# Patient Record
Sex: MALE | Race: OTHER | NOT HISPANIC OR LATINO | URBAN - METROPOLITAN AREA
[De-identification: names, ages, dates, MRNs, and addresses within clinical notes are randomized per-mention and may not be internally consistent; named-entity substitution may affect disease eponyms.]

---

## 2024-04-27 ENCOUNTER — INPATIENT (INPATIENT)
Facility: HOSPITAL | Age: 49
LOS: 10 days | Discharge: HOME | DRG: 434 | End: 2024-05-08
Attending: INTERNAL MEDICINE | Admitting: INTERNAL MEDICINE
Payer: MEDICAID

## 2024-04-27 VITALS
HEART RATE: 110 BPM | WEIGHT: 238.1 LBS | SYSTOLIC BLOOD PRESSURE: 130 MMHG | DIASTOLIC BLOOD PRESSURE: 75 MMHG | TEMPERATURE: 99 F | OXYGEN SATURATION: 93 % | RESPIRATION RATE: 17 BRPM | HEIGHT: 73 IN

## 2024-04-27 DIAGNOSIS — K70.10 ALCOHOLIC HEPATITIS WITHOUT ASCITES: ICD-10-CM

## 2024-04-27 DIAGNOSIS — K74.60 UNSPECIFIED CIRRHOSIS OF LIVER: ICD-10-CM

## 2024-04-27 DIAGNOSIS — R18.8 OTHER ASCITES: ICD-10-CM

## 2024-04-27 DIAGNOSIS — D64.9 ANEMIA, UNSPECIFIED: ICD-10-CM

## 2024-04-27 DIAGNOSIS — D69.6 THROMBOCYTOPENIA, UNSPECIFIED: ICD-10-CM

## 2024-04-27 DIAGNOSIS — Z29.9 ENCOUNTER FOR PROPHYLACTIC MEASURES, UNSPECIFIED: ICD-10-CM

## 2024-04-27 LAB
ALBUMIN SERPL ELPH-MCNC: 2.3 G/DL — LOW (ref 3.5–5)
ALP SERPL-CCNC: 123 U/L — HIGH (ref 40–120)
ALT FLD-CCNC: 37 U/L DA — SIGNIFICANT CHANGE UP (ref 10–60)
ANION GAP SERPL CALC-SCNC: 8 MMOL/L — SIGNIFICANT CHANGE UP (ref 5–17)
APAP SERPL-MCNC: <2 UG/ML — LOW (ref 10–30)
APPEARANCE UR: CLEAR — SIGNIFICANT CHANGE UP
APTT BLD: 37.5 SEC — HIGH (ref 24.5–35.6)
AST SERPL-CCNC: 81 U/L — HIGH (ref 10–40)
BACTERIA # UR AUTO: ABNORMAL /HPF
BILIRUB DIRECT SERPL-MCNC: 2.7 MG/DL — HIGH (ref 0–0.3)
BILIRUB SERPL-MCNC: 6.5 MG/DL — HIGH (ref 0.2–1.2)
BILIRUB UR-MCNC: ABNORMAL
BLD GP AB SCN SERPL QL: SIGNIFICANT CHANGE UP
BUN SERPL-MCNC: 10 MG/DL — SIGNIFICANT CHANGE UP (ref 7–18)
CALCIUM SERPL-MCNC: 8 MG/DL — LOW (ref 8.4–10.5)
CHLORIDE SERPL-SCNC: 106 MMOL/L — SIGNIFICANT CHANGE UP (ref 96–108)
CO2 SERPL-SCNC: 27 MMOL/L — SIGNIFICANT CHANGE UP (ref 22–31)
COLOR SPEC: SIGNIFICANT CHANGE UP
CREAT SERPL-MCNC: 0.78 MG/DL — SIGNIFICANT CHANGE UP (ref 0.5–1.3)
DIFF PNL FLD: NEGATIVE — SIGNIFICANT CHANGE UP
EGFR: 111 ML/MIN/1.73M2 — SIGNIFICANT CHANGE UP
EPI CELLS # UR: PRESENT
ETHANOL SERPL-MCNC: 8 MG/DL — SIGNIFICANT CHANGE UP (ref 0–10)
GLUCOSE SERPL-MCNC: 110 MG/DL — HIGH (ref 70–99)
GLUCOSE UR QL: NEGATIVE MG/DL — SIGNIFICANT CHANGE UP
HCT VFR BLD CALC: 25.6 % — LOW (ref 39–50)
HGB BLD-MCNC: 8.1 G/DL — LOW (ref 13–17)
HIV 1 & 2 AB SERPL IA.RAPID: SIGNIFICANT CHANGE UP
INR BLD: 1.97 RATIO — HIGH (ref 0.85–1.18)
KETONES UR-MCNC: 15 MG/DL
LEUKOCYTE ESTERASE UR-ACNC: ABNORMAL
LIDOCAIN IGE QN: 83 U/L — HIGH (ref 13–75)
MCHC RBC-ENTMCNC: 31.6 GM/DL — LOW (ref 32–36)
MCHC RBC-ENTMCNC: 32.4 PG — SIGNIFICANT CHANGE UP (ref 27–34)
MCV RBC AUTO: 102.4 FL — HIGH (ref 80–100)
NITRITE UR-MCNC: POSITIVE
NRBC # BLD: 0 /100 WBCS — SIGNIFICANT CHANGE UP (ref 0–0)
PH UR: 6.5 — SIGNIFICANT CHANGE UP (ref 5–8)
PLATELET # BLD AUTO: 93 K/UL — LOW (ref 150–400)
POTASSIUM SERPL-MCNC: 3 MMOL/L — LOW (ref 3.5–5.3)
POTASSIUM SERPL-SCNC: 3 MMOL/L — LOW (ref 3.5–5.3)
PROT SERPL-MCNC: 7 G/DL — SIGNIFICANT CHANGE UP (ref 6–8.3)
PROT UR-MCNC: ABNORMAL MG/DL
PROTHROM AB SERPL-ACNC: 22 SEC — HIGH (ref 9.5–13)
RBC # BLD: 2.5 M/UL — LOW (ref 4.2–5.8)
RBC # FLD: 16.1 % — HIGH (ref 10.3–14.5)
RBC CASTS # UR COMP ASSIST: 2 /HPF — SIGNIFICANT CHANGE UP (ref 0–4)
SODIUM SERPL-SCNC: 141 MMOL/L — SIGNIFICANT CHANGE UP (ref 135–145)
SP GR SPEC: 1.1 — HIGH (ref 1–1.03)
UROBILINOGEN FLD QL: >=8 MG/DL (ref 0.2–1)
WBC # BLD: 6.46 K/UL — SIGNIFICANT CHANGE UP (ref 3.8–10.5)
WBC # FLD AUTO: 6.46 K/UL — SIGNIFICANT CHANGE UP (ref 3.8–10.5)
WBC UR QL: 5 /HPF — SIGNIFICANT CHANGE UP (ref 0–5)

## 2024-04-27 PROCEDURE — 74177 CT ABD & PELVIS W/CONTRAST: CPT | Mod: 26,MC

## 2024-04-27 PROCEDURE — 71045 X-RAY EXAM CHEST 1 VIEW: CPT | Mod: 26

## 2024-04-27 PROCEDURE — 99285 EMERGENCY DEPT VISIT HI MDM: CPT

## 2024-04-27 RX ORDER — THIAMINE MONONITRATE (VIT B1) 100 MG
100 TABLET ORAL DAILY
Refills: 0 | Status: DISCONTINUED | OUTPATIENT
Start: 2024-04-27 | End: 2024-05-08

## 2024-04-27 RX ORDER — ACETAMINOPHEN 500 MG
650 TABLET ORAL EVERY 6 HOURS
Refills: 0 | Status: DISCONTINUED | OUTPATIENT
Start: 2024-04-27 | End: 2024-05-08

## 2024-04-27 RX ORDER — FUROSEMIDE 40 MG
20 TABLET ORAL DAILY
Refills: 0 | Status: DISCONTINUED | OUTPATIENT
Start: 2024-04-27 | End: 2024-05-03

## 2024-04-27 RX ORDER — LANOLIN ALCOHOL/MO/W.PET/CERES
3 CREAM (GRAM) TOPICAL AT BEDTIME
Refills: 0 | Status: DISCONTINUED | OUTPATIENT
Start: 2024-04-27 | End: 2024-04-28

## 2024-04-27 RX ORDER — POTASSIUM CHLORIDE 20 MEQ
40 PACKET (EA) ORAL ONCE
Refills: 0 | Status: COMPLETED | OUTPATIENT
Start: 2024-04-27 | End: 2024-04-27

## 2024-04-27 RX ORDER — POTASSIUM CHLORIDE 20 MEQ
10 PACKET (EA) ORAL
Refills: 0 | Status: COMPLETED | OUTPATIENT
Start: 2024-04-27 | End: 2024-04-27

## 2024-04-27 RX ORDER — FOLIC ACID 0.8 MG
1 TABLET ORAL DAILY
Refills: 0 | Status: DISCONTINUED | OUTPATIENT
Start: 2024-04-27 | End: 2024-05-08

## 2024-04-27 RX ORDER — SPIRONOLACTONE 25 MG/1
25 TABLET, FILM COATED ORAL DAILY
Refills: 0 | Status: DISCONTINUED | OUTPATIENT
Start: 2024-04-27 | End: 2024-05-03

## 2024-04-27 RX ORDER — PANTOPRAZOLE SODIUM 20 MG/1
80 TABLET, DELAYED RELEASE ORAL ONCE
Refills: 0 | Status: COMPLETED | OUTPATIENT
Start: 2024-04-27 | End: 2024-04-27

## 2024-04-27 RX ORDER — ONDANSETRON 8 MG/1
4 TABLET, FILM COATED ORAL EVERY 8 HOURS
Refills: 0 | Status: DISCONTINUED | OUTPATIENT
Start: 2024-04-27 | End: 2024-05-08

## 2024-04-27 RX ORDER — PANTOPRAZOLE SODIUM 20 MG/1
40 TABLET, DELAYED RELEASE ORAL
Refills: 0 | Status: DISCONTINUED | OUTPATIENT
Start: 2024-04-28 | End: 2024-05-08

## 2024-04-27 RX ORDER — SODIUM CHLORIDE 9 MG/ML
1000 INJECTION, SOLUTION INTRAVENOUS ONCE
Refills: 0 | Status: COMPLETED | OUTPATIENT
Start: 2024-04-27 | End: 2024-04-27

## 2024-04-27 RX ADMIN — Medication 100 MILLIEQUIVALENT(S): at 15:22

## 2024-04-27 RX ADMIN — Medication 3 MILLIGRAM(S): at 21:21

## 2024-04-27 RX ADMIN — Medication 650 MILLIGRAM(S): at 21:55

## 2024-04-27 RX ADMIN — SODIUM CHLORIDE 1000 MILLILITER(S): 9 INJECTION, SOLUTION INTRAVENOUS at 13:32

## 2024-04-27 RX ADMIN — Medication 100 MILLIEQUIVALENT(S): at 13:32

## 2024-04-27 RX ADMIN — Medication 650 MILLIGRAM(S): at 21:25

## 2024-04-27 RX ADMIN — PANTOPRAZOLE SODIUM 80 MILLIGRAM(S): 20 TABLET, DELAYED RELEASE ORAL at 12:16

## 2024-04-27 RX ADMIN — SODIUM CHLORIDE 1000 MILLILITER(S): 9 INJECTION, SOLUTION INTRAVENOUS at 12:16

## 2024-04-27 RX ADMIN — Medication 40 MILLIEQUIVALENT(S): at 13:32

## 2024-04-27 RX ADMIN — Medication 100 MILLIEQUIVALENT(S): at 16:49

## 2024-04-27 NOTE — H&P ADULT - PROBLEM SELECTOR PLAN 1
- p/w worsening new-onset ascites w/ abd pain + jaundice   - CT abd:  cirrhosis with portal hypertension, splenomegaly and ascites.  - likely in the setting of chronic ETOH use  - AST - 81, ALT - 37, ALP - 123. PT/INR - 22/1.97, Plt - 93, Alb - 2.3, SCr - 2.38  - Tbili - 6.8, Direct bili - 2.7  - MELD Na: 21 , Maddrey: 47.9.  - Start prednisone.   - Check hepatitis panel.   - F/U A1c, lipid profile.  - plan for dx and tx paracentesis - consult IR in the AM   - f/u septic w/u (ascitic analysis, BCx, UCx, and CXR)  - No tenderness on exam but will start ceftriaxone for SBP prophylaxis.   - Hepatology consulted: Dr. Vuong   - Consult GI for endoscopy on Monday. - p/w worsening new-onset ascites w/ abd pain + jaundice   - CT abd:  cirrhosis with portal hypertension, splenomegaly and ascites.  - likely in the setting of chronic ETOH use  - AST - 81, ALT - 37, ALP - 123. PT/INR - 22/1.97, Plt - 93, Alb - 2.3, SCr - 2.38  - Tbili - 6.8, Direct bili - 2.7  - MELD Na: 21 , Maddrey: 47.9.  - Start prednisone.   - Check hepatitis panel.   - F/U A1c, lipid profile.  - plan for dx and tx paracentesis - consult IR in the AM   - F/U UA, Bcx.   - No tenderness on exam but will start ceftriaxone for SBP prophylaxis.   - Hepatology consulted: Dr. Vuong   - Consult GI for endoscopy on Monday. - p/w worsening new-onset ascites w/ abd pain + jaundice   - CT abd:  cirrhosis with portal hypertension, splenomegaly and ascites.  - likely in the setting of chronic ETOH use  - AST - 81, ALT - 37, ALP - 123. PT/INR - 22/1.97, Plt - 93, Alb - 2.3, SCr - 2.38  - Tbili - 6.8, Direct bili - 2.7  - MELD Na: 21 , Maddrey: 47.9.  - Start prednisone.   - Check hepatitis panel.   - F/U A1c, lipid profile.  - plan for dx and tx paracentesis - consult IR in the AM   - F/U UA, Bcx.   - No tenderness on exam. Will hold off SBP prophylaxis until reccs from Hepatology.   - Hepatology consulted: Dr. Vuong   - Consult GI for endoscopy on Monday. - p/w worsening new-onset ascites w/ abd pain + jaundice   - CT abd:  cirrhosis with portal hypertension, splenomegaly and ascites.  - likely in the setting of chronic ETOH use  - AST - 81, ALT - 37, ALP - 123. PT/INR - 22/1.97, Plt - 93, Alb - 2.3, SCr - 2.38  - Tbili - 6.8, Direct bili - 2.7  - MELD Na: 21 , Maddrey: 47.9.  - Check hepatitis panel, and Hepatitis E, and Hepatitis B serologies.   - F/U A1c, lipid profile.  - plan for dx and tx paracentesis - consult IR in the AM   - F/U UA, Bcx.   - No tenderness on exam. Will hold off SBP prophylaxis until reccs from Hepatology.   - Hepatology consulted: Dr. Vuong   - Consult GI for endoscopy on Monday.

## 2024-04-27 NOTE — ED PROVIDER NOTE - OBJECTIVE STATEMENT
48-year-old male with no significant past medical history although does not see a doctor presents to the ED complaining of abdominal bloating and discomfort for a week and a half now.  Stool characteristics different from normal states that it is soft but it seems like it close around a hard stool.  admits heavy drinking but since have not for the past week and a half.  No Tylenol usage, no history of any hepatitis disorder.  No bleeding disorder.  No vomiting, no  fever, no rashes.

## 2024-04-27 NOTE — H&P ADULT - PROBLEM SELECTOR PLAN 3
- P/w abdominal distention and discomfort.  - CT abdomen pelvis patient has cirrhosis with portal hypertension, splenomegaly and ascites.   - Start lasix and spironolactone. - P/w Hb of 8.1 (unknown baseline)  - No signs of active bleeding.   - F/U ferritin, TIBC, total iron, reticulocyte count, haptoglobin, LDH.   - Maintain active T&S.   - Start Pantoprazole. - P/w abdominal distention and discomfort.  - CT abdomen pelvis patient has cirrhosis with portal hypertension, splenomegaly and ascites.   - Start lasix and spironolactone.  - F/U UA, Bcx.   - No tenderness on exam but will start ceftriaxone for SBP prophylaxis.  - plan for dx and tx paracentesis - consult IR in the AM

## 2024-04-27 NOTE — H&P ADULT - HISTORY OF PRESENT ILLNESS
Patient is a 47 y/o M with PMH of alcohol use disorder (has not seen a doctor for 10 years), who presented with 2 weeks history of worsening generalized abdominal pain, discomfort, and bloating. Patient reports that before this he did not feel his abdomen was getting bigger and he had no complains. Patient reports he started noticing bloating 2 weeks ago and it has worsened since then. Patient also reported that for the past few days he has also noticed that he is getting yellowing of his eyes and skin. Patient denies associated nausea/vomiting however reports decreased appetite for the past 2 weeks. Patient also complains of weakness, fatigue, malaise, Patient also denies any hematemesis, melena, hemtochezia, or BRBPR. Patient also denies moments of confusion. He also denies any SOB, chest pain, or LE swelling. He also denies easy bruising, or any bleeding. Patient reports he has been drinking alcohol since more 4-5 drinks of Vodka daily but his last drink was 2 weeks ago when his symptoms started. Patient also reports smoking a pack a day for 20-25 years. Patient denies using any illicit drugs. Patient also denies any history of transfusions, IV drug use, hepatitis B or C virus, or family history of liver disease. Patient denies any recent fevers, chills, headache, dizziness, lightheadedness, chest pain, palpitations, shortness of breath, cough, diarrhea, constipation, melena, hematochezia, dysuria, urinary frequency, or urgency. Patient denies any other complains at this time.

## 2024-04-27 NOTE — H&P ADULT - PROBLEM SELECTOR PLAN 5
- P/w Plt of 93 (unknown baseline)  - Likely 2/2 to cirrhosis. - Hold DVT prophylaxis in setting of anemia and thrombocytopenia.   - SCDs.

## 2024-04-27 NOTE — H&P ADULT - PROBLEM SELECTOR PLAN 4
- P/w Hb of 8.1 (unknown baseline)  - No signs of active bleeding.   - F/U ferritin, TIBC, total iron, reticulocyte count, haptoglobin, LDH.   - Maintain active T&S.   - Start Pantoprazole. - P/w Plt of 93 (unknown baseline)  - Likely 2/2 to cirrhosis.

## 2024-04-27 NOTE — ED PROVIDER NOTE - CLINICAL SUMMARY MEDICAL DECISION MAKING FREE TEXT BOX
48-year-old male with no significant past medical history although does not see a doctor presents to the ED complaining of abdominal bloating and discomfort for a week and a half now.  Stool characteristics different from normal states that it is soft but it seems like it close around a hard stool.  admits heavy drinking but since have not for the past week and a half.  No Tylenol usage, no history of any hepatitis disorder.  No bleeding disorder.  No vomiting, no  fever, no rashes.    likely cirrhosis due to alcohol use- labs, ct, meds, admit

## 2024-04-27 NOTE — H&P ADULT - NSHPPHYSICALEXAM_GEN_ALL_CORE
PHYSICAL EXAM:  GENERAL: NAD, speaks in full sentences, no signs of respiratory distress  HEAD:  Atraumatic, Normocephalic  EYES: EOMI, PERRLA, scleral icterus +ve  NECK: Supple, +spider angiomatas.   CHEST/LUNG: Clear to auscultation bilaterally; No wheeze; No crackles; No accessory muscles used  HEART: Regular rate and rhythm; No murmurs;   ABDOMEN: tense, Nontender, distended; Bowel sounds present; No guarding, +ve fluid thrill.   EXTREMITIES:  2+ Peripheral Pulses, No edema  PSYCH: AAOx3  NEUROLOGY: non-focal, no tremors.   SKIN: +spider angiomatas.

## 2024-04-27 NOTE — H&P ADULT - ASSESSMENT
Patient is a 49 y/o M with PMH of alcohol use disorder (has not seen a doctor for 10 years), who presented with 2 weeks history of worsening generalized abdominal pain, discomfort, and bloating. On CT abdomen pelvis patient has cirrhosis with portal hypertension, splenomegaly and ascites. Patient is being admitted for further work up and management of newly diagnosed cirrhosis w/ ascites.  Patient is a 49 y/o M with PMH of alcohol use disorder (has not seen a doctor for 10 years), who presented with 2 weeks history of worsening generalized abdominal pain, discomfort, and bloating. On CT abdomen pelvis patient has cirrhosis with portal hypertension, splenomegaly and ascites. Patient is being admitted for further work up and management of newly diagnosed cirrhosis w/ ascites.     - +sceral icterus, dye erythema, spider telectacias, tense abd, fluid shift       Patient is a 47 y/o M with PMH of alcohol use disorder (has not seen a doctor for 10 years), who presented with 2 weeks history of worsening generalized abdominal pain, discomfort, and bloating. On CT abdomen pelvis patient has cirrhosis with portal hypertension, splenomegaly and ascites. Patient is being admitted for further work up and management of newly diagnosed cirrhosis w/ ascites.

## 2024-04-27 NOTE — ED ADULT NURSE NOTE - NSFALLUNIVINTERV_ED_ALL_ED
Bed/Stretcher in lowest position, wheels locked, appropriate side rails in place/Call bell, personal items and telephone in reach/Instruct patient to call for assistance before getting out of bed/chair/stretcher/Non-slip footwear applied when patient is off stretcher/[place] to call system/Physically safe environment - no spills, clutter or unnecessary equipment/Purposeful proactive rounding/Room/bathroom lighting operational, light cord in reach

## 2024-04-27 NOTE — ED PROVIDER NOTE - CONSTITUTIONAL, MLM
normal... awake, alert, oriented to person, place, time/situation and in no apparent distress. jaundice

## 2024-04-27 NOTE — ED PROVIDER NOTE - PROGRESS NOTE DETAILS
sorensen: Workup consistent with cirrhosis.  Patient hemodynamically stable no respiratory distress.  Will admit as patient has no follow-up

## 2024-04-27 NOTE — H&P ADULT - PROBLEM SELECTOR PLAN 2
- P/w abdominal distention and discomfort.  - CT abdomen pelvis patient has cirrhosis with portal hypertension, splenomegaly and ascites.   - Start lasix and spironolactone.  - F/U UA, Bcx.   - No tenderness on exam but will start ceftriaxone for SBP prophylaxis.  - plan for dx and tx paracentesis - consult IR in the AM - Meets criteria of alcoholic hepatitis with transaminitis, jaundice, history of heavy alcohol use, elevated PT/INR,  -  Maddrey: 47.9.  - Start prednisone.   - Consulted hepatology - Dr. Vuong. - Meets criteria of alcoholic hepatitis with transaminitis, jaundice, history of heavy alcohol use, elevated PT/INR,  -  Maddrey: 47.9.  - Consulted hepatology - Dr. Vuong.  - F/U Bcx, Ua, CXR.   - Will hold off on starting steroids until 24-28 hours of negative sepsis work up.

## 2024-04-27 NOTE — ED ADULT NURSE NOTE - OBJECTIVE STATEMENT
49 y/o male ambulatory Pt  A &o x3, Pt referred to ED from Urgent Care for, abdominal pain x 1week with worsening abdominal distention.  Pt sclera is also jaundice. Pt chest pain, SOB, nausea, vomiting, dizziness, fever, cough, chills.

## 2024-04-27 NOTE — PATIENT PROFILE ADULT - FALL HARM RISK - RISK INTERVENTIONS

## 2024-04-28 LAB
A1C WITH ESTIMATED AVERAGE GLUCOSE RESULT: 4.1 % — SIGNIFICANT CHANGE UP (ref 4–5.6)
ALBUMIN SERPL ELPH-MCNC: 2 G/DL — LOW (ref 3.5–5)
ALP SERPL-CCNC: 131 U/L — HIGH (ref 40–120)
ALT FLD-CCNC: 32 U/L DA — SIGNIFICANT CHANGE UP (ref 10–60)
ANION GAP SERPL CALC-SCNC: 5 MMOL/L — SIGNIFICANT CHANGE UP (ref 5–17)
AST SERPL-CCNC: 67 U/L — HIGH (ref 10–40)
BILIRUB DIRECT SERPL-MCNC: 2.2 MG/DL — HIGH (ref 0–0.3)
BILIRUB INDIRECT FLD-MCNC: 1.5 MG/DL — HIGH (ref 0.2–1)
BILIRUB SERPL-MCNC: 3.7 MG/DL — HIGH (ref 0.2–1.2)
BUN SERPL-MCNC: 9 MG/DL — SIGNIFICANT CHANGE UP (ref 7–18)
CALCIUM SERPL-MCNC: 8 MG/DL — LOW (ref 8.4–10.5)
CHLORIDE SERPL-SCNC: 108 MMOL/L — SIGNIFICANT CHANGE UP (ref 96–108)
CHOLEST SERPL-MCNC: 74 MG/DL — SIGNIFICANT CHANGE UP
CO2 SERPL-SCNC: 28 MMOL/L — SIGNIFICANT CHANGE UP (ref 22–31)
CREAT SERPL-MCNC: 0.66 MG/DL — SIGNIFICANT CHANGE UP (ref 0.5–1.3)
EGFR: 116 ML/MIN/1.73M2 — SIGNIFICANT CHANGE UP
ESTIMATED AVERAGE GLUCOSE: 71 MG/DL — SIGNIFICANT CHANGE UP (ref 68–114)
FERRITIN SERPL-MCNC: 202 NG/ML — SIGNIFICANT CHANGE UP (ref 30–400)
GLUCOSE SERPL-MCNC: 101 MG/DL — HIGH (ref 70–99)
HAPTOGLOB SERPL-MCNC: 40 MG/DL — SIGNIFICANT CHANGE UP (ref 34–200)
HAV IGM SER-ACNC: SIGNIFICANT CHANGE UP
HBV CORE AB SER-ACNC: SIGNIFICANT CHANGE UP
HBV CORE IGM SER-ACNC: SIGNIFICANT CHANGE UP
HBV CORE IGM SER-ACNC: SIGNIFICANT CHANGE UP
HBV SURFACE AB SER-ACNC: SIGNIFICANT CHANGE UP
HBV SURFACE AG SER-ACNC: SIGNIFICANT CHANGE UP
HBV SURFACE AG SER-ACNC: SIGNIFICANT CHANGE UP
HCT VFR BLD CALC: 22.3 % — LOW (ref 39–50)
HCT VFR BLD CALC: 22.8 % — LOW (ref 39–50)
HCV AB S/CO SERPL IA: 0.64 S/CO — SIGNIFICANT CHANGE UP (ref 0–0.99)
HCV AB S/CO SERPL IA: 0.68 S/CO — SIGNIFICANT CHANGE UP (ref 0–0.99)
HCV AB SERPL-IMP: SIGNIFICANT CHANGE UP
HCV AB SERPL-IMP: SIGNIFICANT CHANGE UP
HDLC SERPL-MCNC: 31 MG/DL — LOW
HGB BLD-MCNC: 7 G/DL — CRITICAL LOW (ref 13–17)
HGB BLD-MCNC: 7.3 G/DL — LOW (ref 13–17)
IRON SATN MFR SERPL: 13 UG/DL — LOW (ref 65–170)
IRON SATN MFR SERPL: 6 % — LOW (ref 20–55)
LDH SERPL L TO P-CCNC: 163 U/L — SIGNIFICANT CHANGE UP (ref 120–225)
LIPID PNL WITH DIRECT LDL SERPL: 33 MG/DL — SIGNIFICANT CHANGE UP
MAGNESIUM SERPL-MCNC: 1.7 MG/DL — SIGNIFICANT CHANGE UP (ref 1.6–2.6)
MCHC RBC-ENTMCNC: 31.4 GM/DL — LOW (ref 32–36)
MCHC RBC-ENTMCNC: 32 GM/DL — SIGNIFICANT CHANGE UP (ref 32–36)
MCHC RBC-ENTMCNC: 32.3 PG — SIGNIFICANT CHANGE UP (ref 27–34)
MCHC RBC-ENTMCNC: 32.6 PG — SIGNIFICANT CHANGE UP (ref 27–34)
MCV RBC AUTO: 101.8 FL — HIGH (ref 80–100)
MCV RBC AUTO: 102.8 FL — HIGH (ref 80–100)
NON HDL CHOLESTEROL: 43 MG/DL — SIGNIFICANT CHANGE UP
NRBC # BLD: 0 /100 WBCS — SIGNIFICANT CHANGE UP (ref 0–0)
NRBC # BLD: 0 /100 WBCS — SIGNIFICANT CHANGE UP (ref 0–0)
PHOSPHATE SERPL-MCNC: 2.4 MG/DL — LOW (ref 2.5–4.5)
PLATELET # BLD AUTO: 74 K/UL — LOW (ref 150–400)
PLATELET # BLD AUTO: 74 K/UL — LOW (ref 150–400)
POTASSIUM SERPL-MCNC: 3.6 MMOL/L — SIGNIFICANT CHANGE UP (ref 3.5–5.3)
POTASSIUM SERPL-SCNC: 3.6 MMOL/L — SIGNIFICANT CHANGE UP (ref 3.5–5.3)
PROT SERPL-MCNC: 6.3 G/DL — SIGNIFICANT CHANGE UP (ref 6–8.3)
RBC # BLD: 2.17 M/UL — LOW (ref 4.2–5.8)
RBC # BLD: 2.17 M/UL — LOW (ref 4.2–5.8)
RBC # BLD: 2.24 M/UL — LOW (ref 4.2–5.8)
RBC # FLD: 16.2 % — HIGH (ref 10.3–14.5)
RBC # FLD: 16.3 % — HIGH (ref 10.3–14.5)
RETICS #: 82.5 K/UL — SIGNIFICANT CHANGE UP (ref 25–125)
RETICS/RBC NFR: 3.8 % — HIGH (ref 0.5–2.5)
SODIUM SERPL-SCNC: 141 MMOL/L — SIGNIFICANT CHANGE UP (ref 135–145)
TIBC SERPL-MCNC: 221 UG/DL — LOW (ref 250–450)
TRIGL SERPL-MCNC: 52 MG/DL — SIGNIFICANT CHANGE UP
UIBC SERPL-MCNC: 208 UG/DL — SIGNIFICANT CHANGE UP (ref 110–370)
WBC # BLD: 5.23 K/UL — SIGNIFICANT CHANGE UP (ref 3.8–10.5)
WBC # BLD: 5.78 K/UL — SIGNIFICANT CHANGE UP (ref 3.8–10.5)
WBC # FLD AUTO: 5.23 K/UL — SIGNIFICANT CHANGE UP (ref 3.8–10.5)
WBC # FLD AUTO: 5.78 K/UL — SIGNIFICANT CHANGE UP (ref 3.8–10.5)

## 2024-04-28 RX ORDER — LANOLIN ALCOHOL/MO/W.PET/CERES
5 CREAM (GRAM) TOPICAL AT BEDTIME
Refills: 0 | Status: COMPLETED | OUTPATIENT
Start: 2024-04-28 | End: 2024-05-05

## 2024-04-28 RX ADMIN — SPIRONOLACTONE 25 MILLIGRAM(S): 25 TABLET, FILM COATED ORAL at 05:24

## 2024-04-28 RX ADMIN — Medication 1 MILLIGRAM(S): at 11:58

## 2024-04-28 RX ADMIN — Medication 20 MILLIGRAM(S): at 05:24

## 2024-04-28 RX ADMIN — PANTOPRAZOLE SODIUM 40 MILLIGRAM(S): 20 TABLET, DELAYED RELEASE ORAL at 05:24

## 2024-04-28 RX ADMIN — Medication 100 MILLIGRAM(S): at 11:58

## 2024-04-28 RX ADMIN — Medication 5 MILLIGRAM(S): at 23:37

## 2024-04-28 NOTE — PROGRESS NOTE ADULT - PROBLEM SELECTOR PLAN 2
- Meets criteria of alcoholic hepatitis with transaminitis, jaundice, history of heavy alcohol use, elevated PT/INR,  -hepatology eval

## 2024-04-28 NOTE — PROGRESS NOTE ADULT - ASSESSMENT
Patient is a 49 y/o M with PMH of alcohol use disorder (has not seen a doctor for 10 years), who presented with 2 weeks history of worsening generalized abdominal pain, discomfort, and bloating. On CT abdomen pelvis patient has cirrhosis with portal hypertension, splenomegaly and ascites. Patient is being admitted for further work up and management of newly diagnosed cirrhosis w/ ascites.

## 2024-04-28 NOTE — CHART NOTE - NSCHARTNOTEFT_GEN_A_CORE
++++++++++++++++++INCOMPLETE+++++++++++++++ EVENT:  UA positive.      Vital Signs Last 24 Hrs  T(C): 36.8 (28 Apr 2024 09:01), Max: 37.4 (28 Apr 2024 01:00)  T(F): 98.2 (28 Apr 2024 09:01), Max: 99.3 (28 Apr 2024 01:00)  HR: 97 (28 Apr 2024 09:01) (89 - 110)  BP: 114/67 (28 Apr 2024 09:01) (108/50 - 130/75)  BP(mean): 69 (28 Apr 2024 01:00) (69 - 69)  RR: 18 (28 Apr 2024 09:01) (16 - 19)  SpO2: 95% (28 Apr 2024 09:01) (93% - 97%)    Parameters below as of 28 Apr 2024 09:01  Patient On (Oxygen Delivery Method): nasal cannula  O2 Flow (L/min): 2      LABS:                        7.0    5.78  )-----------( 74       ( 28 Apr 2024 05:45 )             22.3     04-28    141  |  108  |  9   ----------------------------<  101<H>  3.6   |  28  |  0.66    Ca    8.0<L>      28 Apr 2024 05:45  Phos  2.4     04-28  Mg     1.7     04-28    TPro  6.3  /  Alb  2.0<L>  /  TBili  3.7<H>  /  DBili  2.2<H>  /  AST  67<H>  /  ALT  32  /  AlkPhos  131<H>  04-28      EKG:   IMAGING:    ASSESSMENT:  47 y/o, M, with PMH of Alcohol use disorder (has not seen a doctor for 10 years), who presented with 2 weeks history of worsening generalized abdominal pain, discomfort, and bloating.  Admitted newly diagnosed cirrhosis w/ ascites.    - UA positive    - According to system no Ucx received in lab.    - NP ordered Ucx that was cancelled by lab.  NP called lab to inquire why test was cancelled and  stated the system cancelled test.    - Holding abx in asymptomatic pt in s/o decompensated cirrhosis.    - Continue to monitor and start abx.     - F/u Ucx. EVENT:  UA positive.      Vital Signs Last 24 Hrs  T(C): 36.8 (28 Apr 2024 09:01), Max: 37.4 (28 Apr 2024 01:00)  T(F): 98.2 (28 Apr 2024 09:01), Max: 99.3 (28 Apr 2024 01:00)  HR: 97 (28 Apr 2024 09:01) (89 - 110)  BP: 114/67 (28 Apr 2024 09:01) (108/50 - 130/75)  BP(mean): 69 (28 Apr 2024 01:00) (69 - 69)  RR: 18 (28 Apr 2024 09:01) (16 - 19)  SpO2: 95% (28 Apr 2024 09:01) (93% - 97%)    Parameters below as of 28 Apr 2024 09:01  Patient On (Oxygen Delivery Method): nasal cannula  O2 Flow (L/min): 2      LABS:                        7.0    5.78  )-----------( 74       ( 28 Apr 2024 05:45 )             22.3     04-28    141  |  108  |  9   ----------------------------<  101<H>  3.6   |  28  |  0.66    Ca    8.0<L>      28 Apr 2024 05:45  Phos  2.4     04-28  Mg     1.7     04-28    TPro  6.3  /  Alb  2.0<L>  /  TBili  3.7<H>  /  DBili  2.2<H>  /  AST  67<H>  /  ALT  32  /  AlkPhos  131<H>  04-28      EKG:   IMAGING:    ASSESSMENT:  47 y/o, M, with PMH of Alcohol use disorder (has not seen a doctor for 10 years), who presented with 2 weeks history of worsening generalized abdominal pain, discomfort, and bloating.  Admitted newly diagnosed cirrhosis w/ ascites.    - UA positive.    - According to system no Ucx received in lab.    - NP ordered Ucx that was cancelled by lab.  NP called lab to inquire why test was cancelled and  stated the system cancelled test.    - Holding abx in asymptomatic pt in s/o decompensated cirrhosis.    - Continue to monitor and start abx.     - F/u Ucx.    Of note, NP discussed bedside Para w/ ICU NP.  F/u ICU NP or IR on Mon for Para. EVENT:  UA positive.      Vital Signs Last 24 Hrs  T(C): 36.8 (28 Apr 2024 09:01), Max: 37.4 (28 Apr 2024 01:00)  T(F): 98.2 (28 Apr 2024 09:01), Max: 99.3 (28 Apr 2024 01:00)  HR: 97 (28 Apr 2024 09:01) (89 - 110)  BP: 114/67 (28 Apr 2024 09:01) (108/50 - 130/75)  BP(mean): 69 (28 Apr 2024 01:00) (69 - 69)  RR: 18 (28 Apr 2024 09:01) (16 - 19)  SpO2: 95% (28 Apr 2024 09:01) (93% - 97%)    Parameters below as of 28 Apr 2024 09:01  Patient On (Oxygen Delivery Method): nasal cannula  O2 Flow (L/min): 2      LABS:                        7.0    5.78  )-----------( 74       ( 28 Apr 2024 05:45 )             22.3     04-28    141  |  108  |  9   ----------------------------<  101<H>  3.6   |  28  |  0.66    Ca    8.0<L>      28 Apr 2024 05:45  Phos  2.4     04-28  Mg     1.7     04-28    TPro  6.3  /  Alb  2.0<L>  /  TBili  3.7<H>  /  DBili  2.2<H>  /  AST  67<H>  /  ALT  32  /  AlkPhos  131<H>  04-28      EKG:   IMAGING:    ASSESSMENT:  49 y/o, M, with PMH of Alcohol use disorder (has not seen a doctor for 10 years), who presented with 2 weeks history of worsening generalized abdominal pain, discomfort, and bloating.  Admitted for newly diagnosed cirrhosis w/ ascites.    - UA positive.    - According to system no Ucx received in lab.    - NP ordered Ucx that was cancelled by lab.  NP called lab to inquire why test was cancelled and  stated the system cancelled test.    - Holding abx in asymptomatic pt in s/o decompensated cirrhosis.    - Continue to monitor and start abx.     - F/u Ucx.    Of note, NP discussed bedside Para w/ ICU NP.  F/u ICU NP or IR on Mon for Para.

## 2024-04-28 NOTE — PROGRESS NOTE ADULT - SUBJECTIVE AND OBJECTIVE BOX
SUBJECTIVE / OVERNIGHT EVENTS:pt seen and examined  04-28-24    MEDICATIONS  (STANDING):  folic acid 1 milliGRAM(s) Oral daily  furosemide    Tablet 20 milliGRAM(s) Oral daily  pantoprazole    Tablet 40 milliGRAM(s) Oral before breakfast  spironolactone 25 milliGRAM(s) Oral daily  thiamine 100 milliGRAM(s) Oral daily    MEDICATIONS  (PRN):  acetaminophen     Tablet .. 650 milliGRAM(s) Oral every 6 hours PRN Temp greater or equal to 38C (100.4F), Mild Pain (1 - 3)  LORazepam   Injectable 1 milliGRAM(s) IV Push every 2 hours PRN CIWA-Ar score increase by 2 points and a total score of 7 or less  LORazepam   Injectable 1 milliGRAM(s) IV Push every 1 hour PRN CIWA-Ar score 8 or greater  melatonin 5 milliGRAM(s) Oral at bedtime PRN Insomnia  ondansetron Injectable 4 milliGRAM(s) IV Push every 8 hours PRN Nausea and/or Vomiting    T(C): 37.1 (04-28-24 @ 21:00), Max: 37.4 (04-28-24 @ 01:00)  HR: 87 (04-28-24 @ 21:00) (87 - 97)  BP: 126/71 (04-28-24 @ 21:00) (108/50 - 126/71)  RR: 18 (04-28-24 @ 21:00) (17 - 18)  SpO2: 97% (04-28-24 @ 21:00) (93% - 97%)    CAPILLARY BLOOD GLUCOSE        I&O's Summary      Constitutional: No fever, fatigue  Skin: No rash.  Eyes: No recent vision problems or eye pain.  ENT: No congestion, ear pain, or sore throat.  Cardiovascular: No chest pain or palpation.  Respiratory: No cough, shortness of breath, congestion, or wheezing.  Gastrointestinal: No abdominal pain, nausea, vomiting, or diarrhea.  Genitourinary: No dysuria.  Musculoskeletal: No joint swelling.  Neurologic: No headache.    PHYSICAL EXAM:  GENERAL: NAD  EYES: EOMI, PERRLA  NECK: Supple, No JVD  CHEST/LUNG: dec breath sounds at bases  HEART:  S1 , S2 +  ABDOMEN: soft , bs+, distension+, no tenderness  EXTREMITIES:  no edema,   NEUROLOGY:alert awake      LABS:                        7.3    5.23  )-----------( 74       ( 28 Apr 2024 13:50 )             22.8     04-28    141  |  108  |  9   ----------------------------<  101<H>  3.6   |  28  |  0.66    Ca    8.0<L>      28 Apr 2024 05:45  Phos  2.4     04-28  Mg     1.7     04-28    TPro  6.3  /  Alb  2.0<L>  /  TBili  3.7<H>  /  DBili  2.2<H>  /  AST  67<H>  /  ALT  32  /  AlkPhos  131<H>  04-28    PT/INR - ( 27 Apr 2024 12:10 )   PT: 22.0 sec;   INR: 1.97 ratio         PTT - ( 27 Apr 2024 12:10 )  PTT:37.5 sec      Urinalysis Basic - ( 28 Apr 2024 05:45 )    Color: x / Appearance: x / SG: x / pH: x  Gluc: 101 mg/dL / Ketone: x  / Bili: x / Urobili: x   Blood: x / Protein: x / Nitrite: x   Leuk Esterase: x / RBC: x / WBC x   Sq Epi: x / Non Sq Epi: x / Bacteria: x        RADIOLOGY & ADDITIONAL TESTS:    Imaging Personally Reviewed:    Consultant(s) Notes Reviewed:      Care Discussed with Consultants/Other Providers:

## 2024-04-28 NOTE — PROGRESS NOTE ADULT - PROBLEM SELECTOR PLAN 1
- p/w worsening new-onset ascites w/ abd pain + jaundice   - CT abd:  cirrhosis with portal hypertension, splenomegaly and ascites.  - likely in the setting of chronic ETOH use  - AST - 81, ALT - 37, ALP - 123. PT/INR - 22/1.97, Plt - 93, Alb - 2.3, SCr - 2.38  - Tbili - 6.8, Direct bili - 2.7  - MELD Na: 21 , Maddrey: 47.9.  - Check hepatitis panel, and Hepatitis E, and Hepatitis B serologies.   - F/U A1c, lipid profile.  - plan for dx and tx paracentesis - consult IR in the AM   - F/U UA, Bcx.   - No tenderness on exam. Will hold off SBP prophylaxis until reccs from Hepatology.   - Hepatology consulted: Dr. Vuong   - Consult GI for endoscopy on Monday.

## 2024-04-28 NOTE — PROGRESS NOTE ADULT - PROBLEM SELECTOR PLAN 3
- P/w abdominal distention and discomfort.  - CT abdomen pelvis patient has cirrhosis with portal hypertension, splenomegaly and ascites.   - Start lasix and spironolactone.  - F/U UA, Bcx.   - No tenderness on exam but will start ceftriaxone for SBP prophylaxis.  - plan for dx and tx paracentesis - consult IR in the AM

## 2024-04-28 NOTE — PROGRESS NOTE ADULT - PROBLEM SELECTOR PLAN 4
- P/w Hb of 8.1 (unknown baseline)  - No signs of active bleeding.   - F/U ferritin, TIBC, total iron, reticulocyte count, haptoglobin, LDH.   - Maintain active T&S.    Pantoprazole.

## 2024-04-29 DIAGNOSIS — Z75.8 OTHER PROBLEMS RELATED TO MEDICAL FACILITIES AND OTHER HEALTH CARE: ICD-10-CM

## 2024-04-29 LAB
ALBUMIN SERPL ELPH-MCNC: 2 G/DL — LOW (ref 3.5–5)
ALP SERPL-CCNC: 135 U/L — HIGH (ref 40–120)
ALT FLD-CCNC: 30 U/L DA — SIGNIFICANT CHANGE UP (ref 10–60)
ANION GAP SERPL CALC-SCNC: 6 MMOL/L — SIGNIFICANT CHANGE UP (ref 5–17)
APTT BLD: 39.5 SEC — HIGH (ref 24.5–35.6)
AST SERPL-CCNC: 66 U/L — HIGH (ref 10–40)
BASOPHILS # BLD AUTO: 0.04 K/UL — SIGNIFICANT CHANGE UP (ref 0–0.2)
BASOPHILS NFR BLD AUTO: 0.7 % — SIGNIFICANT CHANGE UP (ref 0–2)
BILIRUB DIRECT SERPL-MCNC: 1.7 MG/DL — HIGH (ref 0–0.3)
BILIRUB INDIRECT FLD-MCNC: 1.6 MG/DL — HIGH (ref 0.2–1)
BILIRUB SERPL-MCNC: 3.3 MG/DL — HIGH (ref 0.2–1.2)
BUN SERPL-MCNC: 8 MG/DL — SIGNIFICANT CHANGE UP (ref 7–18)
CALCIUM SERPL-MCNC: 7.6 MG/DL — LOW (ref 8.4–10.5)
CHLORIDE SERPL-SCNC: 107 MMOL/L — SIGNIFICANT CHANGE UP (ref 96–108)
CO2 SERPL-SCNC: 28 MMOL/L — SIGNIFICANT CHANGE UP (ref 22–31)
CREAT SERPL-MCNC: 0.6 MG/DL — SIGNIFICANT CHANGE UP (ref 0.5–1.3)
EGFR: 120 ML/MIN/1.73M2 — SIGNIFICANT CHANGE UP
EOSINOPHIL # BLD AUTO: 0.16 K/UL — SIGNIFICANT CHANGE UP (ref 0–0.5)
EOSINOPHIL NFR BLD AUTO: 2.9 % — SIGNIFICANT CHANGE UP (ref 0–6)
GLUCOSE SERPL-MCNC: 89 MG/DL — SIGNIFICANT CHANGE UP (ref 70–99)
HBV E AB SER-ACNC: SIGNIFICANT CHANGE UP
HBV E AG SER-ACNC: SIGNIFICANT CHANGE UP
HCT VFR BLD CALC: 22.6 % — LOW (ref 39–50)
HGB BLD-MCNC: 7.2 G/DL — LOW (ref 13–17)
IMM GRANULOCYTES NFR BLD AUTO: 0.2 % — SIGNIFICANT CHANGE UP (ref 0–0.9)
INR BLD: 2.05 RATIO — HIGH (ref 0.85–1.18)
LYMPHOCYTES # BLD AUTO: 2.18 K/UL — SIGNIFICANT CHANGE UP (ref 1–3.3)
LYMPHOCYTES # BLD AUTO: 39.4 % — SIGNIFICANT CHANGE UP (ref 13–44)
MAGNESIUM SERPL-MCNC: 1.7 MG/DL — SIGNIFICANT CHANGE UP (ref 1.6–2.6)
MCHC RBC-ENTMCNC: 31.9 GM/DL — LOW (ref 32–36)
MCHC RBC-ENTMCNC: 32.6 PG — SIGNIFICANT CHANGE UP (ref 27–34)
MCV RBC AUTO: 102.3 FL — HIGH (ref 80–100)
MELD SCORE WITH DIALYSIS: 32 POINTS — SIGNIFICANT CHANGE UP
MELD SCORE WITHOUT DIALYSIS: 19 POINTS — SIGNIFICANT CHANGE UP
MONOCYTES # BLD AUTO: 0.74 K/UL — SIGNIFICANT CHANGE UP (ref 0–0.9)
MONOCYTES NFR BLD AUTO: 13.4 % — SIGNIFICANT CHANGE UP (ref 2–14)
NEUTROPHILS # BLD AUTO: 2.41 K/UL — SIGNIFICANT CHANGE UP (ref 1.8–7.4)
NEUTROPHILS NFR BLD AUTO: 43.4 % — SIGNIFICANT CHANGE UP (ref 43–77)
NRBC # BLD: 0 /100 WBCS — SIGNIFICANT CHANGE UP (ref 0–0)
PHOSPHATE SERPL-MCNC: 2.9 MG/DL — SIGNIFICANT CHANGE UP (ref 2.5–4.5)
PLATELET # BLD AUTO: 77 K/UL — LOW (ref 150–400)
POTASSIUM SERPL-MCNC: 3.4 MMOL/L — LOW (ref 3.5–5.3)
POTASSIUM SERPL-SCNC: 3.4 MMOL/L — LOW (ref 3.5–5.3)
PROT SERPL-MCNC: 6.3 G/DL — SIGNIFICANT CHANGE UP (ref 6–8.3)
PROTHROM AB SERPL-ACNC: 22.9 SEC — HIGH (ref 9.5–13)
RBC # BLD: 2.21 M/UL — LOW (ref 4.2–5.8)
RBC # FLD: 16.7 % — HIGH (ref 10.3–14.5)
SODIUM SERPL-SCNC: 141 MMOL/L — SIGNIFICANT CHANGE UP (ref 135–145)
WBC # BLD: 5.54 K/UL — SIGNIFICANT CHANGE UP (ref 3.8–10.5)
WBC # FLD AUTO: 5.54 K/UL — SIGNIFICANT CHANGE UP (ref 3.8–10.5)

## 2024-04-29 PROCEDURE — 99223 1ST HOSP IP/OBS HIGH 75: CPT

## 2024-04-29 RX ORDER — PHYTONADIONE (VIT K1) 5 MG
5 TABLET ORAL ONCE
Refills: 0 | Status: COMPLETED | OUTPATIENT
Start: 2024-04-29 | End: 2024-04-29

## 2024-04-29 RX ADMIN — Medication 101 MILLIGRAM(S): at 13:38

## 2024-04-29 RX ADMIN — Medication 20 MILLIGRAM(S): at 05:32

## 2024-04-29 RX ADMIN — Medication 650 MILLIGRAM(S): at 21:23

## 2024-04-29 RX ADMIN — Medication 5 MILLIGRAM(S): at 22:04

## 2024-04-29 RX ADMIN — SPIRONOLACTONE 25 MILLIGRAM(S): 25 TABLET, FILM COATED ORAL at 05:32

## 2024-04-29 RX ADMIN — Medication 100 MILLIGRAM(S): at 11:27

## 2024-04-29 RX ADMIN — Medication 1 MILLIGRAM(S): at 11:28

## 2024-04-29 RX ADMIN — Medication 650 MILLIGRAM(S): at 21:53

## 2024-04-29 RX ADMIN — PANTOPRAZOLE SODIUM 40 MILLIGRAM(S): 20 TABLET, DELAYED RELEASE ORAL at 05:32

## 2024-04-29 NOTE — PROGRESS NOTE ADULT - PROBLEM SELECTOR PLAN 2
- Meets criteria of alcoholic hepatitis with transaminitis, jaundice, history of heavy alcohol use, elevated PT/INR,  -  Maddrey: 47.9.  - Consulted hepatology - Dr. Vuong.  - F/U Bcx, Ua, CXR.   - Will hold off on starting steroids until 24-28 hours of negative sepsis work up. - P/w abdominal distention and discomfort.  - CT abdomen pelvis patient has cirrhosis with portal hypertension, splenomegaly and ascites.   - Start lasix and spironolactone.  - F/U UA, Bcx.   - No tenderness on exam but will start ceftriaxone for SBP prophylaxis.  - plan for dx and tx paracentesis in AM

## 2024-04-29 NOTE — PROGRESS NOTE ADULT - PROBLEM SELECTOR PLAN 4
- P/w Hb of 8.1 (unknown baseline)  - No signs of active bleeding.   - F/U ferritin, TIBC, total iron, reticulocyte count, haptoglobin, LDH.   - Maintain active T&S.   - Start Pantoprazole. - HBG downtrending   - concern for black stool  - F/U ferritin, TIBC, total iron, reticulocyte count, haptoglobin, LDH.   - Maintain active T&S.   - Start Pantoprazole   - GI consulted

## 2024-04-29 NOTE — PROGRESS NOTE ADULT - PROBLEM SELECTOR PLAN 1
- p/w worsening new-onset ascites w/ abd pain + jaundice   - CT abd:  cirrhosis with portal hypertension, splenomegaly and ascites.  - likely in the setting of chronic ETOH use  - AST - 81, ALT - 37, ALP - 123. PT/INR - 22/1.97, Plt - 93, Alb - 2.3, SCr - 2.38  - Tbili - 6.8, Direct bili - 2.7  - MELD Na: 21 , Maddrey: 47.9.  - Check hepatitis panel, and Hepatitis E, and Hepatitis B serologies.   - F/U A1c, lipid profile.  - plan for dx and tx paracentesis in AM   - F/U UA, Bcx.   - No tenderness on exam. Will hold off SBP prophylaxis until reccs from Hepatology.   - Hepatology consulted: Dr. Vuong   - GI consulted for endoscopy plan for 5/1

## 2024-04-29 NOTE — CONSULT NOTE ADULT - SUBJECTIVE AND OBJECTIVE BOX
Chief Complaint:  Patient is a 48y old  Male who presents with a chief complaint of New cirrhosis (28 Apr 2024 10:49)      HPI:WHITEABENA is a 48y Male    PMHX/PSHX:    Allergies:  No Known Allergies      Home Medications: reviewed  Hospital Medications:  acetaminophen     Tablet .. 650 milliGRAM(s) Oral every 6 hours PRN  folic acid 1 milliGRAM(s) Oral daily  furosemide    Tablet 20 milliGRAM(s) Oral daily  LORazepam   Injectable 1 milliGRAM(s) IV Push every 2 hours PRN  LORazepam   Injectable 1 milliGRAM(s) IV Push every 1 hour PRN  melatonin 5 milliGRAM(s) Oral at bedtime PRN  ondansetron Injectable 4 milliGRAM(s) IV Push every 8 hours PRN  pantoprazole    Tablet 40 milliGRAM(s) Oral before breakfast  spironolactone 25 milliGRAM(s) Oral daily  thiamine 100 milliGRAM(s) Oral daily      Social History:   Tob: Denies  EtOH: Denies  Illicit Drugs: Denies    Family history:    Denies family history of colon cancer/polyps, stomach cancer/polyps, pancreatic cancer/masses, liver cancer/disease, ovarian cancer and endometrial cancer.    ROS:   General:  No  fevers, chills, night sweats, fatigue  Eyes:  Good vision, no reported pain  ENT:  No sore throat, pain, runny nose  CV:  No pain, palpitations  Pulm:  No dyspnea, cough  GI:  See HPI, otherwise negative  :  No  incontinence, nocturia  Muscle:  No pain, weakness  Neuro:  No memory problems  Psych:  No insomnia, mood problems, depression  Endocrine:  No polyuria, polydipsia, cold/heat intolerance  Heme:  No petechiae, ecchymosis, easy bruisability  Skin:  No rash    PHYSICAL EXAM:   Vital Signs:  Vital Signs Last 24 Hrs  T(C): 36.6 (29 Apr 2024 04:59), Max: 37.2 (29 Apr 2024 01:03)  T(F): 97.9 (29 Apr 2024 04:59), Max: 98.9 (29 Apr 2024 01:03)  HR: 85 (29 Apr 2024 04:59) (85 - 91)  BP: 121/70 (29 Apr 2024 04:59) (113/64 - 127/62)  BP(mean): 87 (29 Apr 2024 04:59) (82 - 87)  RR: 18 (29 Apr 2024 04:59) (18 - 18)  SpO2: 95% (29 Apr 2024 04:59) (95% - 97%)    Parameters below as of 29 Apr 2024 04:59  Patient On (Oxygen Delivery Method): room air  O2 Flow (L/min): 2    Daily     Daily     GENERAL: no acute distress  NEURO: alert, no asterixis  HEENT: anicteric sclera, no conjunctival pallor appreciated  CHEST: no respiratory distress, no accessory muscle use  CARDIAC: regular rate, rhythm  ABDOMEN: soft, non-tender, non-distended, no rebound or guarding  EXTREMITIES: warm, well perfused, no edema  SKIN: no lesions noted    LABS: reviewed                        7.2    5.54  )-----------( 77       ( 29 Apr 2024 07:03 )             22.6     04-29    141  |  107  |  8   ----------------------------<  89  3.4<L>   |  28  |  0.60    Ca    7.6<L>      29 Apr 2024 07:03  Phos  2.9     04-29  Mg     1.7     04-29    TPro  6.3  /  Alb  2.0<L>  /  TBili  3.3<H>  /  DBili  1.7<H>  /  AST  66<H>  /  ALT  30  /  AlkPhos  135<H>  04-29    LIVER FUNCTIONS - ( 29 Apr 2024 07:03 )  Alb: 2.0 g/dL / Pro: 6.3 g/dL / ALK PHOS: 135 U/L / ALT: 30 U/L DA / AST: 66 U/L / GGT: x             Culture - Blood (collected 27 Apr 2024 17:30)  Source: .Blood Blood-Peripheral  Preliminary Report (28 Apr 2024 21:01):    No growth at 24 hours    Urinalysis with Rflx Culture (collected 27 Apr 2024 17:30)    Culture - Blood (collected 27 Apr 2024 17:20)  Source: .Blood Blood-Peripheral  Preliminary Report (28 Apr 2024 21:01):    No growth at 24 hours        Diagnostic Studies: see sunrise for full report         Chief Complaint:  Patient is a 48y old  Male who presents with a chief complaint of New cirrhosis (2024 10:49)      HPI:  WHITE, ABENA is a 48y Male w/ Hx of AUD ( 4-5 drinks/day, Vodka, last drink 2 weeks prior to admission), smoking (20-25 years) presented to [facility] ER w/ 2 weeks progressively worsening abdominal distention,  appetite, malaise, generalized weakness, 2 days Hx of jaundice, and was admitted w/ newly diagnosed cirrhosis w/ CSPH, decompensated w/ ascites.       PMHX/PSHX:    Allergies:  No Known Allergies      Home Medications: reviewed  Hospital Medications:  acetaminophen     Tablet .. 650 milliGRAM(s) Oral every 6 hours PRN  folic acid 1 milliGRAM(s) Oral daily  furosemide    Tablet 20 milliGRAM(s) Oral daily  LORazepam   Injectable 1 milliGRAM(s) IV Push every 2 hours PRN  LORazepam   Injectable 1 milliGRAM(s) IV Push every 1 hour PRN  melatonin 5 milliGRAM(s) Oral at bedtime PRN  ondansetron Injectable 4 milliGRAM(s) IV Push every 8 hours PRN  pantoprazole    Tablet 40 milliGRAM(s) Oral before breakfast  spironolactone 25 milliGRAM(s) Oral daily  thiamine 100 milliGRAM(s) Oral daily      Social History:   Tob: Denies  EtOH: Denies  Illicit Drugs: Denies    Family history:    Denies family history of colon cancer/polyps, stomach cancer/polyps, pancreatic cancer/masses, liver cancer/disease, ovarian cancer and endometrial cancer.    ROS:   General:  No  fevers, chills, night sweats, fatigue  Eyes:  Good vision, no reported pain  ENT:  No sore throat, pain, runny nose  CV:  No pain, palpitations  Pulm:  No dyspnea, cough  GI:  See HPI, otherwise negative  :  No  incontinence, nocturia  Muscle:  No pain, weakness  Neuro:  No memory problems  Psych:  No insomnia, mood problems, depression  Endocrine:  No polyuria, polydipsia, cold/heat intolerance  Heme:  No petechiae, ecchymosis, easy bruisability  Skin:  No rash    PHYSICAL EXAM:   Vital Signs:  Vital Signs Last 24 Hrs  T(C): 36.6 (2024 04:59), Max: 37.2 (2024 01:03)  T(F): 97.9 (2024 04:59), Max: 98.9 (2024 01:03)  HR: 85 (2024 04:59) (85 - 91)  BP: 121/70 (2024 04:59) (113/64 - 127/62)  BP(mean): 87 (2024 04:59) (82 - 87)  RR: 18 (2024 04:59) (18 - 18)  SpO2: 95% (2024 04:59) (95% - 97%)    Parameters below as of 2024 04:59  Patient On (Oxygen Delivery Method): room air  O2 Flow (L/min): 2    Daily     Daily     GENERAL: no acute distress  NEURO: alert, no asterixis  HEENT: anicteric sclera, no conjunctival pallor appreciated  CHEST: no respiratory distress, no accessory muscle use  CARDIAC: regular rate, rhythm  ABDOMEN: soft, non-tender, non-distended, no rebound or guarding  EXTREMITIES: warm, well perfused, no edema  SKIN: no lesions noted    LABS: reviewed                        7.2    5.54  )-----------( 77       ( 2024 07:03 )             22.6     04-29    141  |  107  |  8   ----------------------------<  89  3.4<L>   |  28  |  0.60    Ca    7.6<L>      2024 07:03  Phos  2.9     04-29  Mg     1.7     04-29    TPro  6.3  /  Alb  2.0<L>  /  TBili  3.3<H>  /  DBili  1.7<H>  /  AST  66<H>  /  ALT  30  /  AlkPhos  135<H>  04-29    LIVER FUNCTIONS - ( 2024 07:03 )  Alb: 2.0 g/dL / Pro: 6.3 g/dL / ALK PHOS: 135 U/L / ALT: 30 U/L DA / AST: 66 U/L / GGT: x             Culture - Blood (collected 2024 17:30)  Source: .Blood Blood-Peripheral  Preliminary Report (2024 21:01):    No growth at 24 hours    Urinalysis with Rflx Culture (collected 2024 17:30)    Culture - Blood (collected 2024 17:20)  Source: .Blood Blood-Peripheral  Preliminary Report (2024 21:01):    No growth at 24 hours        Diagnostic Studies: see sunrise for full report         Chief Complaint:  Patient is a 48y old  Male who presents with a chief complaint of New cirrhosis (2024 10:49)      HPI:  WHITE, ABENA is a 48y Male w/ Hx of AUD ( 4-5 drinks/day, Vodka, last drink 2 weeks prior to admission), smoking (20-25 years) presented to [facility] ER w/ 2 weeks progressively worsening abdominal distention,  appetite, malaise, generalized weakness, 2 days Hx of jaundice, and was admitted w/ newly diagnosed cirrhosis w/ CSPH, decompensated w/ ascites.       PMHX/PSHX:    Allergies:  No Known Allergies      Home Medications: reviewed  Hospital Medications:  acetaminophen     Tablet .. 650 milliGRAM(s) Oral every 6 hours PRN  folic acid 1 milliGRAM(s) Oral daily  furosemide    Tablet 20 milliGRAM(s) Oral daily  LORazepam   Injectable 1 milliGRAM(s) IV Push every 2 hours PRN  LORazepam   Injectable 1 milliGRAM(s) IV Push every 1 hour PRN  melatonin 5 milliGRAM(s) Oral at bedtime PRN  ondansetron Injectable 4 milliGRAM(s) IV Push every 8 hours PRN  pantoprazole    Tablet 40 milliGRAM(s) Oral before breakfast  spironolactone 25 milliGRAM(s) Oral daily  thiamine 100 milliGRAM(s) Oral daily      Social History:   Tob: Yes  EtOH: As above  Illicit Drugs: Denies    Family history:    Mother has hepatitis C.    ROS:   General:  No  fevers, chills, night sweats, fatigue  Eyes:  Good vision, no reported pain  ENT:  No sore throat, pain, runny nose  CV:  No pain, palpitations  Pulm:  SOB since the time of worsened abdominal distention  GI:  Abdominal distention, no N/V, no diarrhea, rather constipation, denies hematochezia or melena  :  No  dysuria  Muscle:  No pain, weakness  Neuro:  No memory problems  Skin:  No rash    PHYSICAL EXAM:   Vital Signs:  Vital Signs Last 24 Hrs  T(C): 36.6 (2024 04:59), Max: 37.2 (2024 01:03)  T(F): 97.9 (2024 04:59), Max: 98.9 (2024 01:03)  HR: 85 (2024 04:59) (85 - 91)  BP: 121/70 (2024 04:59) (113/64 - 127/62)  BP(mean): 87 (2024 04:59) (82 - 87)  RR: 18 (2024 04:59) (18 - 18)  SpO2: 95% (2024 04:59) (95% - 97%)    Parameters below as of 2024 04:59  Patient On (Oxygen Delivery Method): room air  O2 Flow (L/min): 2    Daily     Daily     GENERAL: no acute distress  NEURO: AAOX3 no asterixis  HEENT: icteric sclera  CHEST: no respiratory distress, no accessory muscle use, on 2l NC O2  CARDIAC: regular rate, rhythm  ABDOMEN: distended, tense ascites, no rebound or guarding, BS+  EXTREMITIES: warm, well perfused, no edema  SKIN: tattoos    LABS: reviewed                        7.2    5.54  )-----------( 77       ( 2024 07:03 )             22.6     04-29    141  |  107  |  8   ----------------------------<  89  3.4<L>   |  28  |  0.60    Ca    7.6<L>      2024 07:03  Phos  2.9     04-29  Mg     1.7     29    TPro  6.3  /  Alb  2.0<L>  /  TBili  3.3<H>  /  DBili  1.7<H>  /  AST  66<H>  /  ALT  30  /  AlkPhos  135<H>  0429    LIVER FUNCTIONS - ( 2024 07:03 )  Alb: 2.0 g/dL / Pro: 6.3 g/dL / ALK PHOS: 135 U/L / ALT: 30 U/L DA / AST: 66 U/L / GGT: x             Culture - Blood (collected 2024 17:30)  Source: .Blood Blood-Peripheral  Preliminary Report (2024 21:01):    No growth at 24 hours    Urinalysis with Rflx Culture (collected 2024 17:30)    Culture - Blood (collected 2024 17:20)  Source: .Blood Blood-Peripheral  Preliminary Report (2024 21:01):    No growth at 24 hours        Diagnostic Studies: see sunrise for full report

## 2024-04-29 NOTE — PROGRESS NOTE ADULT - PROBLEM SELECTOR PLAN 4
- HBG downtrending   - concern for black stool  - F/U ferritin, TIBC, total iron, reticulocyte count, haptoglobin, LDH.   - Maintain active T&S.   - Start Pantoprazole   - GI consulted

## 2024-04-29 NOTE — PROGRESS NOTE ADULT - PROBLEM SELECTOR PLAN 2
- P/w abdominal distention and discomfort.  - CT abdomen pelvis patient has cirrhosis with portal hypertension, splenomegaly and ascites.   - Start lasix and spironolactone.  - F/U UA, Bcx.   - No tenderness on exam but will start ceftriaxone for SBP prophylaxis.  - plan for dx and tx paracentesis in AM

## 2024-04-29 NOTE — PROGRESS NOTE ADULT - PROBLEM SELECTOR PLAN 1
- p/w worsening new-onset ascites w/ abd pain + jaundice   - CT abd:  cirrhosis with portal hypertension, splenomegaly and ascites.  - likely in the setting of chronic ETOH use  - AST - 81, ALT - 37, ALP - 123. PT/INR - 22/1.97, Plt - 93, Alb - 2.3, SCr - 2.38  - Tbili - 6.8, Direct bili - 2.7  - MELD Na: 21 , Maddrey: 47.9.  - Check hepatitis panel, and Hepatitis E, and Hepatitis B serologies.   - F/U A1c, lipid profile.  - plan for dx and tx paracentesis - consult IR in the AM   - F/U UA, Bcx.   - No tenderness on exam. Will hold off SBP prophylaxis until reccs from Hepatology.   - Hepatology consulted: Dr. Vuong   - Consult GI for endoscopy on Monday. - p/w worsening new-onset ascites w/ abd pain + jaundice   - CT abd:  cirrhosis with portal hypertension, splenomegaly and ascites.  - likely in the setting of chronic ETOH use  - AST - 81, ALT - 37, ALP - 123. PT/INR - 22/1.97, Plt - 93, Alb - 2.3, SCr - 2.38  - Tbili - 6.8, Direct bili - 2.7  - MELD Na: 21 , Maddrey: 47.9.  - Check hepatitis panel, and Hepatitis E, and Hepatitis B serologies.   - F/U A1c, lipid profile.  - plan for dx and tx paracentesis in AM   - F/U UA, Bcx.   - No tenderness on exam. Will hold off SBP prophylaxis until reccs from Hepatology.   - Hepatology consulted: Dr. Vuong   - GI consulted for endoscopy plan for 5/1

## 2024-04-29 NOTE — CONSULT NOTE ADULT - ASSESSMENT
Patient is a 48M with a PMhx of alcohol use disorder (has not seen a doctor in 10 yrs), new dx of cirrhosis with portal HTN and lower esophageal varices on imaging, who presented to the ED with abdominal pain and bloating. GI was consulted for anemia and dark stools.      #Macrocytic anemia  #Cirrhosis, decompensated by ascites  #Thrombocytopenia  #Elevated INR  #Hyperbilirubinemia  #Elevated LFTs  #Ascites  Patient with a hx of alcohol use disorder but no prior withdrawals or seizures, presented with abd pain x 2 weeks and general malaise, found to have cirrhosis with portal HTN and esophageal varices on imaging. Labs on admission notable for Hgb 8.1, .4, plt 93, INR 1.97, K 3.0, TBili 6.5, DBili 2.7, alk phos 123, ALT 81, normal ALT 37. UA grossly positive, lipase 83. Yesterday Hgb 7.0 -> 7.3, not transfused. This morning, Hgb 7.2, plt 77. Macrocytosis suggestive of chronicity, though would consider anemia workup with vit B12 and folate as well. Patient has never had an EGD before however given lower esophageal varices noted on imaging, patient may benefit from endoscopic evaluation. Though no overt signs of bleeding, he's had melena 1 month prior to admission that self-resolved. Last BM was today, brown hard-formed. Symptoms consistent with upper GI bleed, differentials include varices vs esophagitis vs gastritis vs duodenitis vs peptic/duodenal ulcer vs angioectasias vs portal gastropathy vs malignancy vs other. Given overall HD stability and BUN:Cr <30, suspect slow occult bleed. Patient is receiving IV phytonadione for INR 2.05 this morning, possible paracentesis tomorrow. Hepatology consulted, appreciate recs.     	- Tentative EGD 5/1 Wednesday  	- Diet as tolerated now  	- Tuesday: NPO after midnight  	- Wednesday AM labs: CBC, CMP, PT/INR  	- Hepatology following, appreciate recs  	- Planned for paracentesis ? 4/30  	- Trend LFTs daily  	- IV PPI BID  	- Monitor for s/sx of GI bleed  	- Maintain active T&S, 2 large bore peripheral IVs, transfuse for goal Hgb >7 or if symptomatic  	- Trend H/H  	- Avoid NSAIDs and hold anticoagulation if safe per primary team    This note and its recommendations herein are preliminary until such time as cosigned by an attending.    GI will continue to follow.  Thank you for this consult!

## 2024-04-29 NOTE — CONSULT NOTE ADULT - NS ATTEND AMEND GEN_ALL_CORE FT
48M with a PMhx of alcohol use disorder (has not seen a doctor in 10 yrs), new dx of cirrhosis with portal HTN and lower esophageal varices on imaging, who presented to the ED with abdominal pain and bloating. GI was consulted for anemia and dark stools.    Plan for dx/therapeutic para tomorrow and tentative EGD for variceal assessment Wed.   Appreciate hepatology input.

## 2024-04-29 NOTE — PROGRESS NOTE ADULT - ASSESSMENT
Patient is a 47 y/o M with PMH of alcohol use disorder (has not seen a doctor for 10 years), who presented with 2 weeks history of worsening generalized abdominal pain, discomfort, and bloating. On CT abdomen pelvis patient has cirrhosis with portal hypertension, splenomegaly and ascites. Patient is being admitted for further work up and management of newly diagnosed cirrhosis w/ ascites.           47 y/o M with PMH of alcohol use disorder (has not seen a doctor for 10 years), who presented with 2 weeks history of worsening generalized abdominal pain, discomfort, and bloating. On CT abdomen pelvis patient has cirrhosis with portal hypertension, splenomegaly and large volume ascites. Patient is being admitted for newly diagnosed cirrhosis w/ ascites. Hepatology and GI consulted for work up. Plan for IR therapeutic and diagnostic paracentesis, will give Vit K to optimize for IR procedure in AM

## 2024-04-29 NOTE — PROGRESS NOTE ADULT - PROBLEM SELECTOR PLAN 3
- P/w abdominal distention and discomfort.  - CT abdomen pelvis patient has cirrhosis with portal hypertension, splenomegaly and ascites.   - Start lasix and spironolactone.  - F/U UA, Bcx.   - No tenderness on exam but will start ceftriaxone for SBP prophylaxis.  - plan for dx and tx paracentesis - consult IR in the AM - Meets criteria of alcoholic hepatitis with transaminitis, jaundice, history of heavy alcohol use, elevated PT/INR,  -  Maddrey: 47.9.  - Consulted hepatology - Dr. Vuong.  - F/U Bcx, Ua,

## 2024-04-29 NOTE — PROGRESS NOTE ADULT - SUBJECTIVE AND OBJECTIVE BOX
-*-*- INCOMPLETE  NP Note discussed with  primary attending    Patient is a 48y old  Male who presents with a chief complaint of New cirrhosis (29 Apr 2024 14:31)      INTERVAL HPI/OVERNIGHT EVENTS: intermittent sob     MEDICATIONS  (STANDING):  folic acid 1 milliGRAM(s) Oral daily  furosemide    Tablet 20 milliGRAM(s) Oral daily  pantoprazole    Tablet 40 milliGRAM(s) Oral before breakfast  spironolactone 25 milliGRAM(s) Oral daily  thiamine 100 milliGRAM(s) Oral daily    MEDICATIONS  (PRN):  acetaminophen     Tablet .. 650 milliGRAM(s) Oral every 6 hours PRN Temp greater or equal to 38C (100.4F), Mild Pain (1 - 3)  LORazepam   Injectable 1 milliGRAM(s) IV Push every 2 hours PRN CIWA-Ar score increase by 2 points and a total score of 7 or less  LORazepam   Injectable 1 milliGRAM(s) IV Push every 1 hour PRN CIWA-Ar score 8 or greater  melatonin 5 milliGRAM(s) Oral at bedtime PRN Insomnia  ondansetron Injectable 4 milliGRAM(s) IV Push every 8 hours PRN Nausea and/or Vomiting      __________________________________________________  REVIEW OF SYSTEMS:    CONSTITUTIONAL: No fever,   EYES: no acute visual disturbances  NECK: No pain or stiffness  RESPIRATORY: No cough; +shortness of breath  CARDIOVASCULAR: No chest pain, no palpitations  GASTROINTESTINAL: No pain. No nausea or vomiting; No diarrhea   NEUROLOGICAL: No headache or numbness, no tremors  MUSCULOSKELETAL: No joint pain, no muscle pain  GENITOURINARY: no dysuria, no frequency, no hesitancy  PSYCHIATRY: no depression , no anxiety  ALL OTHER  ROS negative        Vital Signs Last 24 Hrs  T(C): 37 (29 Apr 2024 14:15), Max: 37.2 (29 Apr 2024 01:03)  T(F): 98.6 (29 Apr 2024 14:15), Max: 98.9 (29 Apr 2024 01:03)  HR: 95 (29 Apr 2024 14:15) (85 - 99)  BP: 127/65 (29 Apr 2024 14:15) (110/69 - 127/65)  BP(mean): 87 (29 Apr 2024 04:59) (82 - 87)  RR: 16 (29 Apr 2024 14:15) (16 - 18)  SpO2: 96% (29 Apr 2024 14:15) (94% - 97%)    Parameters below as of 29 Apr 2024 14:15  Patient On (Oxygen Delivery Method): nasal cannula  O2 Flow (L/min): 2      ________________________________________________  PHYSICAL EXAM:  GENERAL: NAD  HEENT: Normocephalic;  conjunctivae and sclerae clear;   NECK : supple  CHEST/LUNG: Clear to ausculitation bilaterally with good air entry   HEART: S1 S2  regular; no murmurs, gallops or rubs  ABDOMEN: Soft, Nontender, large ascites Bowel sounds present  EXTREMITIES: no cyanosis; + 1 pitting edema edema; no calf tenderness  SKIN: warm and dry; no rash  NERVOUS SYSTEM:  Awake and alert; Oriented  to place, person and time     _________________________________________________  LABS:                        7.2    5.54  )-----------( 77       ( 29 Apr 2024 07:03 )             22.6     04-29    141  |  107  |  8   ----------------------------<  89  3.4<L>   |  28  |  0.60    Ca    7.6<L>      29 Apr 2024 07:03  Phos  2.9     04-29  Mg     1.7     04-29    TPro  6.3  /  Alb  2.0<L>  /  TBili  3.3<H>  /  DBili  1.7<H>  /  AST  66<H>  /  ALT  30  /  AlkPhos  135<H>  04-29    PT/INR - ( 29 Apr 2024 07:03 )   PT: 22.9 sec;   INR: 2.05 ratio         PTT - ( 29 Apr 2024 07:03 )  PTT:39.5 sec  Urinalysis Basic - ( 29 Apr 2024 07:03 )    Color: x / Appearance: x / SG: x / pH: x  Gluc: 89 mg/dL / Ketone: x  / Bili: x / Urobili: x   Blood: x / Protein: x / Nitrite: x   Leuk Esterase: x / RBC: x / WBC x   Sq Epi: x / Non Sq Epi: x / Bacteria: x      CAPILLARY BLOOD GLUCOSE      RADIOLOGY & ADDITIONAL TESTS: < from: Xray Chest 1 View- PORTABLE-Urgent (Xray Chest 1 View- PORTABLE-Urgent .) (04.27.24 @ 23:26) >  IMPRESSION:  2.4 cm opacity adjacent to RIGHT tracheobronchial angle either indicating   azygous vein or lymph node .  Lungs clear.  Continued surveillance recommended.    --- End of Report ---            DIAZ SANCHEZ MD; Attending Radiologist  This document has been electronically signed. Apr 28 2024  1:35PM    < end of copied text >    < from: CT Abdomen and Pelvis w/ IV Cont (04.27.24 @ 13:32) >    IMPRESSION:  Cirrhosis with portal hypertension, splenomegaly and ascites.        --- End of Report ---            MAHIN WILLIAMSON MD; Attending Radiologist  This document has been electronically signed. Apr 27 2024  2:16PM    < end of copied text >    Imaging Personally Reviewed:  YES    Consultant(s) Notes Reviewed:   YES/    Care Discussed with Consultants : GI, Hepatology     Plan of care was discussed with patient and /or primary care giver; all questions and concerns were addressed and care was aligned with patient's wishes.

## 2024-04-29 NOTE — PROGRESS NOTE ADULT - ASSESSMENT
49 y/o M with PMH of alcohol use disorder (has not seen a doctor for 10 years), who presented with 2 weeks history of worsening generalized abdominal pain, discomfort, and bloating. On CT abdomen pelvis patient has cirrhosis with portal hypertension, splenomegaly and large volume ascites. Patient is being admitted for newly diagnosed cirrhosis w/ ascites. Hepatology and GI consulted for work up. Plan for IR therapeutic and diagnostic paracentesis, will give Vit K to optimize for IR procedure in AM

## 2024-04-29 NOTE — PROGRESS NOTE ADULT - PROBLEM SELECTOR PLAN 5
- P/w Plt of 93 (unknown baseline)  - Likely 2/2 to cirrhosis. - Likely 2/2 to cirrhosis  - hold A/C   - will give 1 dose Vit K   - trend INR

## 2024-04-29 NOTE — PROGRESS NOTE ADULT - SUBJECTIVE AND OBJECTIVE BOX
Patient is a 48y old  Male who presents with a chief complaint of New cirrhosis (29 Apr 2024       INTERVAL HPI/OVERNIGHT EVENTS: intermittent sob     MEDICATIONS  (STANDING):  folic acid 1 milliGRAM(s) Oral daily  furosemide    Tablet 20 milliGRAM(s) Oral daily  pantoprazole    Tablet 40 milliGRAM(s) Oral before breakfast  spironolactone 25 milliGRAM(s) Oral daily  thiamine 100 milliGRAM(s) Oral daily    MEDICATIONS  (PRN):  acetaminophen     Tablet .. 650 milliGRAM(s) Oral every 6 hours PRN Temp greater or equal to 38C (100.4F), Mild Pain (1 - 3)  LORazepam   Injectable 1 milliGRAM(s) IV Push every 2 hours PRN CIWA-Ar score increase by 2 points and a total score of 7 or less  LORazepam   Injectable 1 milliGRAM(s) IV Push every 1 hour PRN CIWA-Ar score 8 or greater  melatonin 5 milliGRAM(s) Oral at bedtime PRN Insomnia  ondansetron Injectable 4 milliGRAM(s) IV Push every 8 hours PRN Nausea and/or Vomiting      __________________________________________________  REVIEW OF SYSTEMS:    CONSTITUTIONAL: No fever,   EYES: no acute visual disturbances  NECK: No pain or stiffness  RESPIRATORY: No cough; +shortness of breath  CARDIOVASCULAR: No chest pain, no palpitations  GASTROINTESTINAL: No pain. No nausea or vomiting; No diarrhea   NEUROLOGICAL: No headache or numbness, no tremors  MUSCULOSKELETAL: No joint pain, no muscle pain  GENITOURINARY: no dysuria, no frequency, no hesitancy  PSYCHIATRY: no depression , no anxiety  ALL OTHER  ROS negative        Vital Signs Last 24 Hrs  T(C): 37 (29 Apr 2024 14:15), Max: 37.2 (29 Apr 2024 01:03)  T(F): 98.6 (29 Apr 2024 14:15), Max: 98.9 (29 Apr 2024 01:03)  HR: 95 (29 Apr 2024 14:15) (85 - 99)  BP: 127/65 (29 Apr 2024 14:15) (110/69 - 127/65)  BP(mean): 87 (29 Apr 2024 04:59) (82 - 87)  RR: 16 (29 Apr 2024 14:15) (16 - 18)  SpO2: 96% (29 Apr 2024 14:15) (94% - 97%)    Parameters below as of 29 Apr 2024 14:15  Patient On (Oxygen Delivery Method): nasal cannula  O2 Flow (L/min): 2      ________________________________________________  PHYSICAL EXAM:  GENERAL: NAD  HEENT: Normocephalic;  conjunctivae and sclerae clear;   NECK : supple  CHEST/LUNG: dec breath sounds at bases  HEART: S1 S2  regular; no murmurs, gallops or rubs  ABDOMEN: Soft, Nontender, large ascites Bowel sounds present  EXTREMITIES: no cyanosis; + 1 pitting edema edema; no calf tenderness  SKIN: warm and dry; no rash  NERVOUS SYSTEM:  Awake and alert;_________________________________________________  LABS:                        7.2    5.54  )-----------( 77       ( 29 Apr 2024 07:03 )             22.6     04-29    141  |  107  |  8   ----------------------------<  89  3.4<L>   |  28  |  0.60    Ca    7.6<L>      29 Apr 2024 07:03  Phos  2.9     04-29  Mg     1.7     04-29    TPro  6.3  /  Alb  2.0<L>  /  TBili  3.3<H>  /  DBili  1.7<H>  /  AST  66<H>  /  ALT  30  /  AlkPhos  135<H>  04-29    PT/INR - ( 29 Apr 2024 07:03 )   PT: 22.9 sec;   INR: 2.05 ratio         PTT - ( 29 Apr 2024 07:03 )  PTT:39.5 sec  Urinalysis Basic - ( 29 Apr 2024 07:03 )    Color: x / Appearance: x / SG: x / pH: x  Gluc: 89 mg/dL / Ketone: x  / Bili: x / Urobili: x   Blood: x / Protein: x / Nitrite: x   Leuk Esterase: x / RBC: x / WBC x   Sq Epi: x / Non Sq Epi: x / Bacteria: x      CAPILLARY BLOOD GLUCOSE      RADIOLOGY & ADDITIONAL TESTS: < from: Xray Chest 1 View- PORTABLE-Urgent (Xray Chest 1 View- PORTABLE-Urgent .) (04.27.24 @ 23:26) >  IMPRESSION:  2.4 cm opacity adjacent to RIGHT tracheobronchial angle either indicating   azygous vein or lymph node .  Lungs clear.  Continued surveillance recommended.    --- End of Report ---            DIAZ SANCHEZ MD; Attending Radiologist  This document has been electronically signed. Apr 28 2024  1:35PM    < end of copied text >    < from: CT Abdomen and Pelvis w/ IV Cont (04.27.24 @ 13:32) >    IMPRESSION:  Cirrhosis with portal hypertension, splenomegaly and ascites.        --- End of Report ---            MAHIN WILLIAMSON MD; Attending Radiologist  This document has been electronically signed. Apr 27 2024  2:16PM    < end of copied text >    Imaging Personally Reviewed:  YES    Consultant(s) Notes Reviewed:   YES/    Care Discussed with Consultants : GI, Hepatology     Plan of care was discussed with patient and /or primary care giver; all questions and concerns were addressed and care was aligned with patient's wishes.

## 2024-04-29 NOTE — CONSULT NOTE ADULT - ASSESSMENT
48y Male w/ Hx of AUD ( 4-5 drinks/day, Vodka, last drink 2 weeks prior to admission), smoking (20-25 years) presented to [facility] ER w/ 2 weeks progressively worsening abdominal distention,  appetite, malaise, generalized weakness, 2 days Hx of jaundice, and was admitted w/ newly diagnosed cirrhosis w/ CSPH, decompensated w/ ascites.       # Cirrhosis likely due to EtOH, w/ CSPH, decompensated w/ ascites  # Superimposed alcoholic hepatitis  MELD 3.0 21  MDF 53.4  - CXR no infiltrate, F/u BCx, UCx and ascites analysis before considering initiating prednisolone  # Ascites  - Please, obtain diagnostic (cell count, Gram stain, culture, albumin, protein, cytology, LDH, glucose) and therapeutic paracentesis w/ 25% albumin for 6-8g/l ascites removed  - Monitor renal function (Cr normal so far)  - Avoid NSAIDs  - Low salt diet  - No PVT per CTa/p w/ iv  - Consider TTE  # Anemia  # Coagulpathy  # Thrombocytopenia  - Monitor H/H, keep Hb > 7  - Keep PLT > 50, fibrinogen > 120 if bleeding  - No reported overt GIB so far  - Will need EV screening, timing per GI  - Please, send anemia w/u  # No PSE  # HCC status - no focal hepatic lesion on CT a/p, can send AFP  # Etiology of cirrhosis: likely EtOH, but please, send CLD BW (A1AT, ceruloplasmin, ferritin, iron/TIBC, ROSY, SMA, LKM, AMA, Ig panel). Hep B neg, not immune, not exposed, and Hep C ab neg. Hep A IgM neg.   Also F/u Hep E IgM/PCR, and obtain EBV, CMV, HSV PCRs  # Transplant candidacy:     INCOMPLETE NOTE, FULL NOTE TO FOLLOW        47yo  Male w/ Hx of AUD ( 4-5 drinks/day, Vodka, last drink 2 weeks prior to admission), smoking (20-25 years) and family Hx of hepatitis C (mother), presented to [facility] ER w/ 2 weeks progressively worsening abdominal distention, decreased appetite, malaise, generalized weakness, 2 days Hx of jaundice, and was admitted w/ newly diagnosed cirrhosis w/ CSPH, decompensated w/ ascites.       # Cirrhosis likely due to EtOH, w/ CSPH, decompensated w/ ascites  # Superimposed alcoholic hepatitis  MELD 3.0 21  MDF 53.4  - CXR no infiltrate, F/u BCx, UCx and ascites analysis before considering initiating prednisolone  # Ascites  - Please, obtain diagnostic (cell count, Gram stain, culture, albumin, protein, cytology, LDH, glucose) and therapeutic paracentesis w/ 25% albumin for 6-8g/l ascites removed  - Monitor renal function (Cr normal so far)  - Avoid NSAIDs  - Low salt diet  - No PVT per CTa/p w/ iv  - Consider TTE  # Anemia  # Coagulopathy  # Thrombocytopenia  - Monitor H/H, keep Hb > 7  - Keep PLT > 50, fibrinogen > 120 if bleeding  - No reported overt GIB so far  - Will need EV screening, timing per GI, consult appreciated  - Please, send anemia w/u  # No PSE  # HCC status - no focal hepatic lesion on CT a/p, can send AFP  # Etiology of cirrhosis: likely EtOH, but please, send CLD BW (A1AT, ceruloplasmin, ferritin, iron/TIBC, ROSY, SMA, LKM, AMA, Ig panel). Hep B neg, not immune, not exposed, and Hep C ab neg. Hep A IgM neg.   Also F/u Hep E IgM/PCR, and obtain EBV, CMV, HSV PCRs  # Transplant candidacy: Will need to explore psychosocial status. Bilirubin is improving, possibly will need to be referred outpatient, unless worsening.  # AUD  - Folic, thiamine  - CIWA per primary team  - Aspiration, seizure, fall precautions  - Will need rehab     Thank you for consult  Will follow  Discussed w/ primary team

## 2024-04-29 NOTE — PROGRESS NOTE ADULT - PROBLEM SELECTOR PLAN 3
- Meets criteria of alcoholic hepatitis with transaminitis, jaundice, history of heavy alcohol use, elevated PT/INR,  -  Maddrey: 47.9.  - Consulted hepatology - Dr. Vuong.  - F/U Bcx, Ua,

## 2024-04-29 NOTE — CONSULT NOTE ADULT - SUBJECTIVE AND OBJECTIVE BOX
INITIAL GI CONSULTATION    Patient is a 48y old  Male who presents with a chief complaint of New cirrhosis (29 Apr 2024 12:31)    GI HPI:  Patient is a 48M with a PMhx of alcohol use disorder (has not seen a doctor in 10 yrs), new dx of cirrhosis with portal HTN and lower esophageal varices on imaging, who presented to the ED with abdominal pain and bloating. GI was consulted for anemia and dark stools.      Patient reports generalized abdominal pain, weakness, fatigue, bloating, yellow eyes, malaise for the past 2 weeks. Drinks vodka and mixed drinks daily, denies hx of withdrawals or seizures, last drink was 1 beer on Wednesday prior to admission.   Patient reports black diarrhea 2-3 times in one day about a month prior to admission, spontaneously resolved.   Last BM was today, brown, hard and formed. He takes PRN aleve but not in excess. +shortness of breath which he attributes to abd distention, +bloated. He denies coffee-ground emesis, dysphagia, odynophagia, globus sensation, cp, decreased appetite, unintentional weight loss. No prior EGD/colonoscopy. Smokes 1ppd x 20-25 yrs, no drug use. No family hx of liver disease or colorectal cancers. No autoimmune illnesses. No recent travel. He has not required blood transfusions in the past.     In the ED, labs notable for Hgb 8.1, .4, plt 93, INR 1.97, K 3.0, TBili 6.5, DBili 2.7, alk phos 123, AST 81H, ALT 37, lipase 83, UA+, acetaminophen <2, BAL 8, BCx neg. CTAP w/ IV - cirrhosis, no focal hepatic mass lesion, spleen enlarged, mod to large volume ascites, mesenteric edema, portal venous collaterals including lower esophageal varices, no portal venous thrombosis, anasarca of abdominal wall. CXR -  2.4cm opacity adjacent to R tracheobronchial angle either indicating azygous vein or LN, lungs clear.   Admitted to medicine for further management. Hepatology consulted for new cirrhosis.   4/28: retic% 3.8H, abs retic 82.5, TBili 3.7, DBili 2.2, Ind Bili 1.5, alk phos 131, AST 67, ALT 32, haptoglobin 40, , acute hep panel neg, Hgb 7.0 -> 7.3, .8, plt 74, iron 13L, UIBC 208, TIBC 221L, %sat 6L, ferritin 202.   4/29: Hgb 7.2, .3, plt 77, INR 2.05, K 3.4, TBili 3.3, DBili 1.7, alk phos 135, AST 66, ALT 30.           PMH/PSH:  PAST MEDICAL & SURGICAL HISTORY:        FH:  FAMILY HISTORY:        MEDS:  MEDICATIONS  (STANDING):  folic acid 1 milliGRAM(s) Oral daily  furosemide    Tablet 20 milliGRAM(s) Oral daily  pantoprazole    Tablet 40 milliGRAM(s) Oral before breakfast  spironolactone 25 milliGRAM(s) Oral daily  thiamine 100 milliGRAM(s) Oral daily    MEDICATIONS  (PRN):  acetaminophen     Tablet .. 650 milliGRAM(s) Oral every 6 hours PRN Temp greater or equal to 38C (100.4F), Mild Pain (1 - 3)  LORazepam   Injectable 1 milliGRAM(s) IV Push every 2 hours PRN CIWA-Ar score increase by 2 points and a total score of 7 or less  LORazepam   Injectable 1 milliGRAM(s) IV Push every 1 hour PRN CIWA-Ar score 8 or greater  melatonin 5 milliGRAM(s) Oral at bedtime PRN Insomnia  ondansetron Injectable 4 milliGRAM(s) IV Push every 8 hours PRN Nausea and/or Vomiting    Allergies    No Known Allergies    Intolerances          ROS: A detailed set of ROS were asked and negative except those outlined in GI HPI.  ______________________________________________________________________  PHYSICAL EXAM:  T(C): 37 (04-29-24 @ 14:15), Max: 37.2 (04-29-24 @ 01:03)  HR: 95 (04-29-24 @ 14:15)  BP: 127/65 (04-29-24 @ 14:15)  RR: 16 (04-29-24 @ 14:15)  SpO2: 96% (04-29-24 @ 14:15)  Wt(kg): --      GEN: NAD  HEENT: EOMI, conjunctivae anicteric, neck supple, moist mucous membranes  PULM: LSCTAB, no wheezing, rales, or rhonchi  CV: RRR, no m/r/b  GI: taut, pain to right abdomen, distended; +BS in all four quadrants  MSK: BETH, no edema  NEURO: A&O x 3, no gross deficits  ______________________________________________________________________  LABS:                        7.2    5.54  )-----------( 77       ( 29 Apr 2024 07:03 )             22.6     04-29    141  |  107  |  8   ----------------------------<  89  3.4<L>   |  28  |  0.60    Ca    7.6<L>      29 Apr 2024 07:03  Phos  2.9     04-29  Mg     1.7     04-29    TPro  6.3  /  Alb  2.0<L>  /  TBili  3.3<H>  /  DBili  1.7<H>  /  AST  66<H>  /  ALT  30  /  AlkPhos  135<H>  04-29    LIVER FUNCTIONS - ( 29 Apr 2024 07:03 )  Alb: 2.0 g/dL / Pro: 6.3 g/dL / ALK PHOS: 135 U/L / ALT: 30 U/L DA / AST: 66 U/L / GGT: x           PT/INR - ( 29 Apr 2024 07:03 )   PT: 22.9 sec;   INR: 2.05 ratio         PTT - ( 29 Apr 2024 07:03 )  PTT:39.5 sec  ____________________________________________    IMAGING:    < from: CT Abdomen and Pelvis w/ IV Cont (04.27.24 @ 13:32) >    IMPRESSION:  Cirrhosis with portal hypertension, splenomegaly and ascites.    < end of copied text >

## 2024-04-30 LAB
ALBUMIN SERPL ELPH-MCNC: 1.9 G/DL — LOW (ref 3.5–5)
ALP SERPL-CCNC: 134 U/L — HIGH (ref 40–120)
ALT FLD-CCNC: 29 U/L DA — SIGNIFICANT CHANGE UP (ref 10–60)
ANION GAP SERPL CALC-SCNC: 6 MMOL/L — SIGNIFICANT CHANGE UP (ref 5–17)
APTT BLD: 39.2 SEC — HIGH (ref 24.5–35.6)
AST SERPL-CCNC: 62 U/L — HIGH (ref 10–40)
BILIRUB DIRECT SERPL-MCNC: 1.8 MG/DL — HIGH (ref 0–0.3)
BILIRUB SERPL-MCNC: 3.4 MG/DL — HIGH (ref 0.2–1.2)
BUN SERPL-MCNC: 8 MG/DL — SIGNIFICANT CHANGE UP (ref 7–18)
CALCIUM SERPL-MCNC: 7.6 MG/DL — LOW (ref 8.4–10.5)
CHLORIDE SERPL-SCNC: 107 MMOL/L — SIGNIFICANT CHANGE UP (ref 96–108)
CO2 SERPL-SCNC: 29 MMOL/L — SIGNIFICANT CHANGE UP (ref 22–31)
CREAT SERPL-MCNC: 0.55 MG/DL — SIGNIFICANT CHANGE UP (ref 0.5–1.3)
EGFR: 123 ML/MIN/1.73M2 — SIGNIFICANT CHANGE UP
GLUCOSE SERPL-MCNC: 87 MG/DL — SIGNIFICANT CHANGE UP (ref 70–99)
HCT VFR BLD CALC: 22.5 % — LOW (ref 39–50)
HEV AB FLD QL: NEGATIVE — SIGNIFICANT CHANGE UP
HEV IGM SER QL: NEGATIVE — SIGNIFICANT CHANGE UP
HGB BLD-MCNC: 7.1 G/DL — LOW (ref 13–17)
INR BLD: 1.91 RATIO — HIGH (ref 0.85–1.18)
MCHC RBC-ENTMCNC: 31.6 GM/DL — LOW (ref 32–36)
MCHC RBC-ENTMCNC: 32.6 PG — SIGNIFICANT CHANGE UP (ref 27–34)
MCV RBC AUTO: 103.2 FL — HIGH (ref 80–100)
NRBC # BLD: 0 /100 WBCS — SIGNIFICANT CHANGE UP (ref 0–0)
PLATELET # BLD AUTO: 73 K/UL — LOW (ref 150–400)
POTASSIUM SERPL-MCNC: 3 MMOL/L — LOW (ref 3.5–5.3)
POTASSIUM SERPL-SCNC: 3 MMOL/L — LOW (ref 3.5–5.3)
PROT SERPL-MCNC: 6.3 G/DL — SIGNIFICANT CHANGE UP (ref 6–8.3)
PROTHROM AB SERPL-ACNC: 21.4 SEC — HIGH (ref 9.5–13)
RBC # BLD: 2.18 M/UL — LOW (ref 4.2–5.8)
RBC # FLD: 17.1 % — HIGH (ref 10.3–14.5)
SODIUM SERPL-SCNC: 142 MMOL/L — SIGNIFICANT CHANGE UP (ref 135–145)
WBC # BLD: 5.02 K/UL — SIGNIFICANT CHANGE UP (ref 3.8–10.5)
WBC # FLD AUTO: 5.02 K/UL — SIGNIFICANT CHANGE UP (ref 3.8–10.5)

## 2024-04-30 PROCEDURE — 99233 SBSQ HOSP IP/OBS HIGH 50: CPT

## 2024-04-30 RX ORDER — PHYTONADIONE (VIT K1) 5 MG
5 TABLET ORAL ONCE
Refills: 0 | Status: COMPLETED | OUTPATIENT
Start: 2024-04-30 | End: 2024-04-30

## 2024-04-30 RX ORDER — POTASSIUM CHLORIDE 20 MEQ
10 PACKET (EA) ORAL
Refills: 0 | Status: COMPLETED | OUTPATIENT
Start: 2024-04-30 | End: 2024-04-30

## 2024-04-30 RX ADMIN — Medication 100 MILLIEQUIVALENT(S): at 10:11

## 2024-04-30 RX ADMIN — SPIRONOLACTONE 25 MILLIGRAM(S): 25 TABLET, FILM COATED ORAL at 05:46

## 2024-04-30 RX ADMIN — Medication 1 MILLIGRAM(S): at 11:23

## 2024-04-30 RX ADMIN — Medication 650 MILLIGRAM(S): at 16:31

## 2024-04-30 RX ADMIN — Medication 100 MILLIGRAM(S): at 11:22

## 2024-04-30 RX ADMIN — Medication 5 MILLIGRAM(S): at 22:28

## 2024-04-30 RX ADMIN — Medication 100 MILLIEQUIVALENT(S): at 09:01

## 2024-04-30 RX ADMIN — Medication 100 MILLIEQUIVALENT(S): at 11:21

## 2024-04-30 RX ADMIN — Medication 20 MILLIGRAM(S): at 05:46

## 2024-04-30 RX ADMIN — PANTOPRAZOLE SODIUM 40 MILLIGRAM(S): 20 TABLET, DELAYED RELEASE ORAL at 05:46

## 2024-04-30 NOTE — PROGRESS NOTE ADULT - SUBJECTIVE AND OBJECTIVE BOX
Patient is a 48y old  Male who presents with a chief complaint of New cirrhosis (29 Apr 2024 14:31)      OVERNIGHT EVENTS: none overnight, pt is NAD, sitting up in bed, pending IR paracentesis     REVIEW OF SYSTEMS:  CONSTITUTIONAL: No fever, chills  RESPIRATORY: No cough, SOB  CARDIOVASCULAR: No chest pain, palpitations  GASTROINTESTINAL: intermittent abdominal pain. No nausea, vomiting, or diarrhea  GENITOURINARY: No dysuria  NEUROLOGICAL: No HA  SKIN: No itching, burning, rashes, or lesions   LYMPH NODES: No enlarged glands    T(C): 36.7 (04-30-24 @ 12:14), Max: 36.8 (04-29-24 @ 20:12)  HR: 103 (04-30-24 @ 12:14) (84 - 103)  BP: 128/64 (04-30-24 @ 12:14) (112/68 - 128/64)  RR: 18 (04-30-24 @ 12:14) (17 - 18)  SpO2: 94% (04-30-24 @ 12:14) (94% - 94%)  Wt(kg): --Vital Signs Last 24 Hrs  T(C): 36.7 (30 Apr 2024 12:14), Max: 36.8 (29 Apr 2024 20:12)  T(F): 98 (30 Apr 2024 12:14), Max: 98.3 (29 Apr 2024 20:12)  HR: 103 (30 Apr 2024 12:14) (84 - 103)  BP: 128/64 (30 Apr 2024 12:14) (112/68 - 128/64)  BP(mean): --  RR: 18 (30 Apr 2024 12:14) (17 - 18)  SpO2: 94% (30 Apr 2024 12:14) (94% - 94%)    Parameters below as of 30 Apr 2024 12:14  Patient On (Oxygen Delivery Method): nasal cannula  O2 Flow (L/min): 2        PHYSICAL EXAM:  GENERAL: NAD  CHEST/LUNG: Clear to auscultation bilaterally; No rales, rhonchi, wheezing, or rubs  HEART: S1, S2, Regular rate and rhythm  ABDOMEN:  distended, Soft, Nontender; Bowel sounds present  NEURO: Alert & Oriented X3    Consultant(s) Notes Reviewed:  [x ] YES  [ ] NO  Care Discussed with Consultants/Other Providers [ x] YES  [ ] NO  LABS:                        7.1    5.02  )-----------( 73       ( 30 Apr 2024 05:55 )             22.5     04-30    142  |  107  |  8   ----------------------------<  87  3.0<L>   |  29  |  0.55    Ca    7.6<L>      30 Apr 2024 05:55  Phos  2.9     04-29  Mg     1.7     04-29    TPro  6.3  /  Alb  1.9<L>  /  TBili  3.4<H>  /  DBili  1.8<H>  /  AST  62<H>  /  ALT  29  /  AlkPhos  134<H>  04-30    PT/INR - ( 30 Apr 2024 05:55 )   PT: 21.4 sec;   INR: 1.91 ratio         PTT - ( 30 Apr 2024 05:55 )  PTT:39.2 sec  CAPILLARY BLOOD GLUCOSE          Urinalysis Basic - ( 30 Apr 2024 05:55 )    Color: x / Appearance: x / SG: x / pH: x  Gluc: 87 mg/dL / Ketone: x  / Bili: x / Urobili: x   Blood: x / Protein: x / Nitrite: x   Leuk Esterase: x / RBC: x / WBC x   Sq Epi: x / Non Sq Epi: x / Bacteria: x        RADIOLOGY & ADDITIONAL TESTS:  Imaging Personally Reviewed:  [ ] YES  [ ] NO

## 2024-04-30 NOTE — PROGRESS NOTE ADULT - SUBJECTIVE AND OBJECTIVE BOX
Patient is a 48y old  Male who presents with a chief complaint of New cirrhosis      OVERNIGHT EVENTS: none overnight, pt is NAD, sitting up in bed, pending IR paracentesis     REVIEW OF SYSTEMS:  CONSTITUTIONAL: No fever, chills  RESPIRATORY: No cough, SOB  CARDIOVASCULAR: No chest pain, palpitations  GASTROINTESTINAL: intermittent abdominal pain. No nausea, vomiting, or diarrhea  GENITOURINARY: No dysuria  NEUROLOGICAL: No HA  SKIN: No itching, burning, rashes, or lesions   LYMPH NODES: No enlarged glands    T(C): 36.7 (04-30-24 @ 12:14), Max: 36.8 (04-29-24 @ 20:12)  HR: 103 (04-30-24 @ 12:14) (84 - 103)  BP: 128/64 (04-30-24 @ 12:14) (112/68 - 128/64)  RR: 18 (04-30-24 @ 12:14) (17 - 18)  SpO2: 94% (04-30-24 @ 12:14) (94% - 94%)  Wt(kg): --Vital Signs Last 24 Hrs  T(C): 36.7 (30 Apr 2024 12:14), Max: 36.8 (29 Apr 2024 20:12)  T(F): 98 (30 Apr 2024 12:14), Max: 98.3 (29 Apr 2024 20:12)  HR: 103 (30 Apr 2024 12:14) (84 - 103)  BP: 128/64 (30 Apr 2024 12:14) (112/68 - 128/64)  BP(mean): --  RR: 18 (30 Apr 2024 12:14) (17 - 18)  SpO2: 94% (30 Apr 2024 12:14) (94% - 94%)    Parameters below as of 30 Apr 2024 12:14  Patient On (Oxygen Delivery Method): nasal cannula  O2 Flow (L/min): 2        PHYSICAL EXAM:  GENERAL: NAD  CHEST/LUNG: dec breath sounds at bases  HEART: S1, S2, +  ABDOMEN:  distended, Soft, Nontender; Bowel sounds present  NEURO: Alert & Oriented X3  no flap tremor  Consultant(s) Notes Reviewed:  [x ] YES  [ ] NO  Care Discussed with Consultants/Other Providers [ x] YES  [ ] NO  LABS:                        7.1    5.02  )-----------( 73       ( 30 Apr 2024 05:55 )             22.5     04-30    142  |  107  |  8   ----------------------------<  87  3.0<L>   |  29  |  0.55    Ca    7.6<L>      30 Apr 2024 05:55  Phos  2.9     04-29  Mg     1.7     04-29    TPro  6.3  /  Alb  1.9<L>  /  TBili  3.4<H>  /  DBili  1.8<H>  /  AST  62<H>  /  ALT  29  /  AlkPhos  134<H>  04-30    PT/INR - ( 30 Apr 2024 05:55 )   PT: 21.4 sec;   INR: 1.91 ratio         PTT - ( 30 Apr 2024 05:55 )  PTT:39.2 sec  CAPILLARY BLOOD GLUCOSE          Urinalysis Basic - ( 30 Apr 2024 05:55 )    Color: x / Appearance: x / SG: x / pH: x  Gluc: 87 mg/dL / Ketone: x  / Bili: x / Urobili: x   Blood: x / Protein: x / Nitrite: x   Leuk Esterase: x / RBC: x / WBC x   Sq Epi: x / Non Sq Epi: x / Bacteria: x        RADIOLOGY & ADDITIONAL TESTS:  Imaging Personally Reviewed:  [ ] YES  [ ] NO

## 2024-04-30 NOTE — PROGRESS NOTE ADULT - ASSESSMENT
Patient is a 48M with a PMhx of alcohol use disorder (has not seen a doctor in 10 yrs), new dx of cirrhosis with portal HTN and lower esophageal varices on imaging, who presented to the ED with abdominal pain and bloating. GI was consulted for anemia and dark stools.      #Macrocytic anemia  #Cirrhosis, decompensated by ascites  #Thrombocytopenia  #Elevated INR  #Hyperbilirubinemia  #Elevated LFTs  #Ascites  Patient with a hx of alcohol use disorder but no prior withdrawals or seizures, presented with abd pain x 2 weeks and general malaise, found to have cirrhosis with portal HTN and esophageal varices on imaging. Labs on admission notable for Hgb 8.1, .4, plt 93, INR 1.97, K 3.0, TBili 6.5, DBili 2.7, alk phos 123, ALT 81, normal ALT 37. UA grossly positive, lipase 83. Yesterday Hgb 7.0 -> 7.3, not transfused. This morning, Hgb 7.2, plt 77. Macrocytosis suggestive of chronicity, though would consider anemia workup with vit B12 and folate as well. Patient has never had an EGD before however given lower esophageal varices noted on imaging, patient may benefit from endoscopic evaluation. Though no overt signs of bleeding, he's had melena 1 month prior to admission that self-resolved. Last BM was today, brown hard-formed.   4/30: Paracentesis today rescheduled to tomorrow. Planned for EGD tomorrow as well. Hgb 7.1, stable, no overt bleeding.     	- Tentative EGD 5/1 Wednesday  	- NPO after midnight  	- AM labs: CBC, CMP, PT/INR   	- Medical optimization, goal Hgb >7 and INR <1.5  	- Replete lytes aggressively  	- Planned for paracentesis 5/1  	- Trend LFTs daily  	- IV PPI BID  	- Maintain active T&S, 2 large bore peripheral IVs, transfuse for goal Hgb >7 or if symptomatic  	- Trend H/H  	- Avoid NSAIDs and hold anticoagulation if safe per primary team    This note and its recommendations herein are preliminary until such time as cosigned by an attending.

## 2024-04-30 NOTE — PROGRESS NOTE ADULT - ASSESSMENT
49 y/o M with PMH of alcohol use disorder (has not seen a doctor for 10 years), who presented with 2 weeks history of worsening generalized abdominal pain, discomfort, and bloating. On CT abdomen pelvis patient has cirrhosis with portal hypertension, splenomegaly and large volume ascites. Patient is being admitted for newly diagnosed cirrhosis w/ ascites. Hepatology and GI consulted for work up. Plan for IR therapeutic and diagnostic paracentesis, will give Vit K to optimize for IR procedure in AM       4/30: paracentesis rescheduled by IR tomorrow; EGD scheduled for 5/1 in the morning, keep pt NPO

## 2024-04-30 NOTE — PROGRESS NOTE ADULT - ATTENDING COMMENTS
49yo  Male w/ Hx of AUD (4-5 drinks/day, Vodka, last drink 2 weeks prior to admission), smoking, and family Hx of hepatitis C (mother), presented to [facility] ER w/ 2 weeks progressively worsening abdominal distention, decreased appetite, malaise, generalized weakness, and with new onset (2 days) of jaundice, and found to have newly diagnosed cirrhosis w/ CSPH, decompensated w/ ascites, and likely severe alcoholic hepatitis (w/ MDF > 32).   - Infectious workup negative so far (2 sets BCx neg, CXR w/o infiltrate, no pyuria), but still has pending diagnostic and therapeutic paracentesis. Please, assure 25% albumin 6-8 g/ l ascites removed.   - IF all infectious workup is negative, including ascites analysis, (and no overt GIB, normal Cr), consider prednisolone for alcoholic hepatitis component.   - Etiology of liver disease is likely EtOH, but need to follow CLD workup as above.   - Pending EGD for anemia, EV screening. No overt bleeding. (Pre-procedure optimization as above / per GI.)  - No PSE.  - No HCC.  Thank you for consult  Will follow  D/w primary team

## 2024-04-30 NOTE — PROGRESS NOTE ADULT - ASSESSMENT
47yo  Male w/ Hx of AUD (4-5 drinks/day, Vodka, last drink 2 weeks prior to admission), smoking (20-25 years) not on home O2, and family Hx of hepatitis C (mother), presented to [facility] w/ 2 weeks progressively worsening abdominal distention, decreased appetite, malaise, generalized weakness, with 2 days Hx of jaundice prior to arrival, and was admitted w/ newly diagnosed cirrhosis w/ CSPH, decompensated w/ ascites.       # Cirrhosis likely due to EtOH, w/ CSPH, decompensated w/ ascites  # Superimposed alcoholic hepatitis  # Ascites  MELD 3.0 21  MDF 53.4    CXR no infiltrate  BCx NGTD x 48h  CT a/p w/ IV contrast: Cirrhosis with portal HTN with collaterals including lower esophageal varices, moderate to large volume ascites with mesenteric edema and anasarca.     Planned for EDG and IR-quided paracentesis tomorrow 5/1  - F/u infectious w/u ascites analysis before considering initiating prednisolone  - Obtain diagnostic (cell count, Gram stain, culture, albumin, protein, cytology, LDH, glucose) and therapeutic paracentesis w/ 25% albumin for 6-8g/l ascites removed  - Monitor renal function (Cr normal so far)  - Avoid NSAIDs  - Low salt diet  - No PVT per CTa/p w/ iv  - Consider TTE    # Anemia  # Coagulopathy  # Thrombocytopenia  - Monitor H/H, keep Hb > 7  - Keep PLT > 50, fibrinogen > 120 if bleeding  - No reported overt GIB so far  - Will need EV screening, timing per GI, consult appreciated  - Please, send anemia w/u    # No PSE  # HCC status - no focal hepatic lesion on CT a/p, can send AFP  # Etiology of cirrhosis: likely EtOH, but please, send CLD BW (A1AT, ceruloplasmin, ferritin, iron/TIBC, ROSY, SMA, LKM, AMA, Ig panel). Hep B neg, not immune, not exposed, and Hep C ab neg. Hep A IgM neg.   Also F/u Hep E IgM/PCR, and obtain EBV, CMV, HSV PCRs  # Transplant candidacy: Will need to explore psychosocial status. Bilirubin is improving, possibly will need to be referred outpatient, unless worsening.  # AUD  - Folic, thiamine  - CIWA per primary team  - Aspiration, seizure, fall precautions  - Will need rehab     Thank you for consult  Will follow  Discussed w/ primary team   47yo  Male w/ Hx of AUD (4-5 drinks/day, Vodka, last drink 2 weeks prior to admission), smoking (20-25 years) not on home O2, and family Hx of hepatitis C (mother), presented to [facility] w/ 2 weeks progressively worsening abdominal distention, decreased appetite, malaise, generalized weakness, with 2 days Hx of jaundice prior to arrival, and was admitted w/ newly diagnosed cirrhosis w/ CSPH, decompensated w/ ascites.     CXR no infiltrate  BCx NGTD x 48h  CT a/p w/ IV contrast: Cirrhosis with portal HTN with collaterals including lower esophageal varices, moderate to large volume ascites with mesenteric edema and anasarca.     # Cirrhosis likely due to EtOH, w/ CSPH, decompensated w/ ascites  # Superimposed alcoholic hepatitis  # Ascites  MELD 3.0 21  MDF 53.4  Planned for EGD and IR-quided paracentesis tomorrow 5/1  - F/u infectious w/u ascites analysis before considering initiating prednisolone  - Obtain diagnostic (cell count, Gram stain, culture, albumin, protein, cytology, LDH, glucose) and therapeutic paracentesis w/ 25% albumin for 6-8g/l ascites removed  - Monitor renal function (Cr normal so far)  - Avoid NSAIDs  - Low salt diet  - No PVT per CTa/p w/ iv  - Consider TTE    # Anemia  # Coagulopathy  # Thrombocytopenia  - Monitor H/H, keep Hb > 7  - Keep PLT > 50, fibrinogen > 120 if bleeding  - No reported overt GIB so far  - Agree to give Vitamin K x1 prophylactically prior to planned paracentesis for suspected poor nutrition and cholestatic dz superimposed on suspected alcoholic hepatis w/ cirrhosis  - Planned for EGD tomorrow, GI consult appreciated  --- Iron 13, TIBC 221, Iron % 6, ferritin 202, haptoglobin 40, , reticulocyte % 3.8   - f/u folate, Vitamin b12    # No PSE  # HCC status - no focal hepatic lesion on CT a/p, can send AFP  # Etiology of cirrhosis: likely EtOH, but please, send CLD BW (A1AT, ceruloplasmin, ROSY, SMA, LKM, AMA, Ig panel).   --- Hep B neg, not immune, not exposed, and Hep C ab neg. Hep A IgM neg. Hep E IgG/IgM neg.  - F/u Hep PCR (testing), and obtain EBV, CMV, HSV PCRs    # Transplant candidacy: Will need to explore psychosocial status. Bilirubin is improving, possibly will need to be referred outpatient, unless worsening.  # AUD  - Folic, thiamine  - CIWA per primary team  - Aspiration, seizure, fall precautions  - Will need rehab     Thank you for consult  Will follow  Discussed w/ primary team

## 2024-04-30 NOTE — PROGRESS NOTE ADULT - SUBJECTIVE AND OBJECTIVE BOX
GI Progress Note    Patient is a 48y old  Male who presents with a chief complaint of New cirrhosis (30 Apr 2024 17:05)    GI was consulted for anemia and dark stools.    24-HOUR INTERVAL EVENTS: Patient resting in bed. Paracentesis re-scheduled to tomorrow. Tentative EGD tomorrow as well. Labs reviewed, no overt signs of bleeding. He denies cp, sob, n/v. Has good appetite.     MEDICATIONS  (STANDING):  folic acid 1 milliGRAM(s) Oral daily  furosemide    Tablet 20 milliGRAM(s) Oral daily  pantoprazole    Tablet 40 milliGRAM(s) Oral before breakfast  spironolactone 25 milliGRAM(s) Oral daily  thiamine 100 milliGRAM(s) Oral daily    MEDICATIONS  (PRN):  acetaminophen     Tablet .. 650 milliGRAM(s) Oral every 6 hours PRN Temp greater or equal to 38C (100.4F), Mild Pain (1 - 3)  LORazepam   Injectable 1 milliGRAM(s) IV Push every 2 hours PRN CIWA-Ar score increase by 2 points and a total score of 7 or less  LORazepam   Injectable 1 milliGRAM(s) IV Push every 1 hour PRN CIWA-Ar score 8 or greater  melatonin 5 milliGRAM(s) Oral at bedtime PRN Insomnia  ondansetron Injectable 4 milliGRAM(s) IV Push every 8 hours PRN Nausea and/or Vomiting    __________________________________________________  REVIEW OF SYSTEMS:  A detailed set of ROS were asked and negative except those outlined in GI HPI above/below.   ________________________________________________  PHYSICAL EXAM    Vital Signs Last 24 Hrs  T(C): 36.7 (30 Apr 2024 12:14), Max: 36.8 (29 Apr 2024 20:12)  T(F): 98 (30 Apr 2024 12:14), Max: 98.3 (29 Apr 2024 20:12)  HR: 103 (30 Apr 2024 12:14) (84 - 103)  BP: 128/64 (30 Apr 2024 12:14) (112/68 - 128/64)  BP(mean): --  RR: 18 (30 Apr 2024 12:14) (17 - 18)  SpO2: 94% (30 Apr 2024 12:14) (94% - 94%)    Parameters below as of 30 Apr 2024 12:14  Patient On (Oxygen Delivery Method): nasal cannula  O2 Flow (L/min): 2    GEN: NAD  HEENT: EOMI, conjunctivae anicteric, neck supple, moist mucous membranes  PULM: LSCTAB, no wheezing, rales, or rhonchi  CV: RRR, no m/r/b  GI: taut, pain to right abdomen, distended; +BS in all four quadrants  MSK: BETH, no edema  NEURO: A&O x 3, no gross deficits    _________________________________________________  LABS:                        7.1    5.02  )-----------( 73       ( 30 Apr 2024 05:55 )             22.5     04-30    142  |  107  |  8   ----------------------------<  87  3.0<L>   |  29  |  0.55    Ca    7.6<L>      30 Apr 2024 05:55  Phos  2.9     04-29  Mg     1.7     04-29    TPro  6.3  /  Alb  1.9<L>  /  TBili  3.4<H>  /  DBili  1.8<H>  /  AST  62<H>  /  ALT  29  /  AlkPhos  134<H>  04-30    PT/INR - ( 30 Apr 2024 05:55 )   PT: 21.4 sec;   INR: 1.91 ratio         PTT - ( 30 Apr 2024 05:55 )  PTT:39.2 sec  Urinalysis Basic - ( 30 Apr 2024 05:55 )    Color: x / Appearance: x / SG: x / pH: x  Gluc: 87 mg/dL / Ketone: x  / Bili: x / Urobili: x   Blood: x / Protein: x / Nitrite: x   Leuk Esterase: x / RBC: x / WBC x   Sq Epi: x / Non Sq Epi: x / Bacteria: x      RADIOLOGY & ADDITIONAL TESTS:    < from: Xray Chest 1 View- PORTABLE-Urgent (Xray Chest 1 View- PORTABLE-Urgent .) (04.27.24 @ 23:26) >  IMPRESSION:  2.4 cm opacity adjacent to RIGHT tracheobronchial angle either indicating   azygous vein or lymph node .  Lungs clear.  Continued surveillance recommended.    < end of copied text >

## 2024-04-30 NOTE — PROGRESS NOTE ADULT - SUBJECTIVE AND OBJECTIVE BOX
Chief Complaint:  Patient is a 48y old  Male who presents with a chief complaint of New cirrhosis (30 Apr 2024 15:58)      Reason for consult:  Cirrhosis      Interval Events:     Hospital Medications:  acetaminophen     Tablet .. 650 milliGRAM(s) Oral every 6 hours PRN  folic acid 1 milliGRAM(s) Oral daily  furosemide    Tablet 20 milliGRAM(s) Oral daily  LORazepam   Injectable 1 milliGRAM(s) IV Push every 2 hours PRN  LORazepam   Injectable 1 milliGRAM(s) IV Push every 1 hour PRN  melatonin 5 milliGRAM(s) Oral at bedtime PRN  ondansetron Injectable 4 milliGRAM(s) IV Push every 8 hours PRN  pantoprazole    Tablet 40 milliGRAM(s) Oral before breakfast  spironolactone 25 milliGRAM(s) Oral daily  thiamine 100 milliGRAM(s) Oral daily      ROS:   General:  No  fevers, chills, night sweats, fatigue  Eyes:  Good vision, no reported pain  ENT:  No sore throat, pain, runny nose  CV:  No pain, palpitations  Pulm:  No dyspnea, cough  GI:  See HPI, otherwise negative  :  No  incontinence, nocturia  Muscle:  No pain, weakness  Neuro:  No memory problems  Psych:  No insomnia, mood problems, depression  Endocrine:  No polyuria, polydipsia, cold/heat intolerance  Heme:  No petechiae, ecchymosis, easy bruisability  Skin:  No rash    PHYSICAL EXAM:   Vital Signs:  Vital Signs Last 24 Hrs  T(C): 36.7 (30 Apr 2024 12:14), Max: 36.8 (29 Apr 2024 20:12)  T(F): 98 (30 Apr 2024 12:14), Max: 98.3 (29 Apr 2024 20:12)  HR: 103 (30 Apr 2024 12:14) (84 - 103)  BP: 128/64 (30 Apr 2024 12:14) (112/68 - 128/64)  BP(mean): --  RR: 18 (30 Apr 2024 12:14) (17 - 18)  SpO2: 94% (30 Apr 2024 12:14) (94% - 94%)    Parameters below as of 30 Apr 2024 12:14  Patient On (Oxygen Delivery Method): nasal cannula  O2 Flow (L/min): 2    Daily     Daily     GENERAL: no acute distress  NEURO: alert, no asterixis  HEENT: anicteric sclera, no conjunctival pallor appreciated  CHEST: no respiratory distress, no accessory muscle use  CARDIAC: regular rate, rhythm  ABDOMEN: soft, non-tender, non-distended, no rebound or guarding  EXTREMITIES: warm, well perfused, no edema  SKIN: no lesions noted    LABS: reviewed                        7.1    5.02  )-----------( 73       ( 30 Apr 2024 05:55 )             22.5     04-30    142  |  107  |  8   ----------------------------<  87  3.0<L>   |  29  |  0.55    Ca    7.6<L>      30 Apr 2024 05:55  Phos  2.9     04-29  Mg     1.7     04-29    TPro  6.3  /  Alb  1.9<L>  /  TBili  3.4<H>  /  DBili  1.8<H>  /  AST  62<H>  /  ALT  29  /  AlkPhos  134<H>  04-30    LIVER FUNCTIONS - ( 30 Apr 2024 05:55 )  Alb: 1.9 g/dL / Pro: 6.3 g/dL / ALK PHOS: 134 U/L / ALT: 29 U/L DA / AST: 62 U/L / GGT: x             Interval Diagnostic Studies: see sunrise for full report  RADIOLOGIC IMAGING:  __________________________________________________________________  < from: CT Abdomen and Pelvis w/ IV Cont (04.27.24 @ 13:32) >  FINDINGS:  LOWER CHEST: Bibasilar dependent lung atelectasis, right more than left.    LIVER: Cirrhosis. No focal hepatic mass lesion as imaged.  BILE DUCTS: Normal caliber.  GALLBLADDER: Within normal limits.  SPLEEN: Enlarged measuring approximately 16 cm.  PANCREAS: Within normal limits.  ADRENALS: Within normal limits.  KIDNEYS/URETERS: No hydronephrosis. Multiple nonobstructing calculi lower pole of the left kidney measuring up to 5 mm.    BLADDER: Minimally distended.  REPRODUCTIVE ORGANS: Prostate within normal limits.    BOWEL: No bowel obstruction. Appendix is normal.  PERITONEUM: Moderate to large volume ascites. Mesenteric edema.  VESSELS: Portal venous collaterals including lower esophageal varices. No evidence of portal venous thrombosis.  RETROPERITONEUM/LYMPH NODES: No lymphadenopathy.  ABDOMINAL WALL: Anasarca  BONES: Within normal limits.    IMPRESSION:  Cirrhosis with portal hypertension, splenomegaly and ascites.         Chief Complaint:  Patient is a 48y old  Male who presents with a chief complaint of New cirrhosis (30 Apr 2024 15:58)      Reason for consult:  New Cirrhosis with ascites likely in the setting of chronic ETOH use.       Interval Events:   Patient seen at the bedside and denies any new complaints. Patient is awaiting EGD and IR paracentesis. Endorses abdominal tenderness upon palpation, but denies any change in abdominal distension. Reports that he is awaiting the paracentesis as he feel his stomach distension is limiting his appetite.       Hospital Medications:  acetaminophen     Tablet .. 650 milliGRAM(s) Oral every 6 hours PRN  folic acid 1 milliGRAM(s) Oral daily  furosemide    Tablet 20 milliGRAM(s) Oral daily  LORazepam   Injectable 1 milliGRAM(s) IV Push every 2 hours PRN  LORazepam   Injectable 1 milliGRAM(s) IV Push every 1 hour PRN  melatonin 5 milliGRAM(s) Oral at bedtime PRN  ondansetron Injectable 4 milliGRAM(s) IV Push every 8 hours PRN  pantoprazole    Tablet 40 milliGRAM(s) Oral before breakfast  spironolactone 25 milliGRAM(s) Oral daily  thiamine 100 milliGRAM(s) Oral daily      ROS:   General:  No  fevers, chills, night sweats, fatigue  Eyes:  Good vision, no reported pain  ENT:  No sore throat, pain, runny nose  CV:  No pain, palpitations  Pulm:  No dyspnea, cough  GI:  See HPI, otherwise negative  :  No  incontinence, nocturia  Muscle:  No pain, weakness  Neuro:  No memory problems  Psych:  No insomnia, mood problems, depression  Endocrine:  No polyuria, polydipsia, cold/heat intolerance  Heme:  No petechiae, ecchymosis, easy bruisability  Skin:  No rash    PHYSICAL EXAM:   Vital Signs:  Vital Signs Last 24 Hrs  T(C): 36.7 (30 Apr 2024 12:14), Max: 36.8 (29 Apr 2024 20:12)  T(F): 98 (30 Apr 2024 12:14), Max: 98.3 (29 Apr 2024 20:12)  HR: 103 (30 Apr 2024 12:14) (84 - 103)  BP: 128/64 (30 Apr 2024 12:14) (112/68 - 128/64)  BP(mean): --  RR: 18 (30 Apr 2024 12:14) (17 - 18)  SpO2: 94% (30 Apr 2024 12:14) (94% - 94%)    Parameters below as of 30 Apr 2024 12:14  Patient On (Oxygen Delivery Method): nasal cannula  O2 Flow (L/min): 2    Daily     Daily     GENERAL: no acute distress  NEURO: alert, no asterixis  HEENT: anicteric sclera, no conjunctival pallor appreciated  CHEST: no respiratory distress, no accessory muscle use  CARDIAC: regular rate, rhythm  ABDOMEN: soft, non-tender, non-distended, no rebound or guarding  EXTREMITIES: warm, well perfused, no edema  SKIN: no lesions noted    LABS: reviewed                        7.1    5.02  )-----------( 73       ( 30 Apr 2024 05:55 )             22.5     04-30    142  |  107  |  8   ----------------------------<  87  3.0<L>   |  29  |  0.55    Ca    7.6<L>      30 Apr 2024 05:55  Phos  2.9     04-29  Mg     1.7     04-29    TPro  6.3  /  Alb  1.9<L>  /  TBili  3.4<H>  /  DBili  1.8<H>  /  AST  62<H>  /  ALT  29  /  AlkPhos  134<H>  04-30    LIVER FUNCTIONS - ( 30 Apr 2024 05:55 )  Alb: 1.9 g/dL / Pro: 6.3 g/dL / ALK PHOS: 134 U/L / ALT: 29 U/L DA / AST: 62 U/L / GGT: x             Interval Diagnostic Studies: see sunrise for full report  RADIOLOGIC IMAGING:  __________________________________________________________________  < from: CT Abdomen and Pelvis w/ IV Cont (04.27.24 @ 13:32) >  FINDINGS:  LOWER CHEST: Bibasilar dependent lung atelectasis, right more than left.    LIVER: Cirrhosis. No focal hepatic mass lesion as imaged.  BILE DUCTS: Normal caliber.  GALLBLADDER: Within normal limits.  SPLEEN: Enlarged measuring approximately 16 cm.  PANCREAS: Within normal limits.  ADRENALS: Within normal limits.  KIDNEYS/URETERS: No hydronephrosis. Multiple nonobstructing calculi lower pole of the left kidney measuring up to 5 mm.    BLADDER: Minimally distended.  REPRODUCTIVE ORGANS: Prostate within normal limits.    BOWEL: No bowel obstruction. Appendix is normal.  PERITONEUM: Moderate to large volume ascites. Mesenteric edema.  VESSELS: Portal venous collaterals including lower esophageal varices. No evidence of portal venous thrombosis.  RETROPERITONEUM/LYMPH NODES: No lymphadenopathy.  ABDOMINAL WALL: Anasarca  BONES: Within normal limits.    IMPRESSION:  Cirrhosis with portal hypertension, splenomegaly and ascites.         Chief Complaint:  Patient is a 48y old  Male who presents with a chief complaint of New cirrhosis (30 Apr 2024 15:58)      Reason for consult:  New Cirrhosis with ascites likely in the setting of chronic ETOH use.       Interval Events:   Patient seen at the bedside and denies any new complaints. Patient is awaiting EGD and IR paracentesis. Endorses abdominal tenderness upon palpation, but denies any change in abdominal distension. Reports that he is awaiting the paracentesis as he feels his stomach distension is limiting his appetite and ability to take deep breaths.      Hospital Medications:  acetaminophen     Tablet .. 650 milliGRAM(s) Oral every 6 hours PRN  folic acid 1 milliGRAM(s) Oral daily  furosemide    Tablet 20 milliGRAM(s) Oral daily  LORazepam   Injectable 1 milliGRAM(s) IV Push every 2 hours PRN  LORazepam   Injectable 1 milliGRAM(s) IV Push every 1 hour PRN  melatonin 5 milliGRAM(s) Oral at bedtime PRN  ondansetron Injectable 4 milliGRAM(s) IV Push every 8 hours PRN  pantoprazole    Tablet 40 milliGRAM(s) Oral before breakfast  spironolactone 25 milliGRAM(s) Oral daily  thiamine 100 milliGRAM(s) Oral daily      ROS:   General:  No  fevers, chills, night sweats, fatigue  Eyes:  Good vision, no reported pain  ENT:  No sore throat, pain, runny nose  CV:  No pain, palpitations  Pulm:  No dyspnea, cough  GI:  See HPI, otherwise negative  :  No  incontinence, nocturia  Muscle:  No pain, weakness  Neuro:  No memory problems  Psych:  No insomnia, mood problems, depression  Endocrine:  No polyuria, polydipsia, cold/heat intolerance  Heme:  No petechiae, ecchymosis, easy bruisability  Skin:  No rash    PHYSICAL EXAM:   Vital Signs:  Vital Signs Last 24 Hrs  T(C): 36.7 (30 Apr 2024 12:14), Max: 36.8 (29 Apr 2024 20:12)  T(F): 98 (30 Apr 2024 12:14), Max: 98.3 (29 Apr 2024 20:12)  HR: 103 (30 Apr 2024 12:14) (84 - 103)  BP: 128/64 (30 Apr 2024 12:14) (112/68 - 128/64)  BP(mean): --  RR: 18 (30 Apr 2024 12:14) (17 - 18)  SpO2: 94% (30 Apr 2024 12:14) (94% - 94%)    Parameters below as of 30 Apr 2024 12:14  Patient On (Oxygen Delivery Method): nasal cannula  O2 Flow (L/min): 2    Daily     Daily     GENERAL: no acute distress  NEURO: alert, no asterixis  HEENT: anicteric sclera, no conjunctival pallor appreciated  CHEST: no respiratory distress, no accessory muscle use  CARDIAC: regular rate, rhythm  ABDOMEN: (+) tense abdominal distension, tender  EXTREMITIES: warm, well perfused, no edema  SKIN: no lesions noted    LABS: reviewed                        7.1    5.02  )-----------( 73       ( 30 Apr 2024 05:55 )             22.5     04-30    142  |  107  |  8   ----------------------------<  87  3.0<L>   |  29  |  0.55    Ca    7.6<L>      30 Apr 2024 05:55  Phos  2.9     04-29  Mg     1.7     04-29    TPro  6.3  /  Alb  1.9<L>  /  TBili  3.4<H>  /  DBili  1.8<H>  /  AST  62<H>  /  ALT  29  /  AlkPhos  134<H>  04-30    LIVER FUNCTIONS - ( 30 Apr 2024 05:55 )  Alb: 1.9 g/dL / Pro: 6.3 g/dL / ALK PHOS: 134 U/L / ALT: 29 U/L DA / AST: 62 U/L / GGT: x             Interval Diagnostic Studies: see sunrise for full report  RADIOLOGIC IMAGING:  __________________________________________________________________  < from: CT Abdomen and Pelvis w/ IV Cont (04.27.24 @ 13:32) >  FINDINGS:  LOWER CHEST: Bibasilar dependent lung atelectasis, right more than left.    LIVER: Cirrhosis. No focal hepatic mass lesion as imaged.  BILE DUCTS: Normal caliber.  GALLBLADDER: Within normal limits.  SPLEEN: Enlarged measuring approximately 16 cm.  PANCREAS: Within normal limits.  ADRENALS: Within normal limits.  KIDNEYS/URETERS: No hydronephrosis. Multiple nonobstructing calculi lower pole of the left kidney measuring up to 5 mm.    BLADDER: Minimally distended.  REPRODUCTIVE ORGANS: Prostate within normal limits.    BOWEL: No bowel obstruction. Appendix is normal.  PERITONEUM: Moderate to large volume ascites. Mesenteric edema.  VESSELS: Portal venous collaterals including lower esophageal varices. No evidence of portal venous thrombosis.  RETROPERITONEUM/LYMPH NODES: No lymphadenopathy.  ABDOMINAL WALL: Anasarca  BONES: Within normal limits.    IMPRESSION:  Cirrhosis with portal hypertension, splenomegaly and ascites.

## 2024-04-30 NOTE — PROGRESS NOTE ADULT - ASSESSMENT
49 y/o M with PMH of alcohol use disorder (has not seen a doctor for 10 years), who presented with 2 weeks history of worsening generalized abdominal pain, discomfort, and bloating. On CT abdomen pelvis patient has cirrhosis with portal hypertension, splenomegaly and large volume ascites. Patient is being admitted for newly diagnosed cirrhosis w/ ascites. Hepatology and GI consulted for work up. Plan for IR therapeutic and diagnostic paracentesis, will give Vit K to optimize for IR procedure in AM       4/30: paracentesis pending by IR today; EGD scheduled for 5/1        47 y/o M with PMH of alcohol use disorder (has not seen a doctor for 10 years), who presented with 2 weeks history of worsening generalized abdominal pain, discomfort, and bloating. On CT abdomen pelvis patient has cirrhosis with portal hypertension, splenomegaly and large volume ascites. Patient is being admitted for newly diagnosed cirrhosis w/ ascites. Hepatology and GI consulted for work up. Plan for IR therapeutic and diagnostic paracentesis, will give Vit K to optimize for IR procedure in AM       4/30: paracentesis rescheduled by IR tomorrow; EGD scheduled for 5/1 in the morning, keep pt NPO

## 2024-05-01 LAB
A1AT SERPL-MCNC: 153 MG/DL — SIGNIFICANT CHANGE UP (ref 90–200)
ALBUMIN FLD-MCNC: 0.4 G/DL — SIGNIFICANT CHANGE UP
ALBUMIN SERPL ELPH-MCNC: 1.9 G/DL — LOW (ref 3.5–5)
ALP SERPL-CCNC: 128 U/L — HIGH (ref 40–120)
ALT FLD-CCNC: 29 U/L DA — SIGNIFICANT CHANGE UP (ref 10–60)
ANION GAP SERPL CALC-SCNC: 4 MMOL/L — LOW (ref 5–17)
APTT BLD: 39 SEC — HIGH (ref 24.5–35.6)
AST SERPL-CCNC: 61 U/L — HIGH (ref 10–40)
B PERT IGG+IGM PNL SER: CLEAR — SIGNIFICANT CHANGE UP
BILIRUB SERPL-MCNC: 3.5 MG/DL — HIGH (ref 0.2–1.2)
BUN SERPL-MCNC: 9 MG/DL — SIGNIFICANT CHANGE UP (ref 7–18)
CALCIUM SERPL-MCNC: 7.6 MG/DL — LOW (ref 8.4–10.5)
CERULOPLASMIN SERPL-MCNC: 15 MG/DL — SIGNIFICANT CHANGE UP (ref 15–30)
CHLORIDE SERPL-SCNC: 108 MMOL/L — SIGNIFICANT CHANGE UP (ref 96–108)
CO2 SERPL-SCNC: 29 MMOL/L — SIGNIFICANT CHANGE UP (ref 22–31)
COLOR FLD: YELLOW — SIGNIFICANT CHANGE UP
COMMENT - FLUIDS: SIGNIFICANT CHANGE UP
CREAT SERPL-MCNC: 0.54 MG/DL — SIGNIFICANT CHANGE UP (ref 0.5–1.3)
EBV EA AB SER IA-ACNC: 11.2 U/ML — HIGH
EBV EA AB TITR SER IF: POSITIVE
EBV EA IGG SER-ACNC: POSITIVE
EBV NA IGG SER IA-ACNC: 431 U/ML — HIGH
EBV PATRN SPEC IB-IMP: SIGNIFICANT CHANGE UP
EBV VCA IGG AVIDITY SER QL IA: POSITIVE
EBV VCA IGM SER IA-ACNC: 25 U/ML — SIGNIFICANT CHANGE UP
EBV VCA IGM SER IA-ACNC: 568 U/ML — HIGH
EBV VCA IGM TITR FLD: NEGATIVE — SIGNIFICANT CHANGE UP
EGFR: 124 ML/MIN/1.73M2 — SIGNIFICANT CHANGE UP
FLUID INTAKE SUBSTANCE CLASS: SIGNIFICANT CHANGE UP
FOLATE SERPL-MCNC: 4.8 NG/ML — SIGNIFICANT CHANGE UP
GLUCOSE FLD-MCNC: 97 MG/DL — SIGNIFICANT CHANGE UP
GLUCOSE SERPL-MCNC: 83 MG/DL — SIGNIFICANT CHANGE UP (ref 70–99)
GRAM STN FLD: SIGNIFICANT CHANGE UP
HCT VFR BLD CALC: 22.9 % — LOW (ref 39–50)
HGB BLD-MCNC: 7.2 G/DL — LOW (ref 13–17)
IGA FLD-MCNC: 1658 MG/DL — HIGH (ref 84–499)
IGG FLD-MCNC: 1661 MG/DL — HIGH (ref 610–1660)
IGM SERPL-MCNC: 188 MG/DL — SIGNIFICANT CHANGE UP (ref 35–242)
INR BLD: 1.89 RATIO — HIGH (ref 0.85–1.18)
KAPPA LC SER QL IFE: 11.1 MG/DL — HIGH (ref 0.33–1.94)
KAPPA/LAMBDA FREE LIGHT CHAIN RATIO, SERUM: 0.95 RATIO — SIGNIFICANT CHANGE UP (ref 0.26–1.65)
LAMBDA LC SER QL IFE: 11.67 MG/DL — HIGH (ref 0.57–2.63)
LDH SERPL L TO P-CCNC: 46 U/L — SIGNIFICANT CHANGE UP
LYMPHOCYTES # FLD: 25 % — SIGNIFICANT CHANGE UP
MANUAL SMEAR VERIFICATION: SIGNIFICANT CHANGE UP
MCHC RBC-ENTMCNC: 31.4 GM/DL — LOW (ref 32–36)
MCHC RBC-ENTMCNC: 31.9 PG — SIGNIFICANT CHANGE UP (ref 27–34)
MCV RBC AUTO: 101.3 FL — HIGH (ref 80–100)
MESOTHL CELL # FLD: 9 % — SIGNIFICANT CHANGE UP
MONOS+MACROS # FLD: 66 % — SIGNIFICANT CHANGE UP
NEUTROPHILS-BODY FLUID: 0 % — SIGNIFICANT CHANGE UP
NRBC # BLD: 0 /100 WBCS — SIGNIFICANT CHANGE UP (ref 0–0)
PLAT MORPH BLD: NORMAL — SIGNIFICANT CHANGE UP
PLATELET # BLD AUTO: 94 K/UL — LOW (ref 150–400)
PLATELET COUNT - ESTIMATE: ABNORMAL
POTASSIUM SERPL-MCNC: 3.4 MMOL/L — LOW (ref 3.5–5.3)
POTASSIUM SERPL-SCNC: 3.4 MMOL/L — LOW (ref 3.5–5.3)
PROT FLD-MCNC: <1 G/DL — SIGNIFICANT CHANGE UP
PROT SERPL-MCNC: 6.5 G/DL — SIGNIFICANT CHANGE UP (ref 6–8.3)
PROTHROM AB SERPL-ACNC: 21.1 SEC — HIGH (ref 9.5–13)
RBC # BLD: 2.26 M/UL — LOW (ref 4.2–5.8)
RBC # FLD: 17.1 % — HIGH (ref 10.3–14.5)
RBC BLD AUTO: NORMAL — SIGNIFICANT CHANGE UP
RCV VOL RI: 0 /UL — SIGNIFICANT CHANGE UP (ref 0–5)
SODIUM SERPL-SCNC: 141 MMOL/L — SIGNIFICANT CHANGE UP (ref 135–145)
SPECIMEN SOURCE: SIGNIFICANT CHANGE UP
TOTAL NUCLEATED CELL COUNT, BODY FLUID: 215 /UL — SIGNIFICANT CHANGE UP
TUBE TYPE: SIGNIFICANT CHANGE UP
VIT B12 SERPL-MCNC: 1213 PG/ML — SIGNIFICANT CHANGE UP (ref 232–1245)
WBC # BLD: 5.73 K/UL — SIGNIFICANT CHANGE UP (ref 3.8–10.5)
WBC # FLD AUTO: 5.73 K/UL — SIGNIFICANT CHANGE UP (ref 3.8–10.5)

## 2024-05-01 PROCEDURE — 88305 TISSUE EXAM BY PATHOLOGIST: CPT | Mod: 26

## 2024-05-01 PROCEDURE — 99233 SBSQ HOSP IP/OBS HIGH 50: CPT

## 2024-05-01 PROCEDURE — 76942 ECHO GUIDE FOR BIOPSY: CPT | Mod: 26,59

## 2024-05-01 PROCEDURE — 49083 ABD PARACENTESIS W/IMAGING: CPT

## 2024-05-01 PROCEDURE — 88112 CYTOPATH CELL ENHANCE TECH: CPT | Mod: 26

## 2024-05-01 RX ORDER — POTASSIUM CHLORIDE 20 MEQ
20 PACKET (EA) ORAL ONCE
Refills: 0 | Status: COMPLETED | OUTPATIENT
Start: 2024-05-01 | End: 2024-05-01

## 2024-05-01 RX ORDER — ALBUMIN HUMAN 25 %
100 VIAL (ML) INTRAVENOUS EVERY 6 HOURS
Refills: 0 | Status: COMPLETED | OUTPATIENT
Start: 2024-05-01 | End: 2024-05-01

## 2024-05-01 RX ADMIN — Medication 5 MILLIGRAM(S): at 23:27

## 2024-05-01 RX ADMIN — Medication 20 MILLIGRAM(S): at 05:30

## 2024-05-01 RX ADMIN — Medication 100 MILLIGRAM(S): at 15:39

## 2024-05-01 RX ADMIN — Medication 50 MILLILITER(S): at 15:39

## 2024-05-01 RX ADMIN — SPIRONOLACTONE 25 MILLIGRAM(S): 25 TABLET, FILM COATED ORAL at 05:30

## 2024-05-01 RX ADMIN — Medication 50 MILLILITER(S): at 23:15

## 2024-05-01 RX ADMIN — Medication 650 MILLIGRAM(S): at 23:27

## 2024-05-01 RX ADMIN — PANTOPRAZOLE SODIUM 40 MILLIGRAM(S): 20 TABLET, DELAYED RELEASE ORAL at 05:30

## 2024-05-01 RX ADMIN — Medication 1 MILLIGRAM(S): at 15:39

## 2024-05-01 RX ADMIN — Medication 20 MILLIEQUIVALENT(S): at 18:35

## 2024-05-01 NOTE — PROGRESS NOTE ADULT - SUBJECTIVE AND OBJECTIVE BOX
Chief Complaint:  Patient is a 48y old  Male who presents with a chief complaint of New cirrhosis (2024 17:37)      Reason for consult:    Interval Events:     Hospital Medications:  acetaminophen     Tablet .. 650 milliGRAM(s) Oral every 6 hours PRN  albumin human 25% IVPB 100 milliLiter(s) IV Intermittent every 6 hours  folic acid 1 milliGRAM(s) Oral daily  furosemide    Tablet 20 milliGRAM(s) Oral daily  LORazepam   Injectable 1 milliGRAM(s) IV Push every 2 hours PRN  LORazepam   Injectable 1 milliGRAM(s) IV Push every 1 hour PRN  melatonin 5 milliGRAM(s) Oral at bedtime PRN  ondansetron Injectable 4 milliGRAM(s) IV Push every 8 hours PRN  pantoprazole    Tablet 40 milliGRAM(s) Oral before breakfast  spironolactone 25 milliGRAM(s) Oral daily  thiamine 100 milliGRAM(s) Oral daily      ROS:   General:  No  fevers, chills, night sweats, fatigue  Eyes:  Good vision, no reported pain  ENT:  No sore throat, pain, runny nose  CV:  No pain, palpitations  Pulm:  No dyspnea, cough  GI:  See HPI, otherwise negative  :  No  incontinence, nocturia  Muscle:  No pain, weakness  Neuro:  No memory problems  Psych:  No insomnia, mood problems, depression  Endocrine:  No polyuria, polydipsia, cold/heat intolerance  Heme:  No petechiae, ecchymosis, easy bruisability  Skin:  No rash    PHYSICAL EXAM:   Vital Signs:  Vital Signs Last 24 Hrs  T(C): 36.7 (01 May 2024 05:29), Max: 36.8 (2024 20:00)  T(F): 98 (01 May 2024 05:29), Max: 98.3 (2024 20:00)  HR: 85 (01 May 2024 05:29) (85 - 95)  BP: 103/68 (01 May 2024 05:29) (103/68 - 122/61)  BP(mean): --  RR: 18 (01 May 2024 05:29) (18 - 18)  SpO2: 93% (01 May 2024 05:29) (93% - 94%)    Parameters below as of 01 May 2024 05:29  Patient On (Oxygen Delivery Method): nasal cannula  O2 Flow (L/min): 2    Daily Height in cm: 185.4 (01 May 2024 05:00)    Daily Weight in k.3 (01 May 2024 05:29)    GENERAL: no acute distress  NEURO: alert, no asterixis  HEENT: anicteric sclera, no conjunctival pallor appreciated  CHEST: no respiratory distress, no accessory muscle use  CARDIAC: regular rate, rhythm  ABDOMEN: soft, non-tender, non-distended, no rebound or guarding  EXTREMITIES: warm, well perfused, no edema  SKIN: no lesions noted    LABS: reviewed                        7.2    5.73  )-----------( 94       ( 01 May 2024 05:45 )             22.9     05-    141  |  108  |  9   ----------------------------<  83  3.4<L>   |  29  |  0.54    Ca    7.6<L>      01 May 2024 05:45    TPro  6.5  /  Alb  1.9<L>  /  TBili  3.5<H>  /  DBili  x   /  AST  61<H>  /  ALT  29  /  AlkPhos  128<H>  05    LIVER FUNCTIONS - ( 01 May 2024 05:45 )  Alb: 1.9 g/dL / Pro: 6.5 g/dL / ALK PHOS: 128 U/L / ALT: 29 U/L DA / AST: 61 U/L / GGT: x             Interval Diagnostic Studies: see sunrise for full report  RADIOLOGIC IMAGING:     Chief Complaint:  Patient is a 48y old  Male who presents with a chief complaint of New cirrhosis (2024 17:37)      Reason for consult:  ALD    Interval Events:   Patient seen at the bedside, no overnight events reported. Patient is s/p large volume paracentesis today / draining 5L by IR. Patient reports feeling slightly relieved feeling as though his breathing and abdominal tenderness has improved, reporting that he feels as though it is easier to move.      Hospital Medications:  acetaminophen     Tablet .. 650 milliGRAM(s) Oral every 6 hours PRN  albumin human 25% IVPB 100 milliLiter(s) IV Intermittent every 6 hours  folic acid 1 milliGRAM(s) Oral daily  furosemide    Tablet 20 milliGRAM(s) Oral daily  LORazepam   Injectable 1 milliGRAM(s) IV Push every 2 hours PRN  LORazepam   Injectable 1 milliGRAM(s) IV Push every 1 hour PRN  melatonin 5 milliGRAM(s) Oral at bedtime PRN  ondansetron Injectable 4 milliGRAM(s) IV Push every 8 hours PRN  pantoprazole    Tablet 40 milliGRAM(s) Oral before breakfast  spironolactone 25 milliGRAM(s) Oral daily  thiamine 100 milliGRAM(s) Oral daily      ROS:   General:  No  fevers, chills, or fatigue  Eyes:  Good vision, no reported pain  ENT:  No sore throat, pain, runny nose  CV:  No pain, palpitations  Pulm:  No dyspnea, cough  GI:  See HPI, otherwise negative  :  No  incontinence, nocturia  Muscle:  No pain, weakness  Neuro:  No memory problems, no HA  Psych:  No insomnia, mood problems, depression  Endocrine:  No polyuria, polydipsia, cold/heat intolerance  Heme:  No petechiae, ecchymosis, easy bruisability  Skin:  No rash    PHYSICAL EXAM:   Vital Signs:  Vital Signs Last 24 Hrs  T(C): 36.7 (01 May 2024 05:29), Max: 36.8 (2024 20:00)  T(F): 98 (01 May 2024 05:29), Max: 98.3 (2024 20:00)  HR: 85 (01 May 2024 05:29) (85 - 95)  BP: 103/68 (01 May 2024 05:29) (103/68 - 122/61)  BP(mean): --  RR: 18 (01 May 2024 05:29) (18 - 18)  SpO2: 93% (01 May 2024 05:29) (93% - 94%)    Parameters below as of 01 May 2024 05:29  Patient On (Oxygen Delivery Method): nasal cannula  O2 Flow (L/min): 2    Daily Height in cm: 185.4 (01 May 2024 05:00)    Daily Weight in k.3 (01 May 2024 05:29)    GENERAL: no acute distress  NEURO: alert, no asterixis  HEENT: anicteric sclera, no conjunctival pallor appreciated  CHEST: no respiratory distress, no accessory muscle use  CARDIAC: regular rate, rhythm  ABDOMEN: (+) abdominal distension, softer now s/p paracentesis drained 5L, non-tender; no rebound or guarding  EXTREMITIES: warm, well perfused, no edema  SKIN: no lesions noted    LABS: reviewed                        7.2    5.73  )-----------( 94       ( 01 May 2024 05:45 )             22.9     05-    141  |  108  |  9   ----------------------------<  83  3.4<L>   |  29  |  0.54    Ca    7.6<L>      01 May 2024 05:45    TPro  6.5  /  Alb  1.9<L>  /  TBili  3.5<H>  /  DBili  x   /  AST  61<H>  /  ALT  29  /  AlkPhos  128<H>  05-    LIVER FUNCTIONS - ( 01 May 2024 05:45 )  Alb: 1.9 g/dL / Pro: 6.5 g/dL / ALK PHOS: 128 U/L / ALT: 29 U/L DA / AST: 61 U/L / GGT: x             Interval Diagnostic Studies: see sunrise for full report  RADIOLOGIC IMAGING:  __________________________________________________________________  < from: CT Abdomen and Pelvis w/ IV Cont (24 @ 13:32) >  FINDINGS:  LOWER CHEST: Bibasilar dependent lung atelectasis, right more than left.    LIVER: Cirrhosis. No focal hepatic mass lesion as imaged.  BILE DUCTS: Normal caliber.  GALLBLADDER: Within normal limits.  SPLEEN: Enlarged measuring approximately 16 cm.  PANCREAS: Within normal limits.  ADRENALS: Within normal limits.  KIDNEYS/URETERS: No hydronephrosis. Multiple nonobstructing calculi lower pole of the left kidney measuring up to 5 mm.    BLADDER: Minimally distended.  REPRODUCTIVE ORGANS: Prostate within normal limits.    BOWEL: No bowel obstruction. Appendix is normal.  PERITONEUM: Moderate to large volume ascites. Mesenteric edema.  VESSELS: Portal venous collaterals including lower esophageal varices. No evidence of portal venous thrombosis.  RETROPERITONEUM/LYMPH NODES: No lymphadenopathy.  ABDOMINAL WALL: Anasarca  BONES: Within normal limits.    IMPRESSION:  Cirrhosis with portal hypertension, splenomegaly and ascites.     Chief Complaint:  Patient is a 48y old  Male who presents with a chief complaint of New cirrhosis (2024 17:37)      Reason for consult:  ALD    Interval Events:   Patient seen at the bedside, no overnight events reported. Patient is s/p large volume paracentesis today / draining 5L by IR. Patient reports feeling slightly relieved feeling as though his breathing and abdominal tenderness has improved, reporting that he feels as though it is easier to move.      Hospital Medications:  acetaminophen     Tablet .. 650 milliGRAM(s) Oral every 6 hours PRN  albumin human 25% IVPB 100 milliLiter(s) IV Intermittent every 6 hours  folic acid 1 milliGRAM(s) Oral daily  furosemide    Tablet 20 milliGRAM(s) Oral daily  LORazepam   Injectable 1 milliGRAM(s) IV Push every 2 hours PRN  LORazepam   Injectable 1 milliGRAM(s) IV Push every 1 hour PRN  melatonin 5 milliGRAM(s) Oral at bedtime PRN  ondansetron Injectable 4 milliGRAM(s) IV Push every 8 hours PRN  pantoprazole    Tablet 40 milliGRAM(s) Oral before breakfast  spironolactone 25 milliGRAM(s) Oral daily  thiamine 100 milliGRAM(s) Oral daily      ROS:   General:  No  fevers, chills, or fatigue  Eyes:  Good vision, no reported pain  ENT:  No sore throat, pain, runny nose  CV:  No pain, palpitations  Pulm:  No dyspnea, cough  GI:  See HPI, otherwise negative  :  No  dysuria  Muscle:  No pain, weakness  Neuro:  No memory problems, no HA  Psych:  No insomnia, mood problems, depression  Endocrine:  No polyuria, polydipsia, cold/heat intolerance  Heme:  No petechiae, ecchymosis, easy bruisability  Skin:  No rash    PHYSICAL EXAM:   Vital Signs:  Vital Signs Last 24 Hrs  T(C): 36.7 (01 May 2024 05:29), Max: 36.8 (2024 20:00)  T(F): 98 (01 May 2024 05:29), Max: 98.3 (2024 20:00)  HR: 85 (01 May 2024 05:29) (85 - 95)  BP: 103/68 (01 May 2024 05:29) (103/68 - 122/61)  BP(mean): --  RR: 18 (01 May 2024 05:29) (18 - 18)  SpO2: 93% (01 May 2024 05:29) (93% - 94%)    Parameters below as of 01 May 2024 05:29  Patient On (Oxygen Delivery Method): nasal cannula  O2 Flow (L/min): 2    Daily Height in cm: 185.4 (01 May 2024 05:00)    Daily Weight in k.3 (01 May 2024 05:29)    GENERAL: no acute distress  NEURO: alert, no asterixis  HEENT: anicteric sclera, no conjunctival pallor appreciated  CHEST: no respiratory distress, no accessory muscle use  CARDIAC: regular rate, rhythm  ABDOMEN: (+) abdominal distension, softer now s/p paracentesis drained 5L, non-tender; no rebound or guarding  EXTREMITIES: warm, well perfused, no edema  SKIN: no lesions noted    LABS: reviewed                        7.2    5.73  )-----------( 94       ( 01 May 2024 05:45 )             22.9     05-    141  |  108  |  9   ----------------------------<  83  3.4<L>   |  29  |  0.54    Ca    7.6<L>      01 May 2024 05:45    TPro  6.5  /  Alb  1.9<L>  /  TBili  3.5<H>  /  DBili  x   /  AST  61<H>  /  ALT  29  /  AlkPhos  128<H>  05-    LIVER FUNCTIONS - ( 01 May 2024 05:45 )  Alb: 1.9 g/dL / Pro: 6.5 g/dL / ALK PHOS: 128 U/L / ALT: 29 U/L DA / AST: 61 U/L / GGT: x             Interval Diagnostic Studies: see sunrise for full report  RADIOLOGIC IMAGING:  __________________________________________________________________  < from: CT Abdomen and Pelvis w/ IV Cont (24 @ 13:32) >  FINDINGS:  LOWER CHEST: Bibasilar dependent lung atelectasis, right more than left.    LIVER: Cirrhosis. No focal hepatic mass lesion as imaged.  BILE DUCTS: Normal caliber.  GALLBLADDER: Within normal limits.  SPLEEN: Enlarged measuring approximately 16 cm.  PANCREAS: Within normal limits.  ADRENALS: Within normal limits.  KIDNEYS/URETERS: No hydronephrosis. Multiple nonobstructing calculi lower pole of the left kidney measuring up to 5 mm.    BLADDER: Minimally distended.  REPRODUCTIVE ORGANS: Prostate within normal limits.    BOWEL: No bowel obstruction. Appendix is normal.  PERITONEUM: Moderate to large volume ascites. Mesenteric edema.  VESSELS: Portal venous collaterals including lower esophageal varices. No evidence of portal venous thrombosis.  RETROPERITONEUM/LYMPH NODES: No lymphadenopathy.  ABDOMINAL WALL: Anasarca  BONES: Within normal limits.    IMPRESSION:  Cirrhosis with portal hypertension, splenomegaly and ascites.

## 2024-05-01 NOTE — PROGRESS NOTE ADULT - SUBJECTIVE AND OBJECTIVE BOX
Patient is a 48y old  Male who presents with a chief complaint of New cirrhosis (01 May 2024    pt seen and examined    REVIEW OF SYSTEMS:  RESPIRATORY: No cough, SOB  CARDIOVASCULAR: No chest pain, palpitations  GASTROINTESTINAL: +abdominal pain. No nausea, vomiting, or diarrhea  GENITOURINARY: No dysuria  NEUROLOGICAL: No HA      T(C): 36.9 (05-01-24 @ 13:39), Max: 36.9 (05-01-24 @ 13:30)  HR: 90 (05-01-24 @ 14:37) (85 - 99)  BP: 116/68 (05-01-24 @ 14:37) (103/68 - 122/61)  RR: 19 (05-01-24 @ 14:37) (14 - 19)  SpO2: 94% (05-01-24 @ 14:37) (93% - 95%)  Wt(kg): --Vital Signs Last 24 Hrs  T(C): 36.9 (01 May 2024 13:39), Max: 36.9 (01 May 2024 13:30)  T(F): 98.5 (01 May 2024 13:39), Max: 98.5 (01 May 2024 13:30)  HR: 90 (01 May 2024 14:37) (85 - 99)  BP: 116/68 (01 May 2024 14:37) (103/68 - 122/61)  BP(mean): 80 (01 May 2024 14:37) (79 - 80)  RR: 19 (01 May 2024 14:37) (14 - 19)  SpO2: 94% (01 May 2024 14:37) (93% - 95%)    Parameters below as of 01 May 2024 14:37  Patient On (Oxygen Delivery Method): nasal cannula          PHYSICAL EXAM:  GENERAL: NAD  CHEST/LUNG: dec breath sounds at bases  HEART: S1, S2, Regular rate and rhythm  ABDOMEN: distended; Bowel sounds present, no tenderness, no guarding   NEURO: Alert & Oriented X3      Consultant(s) Notes Reviewed:  [x ] YES  [ ] NO  Care Discussed with Consultants/Other Providers [ x] YES  [ ] NO  LABS:                        7.2    5.73  )-----------( 94       ( 01 May 2024 05:45 )             22.9     05-01    141  |  108  |  9   ----------------------------<  83  3.4<L>   |  29  |  0.54    Ca    7.6<L>      01 May 2024 05:45    TPro  6.5  /  Alb  1.9<L>  /  TBili  3.5<H>  /  DBili  x   /  AST  61<H>  /  ALT  29  /  AlkPhos  128<H>  05-01    PT/INR - ( 01 May 2024 05:45 )   PT: 21.1 sec;   INR: 1.89 ratio         PTT - ( 01 May 2024 05:45 )  PTT:39.0 sec  CAPILLARY BLOOD GLUCOSE          Urinalysis Basic - ( 01 May 2024 05:45 )    Color: x / Appearance: x / SG: x / pH: x  Gluc: 83 mg/dL / Ketone: x  / Bili: x / Urobili: x   Blood: x / Protein: x / Nitrite: x   Leuk Esterase: x / RBC: x / WBC x   Sq Epi: x / Non Sq Epi: x / Bacteria: x        RADIOLOGY & ADDITIONAL TESTS:  Imaging Personally Reviewed:  [ ] YES  [ ] NO

## 2024-05-01 NOTE — PROGRESS NOTE ADULT - PROBLEM SELECTOR PLAN 1
- p/w worsening new-onset ascites w/ abd pain + jaundice   - CT abd:  cirrhosis with portal hypertension, splenomegaly and ascites.  - Will hold off SBP prophylaxis until reccs from Hepatology.   - Hepatology Dr. Vuong following    - GI Dr. Hathaway following

## 2024-05-01 NOTE — PROGRESS NOTE ADULT - SUBJECTIVE AND OBJECTIVE BOX
Patient is a 48y old  Male who presents with a chief complaint of New cirrhosis (01 May 2024 12:35)      OVERNIGHT EVENTS: none noted    REVIEW OF SYSTEMS:  RESPIRATORY: No cough, SOB  CARDIOVASCULAR: No chest pain, palpitations  GASTROINTESTINAL: +abdominal pain. No nausea, vomiting, or diarrhea  GENITOURINARY: No dysuria  NEUROLOGICAL: No HA      T(C): 36.9 (05-01-24 @ 13:39), Max: 36.9 (05-01-24 @ 13:30)  HR: 90 (05-01-24 @ 14:37) (85 - 99)  BP: 116/68 (05-01-24 @ 14:37) (103/68 - 122/61)  RR: 19 (05-01-24 @ 14:37) (14 - 19)  SpO2: 94% (05-01-24 @ 14:37) (93% - 95%)  Wt(kg): --Vital Signs Last 24 Hrs  T(C): 36.9 (01 May 2024 13:39), Max: 36.9 (01 May 2024 13:30)  T(F): 98.5 (01 May 2024 13:39), Max: 98.5 (01 May 2024 13:30)  HR: 90 (01 May 2024 14:37) (85 - 99)  BP: 116/68 (01 May 2024 14:37) (103/68 - 122/61)  BP(mean): 80 (01 May 2024 14:37) (79 - 80)  RR: 19 (01 May 2024 14:37) (14 - 19)  SpO2: 94% (01 May 2024 14:37) (93% - 95%)    Parameters below as of 01 May 2024 14:37  Patient On (Oxygen Delivery Method): nasal cannula          PHYSICAL EXAM:  GENERAL: NAD  CHEST/LUNG: Clear to auscultation bilaterally; No rales, rhonchi, wheezing, or rubs  HEART: S1, S2, Regular rate and rhythm  ABDOMEN: distended; Bowel sounds present  NEURO: Alert & Oriented X3      Consultant(s) Notes Reviewed:  [x ] YES  [ ] NO  Care Discussed with Consultants/Other Providers [ x] YES  [ ] NO  LABS:                        7.2    5.73  )-----------( 94       ( 01 May 2024 05:45 )             22.9     05-01    141  |  108  |  9   ----------------------------<  83  3.4<L>   |  29  |  0.54    Ca    7.6<L>      01 May 2024 05:45    TPro  6.5  /  Alb  1.9<L>  /  TBili  3.5<H>  /  DBili  x   /  AST  61<H>  /  ALT  29  /  AlkPhos  128<H>  05-01    PT/INR - ( 01 May 2024 05:45 )   PT: 21.1 sec;   INR: 1.89 ratio         PTT - ( 01 May 2024 05:45 )  PTT:39.0 sec  CAPILLARY BLOOD GLUCOSE          Urinalysis Basic - ( 01 May 2024 05:45 )    Color: x / Appearance: x / SG: x / pH: x  Gluc: 83 mg/dL / Ketone: x  / Bili: x / Urobili: x   Blood: x / Protein: x / Nitrite: x   Leuk Esterase: x / RBC: x / WBC x   Sq Epi: x / Non Sq Epi: x / Bacteria: x        RADIOLOGY & ADDITIONAL TESTS:  Imaging Personally Reviewed:  [ ] YES  [ ] NO

## 2024-05-01 NOTE — PROGRESS NOTE ADULT - PROBLEM SELECTOR PLAN 3
- Meets criteria of alcoholic hepatitis with transaminitis, jaundice, history of heavy alcohol use, elevated PT/INR,  -  Maddrey: 47.9.  - hepatology Dr. Vuong following

## 2024-05-01 NOTE — PROGRESS NOTE ADULT - ASSESSMENT
47yo  Male w/ Hx of AUD (4-5 drinks/day, Vodka, last drink 2 weeks prior to admission), smoking (20-25 years) not on home O2, and family Hx of hepatitis C (mother), presented to [facility] ER w/ 2 weeks progressively worsening abdominal distention, decreased appetite, malaise, generalized weakness, with 2 days Hx of jaundice prior to arrival, and was admitted w/ newly diagnosed cirrhosis w/ CSPH, decompensated w/ ascites.     CXR no infiltrate  BCx NGTD x 48h  CT a/p w/ IV contrast: Cirrhosis with portal HTN with collaterals including lower esophageal varices, moderate to large volume ascites with mesenteric edema and anasarca.     # Cirrhosis likely due to EtOH, w/ CSPH, decompensated w/ ascites  # Superimposed alcoholic hepatitis  # Ascites  MELD 3.0 18, continues to improve  MDF 45.4, slightly improved continues to downtrend but remains >32 would likely benefit steroids  s/p large volume, diagnostic and therapeutic paracentesis by IR 5/1 drained 5L, given albumin 8g/L drained   s/p EGD 5/1, awaiting results   Infectious w/u thus far negative, BCx NGTD x72h, UA neg, with no s/s infection   - F/u ascites analysis BEFORE considering initiating prednisolone, pending ascites fluid gram stain cx and cytology (testing in progress)  --- Cell count 215 with 0% neutrophils  - F/u ascites albumin, protein, LDH, glucose analysis (testing in progress)  - Monitor renal function (Cr normal so far)  - Avoid NSAIDs  - Low salt diet  - No PVT per CTa/p w/ iv  - Consider TTE    # Anemia  # Coagulopathy  # Thrombocytopenia  - Monitor H/H, keep Hb > 7  - Keep PLT > 50, fibrinogen > 120 if bleeding  - No reported overt GIB so far  - Agree to give Vitamin K x1 prophylactically prior to planned paracentesis for suspected poor nutrition and cholestatic dz superimposed on suspected alcoholic hepatis w/ cirrhosis  s/p EGD 5/1, awaiting results; GI consult appreciated  --- Iron 13, TIBC 221, Iron % 6, ferritin 202, haptoglobin 40, , reticulocyte % 3.8   - f/u folate, Vitamin b12 (testing in progress)    # No PSE  # HCC status - no focal hepatic lesion on CT a/p, can send AFP  # Etiology of cirrhosis: likely EtOH  - F/u CLD BW: A1AT, ceruloplasmin, ROSY, SMA, LKM, AMA, Ig panel (testing in progress)   --- Hep B neg, not immune, not exposed, and Hep C ab neg. Hep A IgM neg. Hep E IgG/IgM neg.  - F/u Hep E PCR, EBV, CMV, HSV PCRs (testing in progress)    # Transplant candidacy: Will need to explore psychosocial status. Bilirubin is improving, possibly will need to be referred outpatient, unless worsening.  # AUD  - Folic, thiamine  - CIWA per primary team  - Aspiration, seizure, fall precautions  - Will need rehab     Thank you for consult  Will follow  Discussed w/ primary team 47yo  Male w/ Hx of AUD (4-5 drinks/day, Vodka, last drink 2 weeks prior to admission), smoking (20-25 years) not on home O2, and family Hx of hepatitis C (mother), presented to [facility] ER w/ 2 weeks progressively worsening abdominal distention, decreased appetite, malaise, generalized weakness, with 2 days Hx of jaundice prior to arrival, and was admitted w/ newly diagnosed cirrhosis w/ CSPH, decompensated w/ ascites.     CXR no infiltrate  BCx NGTD x 48h  CT a/p w/ IV contrast: Cirrhosis with portal HTN with collaterals including lower esophageal varices, moderate to large volume ascites with mesenteric edema and anasarca.     # Cirrhosis likely due to EtOH, w/ CSPH, decompensated w/ ascites  # Superimposed alcoholic hepatitis  # Ascites  MELD 3.0 18, continues to improve  MDF 45.4, slightly improved continues to downtrend but remains >32 would likely benefit steroids  s/p large volume, diagnostic and therapeutic paracentesis by IR 5/1 drained 5L, given albumin 8g/L drained   s/p EGD 5/1 showing grade II-III esophageal varices, non-bleeding. no bands placed.  Infectious w/u thus far negative, BCx NGTD x72h, UA neg, with no s/s infection   - F/u ascites analysis BEFORE considering initiating prednisolone, pending ascites fluid gram stain cx and cytology (testing in progress)  --- Cell count 215 with 0% neutrophils  - F/u ascites albumin, protein, LDH, glucose analysis (testing in progress)  - Monitor renal function (Cr normal so far)  - c/w Lasix 20mg daily and spironolactone 25mg for now, if remains stable overnight s/p large-volume paracentesis would consider increasing diuretics to optimize volume status.   - would consider starting carvedilol to slow the progression of CSPH   - Avoid NSAIDs  - Low salt diet with 1200ml fluid restriction   - Encourage alcohol cessation  - No PVT per CTa/p w/ iv  - Consider TTE    # Anemia  # Coagulopathy  # Thrombocytopenia  - Monitor H/H, keep Hb > 7  - Keep PLT > 50, fibrinogen > 120 if bleeding  - No reported overt GIB so far  - Agree to give Vitamin K x1 prophylactically prior to planned paracentesis for suspected poor nutrition and cholestatic dz superimposed on suspected alcoholic hepatis w/ cirrhosis  s/p EGD 5/1, awaiting results; GI consult appreciated  --- Iron 13, TIBC 221, Iron % 6, ferritin 202, haptoglobin 40, , reticulocyte % 3.8   - f/u folate, Vitamin b12 (testing in progress)    # No PSE  # HCC status - no focal hepatic lesion on CT a/p, can send AFP  # Etiology of cirrhosis: likely EtOH  - F/u CLD BW: A1AT, ceruloplasmin, ROSY, SMA, LKM, AMA, Ig panel (testing in progress)   --- Hep B neg, not immune, not exposed, and Hep C ab neg. Hep A IgM neg. Hep E IgG/IgM neg.  - F/u Hep E PCR, EBV, CMV, HSV PCRs (testing in progress)    # Transplant candidacy: Will need to explore psychosocial status. Bilirubin is improving, possibly will need to be referred outpatient, unless worsening.  # AUD  - Folic, thiamine  - CIWA per primary team  - Aspiration, seizure, fall precautions  - Will need rehab     Thank you for consult  Will follow  Discussed w/ primary team 49yo  Male w/ Hx of AUD (4-5 drinks/day, Vodka, last drink 2 weeks prior to admission), smoking (20-25 years) not on home O2, and family Hx of hepatitis C (mother), presented to [facility] ER w/ 2 weeks progressively worsening abdominal distention, decreased appetite, malaise, generalized weakness, with 2 days Hx of jaundice prior to arrival, and was admitted w/ newly diagnosed cirrhosis w/ CSPH, decompensated w/ ascites.     CXR no infiltrate  BCx NGTD x 48h  CT a/p w/ IV contrast: Cirrhosis with portal HTN with collaterals including lower esophageal varices, moderate to large volume ascites with mesenteric edema and anasarca.     # Cirrhosis likely due to EtOH, w/ CSPH, decompensated w/ ascites  # Superimposed alcoholic hepatitis  # Ascites  MELD 3.0 18, continues to improve  MDF 45.4, slightly improved continues to downtrend but remains >32 would likely benefit steroids  s/p large volume, diagnostic and therapeutic paracentesis by IR 5/1 drained 5L, given albumin 8g/L drained   s/p EGD 5/1 showing grade II-III esophageal varices, non-bleeding. no bands placed.  Infectious w/u thus far negative, BCx NGTD x72h, UA neg, with no s/s infection, ascites - no SBP   - Consider starting prednisolone 40 mg in am, with checking Lille score at day #7 (or day#4) to decide whether to continue for total 28 days course  - Consider adding NAC (liver failure protocol) for 2-5 days to the steroid  - F/u rest of ascites analysis, Gram stain, Cx, cytology  - Low protein ascites + CTP >9, bilirubin > 3, thus consider long term SBP ppx, unless improvement.  - Monitor renal function (Cr normal so far)  - c/w Lasix 20mg daily and spironolactone 25mg for now, if remains stable overnight s/p large-volume paracentesis would consider increasing diuretics to optimize volume status.   - If vitals allow after up-titration of diuretics, consider starting carvedilol 3.125 mg bid b/o CSPH  - Avoid NSAIDs  - Low salt diet   - Encourage alcohol cessation  - No PVT per CTa/p w/ iv  - Consider TTE    # Anemia  # Coagulopathy  # Thrombocytopenia  # PHG  # GR II-III EVs  - No reported overt GIB so far  - S/p vitamin K x2  - Iron 13, TIBC 221, Iron % 6, ferritin 202, haptoglobin 40, , reticulocyte % 3.8   - s/p EGD 5/1: grade II-III esophageal varices, non-bleeding. no bands placed; PHG  - Monitor H/H, keep Hb > 7  - Keep PLT > 50, fibrinogen > 120 if bleeding  - F/u folate, Vitamin b12 (testing in progress)  - Beta blocker as above     # No PSE  # HCC status - no focal hepatic lesion on CT a/p, can send AFP  # Etiology of cirrhosis: likely EtOH  - CLD w/u so far: Hep B neg, not immune, not exposed, and Hep C ab neg. Hep A IgM neg. Hep E IgG/IgM neg. A1AT WNL. Ferritin 202.   - Ceruloplasmin 15, possibly due to severe liver disease, but can send 24 hr urine copper.   - F/u ROSY, SMA, LKM, AMA, Ig panel (testing in progress)   - F/u Hep E PCR, EBV, CMV, HSV PCRs (testing in progress)    # Transplant candidacy: Will need to explore psychosocial status. Bilirubin is improving, possibly will need to be referred outpatient, unless worsening.  # AUD  - Folic, thiamine  - CIWA per primary team  - Aspiration, seizure, fall precautions  - Will need rehab     Thank you for consult  Will follow  Discussed w/ primary team

## 2024-05-01 NOTE — PROGRESS NOTE ADULT - ASSESSMENT
47 y/o M with PMH of alcohol use disorder (has not seen a doctor for 10 years), who presented with 2 weeks history of worsening generalized abdominal pain, discomfort, and bloating. On CT abdomen pelvis patient has cirrhosis with portal hypertension, splenomegaly and large volume ascites. Patient is being admitted for newly diagnosed cirrhosis w/ ascites. Hepatology and GI consulted for work up. Plan for IR therapeutic and diagnostic paracentesis, will give Vit K to optimize for IR procedure in AM     4/30: paracentesis rescheduled by IR tomorrow; EGD scheduled for 5/1 in the morning, keep pt NPO    5/1: s/p paracentesis- 5000 cc serous fluid drained. Catheter removed and a sterile  dressing applied.  Specimens were obtained and sent for a complete evaluation including cytology.   s/p EGD, showing grade II-III esophageal varices, non-bleeding. no bands placed; Infectious w/u thus far negative, BCx NGTD x72h, UA neg, with no s/s infection

## 2024-05-01 NOTE — PROGRESS NOTE ADULT - PROBLEM SELECTOR PLAN 4
- HBG downtrending   - concern for black stool  - F/U ferritin, TIBC, total iron, reticulocyte count, haptoglobin, LDH.   - Maintain active T&S.    Pantoprazole   - GI following

## 2024-05-01 NOTE — PROGRESS NOTE ADULT - PROBLEM SELECTOR PLAN 4
- HBG downtrending   - concern for black stool  - F/U ferritin, TIBC, total iron, reticulocyte count, haptoglobin, LDH.   - Maintain active T&S.   - Start Pantoprazole   - GI following

## 2024-05-01 NOTE — PROGRESS NOTE ADULT - PROBLEM SELECTOR PLAN 2
- CT abdomen pelvis patient has cirrhosis with portal hypertension, splenomegaly and ascites.   - F/u ascites analysis BEFORE considering initiating prednisolone, pending ascites fluid gram stain cx and cytology (testing in progress)  - Cell count 215 with 0% neutrophils  - F/u ascites albumin, protein, LDH, glucose analysis (testing in progress)  - Monitor renal function (Cr normal so far)  - c/w Lasix 20mg daily and spironolactone 25mg for now, if remains stable overnight s/p large-volume paracentesis would consider increasing diuretics to optimize volume status.   - Avoid NSAIDs  - Low salt diet with 1200ml fluid restriction

## 2024-05-02 LAB
ALBUMIN SERPL ELPH-MCNC: 1.9 G/DL — LOW (ref 3.5–5)
ALP SERPL-CCNC: 107 U/L — SIGNIFICANT CHANGE UP (ref 40–120)
ALT FLD-CCNC: 26 U/L DA — SIGNIFICANT CHANGE UP (ref 10–60)
ANA TITR SER: NEGATIVE — SIGNIFICANT CHANGE UP
ANION GAP SERPL CALC-SCNC: 4 MMOL/L — LOW (ref 5–17)
ANISOCYTOSIS BLD QL: SLIGHT — SIGNIFICANT CHANGE UP
ANISOCYTOSIS BLD QL: SLIGHT — SIGNIFICANT CHANGE UP
APTT BLD: 39.7 SEC — HIGH (ref 24.5–35.6)
AST SERPL-CCNC: 53 U/L — HIGH (ref 10–40)
BASOPHILS # BLD AUTO: 0.05 K/UL — SIGNIFICANT CHANGE UP (ref 0–0.2)
BASOPHILS NFR BLD AUTO: 0.8 % — SIGNIFICANT CHANGE UP (ref 0–2)
BILIRUB SERPL-MCNC: 2.7 MG/DL — HIGH (ref 0.2–1.2)
BLD GP AB SCN SERPL QL: SIGNIFICANT CHANGE UP
BUN SERPL-MCNC: 12 MG/DL — SIGNIFICANT CHANGE UP (ref 7–18)
CALCIUM SERPL-MCNC: 7.9 MG/DL — LOW (ref 8.4–10.5)
CHLORIDE SERPL-SCNC: 109 MMOL/L — HIGH (ref 96–108)
CMV DNA CSF QL NAA+PROBE: SIGNIFICANT CHANGE UP IU/ML
CMV DNA SPEC NAA+PROBE-LOG#: SIGNIFICANT CHANGE UP LOG10IU/ML
CO2 SERPL-SCNC: 29 MMOL/L — SIGNIFICANT CHANGE UP (ref 22–31)
CREAT SERPL-MCNC: 0.62 MG/DL — SIGNIFICANT CHANGE UP (ref 0.5–1.3)
CULTURE RESULTS: SIGNIFICANT CHANGE UP
CULTURE RESULTS: SIGNIFICANT CHANGE UP
EGFR: 119 ML/MIN/1.73M2 — SIGNIFICANT CHANGE UP
ELLIPTOCYTES BLD QL SMEAR: SLIGHT — SIGNIFICANT CHANGE UP
EOSINOPHIL # BLD AUTO: 0.16 K/UL — SIGNIFICANT CHANGE UP (ref 0–0.5)
EOSINOPHIL NFR BLD AUTO: 2.7 % — SIGNIFICANT CHANGE UP (ref 0–6)
GLUCOSE SERPL-MCNC: 89 MG/DL — SIGNIFICANT CHANGE UP (ref 70–99)
HCT VFR BLD CALC: 21.4 % — LOW (ref 39–50)
HCT VFR BLD CALC: 26.7 % — LOW (ref 39–50)
HGB BLD-MCNC: 6.9 G/DL — CRITICAL LOW (ref 13–17)
HGB BLD-MCNC: 8.6 G/DL — LOW (ref 13–17)
HYPOCHROMIA BLD QL: SLIGHT — SIGNIFICANT CHANGE UP
IMM GRANULOCYTES NFR BLD AUTO: 0.5 % — SIGNIFICANT CHANGE UP (ref 0–0.9)
INR BLD: 1.9 RATIO — HIGH (ref 0.85–1.18)
LG PLATELETS BLD QL AUTO: SLIGHT — SIGNIFICANT CHANGE UP
LKM AB SER-ACNC: <20.1 UNITS — SIGNIFICANT CHANGE UP (ref 0–20)
LYMPHOCYTES # BLD AUTO: 1.85 K/UL — SIGNIFICANT CHANGE UP (ref 1–3.3)
LYMPHOCYTES # BLD AUTO: 31.1 % — SIGNIFICANT CHANGE UP (ref 13–44)
MACROCYTES BLD QL: SLIGHT — SIGNIFICANT CHANGE UP
MACROCYTES BLD QL: SLIGHT — SIGNIFICANT CHANGE UP
MANUAL SMEAR VERIFICATION: SIGNIFICANT CHANGE UP
MANUAL SMEAR VERIFICATION: SIGNIFICANT CHANGE UP
MCHC RBC-ENTMCNC: 31.9 PG — SIGNIFICANT CHANGE UP (ref 27–34)
MCHC RBC-ENTMCNC: 32.2 GM/DL — SIGNIFICANT CHANGE UP (ref 32–36)
MCHC RBC-ENTMCNC: 32.2 GM/DL — SIGNIFICANT CHANGE UP (ref 32–36)
MCHC RBC-ENTMCNC: 32.4 PG — SIGNIFICANT CHANGE UP (ref 27–34)
MCV RBC AUTO: 100.5 FL — HIGH (ref 80–100)
MCV RBC AUTO: 98.9 FL — SIGNIFICANT CHANGE UP (ref 80–100)
MICROCYTES BLD QL: SLIGHT — SIGNIFICANT CHANGE UP
MICROCYTES BLD QL: SLIGHT — SIGNIFICANT CHANGE UP
MONOCYTES # BLD AUTO: 1.02 K/UL — HIGH (ref 0–0.9)
MONOCYTES NFR BLD AUTO: 17.2 % — HIGH (ref 2–14)
NEUTROPHILS # BLD AUTO: 2.83 K/UL — SIGNIFICANT CHANGE UP (ref 1.8–7.4)
NEUTROPHILS NFR BLD AUTO: 47.7 % — SIGNIFICANT CHANGE UP (ref 43–77)
NRBC # BLD: 0 /100 WBCS — SIGNIFICANT CHANGE UP (ref 0–0)
NRBC # BLD: 0 /100 WBCS — SIGNIFICANT CHANGE UP (ref 0–0)
OVALOCYTES BLD QL SMEAR: SLIGHT — SIGNIFICANT CHANGE UP
OVALOCYTES BLD QL SMEAR: SLIGHT — SIGNIFICANT CHANGE UP
PLAT MORPH BLD: NORMAL — SIGNIFICANT CHANGE UP
PLAT MORPH BLD: NORMAL — SIGNIFICANT CHANGE UP
PLATELET # BLD AUTO: 101 K/UL — LOW (ref 150–400)
PLATELET # BLD AUTO: 85 K/UL — LOW (ref 150–400)
PLATELET COUNT - ESTIMATE: ABNORMAL
PLATELET COUNT - ESTIMATE: NORMAL — SIGNIFICANT CHANGE UP
POIKILOCYTOSIS BLD QL AUTO: SLIGHT — SIGNIFICANT CHANGE UP
POTASSIUM SERPL-MCNC: 3.6 MMOL/L — SIGNIFICANT CHANGE UP (ref 3.5–5.3)
POTASSIUM SERPL-SCNC: 3.6 MMOL/L — SIGNIFICANT CHANGE UP (ref 3.5–5.3)
PROT SERPL-MCNC: 5.9 G/DL — LOW (ref 6–8.3)
PROTHROM AB SERPL-ACNC: 21.3 SEC — HIGH (ref 9.5–13)
RBC # BLD: 2.13 M/UL — LOW (ref 4.2–5.8)
RBC # BLD: 2.7 M/UL — LOW (ref 4.2–5.8)
RBC # FLD: 17.1 % — HIGH (ref 10.3–14.5)
RBC # FLD: 18.6 % — HIGH (ref 10.3–14.5)
RBC BLD AUTO: ABNORMAL
RBC BLD AUTO: ABNORMAL
SODIUM SERPL-SCNC: 142 MMOL/L — SIGNIFICANT CHANGE UP (ref 135–145)
SPECIMEN SOURCE: SIGNIFICANT CHANGE UP
SPECIMEN SOURCE: SIGNIFICANT CHANGE UP
SPHEROCYTES BLD QL SMEAR: SLIGHT — SIGNIFICANT CHANGE UP
TARGETS BLD QL SMEAR: SLIGHT — SIGNIFICANT CHANGE UP
WBC # BLD: 5.1 K/UL — SIGNIFICANT CHANGE UP (ref 3.8–10.5)
WBC # BLD: 5.94 K/UL — SIGNIFICANT CHANGE UP (ref 3.8–10.5)
WBC # FLD AUTO: 5.1 K/UL — SIGNIFICANT CHANGE UP (ref 3.8–10.5)
WBC # FLD AUTO: 5.94 K/UL — SIGNIFICANT CHANGE UP (ref 3.8–10.5)

## 2024-05-02 PROCEDURE — 99233 SBSQ HOSP IP/OBS HIGH 50: CPT

## 2024-05-02 PROCEDURE — 43235 EGD DIAGNOSTIC BRUSH WASH: CPT

## 2024-05-02 RX ADMIN — SPIRONOLACTONE 25 MILLIGRAM(S): 25 TABLET, FILM COATED ORAL at 06:33

## 2024-05-02 RX ADMIN — Medication 650 MILLIGRAM(S): at 21:37

## 2024-05-02 RX ADMIN — Medication 650 MILLIGRAM(S): at 00:10

## 2024-05-02 RX ADMIN — Medication 650 MILLIGRAM(S): at 22:07

## 2024-05-02 RX ADMIN — Medication 5 MILLIGRAM(S): at 21:37

## 2024-05-02 RX ADMIN — Medication 20 MILLIGRAM(S): at 06:32

## 2024-05-02 RX ADMIN — PANTOPRAZOLE SODIUM 40 MILLIGRAM(S): 20 TABLET, DELAYED RELEASE ORAL at 06:32

## 2024-05-02 NOTE — PROGRESS NOTE ADULT - PROBLEM SELECTOR PLAN 3
- Meets criteria of alcoholic hepatitis with transaminitis, jaundice, history of heavy alcohol use, elevated PT/INR,  -  Maddrey: 47.9.  - hepatology Dr. Vuong following  -CIWA scoring 0  -CIWA monitoring x72 hours- D/C order

## 2024-05-02 NOTE — PROGRESS NOTE ADULT - PROBLEM SELECTOR PROBLEM 6
Chief complaint:   Chief Complaint   Patient presents with   • URI       Vitals:  Visit Vitals  /64 (BP Location: RUE - Right upper extremity, Patient Position: Sitting, Cuff Size: Large Adult)   Pulse 89   Temp 98.7 °F (37.1 °C) (Oral)   Resp 19   SpO2 96%       HISTORY OF PRESENT ILLNESS     The patient is a 58-year-old female who presents in care with complaint of an 8-day history of intermittent fevers, a non-productive cough, nasal congestion, headaches, intermittent shortness of breath, fatigue, and chills. The patient was seen in the emergency department on 04/06/2020 when her symptoms began and diagnosed with a viral illness at that time. She was advised that COVID-19 was a possible diagnosis but she did not meet criteria for testing. The patient was advised to self-quarantine and take Tylenol as needed.  She states that several days ago she fell she had lost her sense of taste.   The patient states this seems to be improving today.  She reports taking Tylenol with some relief of symptoms.  She denies having any nausea, vomiting, abdominal pain, urinary symptoms, or constipation.  She states that she had one episode of diarrhea several days ago.  She denies any blood or mucus in her stool.  She denies any recent travel or close contacts testing positive for COVID-19. 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 She reports receiving an influenza vaccine this season.  She denies a history of asthma, COPD, pneumonia, or cigarette smoking.  She denies having a sore throat, chest pain, chest tightness, dizziness, lightheadedness, wheezing, or lower extremity swelling. The patient was masked upon entering the building and the writer wore full personal protective equipment during the encounter.       Other significant problems:  There are no active problems to display for this patient.      PAST MEDICAL, FAMILY AND SOCIAL HISTORY     Medications:  Current Outpatient  Medications   Medication   • acetaminophen (TYLENOL) 325 MG tablet   • doxycycline hyclate (VIBRAMYCIN) 100 MG capsule   • benzonatate (TESSALON PERLES) 100 MG capsule     No current facility-administered medications for this visit.        Allergies:  ALLERGIES:  No Known Allergies    Past Medical  History/Surgeries:  Past Medical History:   Diagnosis Date   • Calcaneal spur 10/29/1999   • Closed fracture of lateral malleolus 04/25/2007    R ankle   • Plantar fascial fibromatosis 10/29/1999       History reviewed. No pertinent surgical history.    Family History:  Family History   Problem Relation Age of Onset   • Cancer Other    • Diabetes Other    • Hypertension Other        Social History:  Social History     Tobacco Use   • Smoking status: Never Smoker   Substance Use Topics   • Alcohol use: No       REVIEW OF SYSTEMS     Review of Systems   Constitutional: Positive for appetite change, chills and fever.   HENT: Positive for congestion. Negative for ear discharge, ear pain, sinus pressure, sinus pain, sore throat, trouble swallowing and voice change.    Eyes: Negative for discharge, redness and itching.   Respiratory: Positive for cough and shortness of breath. Negative for chest tightness, wheezing and stridor.    Cardiovascular: Negative for chest pain and leg swelling.   Gastrointestinal: Positive for diarrhea. Negative for abdominal pain, constipation, nausea and vomiting.   Musculoskeletal: Negative for neck stiffness.   Skin: Negative for rash.   Neurological: Positive for headaches. Negative for dizziness and light-headedness.       PHYSICAL EXAM     Physical Exam  Vitals signs reviewed.   Constitutional:       General: She is not in acute distress.     Appearance: She is well-developed. She is not toxic-appearing or diaphoretic.   HENT:      Right Ear: Tympanic membrane, ear canal and external ear normal.      Left Ear: Tympanic membrane, ear canal and external ear normal.      Nose: Congestion present.       Right Sinus: No maxillary sinus tenderness or frontal sinus tenderness.      Left Sinus: No maxillary sinus tenderness or frontal sinus tenderness.      Mouth/Throat:      Lips: Pink.      Mouth: Mucous membranes are moist.      Pharynx: Uvula midline. No pharyngeal swelling, posterior oropharyngeal erythema or uvula swelling.      Tonsils: No tonsillar exudate or tonsillar abscesses.   Eyes:      General:         Right eye: No discharge.         Left eye: No discharge.      Pupils: Pupils are equal, round, and reactive to light.   Neck:      Musculoskeletal: Normal range of motion and neck supple.   Cardiovascular:      Rate and Rhythm: Normal rate and regular rhythm.      Pulses: Normal pulses.      Heart sounds: Normal heart sounds, S1 normal and S2 normal. No murmur. No friction rub. No gallop.    Pulmonary:      Effort: Pulmonary effort is normal. No respiratory distress.      Breath sounds: Normal breath sounds. No decreased breath sounds, wheezing, rhonchi or rales.   Lymphadenopathy:      Cervical: No cervical adenopathy.   Skin:     General: Skin is warm and dry.      Findings: No rash.   Neurological:      Mental Status: She is alert.         ASSESSMENT/PLAN     The patient has a low grade fever on exam and states she has not had any antipyretics today. She is non-toxic appearing.  Her lungs are clear to auscultation.  She is conversing in full sentences and not in any distress. I will provide the patient a dose of Tylenol for her fever and obtain a chest x-ray to rule out acute cardiopulmonary process such as pneumonia.  Other differentials considered include a viral process. COVID-19 is a potential differential, however, the patient does not meet criteria for testing at this time per the CDC guidelines. She is not currently working and has been self isolating at home as advised from her previous ED visit on 04/06/2020. She denies having any medical problems. Her vital signs are stable.     Chest  x-ray shows a large opacity within the right mid/lower lung which appears to be situated posteriorly on the lateral projection.  Findings are concerning for pneumonitis.  No pleural effusion noted.  Cardiomediastinal silhouette is normal.  I updated the patient on her results.  I will treat her for community-acquired pneumonia with doxycycline.  I will also prescribe Tessalon Perles for her cough.  Her repeat vital signs have improved after Tylenol and p.o. fluids.  I advised her to quarantine for 14 days from the onset of her symptoms as COVID-19 is a possible diagnosis. I advised that any family living with her should also quarantine at home. I discussed taking Tylenol as needed for discomfort/fevers, resting, and pushing fluids. I advised that she follow up with her PCP in the next 10-14 days and discussed the signs and symptoms that should prompt her to go to the emergency department.     Andra was seen today for uri.    Diagnoses and all orders for this visit:    Fever, unspecified fever cause  -     acetaminophen (TYLENOL) tablet 650 mg    Cough  -     XR CHEST PA AND LATERAL; Future    Pneumonia of right lung due to infectious organism, unspecified part of lung    Other orders  -     doxycycline hyclate (VIBRAMYCIN) 100 MG capsule; Take 1 capsule by mouth 2 times daily for 7 days.  -     benzonatate (TESSALON PERLES) 100 MG capsule; Take 1 capsule by mouth 3 times daily as needed for Cough.         Prophylactic measure

## 2024-05-02 NOTE — PROGRESS NOTE ADULT - ASSESSMENT
Patient is a 48M with a PMhx of alcohol use disorder (has not seen a doctor in 10 yrs), new dx of cirrhosis with portal HTN and lower esophageal varices on imaging, who presented to the ED with abdominal pain and bloating. GI was consulted for anemia and dark stools.      #Macrocytic anemia  #Cirrhosis, decompensated by ascites  #Thrombocytopenia  #Elevated INR  #Hyperbilirubinemia  #Elevated LFTs  #Ascites  Patient with a hx of alcohol use disorder but no prior withdrawals or seizures, presented with abd pain x 2 weeks and general malaise, found to have cirrhosis with portal HTN and esophageal varices on imaging. Labs on admission notable for Hgb 8.1, .4, plt 93, INR 1.97, K 3.0, TBili 6.5, DBili 2.7, alk phos 123, ALT 81, normal ALT 37. UA grossly positive, lipase 83. Yesterday Hgb 7.0 -> 7.3, not transfused. This morning, Hgb 7.2, plt 77. Macrocytosis suggestive of chronicity, though would consider anemia workup with vit B12 and folate as well. Patient has never had an EGD before however given lower esophageal varices noted on imaging, patient may benefit from endoscopic evaluation. Though no overt signs of bleeding, he's had melena 1 month prior to admission that self-resolved. Last BM was today, brown hard-formed.   4/30: Paracentesis today rescheduled to tomorrow. Planned for EGD tomorrow as well. Hgb 7.1, stable, no overt bleeding.     	- S/p EGD on 5/1 which revealed  Esophagus: 3 columns of grade II-III varices, not bleeding. Largest one reaches to mid-esophagus.   Stomach: diffuse PHG. No HH. No bx obtained due to INR 1.89, plts 94 today.  Duodenum: somewhat edematous folds, though no evidence of ulcerations or bleeding.  - Will need beta blockade to prevent bleeding.   - Alcohol cessation strongly encouraged.  	- Trend H/H  	- Transfuse to maintain Hgb >7 and INR <1.5  	- Replete lytes aggressively  	- Trend LFTs daily  	- IV PPI BID  	- Maintain active T&S, 2 large bore peripheral IVs, transfuse for goal Hgb >7 or if symptomatic  	- Avoid NSAIDs and hold anticoagulation if safe per primary team    This note and its recommendations herein are preliminary until such time as cosigned by an attending.    GI will sign off at this time.  Thank you for involving us in the care of Mr. Manning  Please re-consult GI PRN.

## 2024-05-02 NOTE — PROGRESS NOTE ADULT - ASSESSMENT
47yo  Male w/ Hx of AUD (4-5 drinks/day, Vodka, last drink 2 weeks prior to admission), smoking (20-25 years) not on home O2, and family Hx of hepatitis C (mother), presented to [facility] ER w/ 2 weeks progressively worsening abdominal distention, decreased appetite, malaise, generalized weakness, with 2 days Hx of jaundice prior to arrival, and was admitted w/ newly diagnosed cirrhosis w/ CSPH, decompensated w/ ascites.     CXR no infiltrate  BCx NGTD x 48h  CT a/p w/ IV contrast: Cirrhosis with portal HTN with collaterals including lower esophageal varices, moderate to large volume ascites with mesenteric edema and anasarca.     # Cirrhosis likely due to EtOH, w/ CSPH, decompensated w/ ascites  # Superimposed alcoholic hepatitis  # Ascites  MELD 3.0 18, continues to improve  MDF 45.5, remains elevated with slight increase compared to yesterday, given >32 would likely benefit from steroids    s/p large volume, diagnostic and therapeutic paracentesis by IR 5/1 drained 5L, given albumin 8g/L drained   s/p EGD 5/1 showing grade II-III esophageal varices, non-bleeding. no bands placed.  Infectious w/u thus far negative, BCx NGTD x72h, UA neg, with no s/s infection, ascites - no SBP   - Consider starting prednisolone 40 mg in am, with checking Lille score at day #7 (or day#4) to decide whether to continue for total 28 days course  - Consider adding NAC (liver failure protocol) for 2-5 days to the steroid  - F/u rest of ascites analysis, Gram stain, Cx, cytology  ---- Low protein ascites + CTP >9, bilirubin > 3, thus consider long term SBP ppx, unless improvement.  - Monitor renal function (Cr normal so far)  - c/w Lasix 20mg daily and spironolactone 25mg for now, if remains stable overnight s/p large-volume paracentesis would consider increasing diuretics to optimize volume status.   - If vitals allow after up-titration of diuretics, consider starting carvedilol 3.125 mg bid b/o CSPH  - Avoid NSAIDs  - Low salt diet   - Encourage alcohol cessation  - No PVT per CTa/p w/ iv  - Consider TTE    # Anemia  # Coagulopathy  # Thrombocytopenia  # PHG  # GR II-III EVs  - No reported overt GIB so far  - S/p vitamin K x2  - Iron 13, TIBC 221, Iron % 6, ferritin 202, haptoglobin 40, , reticulocyte % 3.8, folate and vitamin b12 wnl  - would consider ferrous supplementation for worsening anemia now requiring blood transfusion   - s/p EGD 5/1: grade II-III esophageal varices, non-bleeding. no bands placed; PHG  - Monitor H/H, keep Hb > 7  - Keep PLT > 50, fibrinogen > 120 if bleeding  - f/u post-transfusion CBC  - Beta blocker as above     # No PSE  # HCC status - no focal hepatic lesion on CT a/p, can send AFP  # Etiology of cirrhosis: likely EtOH  - CLD w/u so far: Hep B neg, not immune, not exposed, and Hep C ab neg. Hep A IgM neg. Hep E IgG/IgM neg. A1AT WNL. Ferritin 202.   - Ceruloplasmin 15, possibly due to severe liver disease, but can send 24 hr urine copper.   - F/u ROSY, SMA, LKM, AMA, Ig panel (testing in progress)   - F/u Hep E PCR, EBV, CMV, HSV PCRs (testing in progress)    # Transplant candidacy: Will need to explore psychosocial status. Bilirubin is improving, possibly will need to be referred outpatient, unless worsening.  # AUD  - Folic, thiamine  - CIWA per primary team  - Aspiration, seizure, fall precautions  - Will need rehab     Thank you for consult  Will follow  Discussed w/ primary team 49yo  Male w/ Hx of AUD (4-5 drinks/day, Vodka, last drink 2 weeks prior to admission), smoking (20-25 years) not on home O2, and family Hx of hepatitis C (mother), presented to [facility] ER w/ 2 weeks progressively worsening abdominal distention, decreased appetite, malaise, generalized weakness, with 2 days Hx of jaundice prior to arrival, and was admitted w/ newly diagnosed cirrhosis w/ CSPH, decompensated w/ ascites.     CXR no infiltrate  BCx NGTD x 48h  CT a/p w/ IV contrast: Cirrhosis with portal HTN with collaterals including lower esophageal varices, moderate to large volume ascites with mesenteric edema and anasarca.     # Cirrhosis likely due to EtOH, w/ CSPH, decompensated w/ ascites  # Superimposed alcoholic hepatitis  # Ascites  MELD 3.0 18, continues to improve  MDF 45.5, remains elevated with slight increase compared to yesterday, given >32 would likely benefit from steroids    s/p large volume, diagnostic and therapeutic paracentesis by IR 5/1 drained 5L, given albumin 8g/L drained   s/p EGD 5/1 showing grade II-III esophageal varices, non-bleeding. no bands placed.  Infectious w/u thus far negative, BCx NGTD x72h, UA neg, with no s/s infection, ascites - no SBP   No PVT per CTa/p w/ iv  SAAG >1.1g/dL (1.5 likely indicates that portal HTN is likely the cause of ascites)  ---- Low protein ascites + CTP >9, bilirubin > 3, thus consider long term SBP ppx, unless improvement.    Recommendations:   - Consider starting prednisolone 40 mg in am, with checking Lille score at day #7 (or day#4) to decide whether to continue for total 28 days course  - Consider adding NAC (liver failure protocol) for 2-5 days to the steroid  - F/u ascites Gram stain, Cx, and cytology  - c/w Lasix 20mg daily and spironolactone 25mg for now, if remains stable overnight s/p large-volume paracentesis would consider increasing diuretics to optimize volume status.   - If vitals allow after up-titration of diuretics, consider starting carvedilol 3.125 mg bid b/o CSPH  - Monitor renal function (Cr normal so far)  - Avoid NSAIDs  - Low salt diet   - Encourage alcohol cessation  - Titrate O2 as tolerated, suspect improvement in breathing/ splinting s/p large volume paracentesis   - Consider TTE to r/o other causes of ascites (i.e. right heart failure)     # Anemia now requiring blood transfusion  # Coagulopathy  # Thrombocytopenia  # PHG  # GR II-III EVs  No reported overt GIB so far  S/p vitamin K x2 (4/29, 4/30)  S/p EGD 5/1: grade II-III esophageal varices, non-bleeding. no bands placed; PHG  Anemia w/u results:  ---- Iron 13, TIBC 221, Iron % 6, ferritin 202, haptoglobin 40, , reticulocyte % 3.8, folate and vitamin b12 wnl    Recommendations:   - would consider IV ferrous supplementation while inpatient for worsening anemia now requiring blood transfusion, would use iron tablets with caution to prevent constipation.  - Monitor H/H, keep Hb > 7  - Keep PLT > 50, fibrinogen > 120 if bleeding  - f/u post-transfusion CBC 5/2  - Beta blocker as above     # No PSE  # HCC status - no focal hepatic lesion on CT a/p, can send AFP  # Etiology of cirrhosis: likely EtOH  --- CLD w/u so far: Hep B neg, not immune, not exposed, and Hep C ab neg. Hep A IgM neg. Hep E IgG/IgM neg. A1AT WNL. Ferritin 202. CMV negative  --- EBV serologies indicate a prior infection, awaiting PCR results (testing in progress)  --- Ceruloplasmin 15, possibly due to severe liver disease, but can send 24 hr urine copper.   --- IgA elevated 1658 and IgG elevated 1661 likely due to cirrhosis     Recommendations:  - F/u ROSY, SMA, LKM, AMA (testing in progress)   - F/u Hep E PCR andEBV PCR (testing in progress)  - Please, obtain HSV PCRs (ordered but never received, re-ordered)       # Transplant candidacy: Will need to explore psychosocial status. Bilirubin is improving, possibly will need to be referred outpatient, unless worsening.  # AUD  - Folic, thiamine  - CIWA per primary team  - Aspiration, seizure, fall precautions  - Will need rehab     Thank you for consult  Will follow  Discussed w/ primary team 49yo  Male w/ Hx of AUD (4-5 drinks/day, Vodka, last drink 2 weeks prior to admission), smoking (20-25 years) not on home O2, and family Hx of hepatitis C (mother), presented to [facility] ER w/ 2 weeks progressively worsening abdominal distention, decreased appetite, malaise, generalized weakness, with 2 days Hx of jaundice prior to arrival, and was admitted w/ newly diagnosed cirrhosis w/ CSPH, decompensated w/ ascites. Now s/p LVP w/ albumin, no SBP, consistent w/ portal hypertension, low protein. Also s/p EGD 5/1/24 showing Gr II-III EVs, PHG.     CXR no infiltrate  BCx NGTD x 48h  CT a/p w/ IV contrast: Cirrhosis with portal HTN with collaterals including lower esophageal varices, moderate to large volume ascites with mesenteric edema and anasarca.     # Cirrhosis likely due to EtOH, w/ CSPH, decompensated w/ ascites  # Superimposed alcoholic hepatitis  # Ascites  MELD 3.0 18, continues to improve  MDF 45.5, remains elevated with slight increase compared to yesterday, given >32 would likely benefit from steroids    s/p large volume, diagnostic and therapeutic paracentesis by IR 5/1 drained 5L, given albumin 8g/L drained   s/p EGD 5/1 showing grade II-III esophageal varices, non-bleeding. no bands placed.  Infectious w/u thus far negative, BCx NGTD x72h, UA neg, with no s/s infection, ascites - no SBP   No PVT per CTa/p w/ iv  SAAG >1.1g/dL (1.5 likely indicates that portal HTN is likely the cause of ascites)  ---- Low protein ascites + CTP >9, bilirubin > 3, thus consider long term SBP ppx, unless improvement.    Recommendations:   - Consider starting prednisolone 40 mg if no active bleeding, with checking Lille score at day #7 (or day#4) to decide whether to continue for total 28 days course  - Consider adding NAC (liver failure protocol) for 2-5 days to the steroid  - F/u ascites Gram stain, Cx, and cytology  - c/w Lasix 20mg daily and spironolactone 25mg for now, if remains stable consider increasing diuretics to optimize volume status.   - If vitals allow after up-titration of diuretics, consider starting carvedilol 3.125 mg bid b/o CSPH  - Monitor renal function (Cr normal so far)  - Avoid NSAIDs  - Low salt diet   - Encourage alcohol cessation  - Titrate O2 as tolerated, suspect improvement in breathing/ splinting s/p large volume paracentesis   - Consider TTE to r/o other causes of ascites (i.e. right heart failure)     # Anemia now requiring blood transfusion  # Coagulopathy  # Thrombocytopenia  # PHG  # GR II-III EVs  No reported overt GIB so far  S/p vitamin K x2 (4/29, 4/30)  S/p EGD 5/1: grade II-III esophageal varices, non-bleeding. no bands placed; PHG  Anemia w/u results:  ---- Iron 13, TIBC 221, Iron % 6, ferritin 202, haptoglobin 40, , reticulocyte % 3.8, folate and vitamin b12 wnl    Recommendations:   - would consider IV ferrous supplementation while inpatient for worsening anemia now requiring blood transfusion, would use iron tablets with caution to prevent constipation.  - Monitor H/H, keep Hb > 7  - Keep PLT > 50, fibrinogen > 120 if bleeding  - f/u post-transfusion CBC 5/2. If no adequate response and no signs of overt GIB, might need to consider repeat abdominal imaging to r/o bleeding. Addendum: adequate response to PRBC, c/w close monitoring  - Beta blocker as above   - C/w PPI  - Consider SBP ppx as above    # No PSE  # HCC status - no focal hepatic lesion on CT a/p, can send AFP  # Etiology of cirrhosis: likely EtOH  --- CLD w/u so far: Hep B neg, not immune, not exposed, and Hep C ab neg. Hep A IgM neg. Hep E IgG/IgM neg. A1AT WNL. Ferritin 202. CMV negative  --- EBV serologies indicate a prior infection, awaiting PCR results (testing in progress)  --- Ceruloplasmin 15, possibly due to severe liver disease, but can send 24 hr urine copper.   --- IgA elevated 1658 and IgG elevated 1661 likely due to cirrhosis     Recommendations:  - F/u ROSY, SMA, LKM, AMA (testing in progress)   - F/u Hep E PCR andEBV PCR (testing in progress)  - Please, obtain HSV PCRs (ordered but never received, re-ordered)       # Transplant candidacy: Will need to explore psychosocial status. Bilirubin is improving, possibly will need to be referred outpatient, unless worsening.  # AUD  - Folic, thiamine  - CIWA per primary team  - Aspiration, seizure, fall precautions  - Will need rehab     Thank you for consult  Will follow  Discussed w/ primary team 47yo  Male w/ Hx of AUD (4-5 drinks/day, Vodka, last drink 2 weeks prior to admission), smoking (20-25 years) not on home O2, and family Hx of hepatitis C (mother), presented to [facility] ER w/ 2 weeks progressively worsening abdominal distention, decreased appetite, malaise, generalized weakness, with 2 days Hx of jaundice prior to arrival, and was admitted w/ newly diagnosed cirrhosis w/ CSPH, decompensated w/ ascites. Now s/p LVP w/ albumin, no SBP, consistent w/ portal hypertension, low protein. Also s/p EGD 5/1/24 showing Gr II-III EVs, PHG.     CXR no infiltrate  BCx NGTD x 48h  CT a/p w/ IV contrast: Cirrhosis with portal HTN with collaterals including lower esophageal varices, moderate to large volume ascites with mesenteric edema and anasarca.     # Cirrhosis likely due to EtOH, w/ CSPH, decompensated w/ ascites  # Superimposed alcoholic hepatitis  # Ascites  MELD 3.0 18, continues to improve  MDF 45.5, remains elevated with slight increase compared to yesterday, given >32 would likely benefit from steroids    s/p large volume, diagnostic and therapeutic paracentesis by IR 5/1 drained 5L, given albumin 8g/L drained   s/p EGD 5/1 showing grade II-III esophageal varices, non-bleeding. no bands placed.  Infectious w/u thus far negative, BCx NGTD x72h, UA neg, with no s/s infection, ascites - no SBP   No PVT per CTa/p w/ iv  SAAG >1.1g/dL (1.5 likely indicates that portal HTN is likely the cause of ascites)  ---- Low protein ascites + CTP >9, bilirubin > 3, thus consider long term SBP ppx, unless improvement.    Recommendations:   - Consider starting prednisolone 40 mg if no active bleeding, with checking Lille score at day #7 (or day#4) to decide whether to continue for total 28 days course  - Consider adding NAC (liver failure protocol) for 2-5 days to the steroid  - F/u ascites Gram stain, Cx, and cytology  - c/w Lasix 20mg daily and spironolactone 25mg for now, if remains stable (no bleeding) consider increasing diuretics to optimize volume status.   - If vitals allow after up-titration of diuretics, consider starting carvedilol 3.125 mg bid b/o CSPH  - Monitor renal function (Cr normal so far)  - Avoid NSAIDs  - Low salt diet   - Encourage alcohol cessation  - Titrate O2 as tolerated, suspect improvement in breathing/ splinting s/p large volume paracentesis   - Consider TTE to r/o other causes of ascites (i.e. right heart failure)     # Anemia now requiring blood transfusion  # Coagulopathy  # Thrombocytopenia  # PHG  # GR II-III EVs  No reported overt GIB so far  S/p vitamin K x2 (4/29, 4/30)  S/p EGD 5/1: grade II-III esophageal varices, non-bleeding. no bands placed; PHG  Anemia w/u results:  ---- Iron 13, TIBC 221, Iron % 6, ferritin 202, haptoglobin 40, , reticulocyte % 3.8, folate and vitamin b12 wnl    Recommendations:   - would consider IV ferrous supplementation while inpatient for worsening anemia now requiring blood transfusion, would use iron tablets with caution to prevent constipation.  - Monitor H/H, keep Hb > 7  - Keep PLT > 50, fibrinogen > 120 if bleeding  - f/u post-transfusion CBC 5/2. If no adequate response and no signs of overt GIB, might need to consider repeat abdominal imaging to r/o bleeding. Addendum: adequate response to PRBC, c/w close monitoring  - Beta blocker as above   - C/w PPI  - Consider SBP ppx as above    # No PSE  # HCC status - no focal hepatic lesion on CT a/p, can send AFP  # Etiology of cirrhosis: likely EtOH  --- CLD w/u so far: Hep B neg, not immune, not exposed, and Hep C ab neg. Hep A IgM neg. Hep E IgG/IgM neg. A1AT WNL. Ferritin 202. CMV negative  --- EBV serologies indicate a prior infection, awaiting PCR results (testing in progress)  --- Ceruloplasmin 15, possibly due to severe liver disease, but can send 24 hr urine copper.   --- IgA elevated 1658 and IgG elevated 1661 likely due to cirrhosis     Recommendations:  - F/u ROSY, SMA, LKM, AMA (testing in progress)   - F/u Hep E PCR andEBV PCR (testing in progress)  - Please, obtain HSV PCRs (ordered but never received, re-ordered)       # Transplant candidacy: Will need to explore psychosocial status. Bilirubin is improving, possibly will need to be referred outpatient, unless worsening.  # AUD  - Folic, thiamine  - CIWA per primary team  - Aspiration, seizure, fall precautions  - Will need rehab     Thank you for consult  Will follow  Discussed w/ primary team

## 2024-05-02 NOTE — PROGRESS NOTE ADULT - PROBLEM SELECTOR PLAN 4
- HBG downtrending 6.9 this am  - 1 unit PRBc ordered- consent obtained from pt  - Maintain active T&S.   - C/W Protonix  - GI following

## 2024-05-02 NOTE — PROGRESS NOTE ADULT - SUBJECTIVE AND OBJECTIVE BOX
Patient is a 48y old  Male who presents with a chief complaint of New cirrhosis   5/2/24      INTERVAL HPI/OVERNIGHT EVENTS: Pt seen and assessed at bedside with no new complaints    MEDICATIONS  (STANDING):  folic acid 1 milliGRAM(s) Oral daily  furosemide    Tablet 20 milliGRAM(s) Oral daily  pantoprazole    Tablet 40 milliGRAM(s) Oral before breakfast  spironolactone 25 milliGRAM(s) Oral daily  thiamine 100 milliGRAM(s) Oral daily    MEDICATIONS  (PRN):  acetaminophen     Tablet .. 650 milliGRAM(s) Oral every 6 hours PRN Temp greater or equal to 38C (100.4F), Mild Pain (1 - 3)  LORazepam   Injectable 1 milliGRAM(s) IV Push every 2 hours PRN CIWA-Ar score increase by 2 points and a total score of 7 or less  LORazepam   Injectable 1 milliGRAM(s) IV Push every 1 hour PRN CIWA-Ar score 8 or greater  melatonin 5 milliGRAM(s) Oral at bedtime PRN Insomnia  ondansetron Injectable 4 milliGRAM(s) IV Push every 8 hours PRN Nausea and/or Vomiting  __________________________________________________  REVIEW OF SYSTEMS:    CONSTITUTIONAL: No fever,   EYES: no acute visual disturbances  NECK: No pain or stiffness  RESPIRATORY: No cough; No shortness of breath  CARDIOVASCULAR: No chest pain, no palpitations  GASTROINTESTINAL: No pain. No nausea or vomiting; No diarrhea   NEUROLOGICAL: No headache or numbness, no tremors  MUSCULOSKELETAL: No joint pain, no muscle pain  GENITOURINARY: no dysuria, no frequency, no hesitancy  PSYCHIATRY: no depression , no anxiety  ALL OTHER  ROS negative      Vital Signs Last 24 Hrs  T(C): 36.9 (02 May 2024 05:25), Max: 36.9 (01 May 2024 13:30)  T(F): 98.4 (02 May 2024 05:25), Max: 98.5 (01 May 2024 13:30)  HR: 77 (02 May 2024 05:25) (77 - 99)  BP: 112/58 (02 May 2024 05:25) (108/67 - 120/59)  BP(mean): 79 (01 May 2024 20:30) (79 - 80)  RR: 18 (02 May 2024 05:25) (14 - 19)  SpO2: 97% (02 May 2024 05:25) (94% - 97%)    Parameters below as of 02 May 2024 05:25  Patient On (Oxygen Delivery Method): nasal cannula  O2 Flow (L/min): 2  ________________________________________________  PHYSICAL EXAM:  GENERAL: NAD  HEENT: Normocephalic;  conjunctivae and sclerae clear; moist mucous membranes;   NECK : supple  CHEST/LUNG: dec breath sounds at bases  HEART: S1 S2  +  ABDOMEN:  Distended and tender on palpation; Bowel sounds present  EXTREMITIES: no cyanosis; no edema; no calf tenderness  SKIN: warm and dry; no rash--- Paracentesis site, no redness or drainage noted to site  NERVOUS SYSTEM:  Awake and alert;     LABS:                        6.9    5.10  )-----------( 85       ( 02 May 2024 06:23 )             21.4     05-02    142  |  109<H>  |  12  ----------------------------<  89  3.6   |  29  |  0.62    Ca    7.9<L>      02 May 2024 06:23    TPro  5.9<L>  /  Alb  1.9<L>  /  TBili  2.7<H>  /  DBili  x   /  AST  53<H>  /  ALT  26  /  AlkPhos  107  05-02    PT/INR - ( 02 May 2024 06:23 )   PT: 21.3 sec;   INR: 1.90 ratio         PTT - ( 02 May 2024 06:23 )  PTT:39.7 sec  Urinalysis Basic - ( 02 May 2024 06:23 )    Color: x / Appearance: x / SG: x / pH: x  Gluc: 89 mg/dL / Ketone: x  / Bili: x / Urobili: x   Blood: x / Protein: x / Nitrite: x   Leuk Esterase: x / RBC: x / WBC x   Sq Epi: x / Non Sq Epi: x / Bacteria: x      RADIOLOGY & ADDITIONAL TESTS:  < from: Xray Chest 1 View- PORTABLE-Urgent (Xray Chest 1 View- PORTABLE-Urgent .) (04.27.24 @ 23:26) >  PROCEDURE DATE:  04/27/2024          INTERPRETATION:  Portable AP chest radiograph    COMPARISON:  NONE.    CLINICAL INFORMATION: Pneumonia like symptoms.    FINDINGS:  CATHETERS AND TUBES: None    PULMONARY: 2.4 cm opacity adjacent to RIGHT tracheobronchial angle either   indicating azygous vein or lymph node .  Remaining lung parenchyma clear.    There are no airspace consolidations or radiographic evidence of   pulmonary nodules..  No pleural effusion or pneumothorax.    HEART/VASCULAR: The heart size and mediastinum configuration are within   the limits of normal.    BONES: The visualized osseous thorax is intact.    IMPRESSION:  2.4 cm opacity adjacent to RIGHT tracheobronchial angle either indicating   azygous vein or lymph node .  Lungs clear.  Continued surveillance recommended.    --- End of Report ---    < end of copied text >  < from: CT Abdomen and Pelvis w/ IV Cont (04.27.24 @ 13:32) >  PROCEDURE DATE:  04/27/2024          INTERPRETATION:  CLINICAL INFORMATION: Cirrhosis.    COMPARISON: None.    CONTRAST/COMPLICATIONS:  IV Contrast: Omnipaque 350  90 cc administered   10 cc discarded  Oral Contrast: NONE  Complications: None reported at time of study completion    PROCEDURE:  CT of the Abdomen and Pelvis was performed.  Sagittal and coronal reformats were performed.    FINDINGS:  LOWER CHEST: Bibasilar dependent lung atelectasis, right more than left.    LIVER: Cirrhosis. No focal hepatic mass lesion as imaged.  BILE DUCTS: Normal caliber.  GALLBLADDER: Within normal limits.  SPLEEN: Enlarged measuring approximately 16 cm.  PANCREAS: Within normal limits.  ADRENALS: Within normal limits.  KIDNEYS/URETERS: No hydronephrosis. Multiple nonobstructing calculi lower   pole of the left kidney measuring up to 5 mm.    BLADDER: Minimally distended.  REPRODUCTIVE ORGANS: Prostate within normal limits.    BOWEL: No bowel obstruction. Appendix is normal.  PERITONEUM: Moderate to large volume ascites. Mesenteric edema.  VESSELS: Portal venous collaterals including lower esophageal varices. No   evidence of portal venous thrombosis.  RETROPERITONEUM/LYMPH NODES: No lymphadenopathy.  ABDOMINAL WALL: Anasarca  BONES: Within normal limits.    IMPRESSION:  Cirrhosis with portal hypertension, splenomegaly and ascites.        --- End of Report ---    < end of copied text >  < from: IR Procedure. (05.01.24 @ 10:49) >  PROCEDURE DATE:  05/01/2024          INTERPRETATION:  Clinical History: 47 yo male with cirrhosis and ascites.   Diagnostic and therapeutic paracentesis is requested.    Medication: Lidocaine 2%, 8cc administered SQ    Procedure: The risks and benefits of the procedure were discussed with   the patient, and informed consent was obtained. Prior to any   intervention, a "timeout" was performed confirming both patient and   procedure. Following informed consent the patient was placed in the   supine position and the right lower quadrant prepped and draped in a   sterile fashion. Physiologic monitoring was performed throughout the   procedure. Following the administration of local anesthesia with 2%   lidocaine, under ultrasound guidance the peritoneal fluid collection was   accessed with a micropuncture needle and an 8 F drain placed over an   Amplatz wire. An ultrasound image of the catheter within the ascites was   obtained. Approximately 5000 cc of clear yellow fluid was recovered.   Specimens were obtained and sent for a complete evaluation including   cytology. The catheter was removed and a sterile dressing applied. The   patient tolerated the procedure well and was transferred from the   department in stable condition. There were no immediate complications.    Impression: Successful ultrasound-guided diagnostic and therapeutic   paracentesis, as described above.    --- End of Report ---      Imaging Personally Reviewed:  YES    Consultant(s) Notes Reviewed:   YES    Care Discussed with Consultants :     Plan of care was discussed with patient and /or primary care giver; all questions and concerns were addressed and care was aligned with patient's wishes.

## 2024-05-02 NOTE — PROGRESS NOTE ADULT - PROBLEM SELECTOR PLAN 2
- CT abdomen pelvis shows: cirrhosis with portal hypertension, splenomegaly and ascites.   -S/P large volume paracentesis 5500ml removed  - F/u ascites analysis BEFORE considering initiating prednisolone, pending ascites fluid gram stain cx and cytology   - Cell count 215 with 0% neutrophils  - F/u ascites albumin, protein, LDH, glucose analysis (testing in progress)  - Monitor renal function (Cr normal so far)  - C/W Lasix 20mg daily and spironolactone 25mg   - Avoid NSAIDs  - Low salt diet with 1200ml fluid restriction

## 2024-05-02 NOTE — SBIRT NOTE ADULT - NSSBIRTBRIEFINTDET_GEN_A_CORE
SW explored substance use resources and support systems with pt. Pt declined resources and states he plans on abstaining from alcohol upon dc.

## 2024-05-02 NOTE — PROGRESS NOTE ADULT - ASSESSMENT
49 y/o M with PMH of alcohol use disorder (has not seen a doctor for 10 years), who presented with 2 weeks history of worsening generalized abdominal pain, discomfort, and bloating. On CT abdomen pelvis patient has cirrhosis with portal hypertension, splenomegaly and large volume ascites. Patient is being admitted for newly diagnosed cirrhosis w/ ascites. Hepatology and GI consulted for work up. Plan for IR therapeutic and diagnostic paracentesis, will give Vit K to optimize for IR procedure in AM   4/30: paracentesis rescheduled by IR tomorrow; EGD scheduled for 5/1 in the morning, keep pt NPO  5/1: s/p paracentesis- 5000 cc serous fluid drained. Catheter removed and a sterile  dressing applied.  Specimens were obtained and sent for a complete evaluation including cytology. S/P EGD, showing grade II-III esophageal varices, non-bleeding. no bands placed; Infectious w/u thus far negative, BCx NGTD x72h, UA neg, with no s/s infection   5/2: Hgb 6.9- 1unit PRBC transfused today

## 2024-05-02 NOTE — PROGRESS NOTE ADULT - ASSESSMENT
47 y/o M with PMH of alcohol use disorder (has not seen a doctor for 10 years), who presented with 2 weeks history of worsening generalized abdominal pain, discomfort, and bloating. On CT abdomen pelvis patient has cirrhosis with portal hypertension, splenomegaly and large volume ascites. Patient is being admitted for newly diagnosed cirrhosis w/ ascites. Hepatology and GI consulted for work up. Plan for IR therapeutic and diagnostic paracentesis, will give Vit K to optimize for IR procedure in AM     4/30: paracentesis rescheduled by IR tomorrow; EGD scheduled for 5/1 in the morning, keep pt NPO    5/1: s/p paracentesis- 5000 cc serous fluid drained. Catheter removed and a sterile  dressing applied.  Specimens were obtained and sent for a complete evaluation including cytology. S/P EGD, showing grade II-III esophageal varices, non-bleeding. no bands placed; Infectious w/u thus far negative, BCx NGTD x72h, UA neg, with no s/s infection      49 y/o M with PMH of alcohol use disorder (has not seen a doctor for 10 years), who presented with 2 weeks history of worsening generalized abdominal pain, discomfort, and bloating. On CT abdomen pelvis patient has cirrhosis with portal hypertension, splenomegaly and large volume ascites. Patient is being admitted for newly diagnosed cirrhosis w/ ascites. Hepatology and GI consulted for work up. Plan for IR therapeutic and diagnostic paracentesis, will give Vit K to optimize for IR procedure in AM   4/30: paracentesis rescheduled by IR tomorrow; EGD scheduled for 5/1 in the morning, keep pt NPO  5/1: s/p paracentesis- 5000 cc serous fluid drained. Catheter removed and a sterile  dressing applied.  Specimens were obtained and sent for a complete evaluation including cytology. S/P EGD, showing grade II-III esophageal varices, non-bleeding. no bands placed; Infectious w/u thus far negative, BCx NGTD x72h, UA neg, with no s/s infection   5/2: Hgb 6.9- 1unit PRBC transfused today

## 2024-05-02 NOTE — PROGRESS NOTE ADULT - PROBLEM SELECTOR PLAN 1
P/W worsening new-onset ascites w/ abd pain + jaundice   - CT abd:  cirrhosis with portal hypertension, splenomegaly and ascites.  - Will hold off SBP prophylaxis until reccs from Hepatology.   - Hepatology Dr. Vuong following    - GI Dr. Hathaway following

## 2024-05-02 NOTE — PROGRESS NOTE ADULT - PROBLEM SELECTOR PLAN 3
- Meets criteria of alcoholic hepatitis with transaminitis, jaundice, history of heavy alcohol use, elevated PT/INR,  -  Maddrey: 47.9.  - hepatology Dr. Vuong following  -VICKYWA scoring 0  - C/W Ativan PRN - Meets criteria of alcoholic hepatitis with transaminitis, jaundice, history of heavy alcohol use, elevated PT/INR,  -  Maddrey: 47.9.  - hepatology Dr. Vuong following  -CIWA scoring 0  -CIWA monitoring x72 hours- D/C order

## 2024-05-02 NOTE — PROGRESS NOTE ADULT - SUBJECTIVE AND OBJECTIVE BOX
Chief Complaint:  Patient is a 48y old  Male who presents with a chief complaint of New cirrhosis (02 May 2024 10:15)      Reason for consult:    Interval Events:     Hospital Medications:  acetaminophen     Tablet .. 650 milliGRAM(s) Oral every 6 hours PRN  folic acid 1 milliGRAM(s) Oral daily  furosemide    Tablet 20 milliGRAM(s) Oral daily  LORazepam   Injectable 1 milliGRAM(s) IV Push every 2 hours PRN  LORazepam   Injectable 1 milliGRAM(s) IV Push every 1 hour PRN  melatonin 5 milliGRAM(s) Oral at bedtime PRN  ondansetron Injectable 4 milliGRAM(s) IV Push every 8 hours PRN  pantoprazole    Tablet 40 milliGRAM(s) Oral before breakfast  spironolactone 25 milliGRAM(s) Oral daily  thiamine 100 milliGRAM(s) Oral daily      ROS:   General:  No  fevers, chills, night sweats, fatigue  Eyes:  Good vision, no reported pain  ENT:  No sore throat, pain, runny nose  CV:  No pain, palpitations  Pulm:  No dyspnea, cough  GI:  See HPI, otherwise negative  :  No  incontinence, nocturia  Muscle:  No pain, weakness  Neuro:  No memory problems  Psych:  No insomnia, mood problems, depression  Endocrine:  No polyuria, polydipsia, cold/heat intolerance  Heme:  No petechiae, ecchymosis, easy bruisability  Skin:  No rash    PHYSICAL EXAM:   Vital Signs:  Vital Signs Last 24 Hrs  T(C): 36.9 (02 May 2024 05:25), Max: 36.9 (01 May 2024 13:30)  T(F): 98.4 (02 May 2024 05:25), Max: 98.5 (01 May 2024 13:30)  HR: 77 (02 May 2024 05:25) (77 - 99)  BP: 112/58 (02 May 2024 05:25) (108/67 - 120/59)  BP(mean): 79 (01 May 2024 20:30) (79 - 80)  RR: 18 (02 May 2024 05:25) (14 - 19)  SpO2: 97% (02 May 2024 05:25) (94% - 97%)    Parameters below as of 02 May 2024 05:25  Patient On (Oxygen Delivery Method): nasal cannula  O2 Flow (L/min): 2    Daily     Daily     GENERAL: no acute distress  NEURO: alert, no asterixis  HEENT: anicteric sclera, no conjunctival pallor appreciated  CHEST: no respiratory distress, no accessory muscle use  CARDIAC: regular rate, rhythm  ABDOMEN: soft, non-tender, non-distended, no rebound or guarding  EXTREMITIES: warm, well perfused, no edema  SKIN: no lesions noted    LABS: reviewed                        6.9    5.10  )-----------( 85       ( 02 May 2024 06:23 )             21.4     05-02    142  |  109<H>  |  12  ----------------------------<  89  3.6   |  29  |  0.62    Ca    7.9<L>      02 May 2024 06:23    TPro  5.9<L>  /  Alb  1.9<L>  /  TBili  2.7<H>  /  DBili  x   /  AST  53<H>  /  ALT  26  /  AlkPhos  107  05-02    LIVER FUNCTIONS - ( 02 May 2024 06:23 )  Alb: 1.9 g/dL / Pro: 5.9 g/dL / ALK PHOS: 107 U/L / ALT: 26 U/L DA / AST: 53 U/L / GGT: x             Interval Diagnostic Studies: see sunrise for full report  RADIOLOGIC IMAGING:     Chief Complaint:  Patient is a 48y old  Male who presents with a chief complaint of New cirrhosis (02 May 2024 10:15)      Reason for consult:  ALD    Interval Events:   Patient seen at the bedside. No reported overnight events. Patient denies any new complaints. Reports sore abdomen attributed to prolonged tense abdomen prior to para, now s/p para with 5L drained. Denies any bleeding or fluid drainage at the para site. No reported signs or symptoms of bleeding. Labs notable for Hgb 6.9 and Plts 85 downtrended from the day before, with elevated INR 1.9 despite vitamin K x 2-doses (4/29, 4/30). Patient receiving 1u pRBC, noted to be his blood transfusion. Denies any new complaints.       Hospital Medications:  acetaminophen     Tablet .. 650 milliGRAM(s) Oral every 6 hours PRN  folic acid 1 milliGRAM(s) Oral daily  furosemide    Tablet 20 milliGRAM(s) Oral daily  LORazepam   Injectable 1 milliGRAM(s) IV Push every 2 hours PRN  LORazepam   Injectable 1 milliGRAM(s) IV Push every 1 hour PRN  melatonin 5 milliGRAM(s) Oral at bedtime PRN  ondansetron Injectable 4 milliGRAM(s) IV Push every 8 hours PRN  pantoprazole    Tablet 40 milliGRAM(s) Oral before breakfast  spironolactone 25 milliGRAM(s) Oral daily  thiamine 100 milliGRAM(s) Oral daily      ROS:   General:  No  fevers, chills, night sweats, fatigue  Eyes:  Good vision, no reported pain  ENT:  No sore throat, pain, runny nose  CV:  No pain, palpitations  Pulm:  No dyspnea, cough  GI:  See HPI, otherwise negative  :  No  incontinence, nocturia  Muscle:  No pain, weakness  Neuro:  No memory problems  Psych:  No insomnia, mood problems, depression  Endocrine:  No polyuria, polydipsia, cold/heat intolerance  Heme:  No petechiae, ecchymosis, easy bruisability  Skin:  No rash    PHYSICAL EXAM:   Vital Signs:  Vital Signs Last 24 Hrs  T(C): 36.9 (02 May 2024 05:25), Max: 36.9 (01 May 2024 13:30)  T(F): 98.4 (02 May 2024 05:25), Max: 98.5 (01 May 2024 13:30)  HR: 77 (02 May 2024 05:25) (77 - 99)  BP: 112/58 (02 May 2024 05:25) (108/67 - 120/59)  BP(mean): 79 (01 May 2024 20:30) (79 - 80)  RR: 18 (02 May 2024 05:25) (14 - 19)  SpO2: 97% (02 May 2024 05:25) (94% - 97%)    Parameters below as of 02 May 2024 05:25  Patient On (Oxygen Delivery Method): nasal cannula  O2 Flow (L/min): 2    Daily     Daily     GENERAL: no acute distress  NEURO: alert, no asterixis  HEENT: anicteric sclera, no conjunctival pallor appreciated  CHEST: no respiratory distress, no accessory muscle use  CARDIAC: regular rate, rhythm  ABDOMEN: soft, non-tender, non-distended, no rebound or guarding  EXTREMITIES: warm, well perfused, no edema  SKIN: no lesions noted    LABS: reviewed                        6.9    5.10  )-----------( 85       ( 02 May 2024 06:23 )             21.4     05-02    142  |  109<H>  |  12  ----------------------------<  89  3.6   |  29  |  0.62    Ca    7.9<L>      02 May 2024 06:23    TPro  5.9<L>  /  Alb  1.9<L>  /  TBili  2.7<H>  /  DBili  x   /  AST  53<H>  /  ALT  26  /  AlkPhos  107  05-02    LIVER FUNCTIONS - ( 02 May 2024 06:23 )  Alb: 1.9 g/dL / Pro: 5.9 g/dL / ALK PHOS: 107 U/L / ALT: 26 U/L DA / AST: 53 U/L / GGT: x             Interval Diagnostic Studies: see sunrise for full report  RADIOLOGIC IMAGING:  __________________________________________________________________  < from: CT Abdomen and Pelvis w/ IV Cont (04.27.24 @ 13:32) >  FINDINGS:  LOWER CHEST: Bibasilar dependent lung atelectasis, right more than left.    LIVER: Cirrhosis. No focal hepatic mass lesion as imaged.  BILE DUCTS: Normal caliber.  GALLBLADDER: Within normal limits.  SPLEEN: Enlarged measuring approximately 16 cm.  PANCREAS: Within normal limits.  ADRENALS: Within normal limits.  KIDNEYS/URETERS: No hydronephrosis. Multiple nonobstructing calculi lower pole of the left kidney measuring up to 5 mm.    BLADDER: Minimally distended.  REPRODUCTIVE ORGANS: Prostate within normal limits.    BOWEL: No bowel obstruction. Appendix is normal.  PERITONEUM: Moderate to large volume ascites. Mesenteric edema.  VESSELS: Portal venous collaterals including lower esophageal varices. No evidence of portal venous thrombosis.  RETROPERITONEUM/LYMPH NODES: No lymphadenopathy.  ABDOMINAL WALL: Anasarca  BONES: Within normal limits.    IMPRESSION:  Cirrhosis with portal hypertension, splenomegaly and ascites.   Chief Complaint:  Patient is a 48y old  Male who presents with a chief complaint of New cirrhosis (02 May 2024 10:15)      Reason for consult:  ALD    Interval Events:   Patient seen at the bedside. No reported overnight events. Patient denies any new complaints. Reports sore abdomen attributed to prolonged tense abdomen prior to para, now s/p para with 5L drained. Denies any bleeding or fluid drainage at the para site. No reported signs or symptoms of bleeding. Labs notable for Hgb 6.9 and Plts 85 downtrended from the day before, with elevated INR 1.9 despite vitamin K x 2-doses (4/29, 4/30). Patient receiving 1u pRBC, noted to be his blood transfusion. Denies any new complaints.       Hospital Medications:  acetaminophen     Tablet .. 650 milliGRAM(s) Oral every 6 hours PRN  folic acid 1 milliGRAM(s) Oral daily  furosemide    Tablet 20 milliGRAM(s) Oral daily  LORazepam   Injectable 1 milliGRAM(s) IV Push every 2 hours PRN  LORazepam   Injectable 1 milliGRAM(s) IV Push every 1 hour PRN  melatonin 5 milliGRAM(s) Oral at bedtime PRN  ondansetron Injectable 4 milliGRAM(s) IV Push every 8 hours PRN  pantoprazole    Tablet 40 milliGRAM(s) Oral before breakfast  spironolactone 25 milliGRAM(s) Oral daily  thiamine 100 milliGRAM(s) Oral daily      ROS:   General:  No  fevers, chills, night sweats, fatigue  Eyes:  Good vision, no reported pain  ENT:  No sore throat, pain, runny nose  CV:  No pain, palpitations  Pulm:  No dyspnea, cough  GI:  See HPI, otherwise negative  :  No  incontinence, nocturia  Muscle:  No pain, weakness  Neuro:  No memory problems  Skin:  No rash    PHYSICAL EXAM:   Vital Signs:  Vital Signs Last 24 Hrs  T(C): 36.9 (02 May 2024 05:25), Max: 36.9 (01 May 2024 13:30)  T(F): 98.4 (02 May 2024 05:25), Max: 98.5 (01 May 2024 13:30)  HR: 77 (02 May 2024 05:25) (77 - 99)  BP: 112/58 (02 May 2024 05:25) (108/67 - 120/59)  BP(mean): 79 (01 May 2024 20:30) (79 - 80)  RR: 18 (02 May 2024 05:25) (14 - 19)  SpO2: 97% (02 May 2024 05:25) (94% - 97%)    Parameters below as of 02 May 2024 05:25  Patient On (Oxygen Delivery Method): nasal cannula  O2 Flow (L/min): 2    Daily     Daily     GENERAL: no acute distress  NEURO: alert, no asterixis  HEENT: anicteric sclera, no conjunctival pallor appreciated  CHEST: no respiratory distress, no accessory muscle use, still on NC O2  CARDIAC: regular rate, rhythm  ABDOMEN: (+) abdominal distension, not tense,  non-tender; no rebound or guarding, BS+  EXTREMITIES: warm, well perfused, no edema  SKIN: no lesions noted    LABS: reviewed                        6.9    5.10  )-----------( 85       ( 02 May 2024 06:23 )             21.4     05-02    142  |  109<H>  |  12  ----------------------------<  89  3.6   |  29  |  0.62    Ca    7.9<L>      02 May 2024 06:23    TPro  5.9<L>  /  Alb  1.9<L>  /  TBili  2.7<H>  /  DBili  x   /  AST  53<H>  /  ALT  26  /  AlkPhos  107  05-02    LIVER FUNCTIONS - ( 02 May 2024 06:23 )  Alb: 1.9 g/dL / Pro: 5.9 g/dL / ALK PHOS: 107 U/L / ALT: 26 U/L DA / AST: 53 U/L / GGT: x             Interval Diagnostic Studies: see sunrise for full report  RADIOLOGIC IMAGING:  __________________________________________________________________  < from: CT Abdomen and Pelvis w/ IV Cont (04.27.24 @ 13:32) >  FINDINGS:  LOWER CHEST: Bibasilar dependent lung atelectasis, right more than left.    LIVER: Cirrhosis. No focal hepatic mass lesion as imaged.  BILE DUCTS: Normal caliber.  GALLBLADDER: Within normal limits.  SPLEEN: Enlarged measuring approximately 16 cm.  PANCREAS: Within normal limits.  ADRENALS: Within normal limits.  KIDNEYS/URETERS: No hydronephrosis. Multiple nonobstructing calculi lower pole of the left kidney measuring up to 5 mm.    BLADDER: Minimally distended.  REPRODUCTIVE ORGANS: Prostate within normal limits.    BOWEL: No bowel obstruction. Appendix is normal.  PERITONEUM: Moderate to large volume ascites. Mesenteric edema.  VESSELS: Portal venous collaterals including lower esophageal varices. No evidence of portal venous thrombosis.  RETROPERITONEUM/LYMPH NODES: No lymphadenopathy.  ABDOMINAL WALL: Anasarca  BONES: Within normal limits.    IMPRESSION:  Cirrhosis with portal hypertension, splenomegaly and ascites.

## 2024-05-02 NOTE — PROGRESS NOTE ADULT - PROBLEM SELECTOR PLAN 4
- HBG downtrending 6.9 this am  - 1 unit PRBc ordered- consent obtained  - Maintain active T&S.   - C/W Protonix  - GI following - HBG downtrending 6.9 this am  - 1 unit PRBc ordered- consent obtained from pt  - Maintain active T&S.   - C/W Protonix  - GI following

## 2024-05-02 NOTE — PROGRESS NOTE ADULT - SUBJECTIVE AND OBJECTIVE BOX
Patient is a 48y old  Male who presents with a chief complaint of New cirrhosis (01 May 2024 17:50)      INTERVAL HPI/OVERNIGHT EVENTS: Pt seen and assessed at bedside with no new complaints    MEDICATIONS  (STANDING):  folic acid 1 milliGRAM(s) Oral daily  furosemide    Tablet 20 milliGRAM(s) Oral daily  pantoprazole    Tablet 40 milliGRAM(s) Oral before breakfast  spironolactone 25 milliGRAM(s) Oral daily  thiamine 100 milliGRAM(s) Oral daily    MEDICATIONS  (PRN):  acetaminophen     Tablet .. 650 milliGRAM(s) Oral every 6 hours PRN Temp greater or equal to 38C (100.4F), Mild Pain (1 - 3)  LORazepam   Injectable 1 milliGRAM(s) IV Push every 2 hours PRN CIWA-Ar score increase by 2 points and a total score of 7 or less  LORazepam   Injectable 1 milliGRAM(s) IV Push every 1 hour PRN CIWA-Ar score 8 or greater  melatonin 5 milliGRAM(s) Oral at bedtime PRN Insomnia  ondansetron Injectable 4 milliGRAM(s) IV Push every 8 hours PRN Nausea and/or Vomiting  __________________________________________________  REVIEW OF SYSTEMS:    CONSTITUTIONAL: No fever,   EYES: no acute visual disturbances  NECK: No pain or stiffness  RESPIRATORY: No cough; No shortness of breath  CARDIOVASCULAR: No chest pain, no palpitations  GASTROINTESTINAL: No pain. No nausea or vomiting; No diarrhea   NEUROLOGICAL: No headache or numbness, no tremors  MUSCULOSKELETAL: No joint pain, no muscle pain  GENITOURINARY: no dysuria, no frequency, no hesitancy  PSYCHIATRY: no depression , no anxiety  ALL OTHER  ROS negative      Vital Signs Last 24 Hrs  T(C): 36.9 (02 May 2024 05:25), Max: 36.9 (01 May 2024 13:30)  T(F): 98.4 (02 May 2024 05:25), Max: 98.5 (01 May 2024 13:30)  HR: 77 (02 May 2024 05:25) (77 - 99)  BP: 112/58 (02 May 2024 05:25) (108/67 - 120/59)  BP(mean): 79 (01 May 2024 20:30) (79 - 80)  RR: 18 (02 May 2024 05:25) (14 - 19)  SpO2: 97% (02 May 2024 05:25) (94% - 97%)    Parameters below as of 02 May 2024 05:25  Patient On (Oxygen Delivery Method): nasal cannula  O2 Flow (L/min): 2  ________________________________________________  PHYSICAL EXAM:  GENERAL: NAD  HEENT: Normocephalic;  conjunctivae and sclerae clear; moist mucous membranes;   NECK : supple  CHEST/LUNG: Clear to auscultation bilaterally with good air entry   HEART: S1 S2  regular; no murmurs, gallops or rubs  ABDOMEN:  Distended and tender on palpation; Bowel sounds present  EXTREMITIES: no cyanosis; no edema; no calf tenderness  SKIN: warm and dry; no rash--- Paracentesis site, no redness or drainage noted to site  NERVOUS SYSTEM:  Awake and alert; Oriented  to place, person and time ; no new deficits    _________________________________________________  LABS:                        6.9    5.10  )-----------( 85       ( 02 May 2024 06:23 )             21.4     05-02    142  |  109<H>  |  12  ----------------------------<  89  3.6   |  29  |  0.62    Ca    7.9<L>      02 May 2024 06:23    TPro  5.9<L>  /  Alb  1.9<L>  /  TBili  2.7<H>  /  DBili  x   /  AST  53<H>  /  ALT  26  /  AlkPhos  107  05-02    PT/INR - ( 02 May 2024 06:23 )   PT: 21.3 sec;   INR: 1.90 ratio         PTT - ( 02 May 2024 06:23 )  PTT:39.7 sec  Urinalysis Basic - ( 02 May 2024 06:23 )    Color: x / Appearance: x / SG: x / pH: x  Gluc: 89 mg/dL / Ketone: x  / Bili: x / Urobili: x   Blood: x / Protein: x / Nitrite: x   Leuk Esterase: x / RBC: x / WBC x   Sq Epi: x / Non Sq Epi: x / Bacteria: x      RADIOLOGY & ADDITIONAL TESTS:  < from: Xray Chest 1 View- PORTABLE-Urgent (Xray Chest 1 View- PORTABLE-Urgent .) (04.27.24 @ 23:26) >  PROCEDURE DATE:  04/27/2024          INTERPRETATION:  Portable AP chest radiograph    COMPARISON:  NONE.    CLINICAL INFORMATION: Pneumonia like symptoms.    FINDINGS:  CATHETERS AND TUBES: None    PULMONARY: 2.4 cm opacity adjacent to RIGHT tracheobronchial angle either   indicating azygous vein or lymph node .  Remaining lung parenchyma clear.    There are no airspace consolidations or radiographic evidence of   pulmonary nodules..  No pleural effusion or pneumothorax.    HEART/VASCULAR: The heart size and mediastinum configuration are within   the limits of normal.    BONES: The visualized osseous thorax is intact.    IMPRESSION:  2.4 cm opacity adjacent to RIGHT tracheobronchial angle either indicating   azygous vein or lymph node .  Lungs clear.  Continued surveillance recommended.    --- End of Report ---    < end of copied text >  < from: CT Abdomen and Pelvis w/ IV Cont (04.27.24 @ 13:32) >  PROCEDURE DATE:  04/27/2024          INTERPRETATION:  CLINICAL INFORMATION: Cirrhosis.    COMPARISON: None.    CONTRAST/COMPLICATIONS:  IV Contrast: Omnipaque 350  90 cc administered   10 cc discarded  Oral Contrast: NONE  Complications: None reported at time of study completion    PROCEDURE:  CT of the Abdomen and Pelvis was performed.  Sagittal and coronal reformats were performed.    FINDINGS:  LOWER CHEST: Bibasilar dependent lung atelectasis, right more than left.    LIVER: Cirrhosis. No focal hepatic mass lesion as imaged.  BILE DUCTS: Normal caliber.  GALLBLADDER: Within normal limits.  SPLEEN: Enlarged measuring approximately 16 cm.  PANCREAS: Within normal limits.  ADRENALS: Within normal limits.  KIDNEYS/URETERS: No hydronephrosis. Multiple nonobstructing calculi lower   pole of the left kidney measuring up to 5 mm.    BLADDER: Minimally distended.  REPRODUCTIVE ORGANS: Prostate within normal limits.    BOWEL: No bowel obstruction. Appendix is normal.  PERITONEUM: Moderate to large volume ascites. Mesenteric edema.  VESSELS: Portal venous collaterals including lower esophageal varices. No   evidence of portal venous thrombosis.  RETROPERITONEUM/LYMPH NODES: No lymphadenopathy.  ABDOMINAL WALL: Anasarca  BONES: Within normal limits.    IMPRESSION:  Cirrhosis with portal hypertension, splenomegaly and ascites.        --- End of Report ---    < end of copied text >  < from: IR Procedure. (05.01.24 @ 10:49) >  PROCEDURE DATE:  05/01/2024          INTERPRETATION:  Clinical History: 47 yo male with cirrhosis and ascites.   Diagnostic and therapeutic paracentesis is requested.    Medication: Lidocaine 2%, 8cc administered SQ    Procedure: The risks and benefits of the procedure were discussed with   the patient, and informed consent was obtained. Prior to any   intervention, a "timeout" was performed confirming both patient and   procedure. Following informed consent the patient was placed in the   supine position and the right lower quadrant prepped and draped in a   sterile fashion. Physiologic monitoring was performed throughout the   procedure. Following the administration of local anesthesia with 2%   lidocaine, under ultrasound guidance the peritoneal fluid collection was   accessed with a micropuncture needle and an 8 F drain placed over an   Amplatz wire. An ultrasound image of the catheter within the ascites was   obtained. Approximately 5000 cc of clear yellow fluid was recovered.   Specimens were obtained and sent for a complete evaluation including   cytology. The catheter was removed and a sterile dressing applied. The   patient tolerated the procedure well and was transferred from the   department in stable condition. There were no immediate complications.    Impression: Successful ultrasound-guided diagnostic and therapeutic   paracentesis, as described above.    --- End of Report ---      Imaging Personally Reviewed:  YES    Consultant(s) Notes Reviewed:   YES    Care Discussed with Consultants :     Plan of care was discussed with patient and /or primary care giver; all questions and concerns were addressed and care was aligned with patient's wishes.

## 2024-05-02 NOTE — PROGRESS NOTE ADULT - SUBJECTIVE AND OBJECTIVE BOX
GI PROGRESS NOTE    Patient is a 48y old  Male who presents with a chief complaint of New cirrhosis (02 May 2024 11:57)      HPI:  Patient is a 49 y/o M with PMH of alcohol use disorder (has not seen a doctor for 10 years), who presented with 2 weeks history of worsening generalized abdominal pain, discomfort, and bloating. Patient reports that before this he did not feel his abdomen was getting bigger and he had no complains. Patient reports he started noticing bloating 2 weeks ago and it has worsened since then. Patient also reported that for the past few days he has also noticed that he is getting yellowing of his eyes and skin. Patient denies associated nausea/vomiting however reports decreased appetite for the past 2 weeks. Patient also complains of weakness, fatigue, malaise, Patient also denies any hematemesis, melena, hemtochezia, or BRBPR. Patient also denies moments of confusion. He also denies any SOB, chest pain, or LE swelling. He also denies easy bruising, or any bleeding. Patient reports he has been drinking alcohol since more 4-5 drinks of Vodka daily but his last drink was 2 weeks ago when his symptoms started. Patient also reports smoking a pack a day for 20-25 years. Patient denies using any illicit drugs. Patient also denies any history of transfusions, IV drug use, hepatitis B or C virus, or family history of liver disease. Patient denies any recent fevers, chills, headache, dizziness, lightheadedness, chest pain, palpitations, shortness of breath, cough, diarrhea, constipation, melena, hematochezia, dysuria, urinary frequency, or urgency. Patient denies any other complains at this time.   (27 Apr 2024 16:37)      GI HPI:  Resting comfortably.  Pain controlled.   Tolerating diet.    ______________________________________________________________________  PMH/PSH:  PAST MEDICAL & SURGICAL HISTORY:    ______________________________________________________________________  MEDS:  MEDICATIONS  (STANDING):  folic acid 1 milliGRAM(s) Oral daily  furosemide    Tablet 20 milliGRAM(s) Oral daily  pantoprazole    Tablet 40 milliGRAM(s) Oral before breakfast  spironolactone 25 milliGRAM(s) Oral daily  thiamine 100 milliGRAM(s) Oral daily    MEDICATIONS  (PRN):  acetaminophen     Tablet .. 650 milliGRAM(s) Oral every 6 hours PRN Temp greater or equal to 38C (100.4F), Mild Pain (1 - 3)  melatonin 5 milliGRAM(s) Oral at bedtime PRN Insomnia  ondansetron Injectable 4 milliGRAM(s) IV Push every 8 hours PRN Nausea and/or Vomiting    ______________________________________________________________________  ALL:   Allergies    No Known Allergies    Intolerances      ______________________________________________________________________  SH: Social History:  Lives with room mates. (27 Apr 2024 16:37)    ______________________________________________________________________  FH:  FAMILY HISTORY:    ______________________________________________________________________  ROS:    CONSTITUTIONAL:  No weight loss, fever, chills, weakness or fatigue.    HEENT:  Eyes:  No visual loss, blurred vision, double vision or yellow sclerae. Ears, Nose, Throat:  No hearing loss, sneezing, congestion, runny nose or sore throat.    SKIN:  No rash or itching.    CARDIOVASCULAR:  No chest pain, chest pressure or chest discomfort. No palpitations or edema.    RESPIRATORY:  No shortness of breath, cough or sputum.    GASTROINTESTINAL:  SEE HPI    GENITOURINARY:  No dysuria, hematuria, urinary frequency    NEUROLOGICAL:  No headache, dizziness, syncope, paralysis, ataxia, numbness or tingling in the extremities. No change in bowel or bladder control.    MUSCULOSKELETAL:  No muscle, back pain, joint pain or stiffness.    HEMATOLOGIC:  No anemia, bleeding or bruising.    LYMPHATICS:  No enlarged nodes. No history of splenectomy.    PSYCHIATRIC:  No history of depression or anxiety.    ENDOCRINOLOGIC:  No reports of sweating, cold or heat intolerance. No polyuria or polydipsia.    ALLERGIES:  No history of asthma, hives, eczema or rhinitis.  ______________________________________________________________________  PHYSICAL EXAM:  T(C): 36.5 (05-03-24 @ 04:45), Max: 37.1 (05-02-24 @ 14:15)  HR: 79 (05-03-24 @ 04:45)  BP: 120/63 (05-03-24 @ 04:45)  RR: 17 (05-03-24 @ 04:45)  SpO2: 95% (05-03-24 @ 04:45)  Wt(kg): --      GEN: NAD   CVS- S1 S2  ABD: soft nontender, non distended, bowel sounds+  LUNGS: clear to auscultation  NEURO:  AAO x 3  Extremities: no cyanosis, no calf tenderness, no edema, no clubbing      ______________________________________________________________________  LABS:                        7.8    5.67  )-----------( 98       ( 03 May 2024 06:44 )             24.5     05-03    141  |  109<H>  |  12  ----------------------------<  86  3.6   |  30  |  0.59    Ca    7.6<L>      03 May 2024 06:44    TPro  5.9<L>  /  Alb  1.9<L>  /  TBili  2.7<H>  /  DBili  x   /  AST  53<H>  /  ALT  26  /  AlkPhos  107  05-02    LIVER FUNCTIONS - ( 02 May 2024 06:23 )  Alb: 1.9 g/dL / Pro: 5.9 g/dL / ALK PHOS: 107 U/L / ALT: 26 U/L DA / AST: 53 U/L / GGT: x           ______________________________________________________________________  IMAGING:    < from: CT Abdomen and Pelvis w/ IV Cont (04.27.24 @ 13:32) >  IMPRESSION:  Cirrhosis with portal hypertension, splenomegaly and ascites.    < end of copied text >

## 2024-05-03 LAB
ALBUMIN SERPL ELPH-MCNC: 1.9 G/DL — LOW (ref 3.5–5)
ALP SERPL-CCNC: 110 U/L — SIGNIFICANT CHANGE UP (ref 40–120)
ALT FLD-CCNC: 27 U/L DA — SIGNIFICANT CHANGE UP (ref 10–60)
ANION GAP SERPL CALC-SCNC: 2 MMOL/L — LOW (ref 5–17)
ANNOTATION COMMENT IMP: SIGNIFICANT CHANGE UP
AST SERPL-CCNC: 56 U/L — HIGH (ref 10–40)
BASOPHILS # BLD AUTO: 0.05 K/UL — SIGNIFICANT CHANGE UP (ref 0–0.2)
BASOPHILS NFR BLD AUTO: 0.9 % — SIGNIFICANT CHANGE UP (ref 0–2)
BILIRUB DIRECT SERPL-MCNC: 1.6 MG/DL — HIGH (ref 0–0.3)
BILIRUB INDIRECT FLD-MCNC: 1.8 MG/DL — HIGH (ref 0.2–1)
BILIRUB SERPL-MCNC: 3.4 MG/DL — HIGH (ref 0.2–1.2)
BUN SERPL-MCNC: 12 MG/DL — SIGNIFICANT CHANGE UP (ref 7–18)
CALCIUM SERPL-MCNC: 7.6 MG/DL — LOW (ref 8.4–10.5)
CHLORIDE SERPL-SCNC: 109 MMOL/L — HIGH (ref 96–108)
CO2 SERPL-SCNC: 30 MMOL/L — SIGNIFICANT CHANGE UP (ref 22–31)
CREAT SERPL-MCNC: 0.59 MG/DL — SIGNIFICANT CHANGE UP (ref 0.5–1.3)
EBV DNA SERPL NAA+PROBE-ACNC: SIGNIFICANT CHANGE UP IU/ML
EBVPCR LOG: SIGNIFICANT CHANGE UP LOG10IU/ML
EGFR: 120 ML/MIN/1.73M2 — SIGNIFICANT CHANGE UP
EOSINOPHIL # BLD AUTO: 0.2 K/UL — SIGNIFICANT CHANGE UP (ref 0–0.5)
EOSINOPHIL NFR BLD AUTO: 3.4 % — SIGNIFICANT CHANGE UP (ref 0–6)
GLUCOSE SERPL-MCNC: 86 MG/DL — SIGNIFICANT CHANGE UP (ref 70–99)
HCT VFR BLD CALC: 24.5 % — LOW (ref 39–50)
HCT VFR BLD CALC: 27.6 % — LOW (ref 39–50)
HEV RNA SERPL NAA+PROBE-ACNC: SIGNIFICANT CHANGE UP IU/ML
HEV RNA SERPL NAA+PROBE-LOG IU: <3.3 LOG IU/ML — SIGNIFICANT CHANGE UP
HGB BLD-MCNC: 7.8 G/DL — LOW (ref 13–17)
HGB BLD-MCNC: 8.7 G/DL — LOW (ref 13–17)
IMM GRANULOCYTES NFR BLD AUTO: 0.2 % — SIGNIFICANT CHANGE UP (ref 0–0.9)
INR BLD: 1.55 RATIO — HIGH (ref 0.85–1.18)
LYMPHOCYTES # BLD AUTO: 1.95 K/UL — SIGNIFICANT CHANGE UP (ref 1–3.3)
LYMPHOCYTES # BLD AUTO: 33.3 % — SIGNIFICANT CHANGE UP (ref 13–44)
MCHC RBC-ENTMCNC: 30.9 PG — SIGNIFICANT CHANGE UP (ref 27–34)
MCHC RBC-ENTMCNC: 31.5 GM/DL — LOW (ref 32–36)
MCHC RBC-ENTMCNC: 31.5 PG — SIGNIFICANT CHANGE UP (ref 27–34)
MCHC RBC-ENTMCNC: 31.8 GM/DL — LOW (ref 32–36)
MCV RBC AUTO: 97.9 FL — SIGNIFICANT CHANGE UP (ref 80–100)
MCV RBC AUTO: 98.8 FL — SIGNIFICANT CHANGE UP (ref 80–100)
MITOCHONDRIA AB SER-ACNC: SIGNIFICANT CHANGE UP
MONOCYTES # BLD AUTO: 1 K/UL — HIGH (ref 0–0.9)
MONOCYTES NFR BLD AUTO: 17.1 % — HIGH (ref 2–14)
NEUTROPHILS # BLD AUTO: 2.64 K/UL — SIGNIFICANT CHANGE UP (ref 1.8–7.4)
NEUTROPHILS NFR BLD AUTO: 45.1 % — SIGNIFICANT CHANGE UP (ref 43–77)
NRBC # BLD: 0 /100 WBCS — SIGNIFICANT CHANGE UP (ref 0–0)
NRBC # BLD: 0 /100 WBCS — SIGNIFICANT CHANGE UP (ref 0–0)
PLATELET # BLD AUTO: 108 K/UL — LOW (ref 150–400)
PLATELET # BLD AUTO: 98 K/UL — LOW (ref 150–400)
POTASSIUM SERPL-MCNC: 3.6 MMOL/L — SIGNIFICANT CHANGE UP (ref 3.5–5.3)
POTASSIUM SERPL-SCNC: 3.6 MMOL/L — SIGNIFICANT CHANGE UP (ref 3.5–5.3)
PROT SERPL-MCNC: 6.3 G/DL — SIGNIFICANT CHANGE UP (ref 6–8.3)
PROTHROM AB SERPL-ACNC: 17.4 SEC — HIGH (ref 9.5–13)
RBC # BLD: 2.48 M/UL — LOW (ref 4.2–5.8)
RBC # BLD: 2.82 M/UL — LOW (ref 4.2–5.8)
RBC # FLD: 18.9 % — HIGH (ref 10.3–14.5)
RBC # FLD: 19 % — HIGH (ref 10.3–14.5)
SMOOTH MUSCLE AB SER-ACNC: ABNORMAL
SODIUM SERPL-SCNC: 141 MMOL/L — SIGNIFICANT CHANGE UP (ref 135–145)
SPECIMEN SOURCE: SIGNIFICANT CHANGE UP
WBC # BLD: 5.67 K/UL — SIGNIFICANT CHANGE UP (ref 3.8–10.5)
WBC # BLD: 5.85 K/UL — SIGNIFICANT CHANGE UP (ref 3.8–10.5)
WBC # FLD AUTO: 5.67 K/UL — SIGNIFICANT CHANGE UP (ref 3.8–10.5)
WBC # FLD AUTO: 5.85 K/UL — SIGNIFICANT CHANGE UP (ref 3.8–10.5)

## 2024-05-03 PROCEDURE — 99233 SBSQ HOSP IP/OBS HIGH 50: CPT

## 2024-05-03 RX ORDER — ACETYLCYSTEINE 200 MG/ML
15 VIAL (ML) MISCELLANEOUS ONCE
Refills: 0 | Status: COMPLETED | OUTPATIENT
Start: 2024-05-03 | End: 2024-05-03

## 2024-05-03 RX ORDER — SPIRONOLACTONE 25 MG/1
100 TABLET, FILM COATED ORAL DAILY
Refills: 0 | Status: DISCONTINUED | OUTPATIENT
Start: 2024-05-04 | End: 2024-05-08

## 2024-05-03 RX ORDER — ACETYLCYSTEINE 200 MG/ML
5 VIAL (ML) MISCELLANEOUS ONCE
Refills: 0 | Status: COMPLETED | OUTPATIENT
Start: 2024-05-03 | End: 2024-05-03

## 2024-05-03 RX ORDER — FUROSEMIDE 40 MG
40 TABLET ORAL DAILY
Refills: 0 | Status: DISCONTINUED | OUTPATIENT
Start: 2024-05-04 | End: 2024-05-08

## 2024-05-03 RX ORDER — ACETYLCYSTEINE 200 MG/ML
17 VIAL (ML) MISCELLANEOUS EVERY 24 HOURS
Refills: 0 | Status: DISCONTINUED | OUTPATIENT
Start: 2024-05-03 | End: 2024-05-07

## 2024-05-03 RX ORDER — FUROSEMIDE 40 MG
20 TABLET ORAL ONCE
Refills: 0 | Status: COMPLETED | OUTPATIENT
Start: 2024-05-03 | End: 2024-05-03

## 2024-05-03 RX ORDER — SPIRONOLACTONE 25 MG/1
25 TABLET, FILM COATED ORAL ONCE
Refills: 0 | Status: COMPLETED | OUTPATIENT
Start: 2024-05-03 | End: 2024-05-03

## 2024-05-03 RX ORDER — PREDNISOLONE 5 MG
40 TABLET ORAL DAILY
Refills: 0 | Status: DISCONTINUED | OUTPATIENT
Start: 2024-05-03 | End: 2024-05-08

## 2024-05-03 RX ADMIN — Medication 1 MILLIGRAM(S): at 11:19

## 2024-05-03 RX ADMIN — Medication 325 GRAM(S): at 17:12

## 2024-05-03 RX ADMIN — SPIRONOLACTONE 25 MILLIGRAM(S): 25 TABLET, FILM COATED ORAL at 16:38

## 2024-05-03 RX ADMIN — Medication 131.25 GRAM(S): at 18:36

## 2024-05-03 RX ADMIN — Medication 650 MILLIGRAM(S): at 23:28

## 2024-05-03 RX ADMIN — Medication 650 MILLIGRAM(S): at 22:58

## 2024-05-03 RX ADMIN — Medication 45.21 GRAM(S): at 22:58

## 2024-05-03 RX ADMIN — Medication 40 MILLIGRAM(S): at 16:35

## 2024-05-03 RX ADMIN — PANTOPRAZOLE SODIUM 40 MILLIGRAM(S): 20 TABLET, DELAYED RELEASE ORAL at 05:54

## 2024-05-03 RX ADMIN — SPIRONOLACTONE 25 MILLIGRAM(S): 25 TABLET, FILM COATED ORAL at 05:54

## 2024-05-03 RX ADMIN — Medication 100 MILLIGRAM(S): at 11:20

## 2024-05-03 RX ADMIN — Medication 5 MILLIGRAM(S): at 22:59

## 2024-05-03 RX ADMIN — Medication 20 MILLIGRAM(S): at 05:54

## 2024-05-03 RX ADMIN — Medication 20 MILLIGRAM(S): at 16:38

## 2024-05-03 NOTE — PROGRESS NOTE ADULT - ASSESSMENT
47yo  Male w/ Hx of AUD (4-5 drinks/day, Vodka, last drink 2 weeks prior to admission), smoking (20-25 years) not on home O2, and family Hx of hepatitis C (mother), presented to [facility] ER w/ 2 weeks progressively worsening abdominal distention, decreased appetite, malaise, generalized weakness, with 2 days Hx of jaundice prior to arrival, and was admitted w/ newly diagnosed cirrhosis w/ CSPH, decompensated w/ ascites. Now s/p LVP w/ albumin, no SBP, consistent w/ portal hypertension, low protein. Also s/p EGD 5/1/24 showing Gr II-III EVs, PHG.     CXR no infiltrate  BCx NGTD x 48h  CT a/p w/ IV contrast: Cirrhosis with portal HTN with collaterals including lower esophageal varices, moderate to large volume ascites with mesenteric edema and anasarca.     # Cirrhosis likely due to EtOH, w/ CSPH, decompensated w/ ascites  # Superimposed alcoholic hepatitis  # Ascites  MELD 3.0 19  MDF >32 would likely benefit from steroids    s/p large volume, diagnostic and therapeutic paracentesis by IR 5/1 drained 5L, given albumin 8g/L drained   s/p EGD 5/1 showing grade II-III esophageal varices, non-bleeding. no bands placed.  Infectious w/u thus far negative, BCx NGTD x72h, UA neg, with no s/s infection, ascites - no SBP   No PVT per CTa/p w/ iv  SAAG >1.1g/dL (1.5 likely indicates that portal HTN is likely the cause of ascites)  ---- Low protein ascites + CTP >9, bilirubin > 3, thus consider long term SBP ppx, unless improvement.    Recommendations:   - Consider starting prednisolone 40 mg, with checking Lille score at day #7 (or day#4) to decide whether to continue for total 28 days course  - Consider adding NAC (liver failure protocol) for 2-5 days to the steroid  - F/u rest of ascites analysis, cytology  - Consider increasing diuretics to Furosemide 40 mg and Spironolactone 100 mg  - Monitor renal function (Cr normal so far)  - Therapeutic paracentesis as needed, with 25% albumin  6-8 g/l ascites removed  - Consider SBP ppx as above (b/o CTP > 9, bili > 3, and low protein ascites). Can do ceftriaxone 1g daily while inpatient or cipro 500 mg po daily.   - Avoid NSAIDs  - Low salt diet   - Encourage alcohol cessation  - Titrate O2 as tolerated, suspect improvement in breathing/ splinting s/p large volume paracentesis       # Anemia now requiring blood transfusion  # Coagulopathy  # Thrombocytopenia  # PHG  # GR II-III EVs  No reported overt GIB so far  S/p vitamin K x2 (4/29, 4/30)  S/p EGD 5/1: grade II-III esophageal varices, non-bleeding. no bands placed; PHG  Anemia w/u results:  ---- Iron 13, TIBC 221, Iron % 6, ferritin 202, haptoglobin 40, , reticulocyte % 3.8, folate and vitamin b12 wnl  Adequate response to Hb    Recommendations:   - Would consider IV ferrous supplementation   - Monitor H/H, keep Hb > 7.   - Keep PLT > 50, fibrinogen > 120 if bleeding  - C/w PPI  - Consider SBP ppx as above   - If vitals allow and no active bleeding, start carvedilol 3.125 mg bid    # No PSE  # HCC status - no focal hepatic lesion on CT a/p, can send AFP  # Etiology of cirrhosis: likely EtOH  --- CLD w/u so far: Hep B neg, not immune, not exposed, and Hep C ab neg. Hep A IgM neg. Hep E IgG/IgM neg. A1AT WNL. Ferritin 202. CMV negative. EBV past infection.   --- Ceruloplasmin 15, possibly due to severe liver disease, but can send 24 hr urine copper.   --- IgA elevated 1658 and IgG elevated 1661 likely due to cirrhosis; ROSY neg, LKM neg, SMA and AMA in process.    Recommendations:  - F/u SMA,  AMA (testing in progress)   - F/u Hep E PCR andEBV PCR (testing in progress)  - Please, obtain HSV PCRs (ordered but never received, re-ordered)  - Please, obtain 24 hr urine copper.        # Transplant candidacy: Will need to explore psychosocial status. Bilirubin is improving, possibly will need to be referred outpatient, unless worsening or no response to steroid.  # AUD  - Folic, thiamine  - CIWA per primary team  - Aspiration, seizure, fall precautions  - Will need rehab     Thank you for consult  Will follow. Hepatology returns Monday. Please, consult GI on call if change in status, questions, concerns,   Discussed w/ primary team

## 2024-05-03 NOTE — PROGRESS NOTE ADULT - SUBJECTIVE AND OBJECTIVE BOX
Chief Complaint:  Patient is a 48y old  Male who presents with a chief complaint of New cirrhosis (02 May 2024 14:46)      Reason for consult:    Interval Events:     Hospital Medications:  acetaminophen     Tablet .. 650 milliGRAM(s) Oral every 6 hours PRN  folic acid 1 milliGRAM(s) Oral daily  furosemide    Tablet 20 milliGRAM(s) Oral daily  melatonin 5 milliGRAM(s) Oral at bedtime PRN  ondansetron Injectable 4 milliGRAM(s) IV Push every 8 hours PRN  pantoprazole    Tablet 40 milliGRAM(s) Oral before breakfast  spironolactone 25 milliGRAM(s) Oral daily  thiamine 100 milliGRAM(s) Oral daily      ROS:   General:  No  fevers, chills, night sweats, fatigue  Eyes:  Good vision, no reported pain  ENT:  No sore throat, pain, runny nose  CV:  No pain, palpitations  Pulm:  No dyspnea, cough  GI:  See HPI, otherwise negative  :  No  incontinence, nocturia  Muscle:  No pain, weakness  Neuro:  No memory problems  Psych:  No insomnia, mood problems, depression  Endocrine:  No polyuria, polydipsia, cold/heat intolerance  Heme:  No petechiae, ecchymosis, easy bruisability  Skin:  No rash    PHYSICAL EXAM:   Vital Signs:  Vital Signs Last 24 Hrs  T(C): 36.8 (03 May 2024 12:26), Max: 37.1 (02 May 2024 14:15)  T(F): 98.3 (03 May 2024 12:26), Max: 98.7 (02 May 2024 14:15)  HR: 98 (03 May 2024 12:26) (79 - 98)  BP: 115/71 (03 May 2024 12:26) (102/60 - 120/63)  BP(mean): --  RR: 16 (03 May 2024 12:26) (16 - 17)  SpO2: 95% (03 May 2024 12:26) (95% - 95%)    Parameters below as of 03 May 2024 12:26  Patient On (Oxygen Delivery Method): nasal cannula  O2 Flow (L/min): 2    Daily     Daily     GENERAL: no acute distress  NEURO: alert, no asterixis  HEENT: anicteric sclera, no conjunctival pallor appreciated  CHEST: no respiratory distress, no accessory muscle use  CARDIAC: regular rate, rhythm  ABDOMEN: soft, non-tender, non-distended, no rebound or guarding  EXTREMITIES: warm, well perfused, no edema  SKIN: no lesions noted    LABS: reviewed                        8.7    5.85  )-----------( 108      ( 03 May 2024 12:23 )             27.6     05-03    141  |  109<H>  |  12  ----------------------------<  86  3.6   |  30  |  0.59    Ca    7.6<L>      03 May 2024 06:44    TPro  6.3  /  Alb  1.9<L>  /  TBili  3.4<H>  /  DBili  1.6<H>  /  AST  56<H>  /  ALT  27  /  AlkPhos  110  05-03    LIVER FUNCTIONS - ( 03 May 2024 06:44 )  Alb: 1.9 g/dL / Pro: 6.3 g/dL / ALK PHOS: 110 U/L / ALT: 27 U/L DA / AST: 56 U/L / GGT: x             Interval Diagnostic Studies: see sunrise for full report   Chief Complaint:  Patient is a 48y old  Male who presents with a chief complaint of New cirrhosis (02 May 2024 14:46)      Reason for consult: Decompensated cirrhosis    Interval Events: Patient was seen and examined at bedside. Repeat Hb stable. Abdomen distended, but soft. Had BM yesterday, none today, denies any bleeding.     Hospital Medications:  acetaminophen     Tablet .. 650 milliGRAM(s) Oral every 6 hours PRN  folic acid 1 milliGRAM(s) Oral daily  furosemide    Tablet 20 milliGRAM(s) Oral daily  melatonin 5 milliGRAM(s) Oral at bedtime PRN  ondansetron Injectable 4 milliGRAM(s) IV Push every 8 hours PRN  pantoprazole    Tablet 40 milliGRAM(s) Oral before breakfast  spironolactone 25 milliGRAM(s) Oral daily  thiamine 100 milliGRAM(s) Oral daily      ROS:   General:  No  fevers, chills, night sweats, fatigue  Eyes:  Good vision, no reported pain  ENT:  No sore throat, pain, runny nose  CV:  No pain, palpitations  Pulm:  No dyspnea, cough  GI:  See interval events, otherwise negative  :  No  incontinence, nocturia  Muscle:  No pain, weakness  Neuro:  No memory problems  Skin:  No rash        PHYSICAL EXAM:   Vital Signs:  Vital Signs Last 24 Hrs  T(C): 36.8 (03 May 2024 12:26), Max: 37.1 (02 May 2024 14:15)  T(F): 98.3 (03 May 2024 12:26), Max: 98.7 (02 May 2024 14:15)  HR: 98 (03 May 2024 12:26) (79 - 98)  BP: 115/71 (03 May 2024 12:26) (102/60 - 120/63)  BP(mean): --  RR: 16 (03 May 2024 12:26) (16 - 17)  SpO2: 95% (03 May 2024 12:26) (95% - 95%)    Parameters below as of 03 May 2024 12:26  Patient On (Oxygen Delivery Method): nasal cannula  O2 Flow (L/min): 2    Daily     Daily     GENERAL: no acute distress  NEURO: alert, no asterixis  HEENT: anicteric sclera, no conjunctival pallor appreciated  CHEST: no respiratory distress, no accessory muscle use, still on NC O2  CARDIAC: regular rate, rhythm  ABDOMEN: (+) abdominal distension, not tense,  non-tender; no rebound or guarding, BS+  EXTREMITIES: warm, well perfused, no edema  SKIN: no lesions noted    LABS: reviewed                        8.7    5.85  )-----------( 108      ( 03 May 2024 12:23 )             27.6     05-03    141  |  109<H>  |  12  ----------------------------<  86  3.6   |  30  |  0.59    Ca    7.6<L>      03 May 2024 06:44    TPro  6.3  /  Alb  1.9<L>  /  TBili  3.4<H>  /  DBili  1.6<H>  /  AST  56<H>  /  ALT  27  /  AlkPhos  110  05-03    LIVER FUNCTIONS - ( 03 May 2024 06:44 )  Alb: 1.9 g/dL / Pro: 6.3 g/dL / ALK PHOS: 110 U/L / ALT: 27 U/L DA / AST: 56 U/L / GGT: x             Interval Diagnostic Studies: see sunrise for full report

## 2024-05-03 NOTE — PROGRESS NOTE ADULT - ASSESSMENT
49 y/o M with PMH of alcohol use disorder (has not seen a doctor for 10 years), who presented with 2 weeks history of worsening generalized abdominal pain, discomfort, and bloating. On CT abdomen pelvis patient has cirrhosis with portal hypertension, splenomegaly and large volume ascites. Admitted for newly diagnosed cirrhosis w/ ascites. Hepatology and GI consulted for work up, s/p large volume paracentesis with IR and EGD showing grade II-III esophageal varices, non-bleeding. no bands placed. Infectious w/u thus far negative, BCx NGTD x72h, UA neg, with no s/s infection. Also noted with anemia requiring 1U PRBC. Cirrhosis likely 2/2 ETOH started on acetylcysteine, prednisolone.

## 2024-05-03 NOTE — PROGRESS NOTE ADULT - SUBJECTIVE AND OBJECTIVE BOX
NP Note discussed with  Primary Attending    Patient is a 48y old  Male who presents with a chief complaint of New cirrhosis (03 May 2024 13:56)      INTERVAL HPI/OVERNIGHT EVENTS: no acute events overnight    MEDICATIONS  (STANDING):  acetylcysteine IVPB 17 Gram(s) IV Intermittent every 24 hours  acetylcysteine IVPB 5 Gram(s) IV Intermittent once  folic acid 1 milliGRAM(s) Oral daily  pantoprazole    Tablet 40 milliGRAM(s) Oral before breakfast  prednisoLONE    3 mG/mL Solution (ORAPRED) 40 milliGRAM(s) Oral daily  thiamine 100 milliGRAM(s) Oral daily    MEDICATIONS  (PRN):  acetaminophen     Tablet .. 650 milliGRAM(s) Oral every 6 hours PRN Temp greater or equal to 38C (100.4F), Mild Pain (1 - 3)  melatonin 5 milliGRAM(s) Oral at bedtime PRN Insomnia  ondansetron Injectable 4 milliGRAM(s) IV Push every 8 hours PRN Nausea and/or Vomiting      __________________________________________________  REVIEW OF SYSTEMS:    CONSTITUTIONAL: No fever,   EYES: no acute visual disturbances  NECK: No pain or stiffness  RESPIRATORY: No cough; No shortness of breath  CARDIOVASCULAR: No chest pain, no palpitations  GASTROINTESTINAL: No pain. No nausea or vomiting; No diarrhea   NEUROLOGICAL: No headache or numbness, no tremors  MUSCULOSKELETAL: No joint pain, no muscle pain  GENITOURINARY: no dysuria, no frequency, no hesitancy  PSYCHIATRY: no depression , no anxiety  ALL OTHER  ROS negative        Vital Signs Last 24 Hrs  T(C): 37 (03 May 2024 16:45), Max: 37 (03 May 2024 16:45)  T(F): 98.6 (03 May 2024 16:45), Max: 98.6 (03 May 2024 16:45)  HR: 90 (03 May 2024 16:45) (79 - 98)  BP: 110/62 (03 May 2024 16:45) (103/62 - 120/63)  BP(mean): --  RR: 17 (03 May 2024 16:45) (16 - 17)  SpO2: 95% (03 May 2024 16:45) (95% - 95%)    Parameters below as of 03 May 2024 16:45  Patient On (Oxygen Delivery Method): nasal cannula  O2 Flow (L/min): 2      ________________________________________________  PHYSICAL EXAM:  GENERAL: NAD  HEENT: Normocephalic  NECK : supple  CHEST/LUNG: Clear to auscultation bilaterally   HEART: S1 S2  regular  ABDOMEN: Soft, Nontender,  Bowel sounds present  EXTREMITIES: no edema  SKIN: warm and dry; no rash  NERVOUS SYSTEM:  Awake and alert; Oriented  to place, person and time     _________________________________________________  LABS:                        8.7    5.85  )-----------( 108      ( 03 May 2024 12:23 )             27.6     05-03    141  |  109<H>  |  12  ----------------------------<  86  3.6   |  30  |  0.59    Ca    7.6<L>      03 May 2024 06:44    TPro  6.3  /  Alb  1.9<L>  /  TBili  3.4<H>  /  DBili  1.6<H>  /  AST  56<H>  /  ALT  27  /  AlkPhos  110  05-03    PT/INR - ( 03 May 2024 12:23 )   PT: 17.4 sec;   INR: 1.55 ratio         PTT - ( 02 May 2024 06:23 )  PTT:39.7 sec  Urinalysis Basic - ( 03 May 2024 06:44 )    Color: x / Appearance: x / SG: x / pH: x  Gluc: 86 mg/dL / Ketone: x  / Bili: x / Urobili: x   Blood: x / Protein: x / Nitrite: x   Leuk Esterase: x / RBC: x / WBC x   Sq Epi: x / Non Sq Epi: x / Bacteria: x      CAPILLARY BLOOD GLUCOSE            RADIOLOGY & ADDITIONAL TESTS:  < from: Xray Chest 1 View- PORTABLE-Urgent (Xray Chest 1 View- PORTABLE-Urgent .) (04.27.24 @ 23:26) >  FINDINGS:  CATHETERS AND TUBES: None    PULMONARY: 2.4 cm opacity adjacent to RIGHT tracheobronchial angle either   indicating azygous vein or lymph node .  Remaining lung parenchyma clear.    There are no airspace consolidations or radiographic evidence of   pulmonary nodules..  No pleural effusion or pneumothorax.    HEART/VASCULAR: The heart size and mediastinum configuration are within   the limits of normal.    BONES: The visualized osseous thorax is intact.    IMPRESSION:  2.4 cm opacity adjacent to RIGHT tracheobronchial angle either indicating   azygous vein or lymph node .  Lungs clear.  Continued surveillance recommended.    --- End of Report ---    < end of copied text >          Imaging Personally Reviewed:  YES    Consultant(s) Notes Reviewed:   YES      Plan of care was discussed with patient and /or primary care giver; all questions and concerns were addressed and care was aligned with patient's wishes.

## 2024-05-03 NOTE — PROGRESS NOTE ADULT - PROBLEM SELECTOR PLAN 4
- HBG downtrended  - s/p 1 U PRBC  - Hgb stable  - Maintain active T&S.   - C/W Protonix  - GI following  - no signs of active bleeding  - if Hgb downtrends again consider CT A/P w/ Iv contrast

## 2024-05-03 NOTE — PROGRESS NOTE ADULT - SUBJECTIVE AND OBJECTIVE BOX
Patient is a 48y old  Male who presents with a chief complaint of New cirrhosis (03 May 2024       INTERVAL HPI/OVERNIGHT EVENTS: pt seen and examined    MEDICATIONS  (STANDING):  acetylcysteine IVPB 17 Gram(s) IV Intermittent every 24 hours  acetylcysteine IVPB 5 Gram(s) IV Intermittent once  folic acid 1 milliGRAM(s) Oral daily  pantoprazole    Tablet 40 milliGRAM(s) Oral before breakfast  prednisoLONE    3 mG/mL Solution (ORAPRED) 40 milliGRAM(s) Oral daily  thiamine 100 milliGRAM(s) Oral daily    MEDICATIONS  (PRN):  acetaminophen     Tablet .. 650 milliGRAM(s) Oral every 6 hours PRN Temp greater or equal to 38C (100.4F), Mild Pain (1 - 3)  melatonin 5 milliGRAM(s) Oral at bedtime PRN Insomnia  ondansetron Injectable 4 milliGRAM(s) IV Push every 8 hours PRN Nausea and/or Vomiting      __________________________________________________  REVIEW OF SYSTEMS:    CONSTITUTIONAL: No fever,   EYES: no acute visual disturbances  NECK: No pain or stiffness  RESPIRATORY: No cough; No shortness of breath  CARDIOVASCULAR: No chest pain, no palpitations  GASTROINTESTINAL: No pain. No nausea or vomiting; No diarrhea   NEUROLOGICAL: No headache or numbness, no tremors  MUSCULOSKELETAL: No joint pain, no muscle pain  GENITOURINARY: no dysuria, no frequency, no hesitancy  PSYCHIATRY: no depression , no anxiety  ALL OTHER  ROS negative        Vital Signs Last 24 Hrs  T(C): 37 (03 May 2024 16:45), Max: 37 (03 May 2024 16:45)  T(F): 98.6 (03 May 2024 16:45), Max: 98.6 (03 May 2024 16:45)  HR: 90 (03 May 2024 16:45) (79 - 98)  BP: 110/62 (03 May 2024 16:45) (103/62 - 120/63)  BP(mean): --  RR: 17 (03 May 2024 16:45) (16 - 17)  SpO2: 95% (03 May 2024 16:45) (95% - 95%)    Parameters below as of 03 May 2024 16:45  Patient On (Oxygen Delivery Method): nasal cannula  O2 Flow (L/min): 2      ________________________________________________  PHYSICAL EXAM:  GENERAL: NAD  HEENT: Normocephalic  NECK : supple  CHEST/LUNG: dec breath sounds at bases  HEART: S1 S2  +  ABDOMEN: Soft, Nontender,  Bowel sounds present  EXTREMITIES: no edema  SKIN: warm and dry; no rash  NERVOUS SYSTEM:  Awake and alert;   _________________________________________________  LABS:                        8.7    5.85  )-----------( 108      ( 03 May 2024 12:23 )             27.6     05-03    141  |  109<H>  |  12  ----------------------------<  86  3.6   |  30  |  0.59    Ca    7.6<L>      03 May 2024 06:44    TPro  6.3  /  Alb  1.9<L>  /  TBili  3.4<H>  /  DBili  1.6<H>  /  AST  56<H>  /  ALT  27  /  AlkPhos  110  05-03    PT/INR - ( 03 May 2024 12:23 )   PT: 17.4 sec;   INR: 1.55 ratio         PTT - ( 02 May 2024 06:23 )  PTT:39.7 sec  Urinalysis Basic - ( 03 May 2024 06:44 )    Color: x / Appearance: x / SG: x / pH: x  Gluc: 86 mg/dL / Ketone: x  / Bili: x / Urobili: x   Blood: x / Protein: x / Nitrite: x   Leuk Esterase: x / RBC: x / WBC x   Sq Epi: x / Non Sq Epi: x / Bacteria: x      CAPILLARY BLOOD GLUCOSE            RADIOLOGY & ADDITIONAL TESTS:  < from: Xray Chest 1 View- PORTABLE-Urgent (Xray Chest 1 View- PORTABLE-Urgent .) (04.27.24 @ 23:26) >  FINDINGS:  CATHETERS AND TUBES: None    PULMONARY: 2.4 cm opacity adjacent to RIGHT tracheobronchial angle either   indicating azygous vein or lymph node .  Remaining lung parenchyma clear.    There are no airspace consolidations or radiographic evidence of   pulmonary nodules..  No pleural effusion or pneumothorax.    HEART/VASCULAR: The heart size and mediastinum configuration are within   the limits of normal.    BONES: The visualized osseous thorax is intact.    IMPRESSION:  2.4 cm opacity adjacent to RIGHT tracheobronchial angle either indicating   azygous vein or lymph node .  Lungs clear.  Continued surveillance recommended.    --- End of Report ---    < end of copied text >          Imaging Personally Reviewed:  YES    Consultant(s) Notes Reviewed:   YES      Plan of care was discussed with patient and /or primary care giver; all questions and concerns were addressed and care was aligned with patient's wishes.

## 2024-05-03 NOTE — PROGRESS NOTE ADULT - PROBLEM SELECTOR PLAN 2
- CT abdomen pelvis shows: cirrhosis with portal hypertension, splenomegaly and ascites.   -S/P large volume paracentesis 5500ml removed  -optimizing diuretics if BP allows  rest of plan as above

## 2024-05-03 NOTE — PROGRESS NOTE ADULT - ASSESSMENT
49 y/o M with PMH of alcohol use disorder (has not seen a doctor for 10 years), who presented with 2 weeks history of worsening generalized abdominal pain, discomfort, and bloating. On CT abdomen pelvis patient has cirrhosis with portal hypertension, splenomegaly and large volume ascites. Admitted for newly diagnosed cirrhosis w/ ascites. Hepatology and GI consulted for work up, s/p large volume paracentesis with IR and EGD showing grade II-III esophageal varices, non-bleeding. no bands placed. Infectious w/u thus far negative, BCx NGTD x72h, UA neg, with no s/s infection. Also noted with anemia requiring 1U PRBC. Cirrhosis likely 2/2 ETOH started on acetylcysteine, prednisolone.     n/a

## 2024-05-03 NOTE — PROGRESS NOTE ADULT - PROBLEM SELECTOR PLAN 1
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  - will start the following per Hepatology reccs:          -started acetylcysteine          -started prednisolone          -increased dose of spirinolactone and lasix          -if BP allows can consider starting Carvedilol          -started ceftriaxone 1G   - Hepatology Dr. Vuong following    - GI Dr. Hathaway following  - monitor cbc, cmp, hepatic function panel, mg,phosp

## 2024-05-04 LAB
ALBUMIN SERPL ELPH-MCNC: 2 G/DL — LOW (ref 3.5–5)
ALP SERPL-CCNC: 116 U/L — SIGNIFICANT CHANGE UP (ref 40–120)
ALT FLD-CCNC: 31 U/L DA — SIGNIFICANT CHANGE UP (ref 10–60)
ANION GAP SERPL CALC-SCNC: 6 MMOL/L — SIGNIFICANT CHANGE UP (ref 5–17)
AST SERPL-CCNC: 55 U/L — HIGH (ref 10–40)
BILIRUB DIRECT SERPL-MCNC: 1.7 MG/DL — HIGH (ref 0–0.3)
BILIRUB SERPL-MCNC: 3.1 MG/DL — HIGH (ref 0.2–1.2)
BUN SERPL-MCNC: 12 MG/DL — SIGNIFICANT CHANGE UP (ref 7–18)
CALCIUM SERPL-MCNC: 8.2 MG/DL — LOW (ref 8.4–10.5)
CHLORIDE SERPL-SCNC: 107 MMOL/L — SIGNIFICANT CHANGE UP (ref 96–108)
CO2 SERPL-SCNC: 27 MMOL/L — SIGNIFICANT CHANGE UP (ref 22–31)
CREAT SERPL-MCNC: 0.58 MG/DL — SIGNIFICANT CHANGE UP (ref 0.5–1.3)
EGFR: 121 ML/MIN/1.73M2 — SIGNIFICANT CHANGE UP
GLUCOSE SERPL-MCNC: 120 MG/DL — HIGH (ref 70–99)
HCT VFR BLD CALC: 27.8 % — LOW (ref 39–50)
HGB BLD-MCNC: 9 G/DL — LOW (ref 13–17)
MAGNESIUM SERPL-MCNC: 1.9 MG/DL — SIGNIFICANT CHANGE UP (ref 1.6–2.6)
MCHC RBC-ENTMCNC: 31 PG — SIGNIFICANT CHANGE UP (ref 27–34)
MCHC RBC-ENTMCNC: 32.4 GM/DL — SIGNIFICANT CHANGE UP (ref 32–36)
MCV RBC AUTO: 95.9 FL — SIGNIFICANT CHANGE UP (ref 80–100)
NRBC # BLD: 0 /100 WBCS — SIGNIFICANT CHANGE UP (ref 0–0)
PHOSPHATE SERPL-MCNC: 3.2 MG/DL — SIGNIFICANT CHANGE UP (ref 2.5–4.5)
PLATELET # BLD AUTO: 119 K/UL — LOW (ref 150–400)
POTASSIUM SERPL-MCNC: 3.6 MMOL/L — SIGNIFICANT CHANGE UP (ref 3.5–5.3)
POTASSIUM SERPL-SCNC: 3.6 MMOL/L — SIGNIFICANT CHANGE UP (ref 3.5–5.3)
PROT SERPL-MCNC: 7.1 G/DL — SIGNIFICANT CHANGE UP (ref 6–8.3)
RBC # BLD: 2.9 M/UL — LOW (ref 4.2–5.8)
RBC # FLD: 18.3 % — HIGH (ref 10.3–14.5)
SODIUM SERPL-SCNC: 140 MMOL/L — SIGNIFICANT CHANGE UP (ref 135–145)
WBC # BLD: 5.67 K/UL — SIGNIFICANT CHANGE UP (ref 3.8–10.5)
WBC # FLD AUTO: 5.67 K/UL — SIGNIFICANT CHANGE UP (ref 3.8–10.5)

## 2024-05-04 RX ORDER — CARVEDILOL PHOSPHATE 80 MG/1
3.12 CAPSULE, EXTENDED RELEASE ORAL EVERY 12 HOURS
Refills: 0 | Status: DISCONTINUED | OUTPATIENT
Start: 2024-05-04 | End: 2024-05-08

## 2024-05-04 RX ADMIN — Medication 100 MILLIGRAM(S): at 11:12

## 2024-05-04 RX ADMIN — SPIRONOLACTONE 100 MILLIGRAM(S): 25 TABLET, FILM COATED ORAL at 06:31

## 2024-05-04 RX ADMIN — Medication 1 MILLIGRAM(S): at 11:11

## 2024-05-04 RX ADMIN — Medication 5 MILLIGRAM(S): at 23:37

## 2024-05-04 RX ADMIN — Medication 40 MILLIGRAM(S): at 06:31

## 2024-05-04 RX ADMIN — CARVEDILOL PHOSPHATE 3.12 MILLIGRAM(S): 80 CAPSULE, EXTENDED RELEASE ORAL at 18:19

## 2024-05-04 RX ADMIN — Medication 45.21 GRAM(S): at 23:30

## 2024-05-04 RX ADMIN — PANTOPRAZOLE SODIUM 40 MILLIGRAM(S): 20 TABLET, DELAYED RELEASE ORAL at 06:31

## 2024-05-04 RX ADMIN — Medication 650 MILLIGRAM(S): at 23:37

## 2024-05-04 NOTE — PROGRESS NOTE ADULT - SUBJECTIVE AND OBJECTIVE BOX
Patient is a 48y old  Male who presents with a chief complaint of New cirrhosis (04 May 2024       INTERVAL HPI/OVERNIGHT EVENTS: pt seen and examined    MEDICATIONS  (STANDING):  acetylcysteine IVPB 17 Gram(s) IV Intermittent every 24 hours  carvedilol 3.125 milliGRAM(s) Oral every 12 hours  folic acid 1 milliGRAM(s) Oral daily  furosemide    Tablet 40 milliGRAM(s) Oral daily  pantoprazole    Tablet 40 milliGRAM(s) Oral before breakfast  prednisoLONE    3 mG/mL Solution (ORAPRED) 40 milliGRAM(s) Oral daily  spironolactone 100 milliGRAM(s) Oral daily  thiamine 100 milliGRAM(s) Oral daily    MEDICATIONS  (PRN):  acetaminophen     Tablet .. 650 milliGRAM(s) Oral every 6 hours PRN Temp greater or equal to 38C (100.4F), Mild Pain (1 - 3)  melatonin 5 milliGRAM(s) Oral at bedtime PRN Insomnia  ondansetron Injectable 4 milliGRAM(s) IV Push every 8 hours PRN Nausea and/or Vomiting    __________________________________________________  REVIEW OF SYSTEMS:    CONSTITUTIONAL: No fever,   EYES: no acute visual disturbances  NECK: No pain or stiffness  RESPIRATORY: No cough; No shortness of breath  CARDIOVASCULAR: No chest pain, no palpitations  GASTROINTESTINAL: No pain. No nausea or vomiting; No diarrhea   NEUROLOGICAL: No headache or numbness, no tremors  MUSCULOSKELETAL: No joint pain, no muscle pain  GENITOURINARY: no dysuria, no frequency, no hesitancy  PSYCHIATRY: no depression , no anxiety  ALL OTHER  ROS negative        Vital Signs Last 24 Hrs  T(C): 37 (05-04-24 @ 20:28), Max: 37.1 (05-04-24 @ 14:44)  T(F): 98.6 (05-04-24 @ 20:28), Max: 98.8 (05-04-24 @ 14:44)  HR: 97 (05-04-24 @ 20:28) (89 - 97)  BP: 105/65 (05-04-24 @ 20:28) (105/60 - 114/75)  BP(mean): --  RR: 16 (05-04-24 @ 20:28) (16 - 18)  SpO2: 94% (05-04-24 @ 20:28) (94% - 96%)        ________________________________________________  PHYSICAL EXAM:  GENERAL: NAD  HEENT: Normocephalic  NECK : supple  CHEST/LUNG: dec breath sounds at bases  HEART: S1 S2  +  ABDOMEN: Soft, Nontender,  Bowel sounds present  EXTREMITIES: no edema  SKIN: warm and dry; no rash  NERVOUS SYSTEM:  Awake and alert;   _________________________________________________  LABS:  05-04    140  |  107  |  12  ----------------------------<  120<H>  3.6   |  27  |  0.58    Ca    8.2<L>      04 May 2024 06:39  Phos  3.2     05-04  Mg     1.9     05-04    TPro  7.1  /  Alb  2.0<L>  /  TBili  3.1<H>  /  DBili  1.7<H>  /  AST  55<H>  /  ALT  31  /  AlkPhos  116  05-04    Creatinine Trend: 0.58 <--, 0.59 <--, 0.62 <--, 0.54 <--, 0.55 <--, 0.60 <--, 0.66 <--                        9.0    5.67  )-----------( 119      ( 04 May 2024 06:39 )             27.8     Urine Studies:  Urinalysis Basic - ( 04 May 2024 06:39 )    Color:  / Appearance:  / SG:  / pH:   Gluc: 120 mg/dL / Ketone:   / Bili:  / Urobili:    Blood:  / Protein:  / Nitrite:    Leuk Esterase:  / RBC:  / WBC    Sq Epi:  / Non Sq Epi:  / Bacteria:               LIVER FUNCTIONS - ( 04 May 2024 06:39 )  Alb: 2.0 g/dL / Pro: 7.1 g/dL / ALK PHOS: 116 U/L / ALT: 31 U/L DA / AST: 55 U/L / GGT: x           PT/INR - ( 03 May 2024 12:23 )   PT: 17.4 sec;   INR: 1.55 ratio                 RADIOLOGY & ADDITIONAL TESTS:  < from: Xray Chest 1 View- PORTABLE-Urgent (Xray Chest 1 View- PORTABLE-Urgent .) (04.27.24 @ 23:26) >  FINDINGS:  CATHETERS AND TUBES: None    PULMONARY: 2.4 cm opacity adjacent to RIGHT tracheobronchial angle either   indicating azygous vein or lymph node .  Remaining lung parenchyma clear.    There are no airspace consolidations or radiographic evidence of   pulmonary nodules..  No pleural effusion or pneumothorax.    HEART/VASCULAR: The heart size and mediastinum configuration are within   the limits of normal.    BONES: The visualized osseous thorax is intact.    IMPRESSION:  2.4 cm opacity adjacent to RIGHT tracheobronchial angle either indicating   azygous vein or lymph node .  Lungs clear.  Continued surveillance recommended.    --- End of Report ---    < end of copied text >          Imaging Personally Reviewed:  YES    Consultant(s) Notes Reviewed:   YES      Plan of care was discussed with patient and /or primary care giver; all questions and concerns were addressed and care was aligned with patient's wishes.     details… detailed exam

## 2024-05-05 LAB
ALBUMIN SERPL ELPH-MCNC: 1.6 G/DL — LOW (ref 3.5–5)
ALP SERPL-CCNC: 95 U/L — SIGNIFICANT CHANGE UP (ref 40–120)
ALT FLD-CCNC: 26 U/L DA — SIGNIFICANT CHANGE UP (ref 10–60)
ANION GAP SERPL CALC-SCNC: 4 MMOL/L — LOW (ref 5–17)
AST SERPL-CCNC: 43 U/L — HIGH (ref 10–40)
BILIRUB DIRECT SERPL-MCNC: 1.1 MG/DL — HIGH (ref 0–0.3)
BILIRUB SERPL-MCNC: 1.9 MG/DL — HIGH (ref 0.2–1.2)
BUN SERPL-MCNC: 14 MG/DL — SIGNIFICANT CHANGE UP (ref 7–18)
CALCIUM SERPL-MCNC: 7.6 MG/DL — LOW (ref 8.4–10.5)
CHLORIDE SERPL-SCNC: 110 MMOL/L — HIGH (ref 96–108)
CO2 SERPL-SCNC: 28 MMOL/L — SIGNIFICANT CHANGE UP (ref 22–31)
CREAT SERPL-MCNC: 0.54 MG/DL — SIGNIFICANT CHANGE UP (ref 0.5–1.3)
EGFR: 124 ML/MIN/1.73M2 — SIGNIFICANT CHANGE UP
GLUCOSE SERPL-MCNC: 99 MG/DL — SIGNIFICANT CHANGE UP (ref 70–99)
HCT VFR BLD CALC: 23.2 % — LOW (ref 39–50)
HGB BLD-MCNC: 7.5 G/DL — LOW (ref 13–17)
MAGNESIUM SERPL-MCNC: 1.9 MG/DL — SIGNIFICANT CHANGE UP (ref 1.6–2.6)
MCHC RBC-ENTMCNC: 31 PG — SIGNIFICANT CHANGE UP (ref 27–34)
MCHC RBC-ENTMCNC: 32.3 GM/DL — SIGNIFICANT CHANGE UP (ref 32–36)
MCV RBC AUTO: 95.9 FL — SIGNIFICANT CHANGE UP (ref 80–100)
NRBC # BLD: 0 /100 WBCS — SIGNIFICANT CHANGE UP (ref 0–0)
PHOSPHATE SERPL-MCNC: 2.7 MG/DL — SIGNIFICANT CHANGE UP (ref 2.5–4.5)
PLATELET # BLD AUTO: 100 K/UL — LOW (ref 150–400)
POTASSIUM SERPL-MCNC: 3.2 MMOL/L — LOW (ref 3.5–5.3)
POTASSIUM SERPL-SCNC: 3.2 MMOL/L — LOW (ref 3.5–5.3)
PROT SERPL-MCNC: 5.9 G/DL — LOW (ref 6–8.3)
RBC # BLD: 2.42 M/UL — LOW (ref 4.2–5.8)
RBC # FLD: 18 % — HIGH (ref 10.3–14.5)
SODIUM SERPL-SCNC: 142 MMOL/L — SIGNIFICANT CHANGE UP (ref 135–145)
WBC # BLD: 7.43 K/UL — SIGNIFICANT CHANGE UP (ref 3.8–10.5)
WBC # FLD AUTO: 7.43 K/UL — SIGNIFICANT CHANGE UP (ref 3.8–10.5)

## 2024-05-05 RX ORDER — POTASSIUM CHLORIDE 20 MEQ
40 PACKET (EA) ORAL ONCE
Refills: 0 | Status: COMPLETED | OUTPATIENT
Start: 2024-05-05 | End: 2024-05-05

## 2024-05-05 RX ORDER — CIPROFLOXACIN LACTATE 400MG/40ML
500 VIAL (ML) INTRAVENOUS DAILY
Refills: 0 | Status: DISCONTINUED | OUTPATIENT
Start: 2024-05-05 | End: 2024-05-08

## 2024-05-05 RX ADMIN — Medication 650 MILLIGRAM(S): at 22:36

## 2024-05-05 RX ADMIN — Medication 100 MILLIGRAM(S): at 11:34

## 2024-05-05 RX ADMIN — Medication 40 MILLIGRAM(S): at 07:16

## 2024-05-05 RX ADMIN — SPIRONOLACTONE 100 MILLIGRAM(S): 25 TABLET, FILM COATED ORAL at 07:16

## 2024-05-05 RX ADMIN — Medication 500 MILLIGRAM(S): at 12:07

## 2024-05-05 RX ADMIN — CARVEDILOL PHOSPHATE 3.12 MILLIGRAM(S): 80 CAPSULE, EXTENDED RELEASE ORAL at 07:15

## 2024-05-05 RX ADMIN — Medication 45.21 GRAM(S): at 22:34

## 2024-05-05 RX ADMIN — CARVEDILOL PHOSPHATE 3.12 MILLIGRAM(S): 80 CAPSULE, EXTENDED RELEASE ORAL at 17:41

## 2024-05-05 RX ADMIN — Medication 650 MILLIGRAM(S): at 23:15

## 2024-05-05 RX ADMIN — Medication 650 MILLIGRAM(S): at 00:10

## 2024-05-05 RX ADMIN — Medication 40 MILLIGRAM(S): at 07:15

## 2024-05-05 RX ADMIN — Medication 40 MILLIEQUIVALENT(S): at 12:07

## 2024-05-05 RX ADMIN — Medication 5 MILLIGRAM(S): at 20:37

## 2024-05-05 RX ADMIN — PANTOPRAZOLE SODIUM 40 MILLIGRAM(S): 20 TABLET, DELAYED RELEASE ORAL at 07:16

## 2024-05-05 RX ADMIN — Medication 1 MILLIGRAM(S): at 11:33

## 2024-05-05 NOTE — PROGRESS NOTE ADULT - SUBJECTIVE AND OBJECTIVE BOX
Patient is a 48y old  Male who presents with a chief complaint of New cirrhosis   5/5/4      INTERVAL HPI/OVERNIGHT EVENTS: pt seen and examined    MEDICATIONS  (STANDING):  acetylcysteine IVPB 17 Gram(s) IV Intermittent every 24 hours  carvedilol 3.125 milliGRAM(s) Oral every 12 hours  ciprofloxacin     Tablet 500 milliGRAM(s) Oral daily  folic acid 1 milliGRAM(s) Oral daily  furosemide    Tablet 40 milliGRAM(s) Oral daily  pantoprazole    Tablet 40 milliGRAM(s) Oral before breakfast  prednisoLONE    3 mG/mL Solution (ORAPRED) 40 milliGRAM(s) Oral daily  spironolactone 100 milliGRAM(s) Oral daily  thiamine 100 milliGRAM(s) Oral daily    MEDICATIONS  (PRN):  acetaminophen     Tablet .. 650 milliGRAM(s) Oral every 6 hours PRN Temp greater or equal to 38C (100.4F), Mild Pain (1 - 3)  melatonin 5 milliGRAM(s) Oral at bedtime PRN Insomnia  ondansetron Injectable 4 milliGRAM(s) IV Push every 8 hours PRN Nausea and/or Vomiting  __________________________________________________  REVIEW OF SYSTEMS:    CONSTITUTIONAL: No fever,   EYES: no acute visual disturbances  NECK: No pain or stiffness  RESPIRATORY: No cough; No shortness of breath  CARDIOVASCULAR: No chest pain, no palpitations  GASTROINTESTINAL: No pain. No nausea or vomiting; No diarrhea   NEUROLOGICAL: No headache or numbness, no tremors  MUSCULOSKELETAL: No joint pain, no muscle pain  GENITOURINARY: no dysuria, no frequency, no hesitancy  PSYCHIATRY: no depression , no anxiety  ALL OTHER  ROS negative      Vital Signs Last 24 Hrs  T(C): 37.1 (05-05-24 @ 20:29), Max: 37.1 (05-05-24 @ 20:29)  T(F): 98.7 (05-05-24 @ 20:29), Max: 98.7 (05-05-24 @ 20:29)  HR: 86 (05-05-24 @ 20:29) (82 - 90)  BP: 113/65 (05-05-24 @ 20:29) (104/68 - 121/61)  BP(mean): --  RR: 16 (05-05-24 @ 20:29) (16 - 17)  SpO2: 96% (05-05-24 @ 20:29) (95% - 97%)    ________________________________________________  PHYSICAL EXAM:  GENERAL: NAD  HEENT: Normocephalic  NECK : supple  CHEST/LUNG: dec breath sounds at bases  HEART: S1 S2  +  ABDOMEN: Soft, Nontender,  Bowel sounds present  EXTREMITIES: no edema  SKIN: warm and dry; no rash  NERVOUS SYSTEM:  Awake and alert;   _________________________________________________  LABS:  05-05    142  |  110<H>  |  14  ----------------------------<  99  3.2<L>   |  28  |  0.54    Ca    7.6<L>      05 May 2024 06:37  Phos  2.7     05-05  Mg     1.9     05-05    TPro  5.9<L>  /  Alb  1.6<L>  /  TBili  1.9<H>  /  DBili  1.1<H>  /  AST  43<H>  /  ALT  26  /  AlkPhos  95  05-05    Creatinine Trend: 0.54 <--, 0.58 <--, 0.59 <--, 0.62 <--, 0.54 <--, 0.55 <--, 0.60 <--                        7.5    7.43  )-----------( 100      ( 05 May 2024 06:37 )             23.2     Urine Studies:  Urinalysis Basic - ( 05 May 2024 06:37 )    Color:  / Appearance:  / SG:  / pH:   Gluc: 99 mg/dL / Ketone:   / Bili:  / Urobili:    Blood:  / Protein:  / Nitrite:    Leuk Esterase:  / RBC:  / WBC    Sq Epi:  / Non Sq Epi:  / Bacteria:               LIVER FUNCTIONS - ( 05 May 2024 06:37 )  Alb: 1.6 g/dL / Pro: 5.9 g/dL / ALK PHOS: 95 U/L / ALT: 26 U/L DA / AST: 43 U/L / GGT: x                       LIVER FUNCTIONS - ( 04 May 2024 06:39 )  Alb: 2.0 g/dL / Pro: 7.1 g/dL / ALK PHOS: 116 U/L / ALT: 31 U/L DA / AST: 55 U/L / GGT: x           PT/INR - ( 03 May 2024 12:23 )   PT: 17.4 sec;   INR: 1.55 ratio                 RADIOLOGY & ADDITIONAL TESTS:  < from: Xray Chest 1 View- PORTABLE-Urgent (Xray Chest 1 View- PORTABLE-Urgent .) (04.27.24 @ 23:26) >  FINDINGS:  CATHETERS AND TUBES: None    PULMONARY: 2.4 cm opacity adjacent to RIGHT tracheobronchial angle either   indicating azygous vein or lymph node .  Remaining lung parenchyma clear.    There are no airspace consolidations or radiographic evidence of   pulmonary nodules..  No pleural effusion or pneumothorax.    HEART/VASCULAR: The heart size and mediastinum configuration are within   the limits of normal.    BONES: The visualized osseous thorax is intact.    IMPRESSION:  2.4 cm opacity adjacent to RIGHT tracheobronchial angle either indicating   azygous vein or lymph node .  Lungs clear.  Continued surveillance recommended.    --- End of Report ---    < end of copied text >          Imaging Personally Reviewed:  YES    Consultant(s) Notes Reviewed:   YES      Plan of care was discussed with patient and /or primary care giver; all questions and concerns were addressed and care was aligned with patient's wishes.

## 2024-05-06 LAB
ALBUMIN SERPL ELPH-MCNC: 1.8 G/DL — LOW (ref 3.5–5)
ALP SERPL-CCNC: 130 U/L — HIGH (ref 40–120)
ALT FLD-CCNC: 31 U/L DA — SIGNIFICANT CHANGE UP (ref 10–60)
ANION GAP SERPL CALC-SCNC: 5 MMOL/L — SIGNIFICANT CHANGE UP (ref 5–17)
APTT BLD: 33.4 SEC — SIGNIFICANT CHANGE UP (ref 24.5–35.6)
AST SERPL-CCNC: 49 U/L — HIGH (ref 10–40)
BILIRUB DIRECT SERPL-MCNC: 1 MG/DL — HIGH (ref 0–0.3)
BILIRUB SERPL-MCNC: 1.8 MG/DL — HIGH (ref 0.2–1.2)
BUN SERPL-MCNC: 12 MG/DL — SIGNIFICANT CHANGE UP (ref 7–18)
CALCIUM SERPL-MCNC: 7.9 MG/DL — LOW (ref 8.4–10.5)
CHLORIDE SERPL-SCNC: 108 MMOL/L — SIGNIFICANT CHANGE UP (ref 96–108)
CO2 SERPL-SCNC: 27 MMOL/L — SIGNIFICANT CHANGE UP (ref 22–31)
CREAT SERPL-MCNC: 0.53 MG/DL — SIGNIFICANT CHANGE UP (ref 0.5–1.3)
CULTURE RESULTS: SIGNIFICANT CHANGE UP
EGFR: 124 ML/MIN/1.73M2 — SIGNIFICANT CHANGE UP
GLUCOSE SERPL-MCNC: 104 MG/DL — HIGH (ref 70–99)
HCT VFR BLD CALC: 25.5 % — LOW (ref 39–50)
HERPES SIMPLEX VIRUS 1/2 SURVEILLANCE PCR RESULT: SIGNIFICANT CHANGE UP
HERPES SIMPLEX VIRUS 1/2 SURVEILLANCE PCR SOURCE: SIGNIFICANT CHANGE UP
HGB BLD-MCNC: 8.1 G/DL — LOW (ref 13–17)
HSV1+2 DNA SPEC QL NAA+PROBE: SIGNIFICANT CHANGE UP
INR BLD: 1.81 RATIO — HIGH (ref 0.85–1.18)
MAGNESIUM SERPL-MCNC: 1.9 MG/DL — SIGNIFICANT CHANGE UP (ref 1.6–2.6)
MCHC RBC-ENTMCNC: 30.7 PG — SIGNIFICANT CHANGE UP (ref 27–34)
MCHC RBC-ENTMCNC: 31.8 GM/DL — LOW (ref 32–36)
MCV RBC AUTO: 96.6 FL — SIGNIFICANT CHANGE UP (ref 80–100)
NRBC # BLD: 0 /100 WBCS — SIGNIFICANT CHANGE UP (ref 0–0)
PHOSPHATE SERPL-MCNC: 2.5 MG/DL — SIGNIFICANT CHANGE UP (ref 2.5–4.5)
PLATELET # BLD AUTO: 110 K/UL — LOW (ref 150–400)
POTASSIUM SERPL-MCNC: 3.5 MMOL/L — SIGNIFICANT CHANGE UP (ref 3.5–5.3)
POTASSIUM SERPL-SCNC: 3.5 MMOL/L — SIGNIFICANT CHANGE UP (ref 3.5–5.3)
PROT SERPL-MCNC: 6.4 G/DL — SIGNIFICANT CHANGE UP (ref 6–8.3)
PROTHROM AB SERPL-ACNC: 20.3 SEC — HIGH (ref 9.5–13)
RBC # BLD: 2.64 M/UL — LOW (ref 4.2–5.8)
RBC # FLD: 17.7 % — HIGH (ref 10.3–14.5)
SODIUM SERPL-SCNC: 140 MMOL/L — SIGNIFICANT CHANGE UP (ref 135–145)
SPECIMEN SOURCE: SIGNIFICANT CHANGE UP
WBC # BLD: 6.96 K/UL — SIGNIFICANT CHANGE UP (ref 3.8–10.5)
WBC # FLD AUTO: 6.96 K/UL — SIGNIFICANT CHANGE UP (ref 3.8–10.5)

## 2024-05-06 PROCEDURE — 99233 SBSQ HOSP IP/OBS HIGH 50: CPT

## 2024-05-06 PROCEDURE — 93306 TTE W/DOPPLER COMPLETE: CPT | Mod: 26

## 2024-05-06 RX ORDER — LANOLIN ALCOHOL/MO/W.PET/CERES
3 CREAM (GRAM) TOPICAL AT BEDTIME
Refills: 0 | Status: DISCONTINUED | OUTPATIENT
Start: 2024-05-06 | End: 2024-05-08

## 2024-05-06 RX ADMIN — Medication 650 MILLIGRAM(S): at 22:53

## 2024-05-06 RX ADMIN — Medication 500 MILLIGRAM(S): at 11:40

## 2024-05-06 RX ADMIN — Medication 3 MILLIGRAM(S): at 22:21

## 2024-05-06 RX ADMIN — PANTOPRAZOLE SODIUM 40 MILLIGRAM(S): 20 TABLET, DELAYED RELEASE ORAL at 05:26

## 2024-05-06 RX ADMIN — Medication 40 MILLIGRAM(S): at 05:25

## 2024-05-06 RX ADMIN — Medication 650 MILLIGRAM(S): at 22:21

## 2024-05-06 RX ADMIN — CARVEDILOL PHOSPHATE 3.12 MILLIGRAM(S): 80 CAPSULE, EXTENDED RELEASE ORAL at 05:24

## 2024-05-06 RX ADMIN — Medication 40 MILLIGRAM(S): at 05:24

## 2024-05-06 RX ADMIN — Medication 100 MILLIGRAM(S): at 11:40

## 2024-05-06 RX ADMIN — SPIRONOLACTONE 100 MILLIGRAM(S): 25 TABLET, FILM COATED ORAL at 05:24

## 2024-05-06 RX ADMIN — Medication 1 MILLIGRAM(S): at 11:41

## 2024-05-06 RX ADMIN — CARVEDILOL PHOSPHATE 3.12 MILLIGRAM(S): 80 CAPSULE, EXTENDED RELEASE ORAL at 17:12

## 2024-05-06 RX ADMIN — Medication 45.21 GRAM(S): at 22:22

## 2024-05-06 NOTE — PROGRESS NOTE ADULT - ASSESSMENT
49 y/o M with PMH of alcohol use disorder (has not seen a doctor for 10 years), who presented with 2 weeks history of worsening generalized abdominal pain, discomfort, and bloating. On CT abdomen pelvis patient has cirrhosis with portal hypertension, splenomegaly and large volume ascites. Admitted for newly diagnosed cirrhosis w/ ascites. Hepatology and GI consulted for work up, s/p large volume paracentesis with IR and EGD showing grade II-III esophageal varices, non-bleeding. no bands placed. Infectious w/u thus far negative, BCx NGTD x72h, UA neg, with no s/s infection. Also noted with anemia requiring 1U PRBC. Cirrhosis likely 2/2 ETOH started on acetylcysteine, prednisolone. LFTs improving, plan for repeat paracentesis prior to D/C as recc'd by hepatology

## 2024-05-06 NOTE — PROGRESS NOTE ADULT - SUBJECTIVE AND OBJECTIVE BOX
-*-*- INCOMPLETE  NP Note discussed with  primary attending    Patient is a 48y old  Male who presents with a chief complaint of New cirrhosis (06 May 2024 11:20)      INTERVAL HPI/OVERNIGHT EVENTS: no acute medical events overnight     MEDICATIONS  (STANDING):  acetylcysteine IVPB 17 Gram(s) IV Intermittent every 24 hours  carvedilol 3.125 milliGRAM(s) Oral every 12 hours  ciprofloxacin     Tablet 500 milliGRAM(s) Oral daily  folic acid 1 milliGRAM(s) Oral daily  furosemide    Tablet 40 milliGRAM(s) Oral daily  pantoprazole    Tablet 40 milliGRAM(s) Oral before breakfast  prednisoLONE    3 mG/mL Solution (ORAPRED) 40 milliGRAM(s) Oral daily  spironolactone 100 milliGRAM(s) Oral daily  thiamine 100 milliGRAM(s) Oral daily    MEDICATIONS  (PRN):  acetaminophen     Tablet .. 650 milliGRAM(s) Oral every 6 hours PRN Temp greater or equal to 38C (100.4F), Mild Pain (1 - 3)  ondansetron Injectable 4 milliGRAM(s) IV Push every 8 hours PRN Nausea and/or Vomiting      __________________________________________________  REVIEW OF SYSTEMS:    CONSTITUTIONAL: No fever,   EYES: no acute visual disturbances  NECK: No pain or stiffness  RESPIRATORY: No cough; No shortness of breath  CARDIOVASCULAR: No chest pain, no palpitations  GASTROINTESTINAL: No pain. No nausea or vomiting; No diarrhea   NEUROLOGICAL: No headache or numbness, no tremors  MUSCULOSKELETAL: No joint pain, no muscle pain  GENITOURINARY: no dysuria, no frequency, no hesitancy  PSYCHIATRY: no depression , no anxiety  ALL OTHER  ROS negative        Vital Signs Last 24 Hrs  T(C): 36.8 (06 May 2024 14:05), Max: 37.1 (05 May 2024 20:29)  T(F): 98.3 (06 May 2024 14:05), Max: 98.7 (05 May 2024 20:29)  HR: 97 (06 May 2024 14:05) (78 - 97)  BP: 103/62 (06 May 2024 14:05) (103/62 - 113/65)  BP(mean): --  RR: 17 (06 May 2024 14:05) (16 - 17)  SpO2: 95% (06 May 2024 14:05) (94% - 96%)    Parameters below as of 06 May 2024 14:05  Patient On (Oxygen Delivery Method): nasal cannula  O2 Flow (L/min): 2      ________________________________________________  PHYSICAL EXAM:  GENERAL: NAD  HEENT: Normocephalic;  conjunctivae and sclerae clear;   NECK : supple  CHEST/LUNG: Clear to ausculitation bilaterally with good air entry   HEART: S1 S2  regular; no murmurs, gallops or rubs  ABDOMEN: Soft, Nontender, Nondistended; Bowel sounds present + ascites   EXTREMITIES: no cyanosis; no edema; no calf tenderness  SKIN: warm and dry; no rash  NERVOUS SYSTEM:  Awake and alert; Oriented  to place, person and time   _________________________________________________  LABS:                        8.1    6.96  )-----------( 110      ( 06 May 2024 06:49 )             25.5     05-06    140  |  108  |  12  ----------------------------<  104<H>  3.5   |  27  |  0.53    Ca    7.9<L>      06 May 2024 06:49  Phos  2.5     05-06  Mg     1.9     05-06    TPro  6.4  /  Alb  1.8<L>  /  TBili  1.8<H>  /  DBili  1.0<H>  /  AST  49<H>  /  ALT  31  /  AlkPhos  130<H>  05-06    PT/INR - ( 06 May 2024 06:49 )   PT: 20.3 sec;   INR: 1.81 ratio         PTT - ( 06 May 2024 06:49 )  PTT:33.4 sec  Urinalysis Basic - ( 06 May 2024 06:49 )    Color: x / Appearance: x / SG: x / pH: x  Gluc: 104 mg/dL / Ketone: x  / Bili: x / Urobili: x   Blood: x / Protein: x / Nitrite: x   Leuk Esterase: x / RBC: x / WBC x   Sq Epi: x / Non Sq Epi: x / Bacteria: x      CAPILLARY BLOOD GLUCOSE    RADIOLOGY & ADDITIONAL TESTS:   < from: CT Abdomen and Pelvis w/ IV Cont (04.27.24 @ 13:32) >  ACC: [id number] EXAM:  CT ABDOMEN AND PELVIS IC   ORDERED BY: CARO ALONZO     PROCEDURE DATE:  04/27/2024          INTERPRETATION:  CLINICAL INFORMATION: Cirrhosis.    COMPARISON: None.    CONTRAST/COMPLICATIONS:  IV Contrast: Omnipaque 350  90 cc administered   10 cc discarded  Oral Contrast: NONE  Complications: None reported at time of study completion    PROCEDURE:  CT of the Abdomen and Pelvis was performed.  Sagittal and coronal reformats were performed.    FINDINGS:  LOWER CHEST: Bibasilar dependent lung atelectasis, right more than left.    LIVER: Cirrhosis. No focal hepatic mass lesion as imaged.  BILE DUCTS: Normal caliber.  GALLBLADDER: Within normal limits.  SPLEEN: Enlarged measuring approximately 16 cm.  PANCREAS: Within normal limits.  ADRENALS: Within normal limits.  KIDNEYS/URETERS: No hydronephrosis. Multiple nonobstructing calculi lower   pole of the left kidney measuring up to 5 mm.    BLADDER: Minimally distended.  REPRODUCTIVE ORGANS: Prostate within normal limits.    BOWEL: No bowel obstruction. Appendix is normal.  PERITONEUM: Moderate to large volume ascites. Mesenteric edema.  VESSELS: Portal venous collaterals including lower esophageal varices. No   evidence of portal venous thrombosis.  RETROPERITONEUM/LYMPH NODES: No lymphadenopathy.  ABDOMINAL WALL: Anasarca  BONES: Within normal limits.    IMPRESSION:  Cirrhosis with portal hypertension, splenomegaly and ascites.        --- End of Report ---    < end of copied text >    < from: Xray Chest 1 View- PORTABLE-Urgent (Xray Chest 1 View- PORTABLE-Urgent .) (04.27.24 @ 23:26) >    ACC: [id number] EXAM:  XR CHEST PORTABLE URGENT 1V   ORDERED BY: CANDY BRADY     PROCEDURE DATE:  04/27/2024          INTERPRETATION:  Portable AP chest radiograph    COMPARISON:  NONE.    CLINICAL INFORMATION: Pneumonia like symptoms.    FINDINGS:  CATHETERS AND TUBES: None    PULMONARY: 2.4 cm opacity adjacent to RIGHT tracheobronchial angle either   indicating azygous vein or lymph node .  Remaining lung parenchyma clear.    There are no airspace consolidations or radiographic evidence of   pulmonary nodules..  No pleural effusion or pneumothorax.    HEART/VASCULAR: The heart size and mediastinum configuration are within   the limits of normal.    BONES: The visualized osseous thorax is intact.    IMPRESSION:  2.4 cm opacity adjacent to RIGHT tracheobronchial angle either indicating   azygous vein or lymph node .  Lungs clear.  Continued surveillance recommended.    --- End of Report ---            DIAZ SANCHEZ MD; Attending Radiologist  This document has been electronically signed. Apr 28 2024  1:35PM    < end of copied text >  Imaging Personally Reviewed:  YES/    Consultant(s) Notes Reviewed:   YES/    Care Discussed with Consultants : hepatology     Plan of care was discussed with patient and /or primary care giver; all questions and concerns were addressed and care was aligned with patient's wishes.

## 2024-05-06 NOTE — PROGRESS NOTE ADULT - PROBLEM SELECTOR PLAN 2
- CT abdomen pelvis shows: cirrhosis with portal hypertension, splenomegaly and ascites.   -S/P large volume paracentesis 5500ml removed  -optimizing diuretics if BP allows  - reccomended for repeat para prior to D/C

## 2024-05-06 NOTE — PROGRESS NOTE ADULT - SUBJECTIVE AND OBJECTIVE BOX
Chief Complaint:  Patient is a 48y old  Male who presents with a chief complaint of New cirrhosis (05 May 2024 11:27)      Reason for consult: Decompensated cirrhosis    Interval Events:  Patient was seen and examined at bedside. Repeat Hb stable. Abdomen mildly distended and soft. Had BM yesterday, none today, denies any bleeding.     Hospital Medications:  acetaminophen     Tablet .. 650 milliGRAM(s) Oral every 6 hours PRN  acetylcysteine IVPB 17 Gram(s) IV Intermittent every 24 hours  carvedilol 3.125 milliGRAM(s) Oral every 12 hours  ciprofloxacin     Tablet 500 milliGRAM(s) Oral daily  folic acid 1 milliGRAM(s) Oral daily  furosemide    Tablet 40 milliGRAM(s) Oral daily  ondansetron Injectable 4 milliGRAM(s) IV Push every 8 hours PRN  pantoprazole    Tablet 40 milliGRAM(s) Oral before breakfast  prednisoLONE    3 mG/mL Solution (ORAPRED) 40 milliGRAM(s) Oral daily  spironolactone 100 milliGRAM(s) Oral daily  thiamine 100 milliGRAM(s) Oral daily      ROS:   General:  No  fevers, chills, night sweats, fatigue  Eyes:  Good vision, no reported pain  ENT:  No sore throat, pain, runny nose  CV:  No pain, palpitations  Pulm:  No dyspnea, cough  GI:  See HPI, otherwise negative  :  No  incontinence, nocturia  Muscle:  No pain, weakness  Neuro:  No memory problems  Psych:  No insomnia, mood problems, depression  Endocrine:  No polyuria, polydipsia, cold/heat intolerance  Heme:  No petechiae, ecchymosis, easy bruisability  Skin:  No rash    PHYSICAL EXAM:   Vital Signs:  Vital Signs Last 24 Hrs  T(C): 36.8 (06 May 2024 05:16), Max: 37.1 (05 May 2024 20:29)  T(F): 98.3 (06 May 2024 05:16), Max: 98.7 (05 May 2024 20:29)  HR: 78 (06 May 2024 05:16) (78 - 90)  BP: 106/64 (06 May 2024 05:16) (104/68 - 113/65)  BP(mean): --  RR: 17 (06 May 2024 05:16) (16 - 17)  SpO2: 94% (06 May 2024 05:16) (94% - 96%)    Parameters below as of 06 May 2024 05:16  Patient On (Oxygen Delivery Method): nasal cannula  O2 Flow (L/min): 2    Daily     Daily     GENERAL: no acute distress  NEURO: alert, no asterixis  HEENT: anicteric sclera, no conjunctival pallor appreciated  CHEST: no respiratory distress, no accessory muscle use  CARDIAC: regular rate, rhythm  ABDOMEN: soft, non-tender, (+) mildly distended, no rebound or guarding  EXTREMITIES: warm, well perfused, no edema  SKIN: no lesions noted    LABS: reviewed                        8.1    6.96  )-----------( 110      ( 06 May 2024 06:49 )             25.5     05-06    140  |  108  |  12  ----------------------------<  104<H>  3.5   |  27  |  0.53    Ca    7.9<L>      06 May 2024 06:49  Phos  2.5     05-06  Mg     1.9     05-06    TPro  6.4  /  Alb  1.8<L>  /  TBili  1.8<H>  /  DBili  1.0<H>  /  AST  49<H>  /  ALT  31  /  AlkPhos  130<H>  05-06    LIVER FUNCTIONS - ( 06 May 2024 06:49 )  Alb: 1.8 g/dL / Pro: 6.4 g/dL / ALK PHOS: 130 U/L / ALT: 31 U/L DA / AST: 49 U/L / GGT: x             Interval Diagnostic Studies: see sunrise for full report   Chief Complaint:  Patient is a 48y old  Male who presents with a chief complaint of New cirrhosis (05 May 2024 11:27)      Reason for consult: Decompensated cirrhosis    Interval Events:  Patient was seen and examined at bedside. Repeat Hb stable. Abdomen mildly distended and soft. Had BM yesterday, none today, denies any bleeding.     Hospital Medications:  acetaminophen     Tablet .. 650 milliGRAM(s) Oral every 6 hours PRN  acetylcysteine IVPB 17 Gram(s) IV Intermittent every 24 hours  carvedilol 3.125 milliGRAM(s) Oral every 12 hours  ciprofloxacin     Tablet 500 milliGRAM(s) Oral daily  folic acid 1 milliGRAM(s) Oral daily  furosemide    Tablet 40 milliGRAM(s) Oral daily  ondansetron Injectable 4 milliGRAM(s) IV Push every 8 hours PRN  pantoprazole    Tablet 40 milliGRAM(s) Oral before breakfast  prednisoLONE    3 mG/mL Solution (ORAPRED) 40 milliGRAM(s) Oral daily  spironolactone 100 milliGRAM(s) Oral daily  thiamine 100 milliGRAM(s) Oral daily      ROS:   General:  No  fevers, chills, night sweats, fatigue  Eyes:  Good vision, no reported pain  ENT:  No sore throat, pain, runny nose  CV:  No pain, palpitations  Pulm:  No dyspnea, cough  GI:  See HPI, otherwise negative  :  No  incontinence, nocturia  Muscle:  No pain, weakness  Neuro:  No memory problems  Skin:  No rash    PHYSICAL EXAM:   Vital Signs:  Vital Signs Last 24 Hrs  T(C): 36.8 (06 May 2024 05:16), Max: 37.1 (05 May 2024 20:29)  T(F): 98.3 (06 May 2024 05:16), Max: 98.7 (05 May 2024 20:29)  HR: 78 (06 May 2024 05:16) (78 - 90)  BP: 106/64 (06 May 2024 05:16) (104/68 - 113/65)  BP(mean): --  RR: 17 (06 May 2024 05:16) (16 - 17)  SpO2: 94% (06 May 2024 05:16) (94% - 96%)    Parameters below as of 06 May 2024 05:16  Patient On (Oxygen Delivery Method): nasal cannula  O2 Flow (L/min): 2    Daily     Daily     GENERAL: no acute distress  NEURO: AAOx3, no asterixis  HEENT: icteric sclera, no conjunctival pallor appreciated  CHEST: no respiratory distress, no accessory muscle use  CARDIAC: regular rate, rhythm  ABDOMEN: soft, non-tender, (+) mildly distended, no rebound or guarding, BS+  EXTREMITIES: warm, well perfused, no edema  SKIN: tattoos    LABS: reviewed                        8.1    6.96  )-----------( 110      ( 06 May 2024 06:49 )             25.5     05-06    140  |  108  |  12  ----------------------------<  104<H>  3.5   |  27  |  0.53    Ca    7.9<L>      06 May 2024 06:49  Phos  2.5     05-06  Mg     1.9     05-06    TPro  6.4  /  Alb  1.8<L>  /  TBili  1.8<H>  /  DBili  1.0<H>  /  AST  49<H>  /  ALT  31  /  AlkPhos  130<H>  05-06    LIVER FUNCTIONS - ( 06 May 2024 06:49 )  Alb: 1.8 g/dL / Pro: 6.4 g/dL / ALK PHOS: 130 U/L / ALT: 31 U/L DA / AST: 49 U/L / GGT: x             Interval Diagnostic Studies: see sunrise for full report

## 2024-05-06 NOTE — PROGRESS NOTE ADULT - PROBLEM SELECTOR PLAN 4
- HBG downtrended  - s/p 1 U PRBC  - Hgb stable  - Maintain active T&S.   - C/W Protonix  - GI following  - no signs of active bleeding  - if Hgb downtrends again consider CT A/P w/ Iv contrast - HBG STABLE   - s/p 1 U PRBC  - Hgb stable  - Maintain active T&S.   - C/W Protonix  - GI following  - no signs of active bleeding  - if Hgb downtrends again consider CT A/P w/ Iv contrast

## 2024-05-06 NOTE — PROGRESS NOTE ADULT - SUBJECTIVE AND OBJECTIVE BOX
Patient is a 48y old  Male who presents with a chief complaint of New cirrhosis (06 May 2024       INTERVAL HPI/OVERNIGHT EVENTS: no acute medical events overnight     MEDICATIONS  (STANDING):  acetylcysteine IVPB 17 Gram(s) IV Intermittent every 24 hours  carvedilol 3.125 milliGRAM(s) Oral every 12 hours  ciprofloxacin     Tablet 500 milliGRAM(s) Oral daily  folic acid 1 milliGRAM(s) Oral daily  furosemide    Tablet 40 milliGRAM(s) Oral daily  pantoprazole    Tablet 40 milliGRAM(s) Oral before breakfast  prednisoLONE    3 mG/mL Solution (ORAPRED) 40 milliGRAM(s) Oral daily  spironolactone 100 milliGRAM(s) Oral daily  thiamine 100 milliGRAM(s) Oral daily    MEDICATIONS  (PRN):  acetaminophen     Tablet .. 650 milliGRAM(s) Oral every 6 hours PRN Temp greater or equal to 38C (100.4F), Mild Pain (1 - 3)  ondansetron Injectable 4 milliGRAM(s) IV Push every 8 hours PRN Nausea and/or Vomiting      __________________________________________________  REVIEW OF SYSTEMS:    CONSTITUTIONAL: No fever,   EYES: no acute visual disturbances  NECK: No pain or stiffness  RESPIRATORY: No cough; No shortness of breath  CARDIOVASCULAR: No chest pain, no palpitations  GASTROINTESTINAL: No pain. No nausea or vomiting; No diarrhea   NEUROLOGICAL: No headache or numbness, no tremors  MUSCULOSKELETAL: No joint pain, no muscle pain  GENITOURINARY: no dysuria, no frequency, no hesitancy  PSYCHIATRY: no depression , no anxiety  ALL OTHER  ROS negative        Vital Signs Last 24 Hrs  T(C): 36.8 (06 May 2024 14:05), Max: 37.1 (05 May 2024 20:29)  T(F): 98.3 (06 May 2024 14:05), Max: 98.7 (05 May 2024 20:29)  HR: 97 (06 May 2024 14:05) (78 - 97)  BP: 103/62 (06 May 2024 14:05) (103/62 - 113/65)  BP(mean): --  RR: 17 (06 May 2024 14:05) (16 - 17)  SpO2: 95% (06 May 2024 14:05) (94% - 96%)    Parameters below as of 06 May 2024 14:05  Patient On (Oxygen Delivery Method): nasal cannula  O2 Flow (L/min): 2      ________________________________________________  PHYSICAL EXAM:  GENERAL: NAD  HEENT: Normocephalic;  conjunctivae and sclerae clear;   NECK : supple  CHEST/LUNG: dec breath sounds at bases  HEART: S1 S2  regular; no murmurs, gallops or rubs  ABDOMEN: Soft, Nontender, Nondistended; Bowel sounds present + ascites   EXTREMITIES: edema+  SKIN: warm and dry; no rash  NERVOUS SYSTEM:  Awake and alert;   _________________________________________________  LABS:                        8.1    6.96  )-----------( 110      ( 06 May 2024 06:49 )             25.5     05-06    140  |  108  |  12  ----------------------------<  104<H>  3.5   |  27  |  0.53    Ca    7.9<L>      06 May 2024 06:49  Phos  2.5     05-06  Mg     1.9     05-06    TPro  6.4  /  Alb  1.8<L>  /  TBili  1.8<H>  /  DBili  1.0<H>  /  AST  49<H>  /  ALT  31  /  AlkPhos  130<H>  05-06    PT/INR - ( 06 May 2024 06:49 )   PT: 20.3 sec;   INR: 1.81 ratio         PTT - ( 06 May 2024 06:49 )  PTT:33.4 sec  Urinalysis Basic - ( 06 May 2024 06:49 )    Color: x / Appearance: x / SG: x / pH: x  Gluc: 104 mg/dL / Ketone: x  / Bili: x / Urobili: x   Blood: x / Protein: x / Nitrite: x   Leuk Esterase: x / RBC: x / WBC x   Sq Epi: x / Non Sq Epi: x / Bacteria: x      CAPILLARY BLOOD GLUCOSE    RADIOLOGY & ADDITIONAL TESTS:   < from: CT Abdomen and Pelvis w/ IV Cont (04.27.24 @ 13:32) >  ACC: [id number] EXAM:  CT ABDOMEN AND PELVIS IC   ORDERED BY: CARO ALONZO     PROCEDURE DATE:  04/27/2024          INTERPRETATION:  CLINICAL INFORMATION: Cirrhosis.    COMPARISON: None.    CONTRAST/COMPLICATIONS:  IV Contrast: Omnipaque 350  90 cc administered   10 cc discarded  Oral Contrast: NONE  Complications: None reported at time of study completion    PROCEDURE:  CT of the Abdomen and Pelvis was performed.  Sagittal and coronal reformats were performed.    FINDINGS:  LOWER CHEST: Bibasilar dependent lung atelectasis, right more than left.    LIVER: Cirrhosis. No focal hepatic mass lesion as imaged.  BILE DUCTS: Normal caliber.  GALLBLADDER: Within normal limits.  SPLEEN: Enlarged measuring approximately 16 cm.  PANCREAS: Within normal limits.  ADRENALS: Within normal limits.  KIDNEYS/URETERS: No hydronephrosis. Multiple nonobstructing calculi lower   pole of the left kidney measuring up to 5 mm.    BLADDER: Minimally distended.  REPRODUCTIVE ORGANS: Prostate within normal limits.    BOWEL: No bowel obstruction. Appendix is normal.  PERITONEUM: Moderate to large volume ascites. Mesenteric edema.  VESSELS: Portal venous collaterals including lower esophageal varices. No   evidence of portal venous thrombosis.  RETROPERITONEUM/LYMPH NODES: No lymphadenopathy.  ABDOMINAL WALL: Anasarca  BONES: Within normal limits.    IMPRESSION:  Cirrhosis with portal hypertension, splenomegaly and ascites.        --- End of Report ---    < end of copied text >    < from: Xray Chest 1 View- PORTABLE-Urgent (Xray Chest 1 View- PORTABLE-Urgent .) (04.27.24 @ 23:26) >    ACC: [id number] EXAM:  XR CHEST PORTABLE URGENT 1V   ORDERED BY: CANDY BRADY     PROCEDURE DATE:  04/27/2024          INTERPRETATION:  Portable AP chest radiograph    COMPARISON:  NONE.    CLINICAL INFORMATION: Pneumonia like symptoms.    FINDINGS:  CATHETERS AND TUBES: None    PULMONARY: 2.4 cm opacity adjacent to RIGHT tracheobronchial angle either   indicating azygous vein or lymph node .  Remaining lung parenchyma clear.    There are no airspace consolidations or radiographic evidence of   pulmonary nodules..  No pleural effusion or pneumothorax.    HEART/VASCULAR: The heart size and mediastinum configuration are within   the limits of normal.    BONES: The visualized osseous thorax is intact.    IMPRESSION:  2.4 cm opacity adjacent to RIGHT tracheobronchial angle either indicating   azygous vein or lymph node .  Lungs clear.  Continued surveillance recommended.    --- End of Report ---            DIAZ SANCHEZ MD; Attending Radiologist  This document has been electronically signed. Apr 28 2024  1:35PM    < end of copied text >  Imaging Personally Reviewed:  YES/    Consultant(s) Notes Reviewed:   YES/    Care Discussed with Consultants : hepatology     Plan of care was discussed with patient and /or primary care giver; all questions and concerns were addressed and care was aligned with patient's wishes.

## 2024-05-06 NOTE — PROGRESS NOTE ADULT - PROBLEM SELECTOR PLAN 2
- CT abdomen pelvis shows: cirrhosis with portal hypertension, splenomegaly and ascites.   -S/P large volume paracentesis 5500ml removed  -optimizing diuretics if BP allows  rest of plan as above - CT abdomen pelvis shows: cirrhosis with portal hypertension, splenomegaly and ascites.   -S/P large volume paracentesis 5500ml removed  -optimizing diuretics if BP allows  - reccomended for repeat para prior to D/C

## 2024-05-06 NOTE — PROGRESS NOTE ADULT - PROBLEM SELECTOR PLAN 4
- HBG STABLE   - s/p 1 U PRBC  - Hgb stable  - Maintain active T&S.   - C/W Protonix  - GI following  - no signs of active bleeding  - if Hgb downtrends again consider CT A/P w/ Iv contrast

## 2024-05-06 NOTE — PROGRESS NOTE ADULT - PROBLEM SELECTOR PLAN 1
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  - will start the following per Hepatology reccs:          -started acetylcysteine          -started prednisolone          -increased dose of spirinolactone and lasix          -if BP allows can consider starting Carvedilol          -started ceftriaxone 1G   - Hepatology Dr. Vuong following    - GI Dr. Hathaway following  - monitor cbc, cmp, hepatic function panel, mg,phosp P/W worsening new-onset ascites w/ abd pain + jaundice   likely 2/2 ETOH c/f alcohollic hepatitis  - CT abd:  cirrhosis with portal hypertension, splenomegaly and ascites.  - will start the following per Hepatology reccs:          -started acetylcysteine          -started prednisolone          -increased dose of spirinolactone and lasix          -started on Carvedilol          -started ceftriaxone 1G   - Hepatology Dr. Vuong following    - GI Dr. Hathaway following  - monitor cbc, cmp, hepatic function panel, mg,phosp

## 2024-05-06 NOTE — PROGRESS NOTE ADULT - ASSESSMENT
47yo  Male w/ Hx of AUD (4-5 drinks/day, Vodka, last drink 2 weeks prior to admission), smoking (20-25 years) not on home O2, and family Hx of hepatitis C (mother), presented to [facility] w/ 2 weeks progressively worsening abdominal distention, decreased appetite, malaise, generalized weakness, with 2 days Hx of jaundice prior to arrival, and was admitted w/ newly diagnosed cirrhosis w/ CSPH, decompensated w/ ascites. Now s/p LVP w/ albumin, no SBP, consistent w/ portal hypertension, low protein. Also s/p EGD 5/1/24 showing Gr II-III EVs, PHG.     CXR no infiltrate  BCx NGTD x 48h  CT a/p w/ IV contrast: Cirrhosis with portal HTN with collaterals including lower esophageal varices, moderate to large volume ascites with mesenteric edema and anasarca.     # Cirrhosis likely due to EtOH, w/ CSPH, decompensated w/ ascites  # Superimposed alcoholic hepatitis  # Ascites  MELD 3.0 17  MDF >32 would likely benefit from steroids    s/p large volume, diagnostic and therapeutic paracentesis by IR 5/1 drained 5L, given albumin 8g/L drained   s/p EGD 5/1 showing grade II-III esophageal varices, non-bleeding. no bands placed.  Infectious w/u thus far negative, BCx NGTD x72h, UA neg, with no s/s infection, ascites - no SBP   No PVT per CTa/p w/ iv  SAAG >1.1g/dL (1.5 likely indicates that portal HTN is likely the cause of ascites)  ---- Low protein ascites + CTP >9, bilirubin > 3, thus consider long term SBP ppx, unless improvement.    Recommendations:   - Started on prednisolone 40 mg 5/3- (Day 4) Olga score 0.275 (good prognosis)  - NAC liver failure protocol added 5/3  - F/u rest of ascites analysis, cytology  - Furosemide 40 mg and Spironolactone 100 mg added 5/3  - Monitor renal function (Cr normal so far)  - Therapeutic paracentesis as needed, with 25% albumin  6-8 g/l ascites removed  - cipro 500 mg po daily added 5/5 for SBP ppx   - Avoid NSAIDs  - Low salt diet   - Encourage alcohol cessation  - Titrate O2 as tolerated, suspect improvement in breathing/ splinting s/p large volume paracentesis       # Anemia now requiring blood transfusion  # Coagulopathy  # Thrombocytopenia  # PHG  # GR II-III EVs  No reported overt GIB so far  S/p vitamin K x2 (4/29, 4/30)  S/p EGD 5/1: grade II-III esophageal varices, non-bleeding. no bands placed; PHG  Anemia w/u results:  ---- Iron 13, TIBC 221, Iron % 6, ferritin 202, haptoglobin 40, , reticulocyte % 3.8, folate and vitamin b12 wnl  Adequate response to Hb    Recommendations:   - Would consider IV ferrous supplementation   - Monitor H/H, keep Hb > 7.   - Keep PLT > 50, fibrinogen > 120 if bleeding  - C/w PPI  - Consider SBP ppx as above   - If vitals allow and no active bleeding, start carvedilol 3.125 mg bid    # No PSE  # HCC status - no focal hepatic lesion on CT a/p, can send AFP  # Etiology of cirrhosis: likely EtOH  --- CLD w/u so far: Hep B neg, not immune, not exposed, and Hep C ab neg. Hep A IgM neg. Hep E IgG/IgM neg. A1AT WNL. Ferritin 202. CMV negative. EBV past infection.   --- Ceruloplasmin 15, possibly due to severe liver disease, but can send 24 hr urine copper.   --- IgA elevated 1658 and IgG elevated 1661 likely due to cirrhosis; ROSY neg, LKM neg, SMA and AMA in process.    Recommendations:  - F/u SMA,  AMA (testing in progress)   - F/u Hep E PCR andEBV PCR (testing in progress)  - Please, obtain HSV PCRs (ordered but never received, re-ordered)  - Please, obtain 24 hr urine copper.        # Transplant candidacy: Will need to explore psychosocial status. Bilirubin is improving, possibly will need to be referred outpatient, unless worsening or no response to steroid.  # AUD  - Folic, thiamine  - CIWA per primary team  - Aspiration, seizure, fall precautions  - Will need rehab     Thank you for consult  Will follow. Hepatology returns Monday. Please, consult GI on call if change in status, questions, concerns,   Discussed w/ primary team 49yo  Male w/ Hx of AUD (4-5 drinks/day, Vodka, last drink 2 weeks prior to admission), smoking (20-25 years) not on home O2, and family Hx of hepatitis C (mother), presented to [facility] ER w/ 2 weeks progressively worsening abdominal distention, decreased appetite, malaise, generalized weakness, with 2 days Hx of jaundice prior to arrival, and was admitted w/ newly diagnosed cirrhosis w/ CSPH, decompensated w/ ascites. Now s/p LVP w/ albumin, no SBP, consistent w/ portal hypertension, low protein. Also s/p EGD 5/1/24 showing Gr II-III EVs, PHG.     CXR no infiltrate  BCx NGTD x 5 days  CT a/p w/ IV contrast: Cirrhosis with portal HTN with collaterals including lower esophageal varices, moderate to large volume ascites with mesenteric edema and anasarca.     # Cirrhosis likely due to EtOH, w/ CSPH, decompensated w/ ascites  # Superimposed alcoholic hepatitis  # Ascites  MELD 3.0 17  MDF >32 now on steroids    s/p large volume, diagnostic and therapeutic paracentesis by IR 5/1 drained 5L, given albumin 8g/L drained   s/p EGD 5/1 showing grade II-III esophageal varices, non-bleeding. no bands placed.  Infectious w/u thus far negative, BCx NGTD x72h, UA neg, with no s/s infection, ascites - no SBP   No PVT per CTa/p w/ iv  SAAG >1.1g/dL (1.5 likely indicates that portal HTN is likely the cause of ascites)  ---- Low protein ascites + CTP was >9, bilirubin was > 3, thus was started on cipro 500 mg daily for SBP ppx    Recommendations:   - Started on prednisolone 40 mg 5/3- (Day 4) Lille score 0.275 (good prognosis), thus will continue for 28 days total.   - NAC liver failure protocol added 5/3, completes 5 days tomorrow.  - C/w Furosemide 40 mg and Spironolactone 100 mg - Monitor renal function (Cr normal so far)  - Therapeutic paracentesis as needed, with 25% albumin  6-8 g/l ascites removed - would benefit from another LVP prior to discharge; spoke to IR, scheduled for tomorrow am, appreciated. Please, call IR early in the morning to confirm.   - Cipro 500 mg po daily added 5/5 for SBP ppx, will continue for now, but likely will be able to discontinue outpatient if persistent improvement   - Avoid NSAIDs  - Low salt diet   - Encourage alcohol cessation    # Hypoxia  - Noted SpO2 91-92% on RA. Ascites still present, but no tense.  Need to r/o PPH vs. HPS, thus ordered TTE w/ bubble study. F/u results.   - Please, obtain ABG on RA.  - Consider CT chest (abnormal CXR)      # Anemia now requiring blood transfusion  # Coagulopathy  # Thrombocytopenia  # PHG  # GR II-III EVs  No reported overt GIB so far  S/p vitamin K x2 (4/29, 4/30)  S/p EGD 5/1: grade II-III esophageal varices, non-bleeding. no bands placed; PHG  Anemia w/u results:  ---- Iron 13, TIBC 221, Iron % 6, ferritin 202, haptoglobin 40, , reticulocyte % 3.8, folate and vitamin b12 wnl  Adequate response to PRBC    Recommendations:   - Can consider IV ferrous supplementation   - Monitor H/H, keep Hb > 7.   - Keep PLT > 50, fibrinogen > 120 if bleeding  - C/w PPI  - C/w carvedilol 3.125 mg bid    # No PSE  # HCC status - no focal hepatic lesion on CT a/p  --- Can send AFP  # Etiology of cirrhosis: likely EtOH  --- CLD w/u so far: Hep B neg, not immune, not exposed, and Hep C ab neg. Hep A IgM neg. Hep E IgG/IgM neg. A1AT WNL. Ferritin 202. CMV negative. EBV past infection.   --- Ceruloplasmin 15, possibly due to severe liver disease, but can send 24 hr urine copper.   --- IgA elevated 1658 and IgG elevated 1661 likely due to cirrhosis; ROSY neg, LKM neg, SMA 1:40, and AMA neg.    Recommendations:  - Can still obtain 24 hr urine copper.        # Transplant candidacy: Will need to explore psychosocial status. Bilirubin is improving, possibly will need to be referred outpatient, unless worsening or no response to steroid.  # AUD  - Folic, thiamine  - CIWA per primary team  - Aspiration, seizure, fall precautions  - Will need rehab   - Can consider acamprosate    Thank you for consult  Will follow.    Discussed w/ primary, IR and echo

## 2024-05-06 NOTE — PROGRESS NOTE ADULT - ASSESSMENT
49 y/o M with PMH of alcohol use disorder (has not seen a doctor for 10 years), who presented with 2 weeks history of worsening generalized abdominal pain, discomfort, and bloating. On CT abdomen pelvis patient has cirrhosis with portal hypertension, splenomegaly and large volume ascites. Admitted for newly diagnosed cirrhosis w/ ascites. Hepatology and GI consulted for work up, s/p large volume paracentesis with IR and EGD showing grade II-III esophageal varices, non-bleeding. no bands placed. Infectious w/u thus far negative, BCx NGTD x72h, UA neg, with no s/s infection. Also noted with anemia requiring 1U PRBC. Cirrhosis likely 2/2 ETOH started on acetylcysteine, prednisolone.     47 y/o M with PMH of alcohol use disorder (has not seen a doctor for 10 years), who presented with 2 weeks history of worsening generalized abdominal pain, discomfort, and bloating. On CT abdomen pelvis patient has cirrhosis with portal hypertension, splenomegaly and large volume ascites. Admitted for newly diagnosed cirrhosis w/ ascites. Hepatology and GI consulted for work up, s/p large volume paracentesis with IR and EGD showing grade II-III esophageal varices, non-bleeding. no bands placed. Infectious w/u thus far negative, BCx NGTD x72h, UA neg, with no s/s infection. Also noted with anemia requiring 1U PRBC. Cirrhosis likely 2/2 ETOH started on acetylcysteine, prednisolone. LFTs improving, plan for repeat paracentesis prior to D/C as recc'd by hepatology

## 2024-05-06 NOTE — PROGRESS NOTE ADULT - PROBLEM SELECTOR PLAN 1
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  - will start the following per Hepatology reccs:          -started acetylcysteine          -started prednisolone          -increased dose of spirinolactone and lasix          -started on Carvedilol          -started ceftriaxone 1G   - Hepatology Dr. Vuong following    - GI Dr. Hathaway following  - monitor cbc, cmp, hepatic function panel, mg,phosp

## 2024-05-06 NOTE — PROGRESS NOTE ADULT - PROBLEM SELECTOR PLAN 7
patient from home   pending clinical improvement patient from home   pending repeat paracentesis prior to D/C   f/u hepatology reccs   likely will d/c home without services   SW to follow

## 2024-05-06 NOTE — PROGRESS NOTE ADULT - PROBLEM SELECTOR PLAN 7
patient from home   pending repeat paracentesis prior to D/C   f/u hepatology reccs   likely will d/c home without services   SW to follow

## 2024-05-07 DIAGNOSIS — J96.01 ACUTE RESPIRATORY FAILURE WITH HYPOXIA: ICD-10-CM

## 2024-05-07 DIAGNOSIS — E87.6 HYPOKALEMIA: ICD-10-CM

## 2024-05-07 DIAGNOSIS — I85.00 ESOPHAGEAL VARICES WITHOUT BLEEDING: ICD-10-CM

## 2024-05-07 LAB
ALBUMIN SERPL ELPH-MCNC: 1.7 G/DL — LOW (ref 3.5–5)
ALP SERPL-CCNC: 105 U/L — SIGNIFICANT CHANGE UP (ref 40–120)
ALT FLD-CCNC: 28 U/L DA — SIGNIFICANT CHANGE UP (ref 10–60)
ANION GAP SERPL CALC-SCNC: 4 MMOL/L — LOW (ref 5–17)
AST SERPL-CCNC: 43 U/L — HIGH (ref 10–40)
BASE EXCESS BLDA CALC-SCNC: 2.5 MMOL/L — SIGNIFICANT CHANGE UP (ref -2–3)
BILIRUB DIRECT SERPL-MCNC: 0.9 MG/DL — HIGH (ref 0–0.3)
BILIRUB SERPL-MCNC: 1.6 MG/DL — HIGH (ref 0.2–1.2)
BLOOD GAS COMMENTS ARTERIAL: SIGNIFICANT CHANGE UP
BUN SERPL-MCNC: 12 MG/DL — SIGNIFICANT CHANGE UP (ref 7–18)
CALCIUM SERPL-MCNC: 7.9 MG/DL — LOW (ref 8.4–10.5)
CHLORIDE SERPL-SCNC: 108 MMOL/L — SIGNIFICANT CHANGE UP (ref 96–108)
CO2 SERPL-SCNC: 28 MMOL/L — SIGNIFICANT CHANGE UP (ref 22–31)
CREAT SERPL-MCNC: 0.56 MG/DL — SIGNIFICANT CHANGE UP (ref 0.5–1.3)
EGFR: 122 ML/MIN/1.73M2 — SIGNIFICANT CHANGE UP
GLUCOSE SERPL-MCNC: 97 MG/DL — SIGNIFICANT CHANGE UP (ref 70–99)
HCO3 BLDA-SCNC: 26 MMOL/L — SIGNIFICANT CHANGE UP (ref 21–28)
HCT VFR BLD CALC: 24.5 % — LOW (ref 39–50)
HGB BLD-MCNC: 7.7 G/DL — LOW (ref 13–17)
HOROWITZ INDEX BLDA+IHG-RTO: 21 — SIGNIFICANT CHANGE UP
INR BLD: 1.84 RATIO — HIGH (ref 0.85–1.18)
MAGNESIUM SERPL-MCNC: 1.6 MG/DL — SIGNIFICANT CHANGE UP (ref 1.6–2.6)
MCHC RBC-ENTMCNC: 30.6 PG — SIGNIFICANT CHANGE UP (ref 27–34)
MCHC RBC-ENTMCNC: 31.4 GM/DL — LOW (ref 32–36)
MCV RBC AUTO: 97.2 FL — SIGNIFICANT CHANGE UP (ref 80–100)
NRBC # BLD: 0 /100 WBCS — SIGNIFICANT CHANGE UP (ref 0–0)
PCO2 BLDA: 36 MMHG — SIGNIFICANT CHANGE UP (ref 35–48)
PH BLDA: 7.47 — HIGH (ref 7.35–7.45)
PHOSPHATE SERPL-MCNC: 2.9 MG/DL — SIGNIFICANT CHANGE UP (ref 2.5–4.5)
PLATELET # BLD AUTO: 108 K/UL — LOW (ref 150–400)
PO2 BLDA: 75 MMHG — LOW (ref 83–108)
POTASSIUM SERPL-MCNC: 3.1 MMOL/L — LOW (ref 3.5–5.3)
POTASSIUM SERPL-SCNC: 3.1 MMOL/L — LOW (ref 3.5–5.3)
PROT SERPL-MCNC: 5.6 G/DL — LOW (ref 6–8.3)
PROTHROM AB SERPL-ACNC: 20.6 SEC — HIGH (ref 9.5–13)
RBC # BLD: 2.52 M/UL — LOW (ref 4.2–5.8)
RBC # FLD: 17.3 % — HIGH (ref 10.3–14.5)
SAO2 % BLDA: 96 % — SIGNIFICANT CHANGE UP
SODIUM SERPL-SCNC: 140 MMOL/L — SIGNIFICANT CHANGE UP (ref 135–145)
WBC # BLD: 7.61 K/UL — SIGNIFICANT CHANGE UP (ref 3.8–10.5)
WBC # FLD AUTO: 7.61 K/UL — SIGNIFICANT CHANGE UP (ref 3.8–10.5)

## 2024-05-07 PROCEDURE — 71250 CT THORAX DX C-: CPT | Mod: 26

## 2024-05-07 PROCEDURE — 76942 ECHO GUIDE FOR BIOPSY: CPT | Mod: 26,59

## 2024-05-07 PROCEDURE — 99232 SBSQ HOSP IP/OBS MODERATE 35: CPT

## 2024-05-07 PROCEDURE — 49083 ABD PARACENTESIS W/IMAGING: CPT

## 2024-05-07 RX ORDER — POTASSIUM CHLORIDE 20 MEQ
40 PACKET (EA) ORAL EVERY 4 HOURS
Refills: 0 | Status: COMPLETED | OUTPATIENT
Start: 2024-05-07 | End: 2024-05-07

## 2024-05-07 RX ORDER — ALBUMIN HUMAN 25 %
100 VIAL (ML) INTRAVENOUS ONCE
Refills: 0 | Status: COMPLETED | OUTPATIENT
Start: 2024-05-07 | End: 2024-05-07

## 2024-05-07 RX ADMIN — Medication 3 MILLIGRAM(S): at 22:07

## 2024-05-07 RX ADMIN — Medication 100 MILLILITER(S): at 15:13

## 2024-05-07 RX ADMIN — Medication 40 MILLIGRAM(S): at 05:46

## 2024-05-07 RX ADMIN — Medication 100 MILLIGRAM(S): at 13:29

## 2024-05-07 RX ADMIN — Medication 1 MILLIGRAM(S): at 13:28

## 2024-05-07 RX ADMIN — CARVEDILOL PHOSPHATE 3.12 MILLIGRAM(S): 80 CAPSULE, EXTENDED RELEASE ORAL at 17:07

## 2024-05-07 RX ADMIN — Medication 40 MILLIGRAM(S): at 05:48

## 2024-05-07 RX ADMIN — Medication 40 MILLIEQUIVALENT(S): at 09:45

## 2024-05-07 RX ADMIN — Medication 500 MILLIGRAM(S): at 13:29

## 2024-05-07 RX ADMIN — CARVEDILOL PHOSPHATE 3.12 MILLIGRAM(S): 80 CAPSULE, EXTENDED RELEASE ORAL at 05:48

## 2024-05-07 RX ADMIN — SPIRONOLACTONE 100 MILLIGRAM(S): 25 TABLET, FILM COATED ORAL at 05:47

## 2024-05-07 RX ADMIN — Medication 40 MILLIEQUIVALENT(S): at 14:00

## 2024-05-07 RX ADMIN — PANTOPRAZOLE SODIUM 40 MILLIGRAM(S): 20 TABLET, DELAYED RELEASE ORAL at 05:47

## 2024-05-07 NOTE — CONSULT NOTE ADULT - SUBJECTIVE AND OBJECTIVE BOX
Time of visit:    CHIEF COMPLAINT: Patient is a 48y old  Male who presents with a chief complaint of New cirrhosis (07 May 2024 10:59)      HPI:  Patient is a 48 yr old  man  active smoker with alcohol use disorder (has not seen a doctor for 10 years), who presented with 2 weeks history of worsening generalized abdominal pain, discomfort, and bloating. Patient reports that before this he did not feel his abdomen was getting bigger and he had no complains. Patient reports he started noticing bloating 2 weeks ago and it has worsened since then. Patient also reported that for the past few days he has also noticed that he is getting yellowing of his eyes and skin. Patient denies associated nausea/vomiting however reports decreased appetite for the past 2 weeks. Patient also complains of weakness, fatigue, malaise, Patient also denies any hematemesis, melena, hemtochezia, or BRBPR. Patient also denies moments of confusion. He also denies any SOB, chest pain, or LE swelling. He also denies easy bruising, or any bleeding. Patient reports he has been drinking alcohol since more 4-5 drinks of Vodka daily but his last drink was 2 weeks ago when his symptoms started. Patient also reports smoking a pack a day for 20-25 years. Patient denies using any illicit drugs. Patient also denies any history of transfusions, IV drug use, hepatitis B or C virus, or family history of liver disease. Patient denies any recent fevers, chills, headache, dizziness, lightheadedness, chest pain, palpitations, shortness of breath, cough, diarrhea, constipation, melena, hematochezia, dysuria, urinary frequency, or urgency. Patient denies any other complains at this time.   (27 Apr 2024 16:37)   Patient seen and examined.     PAST MEDICAL & SURGICAL HISTORY:      Allergies    No Known Allergies    Intolerances        MEDICATIONS  (STANDING):  carvedilol 3.125 milliGRAM(s) Oral every 12 hours  ciprofloxacin     Tablet 500 milliGRAM(s) Oral daily  folic acid 1 milliGRAM(s) Oral daily  furosemide    Tablet 40 milliGRAM(s) Oral daily  melatonin 3 milliGRAM(s) Oral at bedtime  pantoprazole    Tablet 40 milliGRAM(s) Oral before breakfast  prednisoLONE    3 mG/mL Solution (ORAPRED) 40 milliGRAM(s) Oral daily  spironolactone 100 milliGRAM(s) Oral daily  thiamine 100 milliGRAM(s) Oral daily      MEDICATIONS  (PRN):  acetaminophen     Tablet .. 650 milliGRAM(s) Oral every 6 hours PRN Temp greater or equal to 38C (100.4F), Mild Pain (1 - 3)  ondansetron Injectable 4 milliGRAM(s) IV Push every 8 hours PRN Nausea and/or Vomiting   Medications up to date at time of exam.    Medications up to date at time of exam.    FAMILY HISTORY:      SOCIAL HISTORY  Smoking History: [ x   ] smoking/smoke exposure 1 PPD x 25 yrs   Living Condition: [ x  ] apartment, [   ] private house  Work History:    Travel History: denies recent travel  Illicit Substance Use: denies  Alcohol Use: drinks vodka daily     REVIEW OF SYSTEMS:    CONSTITUTIONAL:  denies fevers, chills, sweats, weight loss    HEENT:  denies diplopia or blurred vision, sore throat or runny nose.    CARDIOVASCULAR:  denies pressure, squeezing, tightness, or heaviness about the chest; no palpitations.    RESPIRATORY:  denies SOB, cough, RDZ, wheezing.    GASTROINTESTINAL:  denies abdominal pain, nausea, vomiting or diarrhea.    GENITOURINARY: denies dysuria, frequency or urgency.    NEUROLOGIC:  denies numbness, tingling, seizures or weakness.    PSYCHIATRIC:  denies disorder of thought or mood.    MSK: denies swelling, redness      PHYSICAL EXAMINATION:    GENERAL: The patient  in no apparent distress.     Vital Signs Last 24 Hrs  T(C): 36.8 (07 May 2024 14:21), Max: 37.1 (06 May 2024 19:19)  T(F): 98.3 (07 May 2024 14:21), Max: 98.7 (06 May 2024 19:19)  HR: 76 (07 May 2024 17:00) (76 - 91)  BP: 107/64 (07 May 2024 17:00) (107/64 - 121/66)  BP(mean): 78 (07 May 2024 17:00) (78 - 84)  RR: 18 (07 May 2024 17:00) (18 - 18)  SpO2: 95% (07 May 2024 17:00) (89% - 98%)    Parameters below as of 07 May 2024 17:00  Patient On (Oxygen Delivery Method): room air       (if applicable)    Chest Tube (if applicable)    HEENT: Head is normocephalic and atraumatic. Extraocular muscles are intact. Mucous membranes are moist.     NECK: Supple, no palpable adenopathy.    LUNGS: Fair b/l breath sounds, no wheezing, rales, or rhonchi.    HEART: Regular rate and rhythm without murmur.    ABDOMEN: Soft, nontender, and nondistended.  No hepatosplenomegaly is noted. ( returned from IR one hour for large vol paracentesis) + spider nevi blanched with pressure     RENAL: No difficulty voiding, no pelvic pain    EXTREMITIES: Without any cyanosis, clubbing, rash, lesions or edema.    NEUROLOGIC: Awake, alert, oriented, grossly intact    SKIN: Warm, dry, good turgor.      LABS:                        7.7    7.61  )-----------( 108      ( 07 May 2024 06:35 )             24.5     05-07    140  |  108  |  12  ----------------------------<  97  3.1<L>   |  28  |  0.56    Ca    7.9<L>      07 May 2024 06:35  Phos  2.9     05-07  Mg     1.6     05-07    TPro  5.6<L>  /  Alb  1.7<L>  /  TBili  1.6<H>  /  DBili  0.9<H>  /  AST  43<H>  /  ALT  28  /  AlkPhos  105  05-07    PT/INR - ( 07 May 2024 06:35 )   PT: 20.6 sec;   INR: 1.84 ratio         PTT - ( 06 May 2024 06:49 )  PTT:33.4 sec  Urinalysis Basic - ( 07 May 2024 06:35 )    Color: x / Appearance: x / SG: x / pH: x  Gluc: 97 mg/dL / Ketone: x  / Bili: x / Urobili: x   Blood: x / Protein: x / Nitrite: x   Leuk Esterase: x / RBC: x / WBC x   Sq Epi: x / Non Sq Epi: x / Bacteria: x      ABG - ( 07 May 2024 15:46 )  pH, Arterial: 7.47  pH, Blood: x     /  pCO2: 36    /  pO2: 75    / HCO3: 26    / Base Excess: 2.5   /  SaO2: 96          MICROBIOLOGY: (if applicable)    RADIOLOGY & ADDITIONAL STUDIES:  EKG:   CXR:< from: CT Chest No Cont (05.07.24 @ 10:59) >    ACC: [id number] EXAM:  CT CHEST   ORDERED BY: ANNITA JASSO     PROCEDURE DATE:  05/07/2024          INTERPRETATION:  INDICATION: Hypoxia. History of new onset hepatic   cirrhosis.    TECHNIQUE: Helical acquisition images of the chest without intravenous   contrast. Maximum intensity projection images were generated.    COMPARISON: CT abdomen and pelvis with IV contrast 4/27/2024.    FINDINGS:    LUNGS/AIRWAYS/PLEURA: Trace left pleural effusion. Atelectasis in the   lower lobes, middle lobe,and lingula. No endobronchial lesion.    LYMPH NODES/MEDIASTINUM: No enlarged intrathoracic lymph nodes.    HEART/VASCULATURE: Enlarged heart. No pericardial effusion.    UPPER ABDOMEN: Cirrhosis with portal hypertension. Partially included   ascites.    BONES/SOFT TISSUES: No aggressive osseous lesions.      IMPRESSION:    No pneumonia.    Bibasilar atelectasis.    Trace left pleural effusion.    --- End of Report ---          NICKY CAPONE DO; Resident Radiologist  This document has been electronically signed.  ANDREA ARRIETA M.D., ATTENDING RADIOLOGIST  This document has been electronically signed. May  7 2024  1:30PM    < end of copied text >    ECHO:< from: TTE W or WO Ultrasound Enhancing Agent (05.06.24 @ 13:24) >    TRANSTHORACIC ECHOCARDIOGRAM REPORT  ________________________________________________________________________________                                      _______       Pt. Name:       ABENA PERALTA  Study Date:    5/6/2024  MRN:            [id number]   YOB: 1975  Accession #:    [id number]    Age:           48 years  Account#:       [id number]   Gender:        M  Visit ID#  Heart Rate:                  Height:        72.00 in (182.88 cm)  Rhythm:         sinus rhythm Weight: 248.99 lb (112.94 kg)  Blood Pressure: 106/64 mmHg  BSA/BMI:       2.34 m² / 33.77 kg/m²  ________________________________________________________________________________________  Referring Physician:    [id number] cSot Cotter  Interpreting Physician: Lo Deutsch MD  Primary Sonographer:    Julián Rivera [facility]    CPT:               ECHO TTE WO CON COMP W DOPP - 38946.m  Indication(s):     Dyspnea, unspecified - R06.00  Procedure:         Transthoracic echocardiogram with 2-D, M-mode and complete                     spectral and color flow Doppler.  Ordering Location: [facility]  Admission Status:  Inpatient  Study Information: Image quality for this study is fair.    _______________________________________________________________________________________     CONCLUSIONS:      1. Left ventricular wall thickness is normal. Left ventricular systolic function is normal with an ejection fraction of 64 % by Valles's method of disks.   2. The left ventricular diastolic function is indeterminate.   3. Upper limit normal right ventricular cavity size and normal systolic function.   4. The left atrium is mildly dilated.   5. Abdominal ascites is incidentally noted.   6. No prior echocardiogram is available for comparison.   7. Small pericardial effusion noted adjacent to the posterior left ventricle with no evidence of hemodynamic compromise (or echocardiographic evidence of cardiac tamponade).   8. The left ventricular volumes are mildly increased.   9. Trileaflet aortic valve with normal systolic excursion.    ________________________________________________________________________________________  FINDINGS:     Left Ventricle:  Left ventricular wall thickness is normal. Left ventricular systolic function is normal with a calculated ejection fraction of 64 % by the Valles's biplane method of disks. There are no regional wall motion abnormalities seen. The left ventricular diastolic function is indeterminate. The left ventricular volumes are mildly increased.     Right Ventricle:  Theright ventricular cavity is upper limit normal in size and normal systolic function. Tricuspid annular plane systolic excursion (TAPSE) is 2.5 cm (normal >=1.7 cm).     Left Atrium:  The left atrium is mildly dilated with an indexed volume of 36 ml/m².     Right Atrium:  The right atrium is normal in size.     Aortic Valve:  The aortic valve appears trileaflet with normal systolic excursion. There is no aortic valve stenosis. There is no evidence of aortic regurgitation.     Mitral Valve:  Structurally normal mitral valve with normal leaflet excursion. There is no evidence of mitral regurgitation.     Tricuspid Valve:  Structurally normal tricuspid valve with normal leaflet excursion. There is trace tricuspid regurgitation. There is insufficient tricuspid regurgitation detected to calculate pulmonary artery systolic pressure.     Pulmonic Valve:  The pulmonic valve was not well visualized. There is no evidence of pulmonic regurgitation.     Aorta:  The ascending aorta diameter is normal in size.     Pericardium:  There is a small pericardial effusion noted adjacent to the posterior left ventricle with no evidence of hemodynamic compromise (or echocardiographic evidence of cardiac tamponade).     Systemic Veins:  The inferior vena cavawas not well visualized.     Additional Findings and Comments:  The presence of abdominal ascites is incidentally noted.  ____________________________________________________________________  QUANTITATIVE DATA:  Left Ventricle Measurements: (Indexed to BSA)     IVSd (2D):   1.0 cm  LVPWd (2D):  1.0 cm  LVIDd (2D):  4.6 cm  LVIDs (2D):  3.1 cm  LV Mass:     161 g  68.7 g/m²  BiPlane LV EF%: 64 %     MV E Vmax: 0.92 m/s  MV A Vmax: 0.85 m/s  MV E/A:    1.08  MV DT:     223 msec    Aorta Measurements: (Normal range) (Indexed to BSA)     Ao Root      3.9 cm  Ao Asc prox: 3.70 cm       Left Atrium Measurements: (Indexed to BSA)  LA Diam 2D: 3.80 cm  LA Vol BP:  85.0 ml. 36 ml/m².    Right Ventricle Measurements:     TAPSE: 2.5 cm       LVOT / RVOT/ Qp/Qs Data: (Indexed to BSA)  LVOT Diameter:  2.17 cm  LVOT Area:      3.70 cm²  LVOT Vmax:      1.76 m/s  LVOT Vmn:       1.057 m/s  LVOT VTI:       33.27 cm  LVOT peak grad: 12 mmHg  LVOT mean grad: 5.6 mmHg  LVOT SV:        123.0 ml  52.61 ml/m²    Aortic Valve Measurements:  AV Vmax:                2.0 m/s  AV Peak Gradient:       16.1 mmHg  AV Mean Gradient:       7.4 mmHg  AV VTI:                 38.3 cm  AV VTI Ratio:           0.87  AoV EOA, Contin:        3.21 cm²  AoV EOA, Contin i:  1.37 cm²/m²  AoV Dimensionless Index 0.87    Mitral Valve Measurements:     MV E Vmax: 0.9 m/s  MV A Vmax: 0.8 m/s  MV E/A:    1.1    ________________________________________________________________________________________  Electronically signed on5/6/2024 at 3:02:42 PM by Lo Deutsch MD         *** Final ***    < end of copied text >      IMPRESSION: 48y Male PAST MEDICAL & SURGICAL HISTORY:   p/w       IMP: This is a  48 yr old  man  active smoker with alcohol use disorder (has not seen a doctor for 10 years), who presented with 2 weeks history of worsening generalized abdominal pain, discomfort, and bloating. Patient reports that before this he did not feel his abdomen was getting bigger and he had no complains. Patient reports he started noticing bloating 2 weeks ago and it has worsened since then. Patient noted it was difficult to breath when his abd was distended , improved post large volume paracentesis . Hypoxia resolved with paracentesis albeit pat pat may have HPS.       Sugg    - Not a candidate for supp o2 at this time   - Will need 2 DECHO with bubble   - Advise to stop smoking and using alcohol   - Hepatology following   - Consider referral for liver transplant   - Out pat PFT     discussed with NP covering and Diandra Vuong

## 2024-05-07 NOTE — PROGRESS NOTE ADULT - PROBLEM SELECTOR PLAN 10
patient from home   pending CT chest  pending paracentesis  pending improvement with Hypoxia  patient will need outpatient PCP as patient with medicaid and no PCP, as patient with complex medical diagnosis
patient from home   pending CT chest  pending paracentesis  pending improvement with Hypoxia  patient will need outpatient PCP as patient with medicaid and no PCP, as patient with complex medical diagnosis

## 2024-05-07 NOTE — PROGRESS NOTE ADULT - ASSESSMENT
47yo  Male w/ Hx of AUD (4-5 drinks/day, Vodka, last drink 2 weeks prior to admission), smoking (20-25 years) not on home O2, and family Hx of hepatitis C (mother), presented to [facility] ER w/ 2 weeks progressively worsening abdominal distention, decreased appetite, malaise, generalized weakness, with 2 days Hx of jaundice prior to arrival, and was admitted w/ newly diagnosed cirrhosis w/ CSPH, decompensated w/ ascites. Now s/p LVP w/ albumin, no SBP, consistent w/ portal hypertension, low protein. Also s/p EGD 5/1/24 showing Gr II-III EVs, PHG.     CXR no infiltrate  BCx NGTD x 5 days  CT a/p w/ IV contrast: Cirrhosis with portal HTN with collaterals including lower esophageal varices, moderate to large volume ascites with mesenteric edema and anasarca.     # Cirrhosis likely due to EtOH, w/ CSPH, decompensated w/ ascites  # Superimposed alcoholic hepatitis  # Ascites  MELD 3.0 17  MDF >32 now on steroids    s/p large volume, diagnostic and therapeutic paracentesis by IR 5/1 drained 5L, given albumin 8g/L drained   s/p EGD 5/1 showing grade II-III esophageal varices, non-bleeding. no bands placed.  Infectious w/u thus far negative, BCx NGTD x72h, UA neg, with no s/s infection, ascites - no SBP   No PVT per CTa/p w/ iv  SAAG >1.1g/dL (1.5 likely indicates that portal HTN is likely the cause of ascites)  ---- Low protein ascites + CTP was >9, bilirubin was > 3, thus was started on cipro 500 mg daily for SBP ppx    Recommendations:   - Started on prednisolone 40 mg 5/3- (Day 4) Lille score 0.275 (good prognosis), thus will continue for 28 days total.   - NAC liver failure protocol added 5/3, can stop.  - C/w Furosemide 40 mg and can increase Spironolactone 100 mg to 150 mg  - Monitor renal function (Cr normal so far) and electrolytes  - Therapeutic paracentesis as needed, with 25% albumin  6-8 g/l ascites removed. s/p paracentesis today. Patient is aware that might need outpatient as well.   - Cipro 500 mg po daily added 5/5 for SBP ppx, will continue for now, but likely will be able to discontinue outpatient if persistent improvement   - Avoid NSAIDs  - Low salt diet   - Encourage alcohol cessation    # Hypoxia  - Noted SpO2 91-92% on RA. Ascites still present, but no tense.  Need to r/o PPH vs. HPS, thus ordered TTE w/ bubble study. TTE was done, but no report on shunting, might need repeat testing. Can refer outpatient.  - ABG on RA PaO2 75, thus even if has HPS, would not meet MELD exception criteria at present (as PaO2 is > 60)  - CT chest non contrast  noted  - Pulmonology consult appreciated, f/u recs.      # Anemia s/p blood transfusion  # Coagulopathy  # Thrombocytopenia  # PHG  # GR II-III EVs  No reported overt GIB so far  S/p vitamin K x2 (4/29, 4/30)  S/p EGD 5/1: grade II-III esophageal varices, non-bleeding. no bands placed; PHG  Anemia w/u results:  ---- Iron 13, TIBC 221, Iron % 6, ferritin 202, haptoglobin 40, , reticulocyte % 3.8, folate and vitamin b12 wnl  Adequate response to PRBC    Recommendations:   - Can consider IV ferrous supplementation   - Monitor H/H, keep Hb > 7.   - Keep PLT > 50, fibrinogen > 120 if bleeding  - C/w PPI  - C/w carvedilol 3.125 mg bid    # No PSE  # HCC status - no focal hepatic lesion on CT a/p  --- Can send AFP  # Etiology of cirrhosis: likely EtOH  --- CLD w/u so far: Hep B neg, not immune, not exposed, and Hep C ab neg. Hep A IgM neg. Hep E IgG/IgM neg. A1AT WNL. Ferritin 202. CMV negative. EBV past infection.   --- Ceruloplasmin 15, possibly due to severe liver disease, but can send 24 hr urine copper.   --- IgA elevated 1658 and IgG elevated 1661 likely due to cirrhosis; ROSY neg, LKM neg, SMA 1:40, and AMA neg.    Recommendations:  - Can still obtain 24 hr urine copper.        # Transplant candidacy: Will refer outpatient.     # AUD  - C/w Folic, thiamine  - Aspiration, seizure, fall precautions  - Will need rehab, will refer outpatient   - Can consider acamprosate    Thank you for consult  Will follow.  Patient will need to f/u in liver clinic 5/14 8:30 am at the [place] location tentatively, but will be still called from office to confirm. Patient will need PCP too.   Discussed w/ primary, pulmonology

## 2024-05-07 NOTE — PROGRESS NOTE ADULT - PROBLEM SELECTOR PLAN 7
- HBG STABLE   - s/p 1 U PRBC  - Hgb stable  - Maintain active T&S.   - C/W Protonix  - no signs of active bleeding  - if Hgb downtrends again consider CT A/P w/ Iv contrast  GI Dr. Hathaway

## 2024-05-07 NOTE — PROGRESS NOTE ADULT - PROBLEM SELECTOR PLAN 1
secondary to liver failure  SPo2 89% RA on ambulation  SpO2 92% RA at rest.   Oxygen PRN for ambulation  f/u ABG- request by Hepatologist for outpatient   f/u CT chest  continue furosemide and spironolactone- indicated for liver failure  active smoker -refuse nicotine patch  Pulmonologist Dr. Bermudez

## 2024-05-07 NOTE — DISCHARGE NOTE PROVIDER - DETAILS OF MALNUTRITION DIAGNOSIS/DIAGNOSES
This patient has been assessed with a concern for Malnutrition and was treated during this hospitalization for the following Nutrition diagnosis/diagnoses:     -  05/08/2024: Moderate protein-calorie malnutrition

## 2024-05-07 NOTE — DISCHARGE NOTE PROVIDER - NSDCMRMEDTOKEN_GEN_ALL_CORE_FT
carvedilol 3.125 mg oral tablet: 1 tab(s) orally every 12 hours  ciprofloxacin 500 mg oral tablet: 1 tab(s) orally once a day  folic acid 1 mg oral tablet: 1 tab(s) orally once a day  furosemide 20 mg oral tablet: 1 tab(s) orally once a day  Multiple Vitamins with Minerals oral tablet: 1 tab(s) orally once a day  pantoprazole 40 mg oral delayed release tablet: 1 tab(s) orally once a day (before a meal)  prednisoLONE (as sodium phosphate) 15 mg/5 mL oral liquid: 13.33 milliliter(s) orally once a day  spironolactone 25 mg oral tablet: 4 tab(s) orally once a day  thiamine 100 mg oral tablet: 1 tab(s) orally once a day

## 2024-05-07 NOTE — PROGRESS NOTE ADULT - PROBLEM SELECTOR PLAN 3
see plan as above replaced  possible to furosemide, will need decrease dose outpatient  benefit out weight risk to treatment to continue Furosemide

## 2024-05-07 NOTE — PROGRESS NOTE ADULT - SUBJECTIVE AND OBJECTIVE BOX
NP Note discussed with  Primary Attending    Patient is a 48y old  Male who presents with a chief complaint of New cirrhosis (06 May 2024 11:20)      INTERVAL HPI/OVERNIGHT EVENTS: no new complaints    MEDICATIONS  (STANDING):  acetylcysteine IVPB 17 Gram(s) IV Intermittent every 24 hours  carvedilol 3.125 milliGRAM(s) Oral every 12 hours  ciprofloxacin     Tablet 500 milliGRAM(s) Oral daily  folic acid 1 milliGRAM(s) Oral daily  furosemide    Tablet 40 milliGRAM(s) Oral daily  melatonin 3 milliGRAM(s) Oral at bedtime  pantoprazole    Tablet 40 milliGRAM(s) Oral before breakfast  potassium chloride    Tablet ER 40 milliEquivalent(s) Oral every 4 hours  prednisoLONE    3 mG/mL Solution (ORAPRED) 40 milliGRAM(s) Oral daily  spironolactone 100 milliGRAM(s) Oral daily  thiamine 100 milliGRAM(s) Oral daily    MEDICATIONS  (PRN):  acetaminophen     Tablet .. 650 milliGRAM(s) Oral every 6 hours PRN Temp greater or equal to 38C (100.4F), Mild Pain (1 - 3)  ondansetron Injectable 4 milliGRAM(s) IV Push every 8 hours PRN Nausea and/or Vomiting      __________________________________________________  REVIEW OF SYSTEMS:    CONSTITUTIONAL: No fever,   EYES: no acute visual disturbances  NECK: No pain or stiffness  RESPIRATORY: No cough; No shortness of breath  CARDIOVASCULAR: No chest pain, no palpitations  GASTROINTESTINAL: No pain. No nausea or vomiting; No diarrhea   NEUROLOGICAL: No headache or numbness, no tremors  MUSCULOSKELETAL: No joint pain, no muscle pain  GENITOURINARY: no dysuria, no frequency, no hesitancy  PSYCHIATRY: no depression , no anxiety  ALL OTHER  ROS negative        Vital Signs Last 24 Hrs  T(C): 36.6 (07 May 2024 05:15), Max: 37.1 (06 May 2024 19:19)  T(F): 97.9 (07 May 2024 05:15), Max: 98.7 (06 May 2024 19:19)  HR: 77 (07 May 2024 05:15) (77 - 100)  BP: 112/63 (07 May 2024 05:15) (103/62 - 116/68)  BP(mean): --  RR: 18 (07 May 2024 05:15) (17 - 18)  SpO2: 89% (07 May 2024 09:43) (89% - 95%)    Parameters below as of 07 May 2024 09:43  Patient On (Oxygen Delivery Method): room air, Ambulation        ________________________________________________  PHYSICAL EXAM:  GENERAL: NAD  HEENT: Normocephalic;  conjunctivae and sclerae clear; moist mucous membranes;   NECK : supple  CHEST/LUNG: bibasilar crackles  HEART: S1 S2  regular; no murmurs, gallops or rubs  ABDOMEN: ascites, Nontender, distended; Bowel sounds present  EXTREMITIES: no cyanosis; no edema; no calf tenderness  SKIN: tattoos, warm and dry; no rash  NERVOUS SYSTEM:  Awake and alert; Oriented  to place, person and time ; no new deficits    _________________________________________________  LABS:                        7.7    7.61  )-----------( 108      ( 07 May 2024 06:35 )             24.5     05-07    140  |  108  |  12  ----------------------------<  97  3.1<L>   |  28  |  0.56    Ca    7.9<L>      07 May 2024 06:35  Phos  2.9     05-07  Mg     1.6     05-07    TPro  5.6<L>  /  Alb  1.7<L>  /  TBili  1.6<H>  /  DBili  0.9<H>  /  AST  43<H>  /  ALT  28  /  AlkPhos  105  05-07    PT/INR - ( 07 May 2024 06:35 )   PT: 20.6 sec;   INR: 1.84 ratio         PTT - ( 06 May 2024 06:49 )  PTT:33.4 sec  Urinalysis Basic - ( 07 May 2024 06:35 )    Color: x / Appearance: x / SG: x / pH: x  Gluc: 97 mg/dL / Ketone: x  / Bili: x / Urobili: x   Blood: x / Protein: x / Nitrite: x   Leuk Esterase: x / RBC: x / WBC x   Sq Epi: x / Non Sq Epi: x / Bacteria: x      CAPILLARY BLOOD GLUCOSE            RADIOLOGY & ADDITIONAL TESTS:  < from: Xray Chest 1 View- PORTABLE-Urgent (Xray Chest 1 View- PORTABLE-Urgent .) (04.27.24 @ 23:26) >  ACC: [id number] EXAM:  XR CHEST PORTABLE URGENT 1V   ORDERED BY: CANDY BRADY     PROCEDURE DATE:  04/27/2024          INTERPRETATION:  Portable AP chest radiograph    COMPARISON:  NONE.    CLINICAL INFORMATION: Pneumonia like symptoms.    FINDINGS:  CATHETERS AND TUBES: None    PULMONARY: 2.4 cm opacity adjacent to RIGHT tracheobronchial angle either   indicating azygous vein or lymph node .  Remaining lung parenchyma clear.    There are no airspace consolidations or radiographic evidence of   pulmonary nodules..  No pleural effusion or pneumothorax.    HEART/VASCULAR: The heart size and mediastinum configuration are within   the limits of normal.    BONES: The visualized osseous thorax is intact.    IMPRESSION:  2.4 cm opacity adjacent to RIGHT tracheobronchial angle either indicating   azygous vein or lymph node .  Lungs clear.  Continued surveillance recommended.    --- End of Report ---    < end of copied text >  < from: CT Abdomen and Pelvis w/ IV Cont (04.27.24 @ 13:32) >  ACC: [id number] EXAM:  CT ABDOMEN AND PELVIS IC   ORDERED BY: CARO ALONZO     PROCEDURE DATE:  04/27/2024          INTERPRETATION:  CLINICAL INFORMATION: Cirrhosis.    COMPARISON: None.    CONTRAST/COMPLICATIONS:  IV Contrast: Omnipaque 350  90 cc administered   10 cc discarded  Oral Contrast: NONE  Complications: None reported at time of study completion    PROCEDURE:  CT of the Abdomen and Pelvis was performed.  Sagittal and coronal reformats were performed.    FINDINGS:  LOWER CHEST: Bibasilar dependent lung atelectasis, right more than left.    LIVER: Cirrhosis. No focal hepatic mass lesion as imaged.  BILE DUCTS: Normal caliber.  GALLBLADDER: Within normal limits.  SPLEEN: Enlarged measuring approximately 16 cm.  PANCREAS: Within normal limits.  ADRENALS: Within normal limits.  KIDNEYS/URETERS: No hydronephrosis. Multiple nonobstructing calculi lower   pole of the left kidney measuring up to 5 mm.    BLADDER: Minimally distended.  REPRODUCTIVE ORGANS: Prostate within normal limits.    BOWEL: No bowel obstruction. Appendix is normal.  PERITONEUM: Moderate to large volume ascites. Mesenteric edema.  VESSELS: Portal venous collaterals including lower esophageal varices. No   evidence of portal venous thrombosis.  RETROPERITONEUM/LYMPH NODES: No lymphadenopathy.  ABDOMINAL WALL: Anasarca  BONES: Within normal limits.    IMPRESSION:  Cirrhosis with portal hypertension, splenomegaly and ascites.        --- End of Report ---    < end of copied text >  < from: TTE W or WO Ultrasound Enhancing Agent (05.06.24 @ 13:24) >     CONCLUSIONS:      1. Left ventricular wall thickness is normal. Left ventricular systolic function is normal with an ejection fraction of 64 % by Valles's method of disks.   2. The left ventricular diastolic function is indeterminate.   3. Upper limit normal right ventricular cavity size and normal systolic function.   4. The left atrium is mildly dilated.   5. Abdominal ascites is incidentally noted.   6. No prior echocardiogram is available for comparison.   7. Small pericardial effusion noted adjacent to the posterior left ventricle with no evidence of hemodynamic compromise (or echocardiographic evidence of cardiac tamponade).   8. The left ventricular volumes are mildly increased.   9. Trileaflet aortic valve with normal systolic excursion.    ________________________________________________________________________________________  FINDINGS:    < end of copied text >    Imaging  Reviewed:  YES/NO    Consultant(s) Notes Reviewed:   YES/ No      Plan of care was discussed with patient and /or primary care giver; all questions and concerns were addressed

## 2024-05-07 NOTE — DISCHARGE NOTE PROVIDER - CARE PROVIDER_API CALL
Emmanuel Vuong  Internal Medicine  [address] [place] [address]  Phone: [phone]  Fax: [phone]  Follow Up Time:     Emmanuel Vuong  [address] [place], [place]   [address]  Phone: (   )    -  Fax: (   )    -  Scheduled Appointment: 05/14/2024 08:30 AM   Emmanuel Vuong  Internal Medicine  [address] [place] [address]  Phone: [phone]  Fax: [phone]  Follow Up Time:     Emmanuel Vuong  [address] [place], [place]   [address]  Phone: (   )    -  Fax: (   )    -  Scheduled Appointment: 05/14/2024 08:30 AM    Jona Bermudez  Internal Medicine  [address] [facility] [address]  [place] [address]  Phone: [phone]  Fax: [phone]  Follow Up Time: 1 week

## 2024-05-07 NOTE — PROGRESS NOTE ADULT - SUBJECTIVE AND OBJECTIVE BOX
Patient is a 48y old  Male who presents with a chief complaint of New cirrhosis       INTERVAL HPI/OVERNIGHT EVENTS: no new complaints    MEDICATIONS  (STANDING):  acetylcysteine IVPB 17 Gram(s) IV Intermittent every 24 hours  carvedilol 3.125 milliGRAM(s) Oral every 12 hours  ciprofloxacin     Tablet 500 milliGRAM(s) Oral daily  folic acid 1 milliGRAM(s) Oral daily  furosemide    Tablet 40 milliGRAM(s) Oral daily  melatonin 3 milliGRAM(s) Oral at bedtime  pantoprazole    Tablet 40 milliGRAM(s) Oral before breakfast  potassium chloride    Tablet ER 40 milliEquivalent(s) Oral every 4 hours  prednisoLONE    3 mG/mL Solution (ORAPRED) 40 milliGRAM(s) Oral daily  spironolactone 100 milliGRAM(s) Oral daily  thiamine 100 milliGRAM(s) Oral daily    MEDICATIONS  (PRN):  acetaminophen     Tablet .. 650 milliGRAM(s) Oral every 6 hours PRN Temp greater or equal to 38C (100.4F), Mild Pain (1 - 3)  ondansetron Injectable 4 milliGRAM(s) IV Push every 8 hours PRN Nausea and/or Vomiting      __________________________________________________  REVIEW OF SYSTEMS:    CONSTITUTIONAL: No fever,   EYES: no acute visual disturbances  NECK: No pain or stiffness  RESPIRATORY: No cough; No shortness of breath  CARDIOVASCULAR: No chest pain, no palpitations  GASTROINTESTINAL: No pain. No nausea or vomiting; No diarrhea   NEUROLOGICAL: No headache or numbness, no tremors  MUSCULOSKELETAL: No joint pain, no muscle pain  GENITOURINARY: no dysuria, no frequency, no hesitancy  PSYCHIATRY: no depression , no anxiety  ALL OTHER  ROS negative        Vital Signs Last 24 Hrs  T(C): 36.6 (07 May 2024 05:15), Max: 37.1 (06 May 2024 19:19)  T(F): 97.9 (07 May 2024 05:15), Max: 98.7 (06 May 2024 19:19)  HR: 77 (07 May 2024 05:15) (77 - 100)  BP: 112/63 (07 May 2024 05:15) (103/62 - 116/68)  BP(mean): --  RR: 18 (07 May 2024 05:15) (17 - 18)  SpO2: 89% (07 May 2024 09:43) (89% - 95%)    Parameters below as of 07 May 2024 09:43  Patient On (Oxygen Delivery Method): room air, Ambulation        ________________________________________________  PHYSICAL EXAM:  GENERAL: NAD  HEENT: Normocephalic;  conjunctivae and sclerae clear; moist mucous membranes;   NECK : supple  CHEST/LUNG: bibasilar crackles  HEART: S1 S2  +  ABDOMEN: ascites, Nontender, distended; Bowel sounds present  EXTREMITIES: edema+  SKIN: tattoos, warm and dry; no rash  NERVOUS SYSTEM:  Awake and alert;   _________________________________________________  LABS:                        7.7    7.61  )-----------( 108      ( 07 May 2024 06:35 )             24.5     05-07    140  |  108  |  12  ----------------------------<  97  3.1<L>   |  28  |  0.56    Ca    7.9<L>      07 May 2024 06:35  Phos  2.9     05-07  Mg     1.6     05-07    TPro  5.6<L>  /  Alb  1.7<L>  /  TBili  1.6<H>  /  DBili  0.9<H>  /  AST  43<H>  /  ALT  28  /  AlkPhos  105  05-07    PT/INR - ( 07 May 2024 06:35 )   PT: 20.6 sec;   INR: 1.84 ratio         PTT - ( 06 May 2024 06:49 )  PTT:33.4 sec  Urinalysis Basic - ( 07 May 2024 06:35 )    Color: x / Appearance: x / SG: x / pH: x  Gluc: 97 mg/dL / Ketone: x  / Bili: x / Urobili: x   Blood: x / Protein: x / Nitrite: x   Leuk Esterase: x / RBC: x / WBC x   Sq Epi: x / Non Sq Epi: x / Bacteria: x      CAPILLARY BLOOD GLUCOSE            RADIOLOGY & ADDITIONAL TESTS:  < from: Xray Chest 1 View- PORTABLE-Urgent (Xray Chest 1 View- PORTABLE-Urgent .) (04.27.24 @ 23:26) >  ACC: [id number] EXAM:  XR CHEST PORTABLE URGENT 1V   ORDERED BY: CANDY BRADY     PROCEDURE DATE:  04/27/2024          INTERPRETATION:  Portable AP chest radiograph    COMPARISON:  NONE.    CLINICAL INFORMATION: Pneumonia like symptoms.    FINDINGS:  CATHETERS AND TUBES: None    PULMONARY: 2.4 cm opacity adjacent to RIGHT tracheobronchial angle either   indicating azygous vein or lymph node .  Remaining lung parenchyma clear.    There are no airspace consolidations or radiographic evidence of   pulmonary nodules..  No pleural effusion or pneumothorax.    HEART/VASCULAR: The heart size and mediastinum configuration are within   the limits of normal.    BONES: The visualized osseous thorax is intact.    IMPRESSION:  2.4 cm opacity adjacent to RIGHT tracheobronchial angle either indicating   azygous vein or lymph node .  Lungs clear.  Continued surveillance recommended.    --- End of Report ---    < end of copied text >  < from: CT Abdomen and Pelvis w/ IV Cont (04.27.24 @ 13:32) >  ACC: [id number] EXAM:  CT ABDOMEN AND PELVIS IC   ORDERED BY: CARO ALONZO     PROCEDURE DATE:  04/27/2024          INTERPRETATION:  CLINICAL INFORMATION: Cirrhosis.    COMPARISON: None.    CONTRAST/COMPLICATIONS:  IV Contrast: Omnipaque 350  90 cc administered   10 cc discarded  Oral Contrast: NONE  Complications: None reported at time of study completion    PROCEDURE:  CT of the Abdomen and Pelvis was performed.  Sagittal and coronal reformats were performed.    FINDINGS:  LOWER CHEST: Bibasilar dependent lung atelectasis, right more than left.    LIVER: Cirrhosis. No focal hepatic mass lesion as imaged.  BILE DUCTS: Normal caliber.  GALLBLADDER: Within normal limits.  SPLEEN: Enlarged measuring approximately 16 cm.  PANCREAS: Within normal limits.  ADRENALS: Within normal limits.  KIDNEYS/URETERS: No hydronephrosis. Multiple nonobstructing calculi lower   pole of the left kidney measuring up to 5 mm.    BLADDER: Minimally distended.  REPRODUCTIVE ORGANS: Prostate within normal limits.    BOWEL: No bowel obstruction. Appendix is normal.  PERITONEUM: Moderate to large volume ascites. Mesenteric edema.  VESSELS: Portal venous collaterals including lower esophageal varices. No   evidence of portal venous thrombosis.  RETROPERITONEUM/LYMPH NODES: No lymphadenopathy.  ABDOMINAL WALL: Anasarca  BONES: Within normal limits.    IMPRESSION:  Cirrhosis with portal hypertension, splenomegaly and ascites.        --- End of Report ---    < end of copied text >  < from: TTE W or WO Ultrasound Enhancing Agent (05.06.24 @ 13:24) >     CONCLUSIONS:      1. Left ventricular wall thickness is normal. Left ventricular systolic function is normal with an ejection fraction of 64 % by Valles's method of disks.   2. The left ventricular diastolic function is indeterminate.   3. Upper limit normal right ventricular cavity size and normal systolic function.   4. The left atrium is mildly dilated.   5. Abdominal ascites is incidentally noted.   6. No prior echocardiogram is available for comparison.   7. Small pericardial effusion noted adjacent to the posterior left ventricle with no evidence of hemodynamic compromise (or echocardiographic evidence of cardiac tamponade).   8. The left ventricular volumes are mildly increased.   9. Trileaflet aortic valve with normal systolic excursion.    ________________________________________________________________________________________  FINDINGS:    < end of copied text >    Imaging  Reviewed:  YES/NO    Consultant(s) Notes Reviewed:   YES/ No      Plan of care was discussed with patient and /or primary care giver; all questions and concerns were addressed

## 2024-05-07 NOTE — PROCEDURE NOTE - PROCEDURE FINDINGS AND DETAILS
5000 cc serous fluid drained. Catheter removed and a sterile  dressing applied.  Specimens were obtained and sent for a complete evaluation including cytology.
3400 cc serous fluid drained. Catheter removed and a sterile  dressing applied.

## 2024-05-07 NOTE — DISCHARGE NOTE PROVIDER - HOSPITAL COURSE
49 y/o M with PMH of alcohol use disorder (has not seen a doctor for 10 years), who presented with 2 weeks history of worsening generalized abdominal pain, discomfort, and bloating. On CT abdomen pelvis patient has cirrhosis with portal hypertension, splenomegaly and large volume ascites. Patient with Acute Hypoxic Respiratory Failure possible to Cirrhosis with Ascites versus Fluid overload.   Hepatology and GI consulted for work up, s/p large volume paracentesis with IR and EGD showing grade II-III esophageal varices, non-bleeding. no bands placed. Infectious w/u thus far negative, BC negative, UA neg, with no s/s infection. Also noted with anemia requiring 1U PRBC. Cirrhosis likely 2/2 ETOH started on acetylcysteine, prednisolone, furosemide and spironolactone. Cipro for SBP ppx. Paracentesis performed on 5/1 with IR removing 5L, albumin transfused (8G/L removed).  Patient TTE resulting LV thickening EF 64% normal LVS, LVDF indeterminate. Patient remains with AHRF SPo2 89% RA on ambulation, post initial paracentesis, Pulmonology consulted. Patient pending CT chest and repeat paracentesis.     ·  Problem: Acute respiratory failure with hypoxia.   ·  Plan: secondary to liver failure  SPo2 89% RA on ambulation  SpO2 92% RA at rest.   Oxygen PRN for ambulation  f/u ABG- request by Hepatologist for outpatient   f/u CT chest  continue furosemide and spironolactone- indicated for liver failure  active smoker -refuse nicotine patch  Pulmonologist Dr. Bermudez.     Problem/Plan - 2:  ·  Problem: Decompensated hepatic cirrhosis.   ·  Plan: secondary to ETOH abuse  continue NAC last day tomorrow  continue furosemide 40mg daily inpatient- discharge on 20mg daily  continue spironolactone  Paracentesis today with IR- will need albumin 8g/L removed  continue cipro for SBP PPX  continue PPI for esophageal varices   follow up outpatient 1 week post discharge with Hepatology  must AVOID alcohol outpatient  Hepatologist Dr. Vuong.     Problem/Plan - 3:  ·  Problem: Hypokalemia.  ·  Plan: replaced  possible to furosemide, will need decrease dose outpatient  benefit out weight risk to treatment to continue Furosemide.     49 y/o M with PMH of alcohol use disorder (has not seen a doctor for 10 years), who presented with 2 weeks history of worsening generalized abdominal pain, discomfort, and bloating. On CT abdomen pelvis patient has cirrhosis with portal hypertension, splenomegaly and large volume ascites. Patient with Acute Hypoxic Respiratory Failure possible to Cirrhosis with Ascites versus Fluid overload.   Hepatology and GI consulted for work up, s/p large volume paracentesis with IR and EGD showing grade II-III esophageal varices, non-bleeding. no bands placed. Infectious w/u thus far negative, BC negative, UA neg, with no s/s infection. Also noted with anemia requiring 1U PRBC. Cirrhosis likely 2/2 ETOH started on acetylcysteine, prednisolone, furosemide and spironolactone. Cipro for SBP ppx. Paracentesis performed on 5/1 with IR removing 5L, albumin transfused (8G/L removed).  Patient TTE resulting LV thickening EF 64% normal LVS, LVDF indeterminate. Patient remains with AHRF SPo2 89% RA on ambulation, post initial paracentesis, Pulmonology consulted. Patient pending CT chest and repeat paracentesis.     Acute respiratory failure with hypoxia, likely secondary to liver failure. SPo2 89% RA on ambulation SpO2 92% RA at rest.   ABG- request by Hepatologist for outpatient   f/u CT chest showed no PNA  smoking cessation counseling provided as pt is active smoker -refused nicotine patch  Pulmonologist Dr. Bermudez evaluated.     Decompensated hepatic cirrhosis, secondary to ETOH abuse. S/P NAC treatment. Started on furosemide and spironolactone as indicated for liver failure.  Paracentesis 5/1 via IR removed 5000cc-  with albumin 8g/L removed. 2nd paracentesis 5/7 removed 3400cc fluid. Ciprofaxacin 500mg daily started ppx. Protonix for esophageal varices. Followed by Hepatologist Dr. Vuong and to follow up outpatient 1 week post discharge. Appt made.    Hypokalemia possibly due to lasix. Repleted and level improved. Will d/c on lower dose 20mg     *of note pt does not have a PCP. Contacted the [facility] service to assist with finding a PCP and Pulmonologist in [place] per pt's preference. Pt educated on importance of regular follow up with outpatient providers for multiple health issues. He verbalized understanding and confirmed with NP that call center has contacted him via text that they received request and are searching for a provider. Pt confirmed standing appt with Hepatologist for 5/13 @ 8:30am    Pt now medically cleared for discharge home today. Discussed with attending who is in agreement  Please note that this a brief summary of hospital course please refer to daily progress notes and consult notes for full course and events

## 2024-05-07 NOTE — PROGRESS NOTE ADULT - PROBLEM SELECTOR PLAN 7
- Likely 2/2 to cirrhosis  - hold A/C   - S/P 1 dose Vit K   - trend INR - HBG STABLE   - s/p 1 U PRBC  - Hgb stable  - Maintain active T&S.   - C/W Protonix  - no signs of active bleeding  - if Hgb downtrends again consider CT A/P w/ Iv contrast  GI Dr. Hathaway

## 2024-05-07 NOTE — DISCHARGE NOTE PROVIDER - NSFOLLOWUPCLINICS_GEN_ALL_ED_FT
[facility]  Internal Medicine  [address] [place] [address]  Phone: [phone]  Fax: [phone]  Follow Up Time: 1 week

## 2024-05-07 NOTE — PROGRESS NOTE ADULT - PROBLEM SELECTOR PLAN 9
patient from home   pending CT chest  pending paracentesis  pending improvement with Hypoxia  patient will need outpatient PCP as patient with medicaid and no PCP, as patient with complex medical diagnosis -DVT ppx hold in setting of anemia and thrombocytopenia.   - SCDs  -GI ppx Protonix

## 2024-05-07 NOTE — DISCHARGE NOTE PROVIDER - PROVIDER TOKENS
PROVIDER:[TOKEN:[[id number]]],FREE:[LAST:[Csak],FIRST:[Timea],PHONE:[(   )    -],FAX:[(   )    -],ADDRESS:[[address] [place], [place]   [address]],SCHEDULEDAPPT:[05/14/2024],SCHEDULEDAPPTTIME:[08:30 AM]] PROVIDER:[TOKEN:[[id number]]],FREE:[LAST:[Csak],FIRST:[Timea],PHONE:[(   )    -],FAX:[(   )    -],ADDRESS:[[address] [place], [place]   [address]],SCHEDULEDAPPT:[05/14/2024],SCHEDULEDAPPTTIME:[08:30 AM]],PROVIDER:[TOKEN:[[id number]],FOLLOWUP:[1 week]]

## 2024-05-07 NOTE — PROGRESS NOTE ADULT - PROBLEM SELECTOR PLAN 3
replaced  possible to furosemide, will need decrease dose outpatient  benefit out weight risk to treatment to continue Furosemide

## 2024-05-07 NOTE — DISCHARGE NOTE PROVIDER - CARE PROVIDERS DIRECT ADDRESSES
,[email or web address]_Unknown [email or web address],[email or web address],[email or web address]

## 2024-05-07 NOTE — PROCEDURE NOTE - NSICDXPROCEDURE_GEN_ALL_CORE_FT
PROCEDURES:  Paracentesis, with US guidance 01-May-2024 10:35:26  Johanna Steiner  
PROCEDURES:  Paracentesis, with US guidance 01-May-2024 10:35:26  Johanna Steiner

## 2024-05-07 NOTE — DISCHARGE NOTE PROVIDER - CONDITIONS AT DISCHARGE
Pt seen and care plan discussed with attending who is in agreement that pt is medically cleared for discharge home today

## 2024-05-07 NOTE — PROGRESS NOTE ADULT - PROBLEM SELECTOR PLAN 6
- HBG STABLE   - s/p 1 U PRBC  - Hgb stable  - Maintain active T&S.   - C/W Protonix  - no signs of active bleeding  - if Hgb downtrends again consider CT A/P w/ Iv contrast  GI Dr. Hathaway plan as above

## 2024-05-07 NOTE — PROGRESS NOTE ADULT - PROBLEM SELECTOR PROBLEM 1
Decompensated hepatic cirrhosis
Acute respiratory failure with hypoxia
Decompensated hepatic cirrhosis
Acute respiratory failure with hypoxia
Decompensated hepatic cirrhosis
Bed in lowest position, wheels locked, appropriate side rails in place/Call bell, personal items and telephone in reach/Instruct patient to call for assistance before getting out of bed or chair/Non-slip footwear when patient is out of bed/[place] to call system/Physically safe environment - no spills, clutter or unnecessary equipment/Purposeful Proactive Rounding/Room/bathroom lighting operational, light cord in reach

## 2024-05-07 NOTE — PROGRESS NOTE ADULT - PROBLEM SELECTOR PLAN 2
secondary to ETOH abuse  continue NAC last day tomorrow  continue furosemide 40mg daily inpatient- discharge on 20mg daily  continue spironolactone  Paracentesis today with IR- will need albumin 8g/L removed  continue cipro for SBP PPX  continue PPI for esophageal varices   follow up outpatient 1 week post discharge with Hepatology  must AVOID alcohol outpatient  Hepatologist Dr. Vuong

## 2024-05-07 NOTE — PROGRESS NOTE ADULT - PROBLEM SELECTOR PLAN 8
-DVT ppx hold in setting of anemia and thrombocytopenia.   - SCDs  -GI ppx Protonix - Likely 2/2 to cirrhosis  - hold A/C   - S/P 1 dose Vit K   - trend INR

## 2024-05-07 NOTE — DISCHARGE NOTE PROVIDER - NSDCCPCAREPLAN_GEN_ALL_CORE_FT
PRINCIPAL DISCHARGE DIAGNOSIS  Diagnosis: Decompensated hepatic cirrhosis  Assessment and Plan of Treatment: You were treated for liver failure.  Please continue your medications as prescribed.  Please continue taking your medication as prescribed. If you have any questions or concerns about your medication please direct them to your prescribing Healthcare Provider.  Please follow up with your Hepatologist and Primary Care Physician in withing 1 week for further medical management.  Emmanuel aPrrish  5/14/24 @ 8:30am  [address] [id number]        SECONDARY DISCHARGE DIAGNOSES  Diagnosis: Ascites  Assessment and Plan of Treatment: You got paracentesis twice while in admitted.   Please folllow up with the Hepatologsit   Emmanuel Parrish  5/14/24 @ 8:30am  [address] [place], [place] [address]    Diagnosis: Esophageal varices  Assessment and Plan of Treatment: You were seen with Gastroeneterology and has a EGD.  Emmanuel Parrish  5/14/24 @ 8:30am  [address] [id number]    Diagnosis: Hypokalemia  Assessment and Plan of Treatment:     Diagnosis: Thrombocytopenia  Assessment and Plan of Treatment:     Diagnosis: Acute respiratory failure with hypoxia  Assessment and Plan of Treatment:      PRINCIPAL DISCHARGE DIAGNOSIS  Diagnosis: Decompensated hepatic cirrhosis  Assessment and Plan of Treatment: You were treated for liver failure. You were treated with NAC and started on Spironalactone and lasix. Please continue these medications as prescribed.   In order to optimize your overall health and prevent adverse events, please abstain from ingesting alcohol. Continue to supplement with recommended vitamins and follow-up with your primary care provider for further medical care.  It is highly recommended to attend AA meetings to help create a sober lifestyle. If you need immediate assistance with substance abuse you may contact the [facility] by calling [phone].  [facility]: [phone]. [facility] -[phone]   Please continue your medications as prescribed.  Please continue taking your medication as prescribed. If you have any questions or concerns about your medication please direct them to your prescribing Healthcare Provider.  Please follow up with your Hepatologist and Primary Care Physician in withing 1 week for further medical management.  Emmanuel Parrish  5/14/24 @ 8:30am  [address] [facility]  [phone]        SECONDARY DISCHARGE DIAGNOSES  Diagnosis: Acute respiratory failure with hypoxia  Assessment and Plan of Treatment: Your oxygen levels were low when you came to the hospital, this was likely due to the large amount of fluid in your abdomen. You were given supplemental oxygen. You had fluid removed from your abdomen twice. You were weaned off of oxygen and your O2 sat level has remained stable. You were evaluated by a pulmonologist.  Please follow up with a Pulmonologist within 1 week  The name and number has been provided.   You preferred to make your own appointment with a provider in [place]. You were put in touch with the [facility] call center to help facilitate this process and confirmed they contacted you.   Should you need to reach them again  [phone] your representative's name is Jessica    Diagnosis: Ascites  Assessment and Plan of Treatment: You got paracentesis twice while in admitted on 5/1 and 5/7 to remove fluid from your abdomen. Please continue antibiotic ciproflaxacin for 3 days until finished to complete the course.  You make keep the dressing clean and dry to site and remove in 24 hours if no longer draining.   Please folllow up with the Hepatologsit   Emmanuel Parrish  5/14/24 @ 8:30am  [address] [phone]    Diagnosis: Hypokalemia  Assessment and Plan of Treatment: Your potassium level was low during your stay at the hospital. You were supplemented and your level normalized. Please note your lasix dose has been changed to 20mg as it may have contributed to your low potassium. Please continue to take this medication as prescribed.    Diagnosis: Esophageal varices  Assessment and Plan of Treatment: You were seen with Gastroeneterology and had an EGD. varices were found with no bleeding, no banding applied.  This condition is related to your liver failure. You were started on Protonix. Please continue to take this medication as prescribed on an empty stomach. Please follow up with hepatoloist Dr. Vuong for further managment.  Dr. Vuong, [place]  5/14/24 @ 8:30am  [address] [phone]

## 2024-05-07 NOTE — PROGRESS NOTE ADULT - ASSESSMENT
49 y/o M with PMH of alcohol use disorder (has not seen a doctor for 10 years), who presented with 2 weeks history of worsening generalized abdominal pain, discomfort, and bloating. On CT abdomen pelvis patient has cirrhosis with portal hypertension, splenomegaly and large volume ascites. Patient with Acute Hypoxic Respiratory Failure possible to Cirrhosis with Ascites versus Fluid overload.   Hepatology and GI consulted for work up, s/p large volume paracentesis with IR and EGD showing grade II-III esophageal varices, non-bleeding. no bands placed. Infectious w/u thus far negative, BC negative, UA neg, with no s/s infection. Also noted with anemia requiring 1U PRBC. Cirrhosis likely 2/2 ETOH started on acetylcysteine, prednisolone, furosemide and spironolactone. Cipro for SBP ppx. Paracentesis performed on 5/1 with IR removing 5L, albumin transfused (8G/L removed).  Patient TTE resulting LV thickening EF 64% normal LVS, LVDF indeterminate. Patient remains with AHRF SPo2 89% RA on ambulation, post initial paracentesis, Pulmonology consulted. Patient pending CT chest and repeat paracentesis.

## 2024-05-07 NOTE — PROGRESS NOTE ADULT - SUBJECTIVE AND OBJECTIVE BOX
Chief Complaint:  Patient is a 48y old  Male who presents with a chief complaint of New cirrhosis (07 May 2024 18:40)      Reason for consult: Cirrhosis    Interval Events: Patient was seen and examined at bedside. S/p paracentesis 3.4l, w/ 25% albumin. ABG PaO2 75. Hypoxia further improved after paracentesis.     Hospital Medications:  acetaminophen     Tablet .. 650 milliGRAM(s) Oral every 6 hours PRN  carvedilol 3.125 milliGRAM(s) Oral every 12 hours  ciprofloxacin     Tablet 500 milliGRAM(s) Oral daily  folic acid 1 milliGRAM(s) Oral daily  furosemide    Tablet 40 milliGRAM(s) Oral daily  melatonin 3 milliGRAM(s) Oral at bedtime  ondansetron Injectable 4 milliGRAM(s) IV Push every 8 hours PRN  pantoprazole    Tablet 40 milliGRAM(s) Oral before breakfast  prednisoLONE    3 mG/mL Solution (ORAPRED) 40 milliGRAM(s) Oral daily  spironolactone 100 milliGRAM(s) Oral daily  thiamine 100 milliGRAM(s) Oral daily      ROS:   General:  No  fevers, chills, night sweats, fatigue  Eyes:  Good vision, no reported pain  ENT:  No sore throat, pain, runny nose  CV:  No pain, palpitations  Pulm:  No dyspnea, cough  GI:  no abdominal pain, N/V, reports normal BM, denies bleeding  :  No  dysuria  Muscle:  No pain, weakness  Neuro:  No memory problems  Skin:  No rash    PHYSICAL EXAM:   Vital Signs:  Vital Signs Last 24 Hrs  T(C): 36.8 (07 May 2024 14:21), Max: 36.8 (07 May 2024 14:21)  T(F): 98.3 (07 May 2024 14:21), Max: 98.3 (07 May 2024 14:21)  HR: 76 (07 May 2024 17:00) (76 - 78)  BP: 107/64 (07 May 2024 17:00) (107/64 - 121/66)  BP(mean): 78 (07 May 2024 17:00) (78 - 84)  RR: 18 (07 May 2024 17:00) (18 - 18)  SpO2: 95% (07 May 2024 17:00) (89% - 98%)    Parameters below as of 07 May 2024 17:00  Patient On (Oxygen Delivery Method): room air      Daily     Daily     GENERAL: no acute distress  NEURO: AAOx3, no asterixis  HEENT: anicteric sclera, no conjunctival pallor appreciated  CHEST: no respiratory distress, no accessory muscle use  CARDIAC: regular rate, rhythm  ABDOMEN: soft, non-tender, (+) mildly distended, no rebound or guarding, BS+  EXTREMITIES: warm, well perfused, no edema  SKIN: tattoos    LABS: reviewed                        7.7    7.61  )-----------( 108      ( 07 May 2024 06:35 )             24.5     05-07    140  |  108  |  12  ----------------------------<  97  3.1<L>   |  28  |  0.56    Ca    7.9<L>      07 May 2024 06:35  Phos  2.9     05-07  Mg     1.6     05-07    TPro  5.6<L>  /  Alb  1.7<L>  /  TBili  1.6<H>  /  DBili  0.9<H>  /  AST  43<H>  /  ALT  28  /  AlkPhos  105  05-07    LIVER FUNCTIONS - ( 07 May 2024 06:35 )  Alb: 1.7 g/dL / Pro: 5.6 g/dL / ALK PHOS: 105 U/L / ALT: 28 U/L DA / AST: 43 U/L / GGT: x             Interval Diagnostic Studies: see sunrise for full report

## 2024-05-08 VITALS — HEART RATE: 89 BPM

## 2024-05-08 LAB
ALBUMIN SERPL ELPH-MCNC: 1.9 G/DL — LOW (ref 3.5–5)
ALP SERPL-CCNC: 95 U/L — SIGNIFICANT CHANGE UP (ref 40–120)
ALT FLD-CCNC: 30 U/L DA — SIGNIFICANT CHANGE UP (ref 10–60)
ANION GAP SERPL CALC-SCNC: 5 MMOL/L — SIGNIFICANT CHANGE UP (ref 5–17)
APTT BLD: 34.6 SEC — SIGNIFICANT CHANGE UP (ref 24.5–35.6)
AST SERPL-CCNC: 42 U/L — HIGH (ref 10–40)
BILIRUB SERPL-MCNC: 1.6 MG/DL — HIGH (ref 0.2–1.2)
BUN SERPL-MCNC: 14 MG/DL — SIGNIFICANT CHANGE UP (ref 7–18)
CALCIUM SERPL-MCNC: 8.2 MG/DL — LOW (ref 8.4–10.5)
CHLORIDE SERPL-SCNC: 110 MMOL/L — HIGH (ref 96–108)
CO2 SERPL-SCNC: 26 MMOL/L — SIGNIFICANT CHANGE UP (ref 22–31)
CREAT SERPL-MCNC: 0.56 MG/DL — SIGNIFICANT CHANGE UP (ref 0.5–1.3)
EGFR: 122 ML/MIN/1.73M2 — SIGNIFICANT CHANGE UP
GLUCOSE SERPL-MCNC: 96 MG/DL — SIGNIFICANT CHANGE UP (ref 70–99)
HCT VFR BLD CALC: 24.6 % — LOW (ref 39–50)
HGB BLD-MCNC: 7.8 G/DL — LOW (ref 13–17)
INR BLD: 1.67 RATIO — HIGH (ref 0.85–1.18)
MAGNESIUM SERPL-MCNC: 1.8 MG/DL — SIGNIFICANT CHANGE UP (ref 1.6–2.6)
MCHC RBC-ENTMCNC: 30.8 PG — SIGNIFICANT CHANGE UP (ref 27–34)
MCHC RBC-ENTMCNC: 31.7 GM/DL — LOW (ref 32–36)
MCV RBC AUTO: 97.2 FL — SIGNIFICANT CHANGE UP (ref 80–100)
NRBC # BLD: 0 /100 WBCS — SIGNIFICANT CHANGE UP (ref 0–0)
PHOSPHATE SERPL-MCNC: 2.9 MG/DL — SIGNIFICANT CHANGE UP (ref 2.5–4.5)
PLATELET # BLD AUTO: 118 K/UL — LOW (ref 150–400)
POTASSIUM SERPL-MCNC: 3.4 MMOL/L — LOW (ref 3.5–5.3)
POTASSIUM SERPL-SCNC: 3.4 MMOL/L — LOW (ref 3.5–5.3)
PROT SERPL-MCNC: 5.6 G/DL — LOW (ref 6–8.3)
PROTHROM AB SERPL-ACNC: 18.8 SEC — HIGH (ref 9.5–13)
RBC # BLD: 2.53 M/UL — LOW (ref 4.2–5.8)
RBC # FLD: 17.2 % — HIGH (ref 10.3–14.5)
SODIUM SERPL-SCNC: 141 MMOL/L — SIGNIFICANT CHANGE UP (ref 135–145)
WBC # BLD: 7.23 K/UL — SIGNIFICANT CHANGE UP (ref 3.8–10.5)
WBC # FLD AUTO: 7.23 K/UL — SIGNIFICANT CHANGE UP (ref 3.8–10.5)

## 2024-05-08 PROCEDURE — 88305 TISSUE EXAM BY PATHOLOGIST: CPT

## 2024-05-08 PROCEDURE — 83690 ASSAY OF LIPASE: CPT

## 2024-05-08 PROCEDURE — 87070 CULTURE OTHR SPECIMN AEROBIC: CPT

## 2024-05-08 PROCEDURE — 86381 MITOCHONDRIAL ANTIBODY EACH: CPT

## 2024-05-08 PROCEDURE — 87529 HSV DNA AMP PROBE: CPT

## 2024-05-08 PROCEDURE — 76942 ECHO GUIDE FOR BIOPSY: CPT

## 2024-05-08 PROCEDURE — 99232 SBSQ HOSP IP/OBS MODERATE 35: CPT

## 2024-05-08 PROCEDURE — 87798 DETECT AGENT NOS DNA AMP: CPT

## 2024-05-08 PROCEDURE — 81001 URINALYSIS AUTO W/SCOPE: CPT

## 2024-05-08 PROCEDURE — 86850 RBC ANTIBODY SCREEN: CPT

## 2024-05-08 PROCEDURE — 82103 ALPHA-1-ANTITRYPSIN TOTAL: CPT

## 2024-05-08 PROCEDURE — 80307 DRUG TEST PRSMV CHEM ANLYZR: CPT

## 2024-05-08 PROCEDURE — 82728 ASSAY OF FERRITIN: CPT

## 2024-05-08 PROCEDURE — 82390 ASSAY OF CERULOPLASMIN: CPT

## 2024-05-08 PROCEDURE — 85045 AUTOMATED RETICULOCYTE COUNT: CPT

## 2024-05-08 PROCEDURE — 87075 CULTR BACTERIA EXCEPT BLOOD: CPT

## 2024-05-08 PROCEDURE — 86923 COMPATIBILITY TEST ELECTRIC: CPT

## 2024-05-08 PROCEDURE — 87340 HEPATITIS B SURFACE AG IA: CPT

## 2024-05-08 PROCEDURE — 86255 FLUORESCENT ANTIBODY SCREEN: CPT

## 2024-05-08 PROCEDURE — 83735 ASSAY OF MAGNESIUM: CPT

## 2024-05-08 PROCEDURE — 80053 COMPREHEN METABOLIC PANEL: CPT

## 2024-05-08 PROCEDURE — 86900 BLOOD TYPING SEROLOGIC ABO: CPT

## 2024-05-08 PROCEDURE — 83010 ASSAY OF HAPTOGLOBIN QUANT: CPT

## 2024-05-08 PROCEDURE — 49083 ABD PARACENTESIS W/IMAGING: CPT

## 2024-05-08 PROCEDURE — 86665 EPSTEIN-BARR CAPSID VCA: CPT

## 2024-05-08 PROCEDURE — 83540 ASSAY OF IRON: CPT

## 2024-05-08 PROCEDURE — 84100 ASSAY OF PHOSPHORUS: CPT

## 2024-05-08 PROCEDURE — 82803 BLOOD GASES ANY COMBINATION: CPT

## 2024-05-08 PROCEDURE — 86703 HIV-1/HIV-2 1 RESULT ANTBDY: CPT

## 2024-05-08 PROCEDURE — 74177 CT ABD & PELVIS W/CONTRAST: CPT | Mod: MC

## 2024-05-08 PROCEDURE — 86901 BLOOD TYPING SEROLOGIC RH(D): CPT

## 2024-05-08 PROCEDURE — 96374 THER/PROPH/DIAG INJ IV PUSH: CPT

## 2024-05-08 PROCEDURE — 85027 COMPLETE CBC AUTOMATED: CPT

## 2024-05-08 PROCEDURE — 88112 CYTOPATH CELL ENHANCE TECH: CPT

## 2024-05-08 PROCEDURE — 84157 ASSAY OF PROTEIN OTHER: CPT

## 2024-05-08 PROCEDURE — 80074 ACUTE HEPATITIS PANEL: CPT

## 2024-05-08 PROCEDURE — 80076 HEPATIC FUNCTION PANEL: CPT

## 2024-05-08 PROCEDURE — 82746 ASSAY OF FOLIC ACID SERUM: CPT

## 2024-05-08 PROCEDURE — 82784 ASSAY IGA/IGD/IGG/IGM EACH: CPT

## 2024-05-08 PROCEDURE — 86706 HEP B SURFACE ANTIBODY: CPT

## 2024-05-08 PROCEDURE — 86038 ANTINUCLEAR ANTIBODIES: CPT

## 2024-05-08 PROCEDURE — P9040: CPT

## 2024-05-08 PROCEDURE — 85025 COMPLETE CBC W/AUTO DIFF WBC: CPT

## 2024-05-08 PROCEDURE — C1729: CPT

## 2024-05-08 PROCEDURE — 85730 THROMBOPLASTIN TIME PARTIAL: CPT

## 2024-05-08 PROCEDURE — 36430 TRANSFUSION BLD/BLD COMPNT: CPT

## 2024-05-08 PROCEDURE — 83550 IRON BINDING TEST: CPT

## 2024-05-08 PROCEDURE — 82042 OTHER SOURCE ALBUMIN QUAN EA: CPT

## 2024-05-08 PROCEDURE — 99285 EMERGENCY DEPT VISIT HI MDM: CPT | Mod: 25

## 2024-05-08 PROCEDURE — 71250 CT THORAX DX C-: CPT | Mod: MC

## 2024-05-08 PROCEDURE — 86803 HEPATITIS C AB TEST: CPT

## 2024-05-08 PROCEDURE — 87040 BLOOD CULTURE FOR BACTERIA: CPT

## 2024-05-08 PROCEDURE — 71045 X-RAY EXAM CHEST 1 VIEW: CPT

## 2024-05-08 PROCEDURE — 93005 ELECTROCARDIOGRAM TRACING: CPT

## 2024-05-08 PROCEDURE — 89051 BODY FLUID CELL COUNT: CPT

## 2024-05-08 PROCEDURE — 83615 LACTATE (LD) (LDH) ENZYME: CPT

## 2024-05-08 PROCEDURE — 82962 GLUCOSE BLOOD TEST: CPT

## 2024-05-08 PROCEDURE — 86707 HEPATITIS BE ANTIBODY: CPT

## 2024-05-08 PROCEDURE — 86376 MICROSOMAL ANTIBODY EACH: CPT

## 2024-05-08 PROCEDURE — 86664 EPSTEIN-BARR NUCLEAR ANTIGEN: CPT

## 2024-05-08 PROCEDURE — 86705 HEP B CORE ANTIBODY IGM: CPT

## 2024-05-08 PROCEDURE — 85610 PROTHROMBIN TIME: CPT

## 2024-05-08 PROCEDURE — 87799 DETECT AGENT NOS DNA QUANT: CPT

## 2024-05-08 PROCEDURE — 82607 VITAMIN B-12: CPT

## 2024-05-08 PROCEDURE — 86790 VIRUS ANTIBODY NOS: CPT

## 2024-05-08 PROCEDURE — 80061 LIPID PANEL: CPT

## 2024-05-08 PROCEDURE — 87205 SMEAR GRAM STAIN: CPT

## 2024-05-08 PROCEDURE — C1769: CPT

## 2024-05-08 PROCEDURE — P9047: CPT

## 2024-05-08 PROCEDURE — 80048 BASIC METABOLIC PNL TOTAL CA: CPT

## 2024-05-08 PROCEDURE — 86704 HEP B CORE ANTIBODY TOTAL: CPT

## 2024-05-08 PROCEDURE — 36415 COLL VENOUS BLD VENIPUNCTURE: CPT

## 2024-05-08 PROCEDURE — 96375 TX/PRO/DX INJ NEW DRUG ADDON: CPT

## 2024-05-08 PROCEDURE — 83036 HEMOGLOBIN GLYCOSYLATED A1C: CPT

## 2024-05-08 PROCEDURE — 87350 HEPATITIS BE AG IA: CPT

## 2024-05-08 PROCEDURE — 93306 TTE W/DOPPLER COMPLETE: CPT

## 2024-05-08 PROCEDURE — 82945 GLUCOSE OTHER FLUID: CPT

## 2024-05-08 PROCEDURE — 86663 EPSTEIN-BARR ANTIBODY: CPT

## 2024-05-08 PROCEDURE — 82248 BILIRUBIN DIRECT: CPT

## 2024-05-08 RX ORDER — CIPROFLOXACIN LACTATE 400MG/40ML
1 VIAL (ML) INTRAVENOUS
Qty: 3 | Refills: 0
Start: 2024-05-08 | End: 2024-05-10

## 2024-05-08 RX ORDER — SPIRONOLACTONE 25 MG/1
4 TABLET, FILM COATED ORAL
Qty: 120 | Refills: 0
Start: 2024-05-08 | End: 2024-06-06

## 2024-05-08 RX ORDER — FUROSEMIDE 40 MG
1 TABLET ORAL
Qty: 30 | Refills: 0
Start: 2024-05-08 | End: 2024-06-06

## 2024-05-08 RX ORDER — PREDNISOLONE 5 MG
13.33 TABLET ORAL
Qty: 306.59 | Refills: 0
Start: 2024-05-08 | End: 2024-05-30

## 2024-05-08 RX ORDER — FOLIC ACID 0.8 MG
1 TABLET ORAL
Qty: 30 | Refills: 0
Start: 2024-05-08 | End: 2024-06-06

## 2024-05-08 RX ORDER — THIAMINE MONONITRATE (VIT B1) 100 MG
1 TABLET ORAL
Qty: 30 | Refills: 0
Start: 2024-05-08 | End: 2024-06-06

## 2024-05-08 RX ORDER — PANTOPRAZOLE SODIUM 20 MG/1
1 TABLET, DELAYED RELEASE ORAL
Qty: 30 | Refills: 0
Start: 2024-05-08 | End: 2024-06-06

## 2024-05-08 RX ORDER — POTASSIUM CHLORIDE 20 MEQ
40 PACKET (EA) ORAL ONCE
Refills: 0 | Status: COMPLETED | OUTPATIENT
Start: 2024-05-08 | End: 2024-05-08

## 2024-05-08 RX ORDER — MULTIVIT-MIN/FERROUS GLUCONATE 9 MG/15 ML
1 LIQUID (ML) ORAL DAILY
Refills: 0 | Status: DISCONTINUED | OUTPATIENT
Start: 2024-05-08 | End: 2024-05-08

## 2024-05-08 RX ORDER — CARVEDILOL PHOSPHATE 80 MG/1
1 CAPSULE, EXTENDED RELEASE ORAL
Qty: 60 | Refills: 0
Start: 2024-05-08 | End: 2024-06-06

## 2024-05-08 RX ORDER — MULTIVIT-MIN/FERROUS GLUCONATE 9 MG/15 ML
1 LIQUID (ML) ORAL
Qty: 30 | Refills: 0
Start: 2024-05-08 | End: 2024-06-06

## 2024-05-08 RX ADMIN — Medication 500 MILLIGRAM(S): at 11:36

## 2024-05-08 RX ADMIN — Medication 40 MILLIGRAM(S): at 05:45

## 2024-05-08 RX ADMIN — Medication 40 MILLIGRAM(S): at 06:47

## 2024-05-08 RX ADMIN — Medication 40 MILLIEQUIVALENT(S): at 11:57

## 2024-05-08 RX ADMIN — SPIRONOLACTONE 100 MILLIGRAM(S): 25 TABLET, FILM COATED ORAL at 05:45

## 2024-05-08 RX ADMIN — Medication 1 TABLET(S): at 12:05

## 2024-05-08 RX ADMIN — Medication 100 MILLIGRAM(S): at 11:36

## 2024-05-08 RX ADMIN — PANTOPRAZOLE SODIUM 40 MILLIGRAM(S): 20 TABLET, DELAYED RELEASE ORAL at 05:45

## 2024-05-08 RX ADMIN — CARVEDILOL PHOSPHATE 3.12 MILLIGRAM(S): 80 CAPSULE, EXTENDED RELEASE ORAL at 05:45

## 2024-05-08 RX ADMIN — Medication 1 MILLIGRAM(S): at 11:36

## 2024-05-08 NOTE — DIETITIAN INITIAL EVALUATION ADULT - PROBLEM SELECTOR PLAN 2
- Meets criteria of alcoholic hepatitis with transaminitis, jaundice, history of heavy alcohol use, elevated PT/INR,  -  Maddrey: 47.9.  - Consulted hepatology - Dr. Vuong.  - F/U Bcx, Ua, CXR.   - Will hold off on starting steroids until 24-28 hours of negative sepsis work up.

## 2024-05-08 NOTE — PHARMACOTHERAPY INTERVENTION NOTE - COMMENTS
Recommended to add MVI to pt's regimen. 
Pt is currently on Prednisolone 40mg daily.  Double checked with NP Lo and verified by hepatology resident notes that pt should be on Prednisolone and not Prednisone (look alike sound alike meds).

## 2024-05-08 NOTE — DIETITIAN NUTRITION RISK NOTIFICATION - TREATMENT: THE FOLLOWING DIET HAS BEEN RECOMMENDED
Diet, Regular:   1200mL Fluid Restriction ([id number])  Low Sodium (05-08-24 @ 10:33) [Pending Verification By Attending]  ( Pt declining oral nutritional supplement at present)  Rec: MVI/minerals as medically feasible   ( on Thiamine, Folic Acid Rx)

## 2024-05-08 NOTE — PROGRESS NOTE ADULT - REASON FOR ADMISSION
New cirrhosis

## 2024-05-08 NOTE — PROGRESS NOTE ADULT - SUBJECTIVE AND OBJECTIVE BOX
Chief Complaint:  Patient is a 48y old  Male who presents with a chief complaint of Hepatic cirrhosis     (08 May 2024 08:41)      Reason for consult: ALD    Interval Events: Patient was seen and examined at bedside. No new complaints. Vitals stable. Ascites, but soft. No bleeding.     Hospital Medications:  acetaminophen     Tablet .. 650 milliGRAM(s) Oral every 6 hours PRN  carvedilol 3.125 milliGRAM(s) Oral every 12 hours  ciprofloxacin     Tablet 500 milliGRAM(s) Oral daily  folic acid 1 milliGRAM(s) Oral daily  furosemide    Tablet 40 milliGRAM(s) Oral daily  melatonin 3 milliGRAM(s) Oral at bedtime  multivitamin/minerals 1 Tablet(s) Oral daily  ondansetron Injectable 4 milliGRAM(s) IV Push every 8 hours PRN  pantoprazole    Tablet 40 milliGRAM(s) Oral before breakfast  prednisoLONE    3 mG/mL Solution (ORAPRED) 40 milliGRAM(s) Oral daily  spironolactone 100 milliGRAM(s) Oral daily  thiamine 100 milliGRAM(s) Oral daily      ROS:   General:  No  fevers, chills, night sweats, fatigue  Eyes:  Good vision, no reported pain  ENT:  No sore throat, pain, runny nose  CV:  No pain, palpitations  Pulm:  No dyspnea, cough  GI:  no abdominal pain, N/V, reports normal BM, denies bleeding  :  No  dysuria  Muscle:  No pain, weakness  Neuro:  No memory problems  Skin:  No rash      PHYSICAL EXAM:   Vital Signs:  Vital Signs Last 24 Hrs  T(C): 36.9 (08 May 2024 12:45), Max: 36.9 (07 May 2024 20:30)  T(F): 98.5 (08 May 2024 12:45), Max: 98.5 (07 May 2024 20:30)  HR: 89 (08 May 2024 14:16) (75 - 89)  BP: 106/57 (08 May 2024 12:45) (101/63 - 111/53)  BP(mean): 68 (08 May 2024 12:45) (68 - 78)  RR: 17 (08 May 2024 12:45) (17 - 18)  SpO2: 94% (08 May 2024 12:45) (94% - 95%)    Parameters below as of 08 May 2024 12:45  Patient On (Oxygen Delivery Method): room air      Daily     Daily Weight in k.2 (08 May 2024 05:19)    GENERAL: no acute distress  NEURO: AAOx3, no asterixis  HEENT: anicteric sclera, no conjunctival pallor appreciated  CHEST: no respiratory distress, no accessory muscle use  CARDIAC: regular rate, rhythm  ABDOMEN: soft, non-tender, (+) mildly distended, no rebound or guarding, BS+  EXTREMITIES: warm, well perfused, no edema  SKIN: tattoos    LABS: reviewed                        7.8    7.23  )-----------( 118      ( 08 May 2024 06:20 )             24.6     05-08    141  |  110<H>  |  14  ----------------------------<  96  3.4<L>   |  26  |  0.56    Ca    8.2<L>      08 May 2024 06:20  Phos  2.9     05-08  Mg     1.8     05-08    TPro  5.6<L>  /  Alb  1.9<L>  /  TBili  1.6<H>  /  DBili  x   /  AST  42<H>  /  ALT  30  /  AlkPhos  95  05-08    LIVER FUNCTIONS - ( 08 May 2024 06:20 )  Alb: 1.9 g/dL / Pro: 5.6 g/dL / ALK PHOS: 95 U/L / ALT: 30 U/L DA / AST: 42 U/L / GGT: x             Interval Diagnostic Studies: see sunrise for full report

## 2024-05-08 NOTE — PROGRESS NOTE ADULT - ASSESSMENT
47yo  Male w/ Hx of AUD (4-5 drinks/day, Vodka, last drink 2 weeks prior to admission), smoking (20-25 years) not on home O2, and family Hx of hepatitis C (mother), presented to [facility] ER w/ 2 weeks progressively worsening abdominal distention, decreased appetite, malaise, generalized weakness, with 2 days Hx of jaundice prior to arrival, and was admitted w/ newly diagnosed cirrhosis w/ CSPH, decompensated w/ ascites. Now s/p LVP w/ albumin, no SBP, consistent w/ portal hypertension, low protein. Also s/p EGD 5/1/24 showing Gr II-III EVs, PHG.     CXR no infiltrate  BCx NGTD x 5 days  CT a/p w/ IV contrast: Cirrhosis with portal HTN with collaterals including lower esophageal varices, moderate to large volume ascites with mesenteric edema and anasarca.     # Cirrhosis likely due to EtOH, w/ CSPH, decompensated w/ ascites  # Superimposed alcoholic hepatitis  # Ascites  MELD 3.0 17  MDF >32 now on steroids    s/p large volume, diagnostic and therapeutic paracentesis by IR 5/1 drained 5L, given albumin 8g/L drained   s/p EGD 5/1 showing grade II-III esophageal varices, non-bleeding. no bands placed.  Infectious w/u thus far negative, BCx NGTD x72h, UA neg, with no s/s infection, ascites - no SBP   No PVT per CTa/p w/ iv  SAAG >1.1g/dL (1.5 likely indicates that portal HTN is likely the cause of ascites)  ---- Low protein ascites + CTP was >9, bilirubin was > 3, thus was started on cipro 500 mg daily for SBP ppx    Recommendations:   - Started on prednisolone 40 mg 5/3- (Day 4) Lille score 0.275 (good prognosis), thus will continue for 28 days total.   - NAC liver failure protocol added 5/3, can stop.  - C/w Furosemide 40 mg and can increase Spironolactone 100 mg to 150 mg   - Monitor renal function (Cr normal so far) and electrolytes  - Therapeutic paracentesis as needed, with 25% albumin  6-8 g/l ascites removed. s/p paracentesis yesterday. Patient is aware that might need outpatient as well.   - Cipro 500 mg po daily added 5/5 for SBP ppx, will continue for now, but likely will be able to discontinue outpatient if persistent improvement   - Avoid NSAIDs  - Low salt diet   - Encourage alcohol cessation    # Hypoxia  - Noted SpO2 91-92% on RA. Ascites still present, but no tense.  Need to r/o PPH vs. HPS, thus ordered TTE w/ bubble study. TTE was done, but no report on shunting, might need repeat testing. Can refer outpatient.  - ABG on RA PaO2 75, thus even if has HPS, would not meet MELD exception criteria at present (as PaO2 is > 60)  - CT chest non contrast  noted  - Pulmonology consult appreciated, f/u recs.      # Anemia s/p blood transfusion  # Coagulopathy  # Thrombocytopenia  # PHG  # GR II-III EVs  No reported overt GIB so far  S/p vitamin K x2 (4/29, 4/30)  S/p EGD 5/1: grade II-III esophageal varices, non-bleeding. no bands placed; PHG  Anemia w/u results:  ---- Iron 13, TIBC 221, Iron % 6, ferritin 202, haptoglobin 40, , reticulocyte % 3.8, folate and vitamin b12 wnl  Adequate response to PRBC    Recommendations:   - Can consider IV ferrous supplementation   - Monitor H/H, keep Hb > 7.   - Keep PLT > 50, fibrinogen > 120 if bleeding  - C/w PPI  - C/w carvedilol 3.125 mg bid    # No PSE  # HCC status - no focal hepatic lesion on CT a/p  --- Will need AFP  # Etiology of cirrhosis: likely EtOH  --- CLD w/u so far: Hep B neg, not immune, not exposed, and Hep C ab neg. Hep A IgM neg. Hep E IgG/IgM neg. A1AT WNL. Ferritin 202. CMV negative. EBV past infection.   --- Ceruloplasmin 15, possibly due to severe liver disease, but can send 24 hr urine copper.   --- IgA elevated 1658 and IgG elevated 1661 likely due to cirrhosis; ROSY neg, LKM neg, SMA 1:40, and AMA neg.    Recommendations:  - Can still obtain 24 hr urine copper (can do outpatient)       # Transplant candidacy: Will refer outpatient.     # AUD  - C/w Folic, thiamine  - Aspiration, seizure, fall precautions  - Will need rehab, will refer outpatient   - Can consider acamprosate    Thank you for consult  Will follow.  Patient will need to f/u in liver clinic 5/14 8:30 am at the [place] location tentatively, but will be still called from office to confirm. Patient will need PCP too.   Discussed w/ primary, pulmonology

## 2024-05-08 NOTE — DIETITIAN INITIAL EVALUATION ADULT - ADD RECOMMEND
Moderate Malnutrition identified; Pt declining oral nutritional supplement at present  Moderate Malnutrition identified; Pt declining oral nutritional supplement at present; Rec: MVI/minerals as medically feasible ( on Thiamine, Folic Acid Rx)

## 2024-05-08 NOTE — DISCHARGE NOTE NURSING/CASE MANAGEMENT/SOCIAL WORK - PATIENT PORTAL LINK FT
You can access the FollowMyHealth Patient Portal offered by [facility] by registering at the following website: [email or web address]. By joining [facility]’s FollowMyHealth portal, you will also be able to view your health information using other applications (apps) compatible with our system.

## 2024-05-08 NOTE — DIETITIAN INITIAL EVALUATION ADULT - DIET TYPE
May consider oral nutritional supplement if persistently decreased intake as medically feasible/low sodium/regular/1200ml

## 2024-05-08 NOTE — DIETITIAN INITIAL EVALUATION ADULT - PERTINENT MEDS FT
MEDICATIONS  (STANDING):  carvedilol 3.125 milliGRAM(s) Oral every 12 hours  ciprofloxacin     Tablet 500 milliGRAM(s) Oral daily  folic acid 1 milliGRAM(s) Oral daily  furosemide    Tablet 40 milliGRAM(s) Oral daily  melatonin 3 milliGRAM(s) Oral at bedtime  pantoprazole    Tablet 40 milliGRAM(s) Oral before breakfast  prednisoLONE    3 mG/mL Solution (ORAPRED) 40 milliGRAM(s) Oral daily  spironolactone 100 milliGRAM(s) Oral daily  thiamine 100 milliGRAM(s) Oral daily    MEDICATIONS  (PRN):  acetaminophen     Tablet .. 650 milliGRAM(s) Oral every 6 hours PRN Temp greater or equal to 38C (100.4F), Mild Pain (1 - 3)  ondansetron Injectable 4 milliGRAM(s) IV Push every 8 hours PRN Nausea and/or Vomiting

## 2024-05-08 NOTE — PROGRESS NOTE ADULT - PROVIDER SPECIALTY LIST ADULT
Gastroenterology
Gastroenterology
Hepatology
Internal Medicine
Pulmonology
Hepatology
Hepatology
Internal Medicine

## 2024-05-08 NOTE — DIETITIAN INITIAL EVALUATION ADULT - PERTINENT LABORATORY DATA
05-08    141  |  110<H>  |  14  ----------------------------<  96  3.4<L>   |  26  |  0.56    Ca    8.2<L>      08 May 2024 06:20  Phos  2.9     05-08  Mg     1.8     05-08    TPro  5.6<L>  /  Alb  1.9<L>  /  TBili  1.6<H>  /  DBili  x   /  AST  42<H>  /  ALT  30  /  AlkPhos  95  05-08  A1C with Estimated Average Glucose Result: 4.1 % (04-28-24 @ 05:45)

## 2024-05-08 NOTE — DIETITIAN INITIAL EVALUATION ADULT - PROBLEM SELECTOR PLAN 4
- P/w Hb of 8.1 (unknown baseline)  - No signs of active bleeding.   - F/U ferritin, TIBC, total iron, reticulocyte count, haptoglobin, LDH.   - Maintain active T&S.   - Start Pantoprazole.

## 2024-05-08 NOTE — PROGRESS NOTE ADULT - SUBJECTIVE AND OBJECTIVE BOX
Time of Visit:  Patient seen and examined.     MEDICATIONS  (STANDING):  carvedilol 3.125 milliGRAM(s) Oral every 12 hours  ciprofloxacin     Tablet 500 milliGRAM(s) Oral daily  folic acid 1 milliGRAM(s) Oral daily  furosemide    Tablet 40 milliGRAM(s) Oral daily  melatonin 3 milliGRAM(s) Oral at bedtime  multivitamin/minerals 1 Tablet(s) Oral daily  pantoprazole    Tablet 40 milliGRAM(s) Oral before breakfast  prednisoLONE    3 mG/mL Solution (ORAPRED) 40 milliGRAM(s) Oral daily  spironolactone 100 milliGRAM(s) Oral daily  thiamine 100 milliGRAM(s) Oral daily      MEDICATIONS  (PRN):  acetaminophen     Tablet .. 650 milliGRAM(s) Oral every 6 hours PRN Temp greater or equal to 38C (100.4F), Mild Pain (1 - 3)  ondansetron Injectable 4 milliGRAM(s) IV Push every 8 hours PRN Nausea and/or Vomiting       Medications up to date at time of exam.      PHYSICAL EXAMINATION:  Patient has no new complaints.  GENERAL: The patient  in no apparent distress.     Vital Signs Last 24 Hrs  T(C): 36.9 (08 May 2024 12:45), Max: 36.9 (07 May 2024 20:30)  T(F): 98.5 (08 May 2024 12:45), Max: 98.5 (07 May 2024 20:30)  HR: 89 (08 May 2024 14:16) (75 - 89)  BP: 106/57 (08 May 2024 12:45) (101/63 - 111/53)  BP(mean): 68 (08 May 2024 12:45) (68 - 78)  RR: 17 (08 May 2024 12:45) (17 - 18)  SpO2: 94% (08 May 2024 12:45) (94% - 95%)    Parameters below as of 08 May 2024 12:45  Patient On (Oxygen Delivery Method): room air       (if applicable)    Chest Tube (if applicable)    HEENT: Head is normocephalic and atraumatic. Extraocular muscles are intact. Mucous membranes are moist.     NECK: Supple, no palpable adenopathy.    LUNGS: Fair bilateral air entry   no wheezing, rales, or rhonchi.    HEART: Regular rate and rhythm without murmur.    ABDOMEN: Soft, nontender, and nondistended.  No hepatosplenomegaly is noted.    : No painful voiding, no pelvic pain    EXTREMITIES: Without any cyanosis, clubbing, rash, lesions or edema.    NEUROLOGIC: Awake, alert, oriented, grossly intact    SKIN: Warm, dry, good turgor.      LABS:                        7.8    7.23  )-----------( 118      ( 08 May 2024 06:20 )             24.6     05-08    141  |  110<H>  |  14  ----------------------------<  96  3.4<L>   |  26  |  0.56    Ca    8.2<L>      08 May 2024 06:20  Phos  2.9     05-08  Mg     1.8     05-08    TPro  5.6<L>  /  Alb  1.9<L>  /  TBili  1.6<H>  /  DBili  x   /  AST  42<H>  /  ALT  30  /  AlkPhos  95  05-08    PT/INR - ( 08 May 2024 06:20 )   PT: 18.8 sec;   INR: 1.67 ratio         PTT - ( 08 May 2024 06:20 )  PTT:34.6 sec  Urinalysis Basic - ( 08 May 2024 06:20 )    Color: x / Appearance: x / SG: x / pH: x  Gluc: 96 mg/dL / Ketone: x  / Bili: x / Urobili: x   Blood: x / Protein: x / Nitrite: x   Leuk Esterase: x / RBC: x / WBC x   Sq Epi: x / Non Sq Epi: x / Bacteria: x      ABG - ( 07 May 2024 15:46 )  pH, Arterial: 7.47  pH, Blood: x     /  pCO2: 36    /  pO2: 75    / HCO3: 26    / Base Excess: 2.5   /  SaO2: 96                              MICROBIOLOGY: (if applicable)    RADIOLOGY & ADDITIONAL STUDIES:  EKG:   CXR:  ECHO:    IMPRESSION: 48y Male PAST MEDICAL & SURGICAL HISTORY:   p/w       IMP: This is a  48 yr old  man  active smoker with alcohol use disorder (has not seen a doctor for 10 years), who presented with 2 weeks history of worsening generalized abdominal pain, discomfort, and bloating. Patient reports that before this he did not feel his abdomen was getting bigger and he had no complains. Patient reports he started noticing bloating 2 weeks ago and it has worsened since then. Patient noted it was difficult to breath when his abd was distended , improved post large volume paracentesis . Hypoxia resolved with paracentesis albeit pat pat may have HPS.       Sugg    - Not a candidate for supp o2 at this time   - Will need 2 DECHO with bubble   - Advise to stop smoking and using alcohol   - Hepatology following   - Consider referral for liver transplant   - Out pat PFT   - Spoke with [facility]NN for pulse ox with ambulation

## 2024-05-08 NOTE — DIETITIAN INITIAL EVALUATION ADULT - OTHER INFO
Pt lives home PTA, alert, oriented, well-communicated; Reported decreased intake with early satiety x possible 1 wk ( unsure duration per pt, "2 weeks history of worsening generalized abdominal pain, discomfort, and bloating" per H&P), usual jk=685 to 230 lb, wt gaining due to fluid retention, s/p Paracentesis: 5L fluid removal 5/1/24; 3.4L fluid removal 5/7/24, feeling/eating better, % intake per Flowsheet, 95% breakfast intake observed today; denied GI distress, chewing or swallowing problem; Unknown food allergy, no specific food choices, not interested in oral nutritional supplement at present; Hepatologist on the case; s/p  consult

## 2024-05-13 LAB
COPPER ?TM UR-MCNC: SIGNIFICANT CHANGE UP
COPPER UR-MCNC: 20 UG/L — SIGNIFICANT CHANGE UP
COPPER/CREATININE RATIO: 13 UG/G CREAT — SIGNIFICANT CHANGE UP (ref 0–49)
HEAVY METALS, URINE CREATININE: 1.56 G/L — SIGNIFICANT CHANGE UP (ref 0.3–3)

## 2024-11-24 ENCOUNTER — INPATIENT (INPATIENT)
Facility: HOSPITAL | Age: 49
LOS: 0 days | Discharge: ANOTHER HOSPITAL | DRG: 812 | End: 2024-11-25
Attending: HOSPITALIST | Admitting: HOSPITALIST
Payer: MEDICAID

## 2024-11-24 VITALS
HEART RATE: 105 BPM | HEIGHT: 73 IN | TEMPERATURE: 98 F | DIASTOLIC BLOOD PRESSURE: 63 MMHG | WEIGHT: 210.1 LBS | OXYGEN SATURATION: 100 % | SYSTOLIC BLOOD PRESSURE: 127 MMHG | RESPIRATION RATE: 18 BRPM

## 2024-11-24 DIAGNOSIS — E87.6 HYPOKALEMIA: ICD-10-CM

## 2024-11-24 DIAGNOSIS — K74.60 UNSPECIFIED CIRRHOSIS OF LIVER: ICD-10-CM

## 2024-11-24 DIAGNOSIS — F17.200 NICOTINE DEPENDENCE, UNSPECIFIED, UNCOMPLICATED: ICD-10-CM

## 2024-11-24 DIAGNOSIS — K70.10 ALCOHOLIC HEPATITIS WITHOUT ASCITES: ICD-10-CM

## 2024-11-24 DIAGNOSIS — F10.11 ALCOHOL ABUSE, IN REMISSION: ICD-10-CM

## 2024-11-24 DIAGNOSIS — D64.9 ANEMIA, UNSPECIFIED: ICD-10-CM

## 2024-11-24 DIAGNOSIS — Z29.9 ENCOUNTER FOR PROPHYLACTIC MEASURES, UNSPECIFIED: ICD-10-CM

## 2024-11-24 DIAGNOSIS — F10.10 ALCOHOL ABUSE, UNCOMPLICATED: ICD-10-CM

## 2024-11-24 LAB
ALBUMIN SERPL ELPH-MCNC: 2.1 G/DL — LOW (ref 3.5–5)
ALP SERPL-CCNC: 98 U/L — SIGNIFICANT CHANGE UP (ref 40–120)
ALT FLD-CCNC: 24 U/L DA — SIGNIFICANT CHANGE UP (ref 10–60)
ANION GAP SERPL CALC-SCNC: 6 MMOL/L — SIGNIFICANT CHANGE UP (ref 5–17)
ANISOCYTOSIS BLD QL: SLIGHT — SIGNIFICANT CHANGE UP
APTT BLD: 35.1 SEC — SIGNIFICANT CHANGE UP (ref 24.5–35.6)
AST SERPL-CCNC: 40 U/L — SIGNIFICANT CHANGE UP (ref 10–40)
BASOPHILS # BLD AUTO: 0.04 K/UL — SIGNIFICANT CHANGE UP (ref 0–0.2)
BASOPHILS NFR BLD AUTO: 0.8 % — SIGNIFICANT CHANGE UP (ref 0–2)
BILIRUB SERPL-MCNC: 2.6 MG/DL — HIGH (ref 0.2–1.2)
BLD GP AB SCN SERPL QL: SIGNIFICANT CHANGE UP
BUN SERPL-MCNC: 13 MG/DL — SIGNIFICANT CHANGE UP (ref 7–18)
CALCIUM SERPL-MCNC: 8.5 MG/DL — SIGNIFICANT CHANGE UP (ref 8.4–10.5)
CHLORIDE SERPL-SCNC: 102 MMOL/L — SIGNIFICANT CHANGE UP (ref 96–108)
CO2 SERPL-SCNC: 26 MMOL/L — SIGNIFICANT CHANGE UP (ref 22–31)
CREAT SERPL-MCNC: 0.78 MG/DL — SIGNIFICANT CHANGE UP (ref 0.5–1.3)
EGFR: 110 ML/MIN/1.73M2 — SIGNIFICANT CHANGE UP
ELLIPTOCYTES BLD QL SMEAR: SLIGHT — SIGNIFICANT CHANGE UP
EOSINOPHIL # BLD AUTO: 0.04 K/UL — SIGNIFICANT CHANGE UP (ref 0–0.5)
EOSINOPHIL NFR BLD AUTO: 0.8 % — SIGNIFICANT CHANGE UP (ref 0–6)
GLUCOSE SERPL-MCNC: 115 MG/DL — HIGH (ref 70–99)
HCT VFR BLD CALC: 21 % — CRITICAL LOW (ref 39–50)
HGB BLD-MCNC: 6.7 G/DL — CRITICAL LOW (ref 13–17)
HYPOCHROMIA BLD QL: SIGNIFICANT CHANGE UP
IMM GRANULOCYTES NFR BLD AUTO: 0.2 % — SIGNIFICANT CHANGE UP (ref 0–0.9)
INR BLD: 1.81 RATIO — HIGH (ref 0.85–1.16)
IRON SATN MFR SERPL: 16 UG/DL — LOW (ref 65–170)
IRON SATN MFR SERPL: 6 % — LOW (ref 20–55)
LG PLATELETS BLD QL AUTO: SLIGHT — SIGNIFICANT CHANGE UP
LIDOCAIN IGE QN: 108 U/L — HIGH (ref 13–75)
LYMPHOCYTES # BLD AUTO: 1.17 K/UL — SIGNIFICANT CHANGE UP (ref 1–3.3)
LYMPHOCYTES # BLD AUTO: 23.6 % — SIGNIFICANT CHANGE UP (ref 13–44)
MACROCYTES BLD QL: SLIGHT — SIGNIFICANT CHANGE UP
MANUAL SMEAR VERIFICATION: SIGNIFICANT CHANGE UP
MCHC RBC-ENTMCNC: 31 PG — SIGNIFICANT CHANGE UP (ref 27–34)
MCHC RBC-ENTMCNC: 31.9 G/DL — LOW (ref 32–36)
MCV RBC AUTO: 97.2 FL — SIGNIFICANT CHANGE UP (ref 80–100)
MICROCYTES BLD QL: SLIGHT — SIGNIFICANT CHANGE UP
MONOCYTES # BLD AUTO: 0.62 K/UL — SIGNIFICANT CHANGE UP (ref 0–0.9)
MONOCYTES NFR BLD AUTO: 12.5 % — SIGNIFICANT CHANGE UP (ref 2–14)
NEUTROPHILS # BLD AUTO: 3.07 K/UL — SIGNIFICANT CHANGE UP (ref 1.8–7.4)
NEUTROPHILS NFR BLD AUTO: 62.1 % — SIGNIFICANT CHANGE UP (ref 43–77)
NRBC # BLD: 0 /100 WBCS — SIGNIFICANT CHANGE UP (ref 0–0)
OVALOCYTES BLD QL SMEAR: SLIGHT — SIGNIFICANT CHANGE UP
PLAT MORPH BLD: NORMAL — SIGNIFICANT CHANGE UP
PLATELET # BLD AUTO: 157 K/UL — SIGNIFICANT CHANGE UP (ref 150–400)
PLATELET COUNT - ESTIMATE: NORMAL — SIGNIFICANT CHANGE UP
POIKILOCYTOSIS BLD QL AUTO: SLIGHT — SIGNIFICANT CHANGE UP
POLYCHROMASIA BLD QL SMEAR: SLIGHT — SIGNIFICANT CHANGE UP
POTASSIUM SERPL-MCNC: 3.1 MMOL/L — LOW (ref 3.5–5.3)
POTASSIUM SERPL-SCNC: 3.1 MMOL/L — LOW (ref 3.5–5.3)
PROT SERPL-MCNC: 8.4 G/DL — HIGH (ref 6–8.3)
PROTHROM AB SERPL-ACNC: 21.1 SEC — HIGH (ref 9.9–13.4)
RBC # BLD: 2.16 M/UL — LOW (ref 4.2–5.8)
RBC # FLD: 16.7 % — HIGH (ref 10.3–14.5)
RBC BLD AUTO: ABNORMAL
SODIUM SERPL-SCNC: 134 MMOL/L — LOW (ref 135–145)
TARGETS BLD QL SMEAR: SLIGHT — SIGNIFICANT CHANGE UP
TIBC SERPL-MCNC: 266 UG/DL — SIGNIFICANT CHANGE UP (ref 250–450)
UIBC SERPL-MCNC: 250 UG/DL — SIGNIFICANT CHANGE UP (ref 110–370)
WBC # BLD: 4.95 K/UL — SIGNIFICANT CHANGE UP (ref 3.8–10.5)
WBC # FLD AUTO: 4.95 K/UL — SIGNIFICANT CHANGE UP (ref 3.8–10.5)

## 2024-11-24 PROCEDURE — 99223 1ST HOSP IP/OBS HIGH 75: CPT | Mod: GC

## 2024-11-24 PROCEDURE — 71045 X-RAY EXAM CHEST 1 VIEW: CPT | Mod: 26

## 2024-11-24 PROCEDURE — 99291 CRITICAL CARE FIRST HOUR: CPT

## 2024-11-24 RX ORDER — OCTREOTIDE ACETATE 500 UG/ML
50 INJECTION, SOLUTION INTRAVENOUS; SUBCUTANEOUS
Qty: 500 | Refills: 0 | Status: DISCONTINUED | OUTPATIENT
Start: 2024-11-24 | End: 2024-11-25

## 2024-11-24 RX ORDER — OCTREOTIDE ACETATE 500 UG/ML
50 INJECTION, SOLUTION INTRAVENOUS; SUBCUTANEOUS ONCE
Refills: 0 | Status: COMPLETED | OUTPATIENT
Start: 2024-11-24 | End: 2024-11-24

## 2024-11-24 RX ORDER — SODIUM CHLORIDE 9 MG/ML
1000 INJECTION, SOLUTION INTRAMUSCULAR; INTRAVENOUS; SUBCUTANEOUS
Refills: 0 | Status: DISCONTINUED | OUTPATIENT
Start: 2024-11-24 | End: 2024-11-25

## 2024-11-24 RX ORDER — POTASSIUM CHLORIDE 600 MG/1
20 TABLET, EXTENDED RELEASE ORAL ONCE
Refills: 0 | Status: COMPLETED | OUTPATIENT
Start: 2024-11-24 | End: 2024-11-24

## 2024-11-24 RX ORDER — POTASSIUM CHLORIDE 600 MG/1
40 TABLET, EXTENDED RELEASE ORAL ONCE
Refills: 0 | Status: COMPLETED | OUTPATIENT
Start: 2024-11-24 | End: 2024-11-24

## 2024-11-24 RX ORDER — CEFTRIAXONE SODIUM 1 G
2000 VIAL (EA) INJECTION EVERY 24 HOURS
Refills: 0 | Status: DISCONTINUED | OUTPATIENT
Start: 2024-11-24 | End: 2024-11-25

## 2024-11-24 RX ORDER — NICOTINE 21 MG/24H
2 PATCH, EXTENDED RELEASE TRANSDERMAL EVERY 8 HOURS
Refills: 0 | Status: DISCONTINUED | OUTPATIENT
Start: 2024-11-24 | End: 2024-11-25

## 2024-11-24 RX ADMIN — POTASSIUM CHLORIDE 20 MILLIEQUIVALENT(S): 600 TABLET, EXTENDED RELEASE ORAL at 23:27

## 2024-11-24 RX ADMIN — POTASSIUM CHLORIDE 40 MILLIEQUIVALENT(S): 600 TABLET, EXTENDED RELEASE ORAL at 20:44

## 2024-11-24 RX ADMIN — Medication 100 MILLIGRAM(S): at 23:43

## 2024-11-24 RX ADMIN — OCTREOTIDE ACETATE 50 MICROGRAM(S): 500 INJECTION, SOLUTION INTRAVENOUS; SUBCUTANEOUS at 23:43

## 2024-11-24 NOTE — ED ADULT NURSE NOTE - ED STAT RN HANDOFF DETAILS
Patient A&OX3, breathing regular and unlabored. 1 Unit of PRBCs infusing, no distress noted. Report endorsed to LEAH Garza.

## 2024-11-24 NOTE — H&P ADULT - PROBLEM SELECTOR PLAN 3
Meets criteria of alcoholic hepatitis with transaminitis, jaundice, hx of ETOH use, elevated PT/INR,    - MELD-Na: 20  - Maddrey: 49.1  - Hepatology consult in the AM  - steriods ? Meets criteria of alcoholic hepatitis with transaminitis, jaundice, hx of ETOH use, elevated PT/INR,    - MELD-Na: 20  - Maddrey: 49.1  - Hepatology consult in the AM

## 2024-11-24 NOTE — H&P ADULT - NSHPPHYSICALEXAM_GEN_ALL_CORE
Vital Signs Last 24 Hrs  T(C): 36.7 (24 Nov 2024 23:15), Max: 36.8 (24 Nov 2024 17:03)  T(F): 98 (24 Nov 2024 23:15), Max: 98.3 (24 Nov 2024 17:03)  HR: 98 (24 Nov 2024 23:15) (97 - 105)  BP: 120/73 (24 Nov 2024 23:15) (117/72 - 127/63)  BP(mean): 85 (24 Nov 2024 20:00) (85 - 85)  RR: 18 (24 Nov 2024 23:15) (17 - 18)  SpO2: 97% (24 Nov 2024 23:15) (97% - 100%)    Parameters below as of 24 Nov 2024 23:15  Patient On (Oxygen Delivery Method): room air    GENERAL: NAD  HEAD:  Atraumatic, Normocephalic  EYES: EOMI, PERRLA, conjunctiva and sclera clear  ENMT: No tonsillar erythema, exudates, or enlargement; Moist mucous membranes, Good dentition, No lesions  NECK: Supple, normal appearance, No JVD; Normal thyroid; Trachea midline  NERVOUS SYSTEM:  Alert & Oriented X3,  Motor Strength 5/5 B/L upper and lower extremities, sensation intact  CHEST/LUNG: Lungs clear to auscultation bilaterally, No rales, rhonchi, wheezing   HEART: Regular rate and rhythm; No murmurs, rubs, or gallops  ABDOMEN: Soft, Nontender, Nondistended; Bowel sounds present  EXTREMITIES:  2+ Peripheral Pulses, No clubbing, cyanosis, or edema  LYMPH: No lymphadenopathy noted  SKIN: No rashes or lesions;  Good capillary refill Vital Signs Last 24 Hrs  T(C): 36.7 (24 Nov 2024 23:15), Max: 36.8 (24 Nov 2024 17:03)  T(F): 98 (24 Nov 2024 23:15), Max: 98.3 (24 Nov 2024 17:03)  HR: 98 (24 Nov 2024 23:15) (97 - 105)  BP: 120/73 (24 Nov 2024 23:15) (117/72 - 127/63)  BP(mean): 85 (24 Nov 2024 20:00) (85 - 85)  RR: 18 (24 Nov 2024 23:15) (17 - 18)  SpO2: 97% (24 Nov 2024 23:15) (97% - 100%)    Parameters below as of 24 Nov 2024 23:15  Patient On (Oxygen Delivery Method): room air    GENERAL: NAD, +thin, +cachetic  HEAD:  Atraumatic, Normocephalic  EYES: EOMI, PERRLA, conjunctiva and sclera clear  ENMT: +dry mucus membranes. No tonsillar erythema, exudates, or enlargement  NECK: Supple, normal appearance, No JVD; Normal thyroid; Trachea midline  NERVOUS SYSTEM:  Alert & Oriented X3,  Motor Strength 5/5 B/L upper and lower extremities, sensation intact  CHEST/LUNG: Lungs clear to auscultation bilaterally, No rales, rhonchi, wheezing   HEART: Regular rate and rhythm; No murmurs, rubs, or gallops  ABDOMEN: +distended, mild TTP LLQ, RLQ. No rebound or guarding. +shifting dullness. +Soft. Bowel sounds present  EXTREMITIES:  2+ Peripheral Pulses, No clubbing, cyanosis, or edema  LYMPH: No lymphadenopathy noted  SKIN: No rashes or lesions

## 2024-11-24 NOTE — ED ADULT NURSE NOTE - NSFALLRISKINTERV_ED_ALL_ED

## 2024-11-24 NOTE — ED PROVIDER NOTE - PHYSICAL EXAMINATION
General: Patient well appearing, vital signs within normal limits  HEENT: MMM, trachea midline  Eyes: Scleral icterus  Respiratory: No respiratory distress  GI:  large volume ascites, mild tenderness to palpation of the lower abdomen  Neuro: Moves all extremities  Skin: No rashes or lesions

## 2024-11-24 NOTE — ED PROVIDER NOTE - CRITICAL CARE ATTENDING CONTRIBUTION TO CARE
The patient's condition is critical. Management options were put in place to ensure further deterioration does not occur. In addition to the usual care provided, I have spent additional time with this patient through but not limited to the following: additional documentation  reviewing test results,  discussing with patient  and course of care,  reassessment of patient's status and response to interventions.

## 2024-11-24 NOTE — ED ADULT NURSE NOTE - OBJECTIVE STATEMENT
Patient presented to the ED with c/o SOD and ascites and rectal bleed 2 days ago. Patient is jaundice and his abdomen is protuberant and firm.

## 2024-11-24 NOTE — H&P ADULT - PROBLEM SELECTOR PLAN 2
Presenting with abdominal pain associated with worsening abdominal distention x1 month.  Was discharged on spironolactone 25 4 tabs daily, lasix 20, carvedilol 3.125 q12 on last admission.  Not taking any meds currently.     - Cirrhosis likely due to EtOH, w/ CSPH, decompensated w/ ascites.  - will likely need paracentesis this admission.  - holding sp Presenting with abdominal pain associated with worsening abdominal distention x1 month.  Was discharged on spironolactone 25 4 tabs daily, lasix 20, carvedilol 3.125 q12 on last admission.  Not taking any meds currently.   AST 40, ALT 24, ALP 98, Tbili 2.6, PT INR 21/1.81, , albumin. SCr 0.78  - Cirrhosis likely due to EtOH, w/ CSPH, decompensated w/ ascites.  - MELD-Na: 20  - Maddrey: 49.1  - hepatitis panel  - SBP ppx with CTX  - octreotide drip  - will likely need paracentesis this admission - primary team to consult IR in the AM.  - Hepatology consult in the AM Presenting with abdominal pain associated with worsening abdominal distention x1 month.  Was discharged on spironolactone 25 4 tabs daily, lasix 20, carvedilol 3.125 q12 on last admission.  Not taking any meds currently.   AST 40, ALT 24, ALP 98, Tbili 2.6, PT INR 21/1.81, , albumin. SCr 0.78  - Cirrhosis likely due to EtOH, w/ CSPH, decompensated w/ ascites.  - MELD-Na: 20  - Maddrey: 49.1  - SBP ppx with CTX  - octreotide drip  - will likely need paracentesis this admission - primary team to consult IR in the AM.  - Hepatology consult in the AM

## 2024-11-24 NOTE — H&P ADULT - ATTENDING COMMENTS
IMAGING  Chest X-Ray  Pending official read; on my read no infiltrate, consolidation, or effusion.    EKG  Normal Sinus Rhythm, 100 bpm, QTc 510ms, IA 110ms, on my read no ST segment elevation or depression, TWI in lead III    HPI  49 year old male patient with pmhx ETOH use, Cirrhosis who presented to the ER due to worsening abdominal distension.  He states that he last had his abdomen drained 7 months ago. For 6 months it worsened slowly; but, in the last month the abdominal distension worsened more quickly. He has history of cirrhosis but has never followed up with a hepatologist. He denies any alcohol, tobacco, or drug use. He states about 5 days ago he had several grossly bloody bowel movements. For the next 2 days he had only 1 bloody bowel movement per day, and for the last 2 days he had 1 regular brown BM but no further bleeding. He states mixed with the bloody bowel movements was also melena. He denies ever having a colonoscopy and denies taking aspirin or any anticoagulants at home. His last endoscopy was here in May 2024 which showed grade II-III varices, diffuse Portal Hypertensive Gastropathy in the stomach.    Review Of Systems included: + abdominal distension, + mild abdominal pain, + shortness of breath, + generalized weakness, + loss of appetite, + chills, + rectal bleeding, + melena,   No chest pain, no lightheadedness, no diarrhea, no constipation, no dysuria    Physical Exam  General: Awake, Alert, Oriented  Cardiac: RRR  Pulmonary: CTA b/l  Abdominal: Soft, Very Distended, + suprapubic tenderness  Extremities: 2+ edema b/l, both legs same size, no calf tenderness    A/P  # Ascites  - Consult IR  - SCDs    # Rectal Bleeding  # Anemia  - Consult GI  - IV PPI BID  - Monitor Hgb  - Transfuse for Hgb < 7  - Clear Liquid Diet  - SCDs  - Check Iron studies  - Octreotide drip  - IV Ceftriaxone    # Cirrhosis  > Patient has not followed up with Hepatology  > MELD-Na score of 19 points  > Maddrey's Discriminant Function of 38 points; but AST and ALT are both within normal limits, bilirubin is elevated but less than 5 mg/dL, afebrile, no leukocytosis, and patient denies any recent alcohol use. Steroids may also increase risk of bleeding. Alcoholic Hepatitis unlikely; Prednisolone not indicated.  - Consult Hepatology    # Nicotine Use  > Patient denies alcohol, tobacco, or drug use. He uses nicotine lozenges at home and prefers to continue them rather than try a nicotine patch  - Continue patient's nicotine lozenges    # QTc Prolongation  - Avoid QTc prolonging medications    # DVT PPx  - SCDs    # FEN  - Clear Liquid Diet  - Monitor and replete electrolytes as needed  - Avoid standing fluids due to ascites    Previous Admissions Included  4/27/2024 - 5/8/2024: New Cirrhosis 2/2 ETOH abuse, AHRF. s/p large volume paracentesis with IR and EGD showing grade II-III esophageal varices, non-bleeding. no bands placed. Also noted with anemia requiring 1U PRBC. Cirrhosis likely 2/2 ETOH started on acetylcysteine, prednisolone, furosemide and spironolactone. Cipro for SBP ppx. Paracentesis performed on 5/1 with IR removing 5L, albumin transfused (8G/L removed). Pt confirmed standing appt with Hepatologist for 5/13 @ 8:30am  4/27/2024: Urgent Care Visit: Generalized abdominal pain and bloating    Patient case and management was discussed with ER Attending  I did examine all labs (including CBC, PT/INR, APTT, CMP, Lipase), imaging, prior notes    Time-based billing (NON-critical care).  76 minutes spent on total encounter excluding any time spent teaching residents. The necessity of the time spent during the encounter on this date of service was due to: Review of records, vital signs, labs, microbiology, and imaging, Ordering labs and/or tests, General physical examination performed, Generation of treatment plan, Counseling of the patient

## 2024-11-24 NOTE — ED ADULT NURSE NOTE - NSICDXPASTMEDICALHX_GEN_ALL_CORE_FT
PAST MEDICAL HISTORY:  Alcoholic hepatitis     Cirrhosis     ETOH abuse     GERD (gastroesophageal reflux disease)

## 2024-11-24 NOTE — ED PROVIDER NOTE - CLINICAL SUMMARY MEDICAL DECISION MAKING FREE TEXT BOX
49-year-old male with history of alcohol use disorder, cirrhosis presents with worsening abdominal distention/ascites.  Mildly tender in the lower abdomen.  Afebrile.  Appears in no distress.  Laboratory significant for hemoglobin of 6.7.  Patient consented.  1 unit PRBCs ordered.  Patient denies any recent black/tarry or bloody stools but states he did have some several days ago.  All the laboratories of significance  are potassium 3.1 which was replaced.  Will admit for nonemergent paracentesis and further management.

## 2024-11-24 NOTE — H&P ADULT - HISTORY OF PRESENT ILLNESS
49M, from home, ambulates with cane, [facility] ETOH use disorder, cirrhosis c/b ascites, alcoholic hepatitis, GERD. Presenting with abdominal pain with worsening abdominal distention x1 month. States abdominal pain is diffuse, and the distention has made it difficult for him to walk and do ADLs. Endorses poor appetite, nausea, generalized weakness, ~20 wt loss in past 3 months. Endorses 4-5 episodes of "dark stools"  ~1 week ago; no further episodes since then. Denies fever, vomiting, dizziness, chest pain, palpitations, SOB, dysuria, night sweats, numbness or tingling in extremities.     Chart reviewed - patient admitted to [facility] in 5/2024 during which time was dx with cirrhosis, alcoholic hepatitis, requiring paracentesis.     Patient has poor follow up with doctors. States he has not seen one since his discharge in 5/2024. Ran out of his medications (from prior hospitalization in July and has not taken any medications.    PRIMARY TEAM TO COMPLETE MED REC AS PHARMACY CLOSED   49M, from home, ambulates with cane, [facility] ETOH use disorder, cirrhosis c/b ascites, alcoholic hepatitis, GERD. Presenting with abdominal pain with worsening abdominal distention x1 month. States abdominal pain is diffuse, and the distention has made it difficult for him to walk and do ADLs. Endorses poor appetite, nausea, generalized weakness, ~20 wt loss in past 3 months. Endorses 4-5 episodes of "dark stools"  ~1 week ago; no further episodes since then. Denies fever, vomiting, dizziness, chest pain, palpitations, SOB, dysuria, night sweats, numbness or tingling in extremities. States last ETOH use was in May.     Chart reviewed - patient admitted to [facility] in 5/2024 during which time was dx with cirrhosis, alcoholic hepatitis, requiring paracentesis.     Patient has poor follow up with doctors. States he has not seen one since his discharge in 5/2024. Ran out of his medications (from prior hospitalization in July and has not taken any medications.    PRIMARY TEAM TO COMPLETE MED REC AS PHARMACY CLOSED

## 2024-11-24 NOTE — H&P ADULT - NSHPREVIEWOFSYSTEMS_GEN_ALL_CORE
CONSTITUTIONAL: +fatigue, +wt loss. No fever  RESPIRATORY: No cough, wheezing, chills, or hemoptysis; No shortness of breath  CARDIOVASCULAR: No chest pain, palpitations, dizziness, or leg swelling  GASTROINTESTINAL: +abdominal pain, +distention, +nausea. No vomiting, or hematemesis. No diarrhea or constipation. No melena or hematochezia.  GENITOURINARY: No dysuria or hematuria, urinary frequency  NEUROLOGICAL: No headaches, memory loss, loss of strength, numbness, or tremors  ENDOCRINE: No polyuria, polydipsia, or heat/cold intolerance  MUSKULOSKELETAL: No muscle aches, joint pains  HEME: no easy bruisability, no tender or enlarged lymph nodes  SKIN: No itching, burning, rashes, or lesions.

## 2024-11-24 NOTE — ED PROVIDER NOTE - OBJECTIVE STATEMENT
49-year-old male with  past medical history of alcohol use disorder with cirrhosis who was last seen 7 months ago and presents with abdominal distention making it difficult to do activities of daily living.  He denies any fevers. has mild abdominal discomfort.  States that he took CBD prior to arrival.

## 2024-11-24 NOTE — H&P ADULT - PROBLEM SELECTOR PLAN 1
Hb 6.7  Endorses 4-5 episodes of "dark stools"  ~1 week ago; no further episodes since then.   Denies bloody vomit.    - EGD 5/2024 - Esophagus: 3 columns of grade II-III varices, not bleeding. Largest one reaches to mid-esophagus. .  Stomach: diffuse PHG. No HH.   Duodenum: somewhat edematous folds, though no evidence of ulcerations or bleeding.   - will transfuse 1u pRBC. Follow up post transfusion CBC.  - CBC q6  - maintain active T+S, transfuse for Hb <7  - iron studies Hb 6.7  Endorses 4-5 episodes of "dark stools"  ~1 week ago; no further episodes since then.   Denies bloody vomit.    - EGD 5/2024 - Esophagus: 3 columns of grade II-III varices, not bleeding. Largest one reaches to mid-esophagus. .  Stomach: diffuse PHG. No HH.   Duodenum: somewhat edematous folds, though no evidence of ulcerations or bleeding.   - will transfuse 1u pRBC. Follow up post transfusion CBC.  - CBC q6  - maintain active T+S, transfuse for Hb <7  - iron studies  - Hb 6.7 >6.1 > CTA Abd pelvis to r/o bleed.  - Consider transfer for GI intervention Hb 6.7  Endorses 4-5 episodes of "dark stools"  ~1 week ago; no further episodes since then.   Denies bloody vomit.    - EGD 5/2024 - Esophagus: 3 columns of grade II-III varices, not bleeding. Largest one reaches to mid-esophagus. .  Stomach: diffuse PHG. No HH.   Duodenum: somewhat edematous folds, though no evidence of ulcerations or bleeding.   - CBC q6  - maintain active T+S, transfuse for Hb <7  - iron studies  - Hb 6.7 >6.1 s/p 1u pRBC > CTA Abd pelvis to r/o bleed.  - will transfusion additional unit.  - IV PPI BID  - Consider transfer for GI intervention.

## 2024-11-25 ENCOUNTER — INPATIENT (INPATIENT)
Facility: HOSPITAL | Age: 49
LOS: 9 days | Discharge: HOME | End: 2024-12-05
Attending: HOSPITALIST | Admitting: HOSPITALIST
Payer: MEDICAID

## 2024-11-25 VITALS
SYSTOLIC BLOOD PRESSURE: 112 MMHG | TEMPERATURE: 98 F | OXYGEN SATURATION: 95 % | DIASTOLIC BLOOD PRESSURE: 67 MMHG | HEART RATE: 95 BPM | RESPIRATION RATE: 18 BRPM

## 2024-11-25 DIAGNOSIS — K74.60 UNSPECIFIED CIRRHOSIS OF LIVER: ICD-10-CM

## 2024-11-25 DIAGNOSIS — Z29.9 ENCOUNTER FOR PROPHYLACTIC MEASURES, UNSPECIFIED: ICD-10-CM

## 2024-11-25 DIAGNOSIS — D62 ACUTE POSTHEMORRHAGIC ANEMIA: ICD-10-CM

## 2024-11-25 DIAGNOSIS — K92.2 GASTROINTESTINAL HEMORRHAGE, UNSPECIFIED: ICD-10-CM

## 2024-11-25 DIAGNOSIS — Z79.899 OTHER LONG TERM (CURRENT) DRUG THERAPY: ICD-10-CM

## 2024-11-25 LAB
ALBUMIN SERPL ELPH-MCNC: 1.8 G/DL — LOW (ref 3.5–5)
ALBUMIN SERPL ELPH-MCNC: 2.3 G/DL — LOW (ref 3.3–5)
ALP SERPL-CCNC: 77 U/L — SIGNIFICANT CHANGE UP (ref 40–120)
ALP SERPL-CCNC: 82 U/L — SIGNIFICANT CHANGE UP (ref 40–120)
ALT FLD-CCNC: 19 U/L — SIGNIFICANT CHANGE UP (ref 4–41)
ALT FLD-CCNC: 21 U/L DA — SIGNIFICANT CHANGE UP (ref 10–60)
ANION GAP SERPL CALC-SCNC: 12 MMOL/L — SIGNIFICANT CHANGE UP (ref 7–14)
ANION GAP SERPL CALC-SCNC: 5 MMOL/L — SIGNIFICANT CHANGE UP (ref 5–17)
APTT BLD: 36.4 SEC — HIGH (ref 24.5–35.6)
AST SERPL-CCNC: 32 U/L — SIGNIFICANT CHANGE UP (ref 10–40)
AST SERPL-CCNC: 39 U/L — SIGNIFICANT CHANGE UP (ref 4–40)
BASOPHILS # BLD AUTO: 0.03 K/UL — SIGNIFICANT CHANGE UP (ref 0–0.2)
BASOPHILS # BLD AUTO: 0.05 K/UL — SIGNIFICANT CHANGE UP (ref 0–0.2)
BASOPHILS NFR BLD AUTO: 0.5 % — SIGNIFICANT CHANGE UP (ref 0–2)
BASOPHILS NFR BLD AUTO: 1 % — SIGNIFICANT CHANGE UP (ref 0–2)
BILIRUB SERPL-MCNC: 3.4 MG/DL — HIGH (ref 0.2–1.2)
BILIRUB SERPL-MCNC: 5.1 MG/DL — HIGH (ref 0.2–1.2)
BUN SERPL-MCNC: 12 MG/DL — SIGNIFICANT CHANGE UP (ref 7–23)
BUN SERPL-MCNC: 13 MG/DL — SIGNIFICANT CHANGE UP (ref 7–18)
CALCIUM SERPL-MCNC: 7.7 MG/DL — LOW (ref 8.4–10.5)
CALCIUM SERPL-MCNC: 7.7 MG/DL — LOW (ref 8.4–10.5)
CHLORIDE SERPL-SCNC: 104 MMOL/L — SIGNIFICANT CHANGE UP (ref 96–108)
CHLORIDE SERPL-SCNC: 99 MMOL/L — SIGNIFICANT CHANGE UP (ref 98–107)
CO2 SERPL-SCNC: 24 MMOL/L — SIGNIFICANT CHANGE UP (ref 22–31)
CO2 SERPL-SCNC: 28 MMOL/L — SIGNIFICANT CHANGE UP (ref 22–31)
CREAT SERPL-MCNC: 0.63 MG/DL — SIGNIFICANT CHANGE UP (ref 0.5–1.3)
CREAT SERPL-MCNC: 0.8 MG/DL — SIGNIFICANT CHANGE UP (ref 0.5–1.3)
EGFR: 109 ML/MIN/1.73M2 — SIGNIFICANT CHANGE UP
EGFR: 117 ML/MIN/1.73M2 — SIGNIFICANT CHANGE UP
EOSINOPHIL # BLD AUTO: 0.06 K/UL — SIGNIFICANT CHANGE UP (ref 0–0.5)
EOSINOPHIL # BLD AUTO: 0.1 K/UL — SIGNIFICANT CHANGE UP (ref 0–0.5)
EOSINOPHIL NFR BLD AUTO: 1.2 % — SIGNIFICANT CHANGE UP (ref 0–6)
EOSINOPHIL NFR BLD AUTO: 1.8 % — SIGNIFICANT CHANGE UP (ref 0–6)
FERRITIN SERPL-MCNC: 88 NG/ML — SIGNIFICANT CHANGE UP (ref 30–400)
GLUCOSE SERPL-MCNC: 110 MG/DL — HIGH (ref 70–99)
GLUCOSE SERPL-MCNC: 143 MG/DL — HIGH (ref 70–99)
HCT VFR BLD CALC: 18.7 % — CRITICAL LOW (ref 39–50)
HCT VFR BLD CALC: 20.8 % — CRITICAL LOW (ref 39–50)
HCT VFR BLD CALC: 26.5 % — LOW (ref 39–50)
HGB BLD-MCNC: 6.1 G/DL — CRITICAL LOW (ref 13–17)
HGB BLD-MCNC: 6.8 G/DL — CRITICAL LOW (ref 13–17)
HGB BLD-MCNC: 8.6 G/DL — LOW (ref 13–17)
IANC: 3.19 K/UL — SIGNIFICANT CHANGE UP (ref 1.8–7.4)
IMM GRANULOCYTES NFR BLD AUTO: 0.2 % — SIGNIFICANT CHANGE UP (ref 0–0.9)
IMM GRANULOCYTES NFR BLD AUTO: 0.2 % — SIGNIFICANT CHANGE UP (ref 0–0.9)
INR BLD: 1.87 RATIO — HIGH (ref 0.85–1.16)
LYMPHOCYTES # BLD AUTO: 1.06 K/UL — SIGNIFICANT CHANGE UP (ref 1–3.3)
LYMPHOCYTES # BLD AUTO: 1.62 K/UL — SIGNIFICANT CHANGE UP (ref 1–3.3)
LYMPHOCYTES # BLD AUTO: 21.4 % — SIGNIFICANT CHANGE UP (ref 13–44)
LYMPHOCYTES # BLD AUTO: 29.3 % — SIGNIFICANT CHANGE UP (ref 13–44)
MAGNESIUM SERPL-MCNC: 1.8 MG/DL — SIGNIFICANT CHANGE UP (ref 1.6–2.6)
MCHC RBC-ENTMCNC: 30.1 PG — SIGNIFICANT CHANGE UP (ref 27–34)
MCHC RBC-ENTMCNC: 30.6 PG — SIGNIFICANT CHANGE UP (ref 27–34)
MCHC RBC-ENTMCNC: 31.3 PG — SIGNIFICANT CHANGE UP (ref 27–34)
MCHC RBC-ENTMCNC: 32.5 G/DL — SIGNIFICANT CHANGE UP (ref 32–36)
MCHC RBC-ENTMCNC: 32.6 G/DL — SIGNIFICANT CHANGE UP (ref 32–36)
MCHC RBC-ENTMCNC: 32.7 G/DL — SIGNIFICANT CHANGE UP (ref 32–36)
MCV RBC AUTO: 92.7 FL — SIGNIFICANT CHANGE UP (ref 80–100)
MCV RBC AUTO: 93.7 FL — SIGNIFICANT CHANGE UP (ref 80–100)
MCV RBC AUTO: 95.9 FL — SIGNIFICANT CHANGE UP (ref 80–100)
MONOCYTES # BLD AUTO: 0.59 K/UL — SIGNIFICANT CHANGE UP (ref 0–0.9)
MONOCYTES # BLD AUTO: 0.73 K/UL — SIGNIFICANT CHANGE UP (ref 0–0.9)
MONOCYTES NFR BLD AUTO: 11.9 % — SIGNIFICANT CHANGE UP (ref 2–14)
MONOCYTES NFR BLD AUTO: 13.2 % — SIGNIFICANT CHANGE UP (ref 2–14)
NEUTROPHILS # BLD AUTO: 3.04 K/UL — SIGNIFICANT CHANGE UP (ref 1.8–7.4)
NEUTROPHILS # BLD AUTO: 3.19 K/UL — SIGNIFICANT CHANGE UP (ref 1.8–7.4)
NEUTROPHILS NFR BLD AUTO: 55 % — SIGNIFICANT CHANGE UP (ref 43–77)
NEUTROPHILS NFR BLD AUTO: 64.3 % — SIGNIFICANT CHANGE UP (ref 43–77)
NRBC # BLD: 0 /100 WBCS — SIGNIFICANT CHANGE UP (ref 0–0)
NRBC # FLD: 0 K/UL — SIGNIFICANT CHANGE UP (ref 0–0)
PHOSPHATE SERPL-MCNC: 2.7 MG/DL — SIGNIFICANT CHANGE UP (ref 2.5–4.5)
PLATELET # BLD AUTO: 116 K/UL — LOW (ref 150–400)
PLATELET # BLD AUTO: 118 K/UL — LOW (ref 150–400)
PLATELET # BLD AUTO: 131 K/UL — LOW (ref 150–400)
POTASSIUM SERPL-MCNC: 3.5 MMOL/L — SIGNIFICANT CHANGE UP (ref 3.5–5.3)
POTASSIUM SERPL-MCNC: 3.9 MMOL/L — SIGNIFICANT CHANGE UP (ref 3.5–5.3)
POTASSIUM SERPL-SCNC: 3.5 MMOL/L — SIGNIFICANT CHANGE UP (ref 3.5–5.3)
POTASSIUM SERPL-SCNC: 3.9 MMOL/L — SIGNIFICANT CHANGE UP (ref 3.5–5.3)
PROT SERPL-MCNC: 6.8 G/DL — SIGNIFICANT CHANGE UP (ref 6–8.3)
PROT SERPL-MCNC: 7.4 G/DL — SIGNIFICANT CHANGE UP (ref 6–8.3)
PROTHROM AB SERPL-ACNC: 21.6 SEC — HIGH (ref 9.9–13.4)
RBC # BLD: 1.95 M/UL — LOW (ref 4.2–5.8)
RBC # BLD: 2.22 M/UL — LOW (ref 4.2–5.8)
RBC # BLD: 2.86 M/UL — LOW (ref 4.2–5.8)
RBC # FLD: 17.3 % — HIGH (ref 10.3–14.5)
RBC # FLD: 17.5 % — HIGH (ref 10.3–14.5)
RBC # FLD: 17.7 % — HIGH (ref 10.3–14.5)
SODIUM SERPL-SCNC: 135 MMOL/L — SIGNIFICANT CHANGE UP (ref 135–145)
SODIUM SERPL-SCNC: 137 MMOL/L — SIGNIFICANT CHANGE UP (ref 135–145)
TRANSFERRIN SERPL-MCNC: 208 MG/DL — SIGNIFICANT CHANGE UP (ref 200–360)
WBC # BLD: 4.96 K/UL — SIGNIFICANT CHANGE UP (ref 3.8–10.5)
WBC # BLD: 5.19 K/UL — SIGNIFICANT CHANGE UP (ref 3.8–10.5)
WBC # BLD: 5.53 K/UL — SIGNIFICANT CHANGE UP (ref 3.8–10.5)
WBC # FLD AUTO: 4.96 K/UL — SIGNIFICANT CHANGE UP (ref 3.8–10.5)
WBC # FLD AUTO: 5.19 K/UL — SIGNIFICANT CHANGE UP (ref 3.8–10.5)
WBC # FLD AUTO: 5.53 K/UL — SIGNIFICANT CHANGE UP (ref 3.8–10.5)

## 2024-11-25 PROCEDURE — 80053 COMPREHEN METABOLIC PANEL: CPT

## 2024-11-25 PROCEDURE — 86923 COMPATIBILITY TEST ELECTRIC: CPT

## 2024-11-25 PROCEDURE — 82728 ASSAY OF FERRITIN: CPT

## 2024-11-25 PROCEDURE — 83735 ASSAY OF MAGNESIUM: CPT

## 2024-11-25 PROCEDURE — 85025 COMPLETE CBC W/AUTO DIFF WBC: CPT

## 2024-11-25 PROCEDURE — 99233 SBSQ HOSP IP/OBS HIGH 50: CPT

## 2024-11-25 PROCEDURE — 85730 THROMBOPLASTIN TIME PARTIAL: CPT

## 2024-11-25 PROCEDURE — 93005 ELECTROCARDIOGRAM TRACING: CPT

## 2024-11-25 PROCEDURE — 83690 ASSAY OF LIPASE: CPT

## 2024-11-25 PROCEDURE — 85610 PROTHROMBIN TIME: CPT

## 2024-11-25 PROCEDURE — 36430 TRANSFUSION BLD/BLD COMPNT: CPT

## 2024-11-25 PROCEDURE — 99285 EMERGENCY DEPT VISIT HI MDM: CPT | Mod: 25

## 2024-11-25 PROCEDURE — 84100 ASSAY OF PHOSPHORUS: CPT

## 2024-11-25 PROCEDURE — 83540 ASSAY OF IRON: CPT

## 2024-11-25 PROCEDURE — 71045 X-RAY EXAM CHEST 1 VIEW: CPT

## 2024-11-25 PROCEDURE — 87640 STAPH A DNA AMP PROBE: CPT

## 2024-11-25 PROCEDURE — 86850 RBC ANTIBODY SCREEN: CPT

## 2024-11-25 PROCEDURE — 86901 BLOOD TYPING SEROLOGIC RH(D): CPT

## 2024-11-25 PROCEDURE — P9040: CPT

## 2024-11-25 PROCEDURE — 85027 COMPLETE CBC AUTOMATED: CPT

## 2024-11-25 PROCEDURE — 86900 BLOOD TYPING SEROLOGIC ABO: CPT

## 2024-11-25 PROCEDURE — 93970 EXTREMITY STUDY: CPT | Mod: 26

## 2024-11-25 PROCEDURE — 99223 1ST HOSP IP/OBS HIGH 75: CPT

## 2024-11-25 PROCEDURE — 87641 MR-STAPH DNA AMP PROBE: CPT

## 2024-11-25 PROCEDURE — 36415 COLL VENOUS BLD VENIPUNCTURE: CPT

## 2024-11-25 PROCEDURE — 83550 IRON BINDING TEST: CPT

## 2024-11-25 PROCEDURE — 84466 ASSAY OF TRANSFERRIN: CPT

## 2024-11-25 RX ORDER — FUROSEMIDE 40 MG/1
20 TABLET ORAL EVERY 12 HOURS
Refills: 0 | Status: DISCONTINUED | OUTPATIENT
Start: 2024-11-25 | End: 2024-11-26

## 2024-11-25 RX ORDER — CEFTRIAXONE SODIUM 1 G
2000 VIAL (EA) INJECTION EVERY 24 HOURS
Refills: 0 | Status: COMPLETED | OUTPATIENT
Start: 2024-11-25 | End: 2024-11-30

## 2024-11-25 RX ORDER — PANTOPRAZOLE SODIUM 40 MG/1
40 TABLET, DELAYED RELEASE ORAL EVERY 12 HOURS
Refills: 0 | Status: DISCONTINUED | OUTPATIENT
Start: 2024-11-26 | End: 2024-11-25

## 2024-11-25 RX ORDER — INFLUENZA VIRUS VACCINE 15; 15; 15; 15 UG/.5ML; UG/.5ML; UG/.5ML; UG/.5ML
0.5 SUSPENSION INTRAMUSCULAR ONCE
Refills: 0 | Status: DISCONTINUED | OUTPATIENT
Start: 2024-11-25 | End: 2024-11-25

## 2024-11-25 RX ORDER — PANTOPRAZOLE SODIUM 40 MG/1
80 TABLET, DELAYED RELEASE ORAL ONCE
Refills: 0 | Status: COMPLETED | OUTPATIENT
Start: 2024-11-25 | End: 2024-11-25

## 2024-11-25 RX ORDER — NICOTINE 21 MG/24H
1 PATCH, EXTENDED RELEASE TRANSDERMAL DAILY
Refills: 0 | Status: DISCONTINUED | OUTPATIENT
Start: 2024-11-25 | End: 2024-11-27

## 2024-11-25 RX ORDER — PANTOPRAZOLE SODIUM 40 MG/1
40 TABLET, DELAYED RELEASE ORAL EVERY 12 HOURS
Refills: 0 | Status: DISCONTINUED | OUTPATIENT
Start: 2024-11-25 | End: 2024-11-28

## 2024-11-25 RX ORDER — SPIRONOLACTONE 25 MG
25 TABLET ORAL DAILY
Refills: 0 | Status: DISCONTINUED | OUTPATIENT
Start: 2024-11-25 | End: 2024-11-26

## 2024-11-25 RX ORDER — OCTREOTIDE ACETATE 500 UG/ML
50 INJECTION, SOLUTION INTRAVENOUS; SUBCUTANEOUS
Qty: 500 | Refills: 0 | Status: DISCONTINUED | OUTPATIENT
Start: 2024-11-25 | End: 2024-11-28

## 2024-11-25 RX ORDER — LANOLIN ALCOHOL/MO/W.PET/CERES
100 CREAM (GRAM) TOPICAL DAILY
Refills: 0 | Status: DISCONTINUED | OUTPATIENT
Start: 2024-11-25 | End: 2024-12-05

## 2024-11-25 RX ADMIN — SODIUM CHLORIDE 85 MILLILITER(S): 9 INJECTION, SOLUTION INTRAMUSCULAR; INTRAVENOUS; SUBCUTANEOUS at 01:12

## 2024-11-25 RX ADMIN — OCTREOTIDE ACETATE 10 MICROGRAM(S)/HR: 500 INJECTION, SOLUTION INTRAVENOUS; SUBCUTANEOUS at 00:23

## 2024-11-25 RX ADMIN — PANTOPRAZOLE SODIUM 80 MILLIGRAM(S): 40 TABLET, DELAYED RELEASE ORAL at 07:40

## 2024-11-25 RX ADMIN — OCTREOTIDE ACETATE 10 MICROGRAM(S)/HR: 500 INJECTION, SOLUTION INTRAVENOUS; SUBCUTANEOUS at 22:35

## 2024-11-25 RX ADMIN — OCTREOTIDE ACETATE 10 MICROGRAM(S)/HR: 500 INJECTION, SOLUTION INTRAVENOUS; SUBCUTANEOUS at 11:05

## 2024-11-25 RX ADMIN — Medication 100 MILLIGRAM(S): at 21:51

## 2024-11-25 RX ADMIN — NICOTINE 2 MILLIGRAM(S): 21 PATCH, EXTENDED RELEASE TRANSDERMAL at 05:55

## 2024-11-25 NOTE — H&P ADULT - NSHPREVIEWOFSYSTEMS_GEN_ALL_CORE
Review of Systems:   CONSTITUTIONAL: No fever  EYES: No eye pain, visual disturbances, or discharge  ENMT:  No difficulty hearing, tinnitus, vertigo; No sinus or throat pain  NECK: No pain or stiffness  RESPIRATORY: No cough, wheezing, chills or hemoptysis  CARDIOVASCULAR: No chest pain  GASTROINTESTINAL:  abdominal distention / discomfort, recent  melena   GENITOURINARY: No dysuria  NEUROLOGICAL: No headaches  SKIN: No itching, burning, rashes, or lesions   MUSCULOSKELETAL: No joint pain or swelling; No muscle, back, or extremity pain

## 2024-11-25 NOTE — PROGRESS NOTE ADULT - PROBLEM SELECTOR PLAN 3
Meets criteria of alcoholic hepatitis with transaminitis, jaundice, hx of ETOH use, elevated PT/INR,  - MELD-Na: 20  - Maddrey: 49.1

## 2024-11-25 NOTE — H&P ADULT - IN ACCORDANCE WITH [place] LAW, WE OFFER EVERY PATIENT A HEPATITIS C TEST. WOULD YOU LIKE TO BE TESTED TODAY?
EMB showed signs of endometrial polyp with complex hyperplasia with foci of atypia. Discussed with pt that complex hyperplasia with atypia carries a 25-40% risk of endometrial cancer. Will refer pt to gyne oncology at [facility] for consultation and likely hysterectomy    
Opt out

## 2024-11-25 NOTE — PHARMACOTHERAPY INTERVENTION NOTE - COMMENTS
Medication history is incomplete. Pharmacy to follow up. Please call spectra [phone] if you have any questions.

## 2024-11-25 NOTE — H&P ADULT - PROBLEM SELECTOR PLAN 1
-IR consult placed for therapeutic  paracentesis  -pt at very least meets criteria for sbp ppx with daily ceftriaxone 1g; However, pt at times notes abd pressure/ discomfort, at other times abd pain. Would therefore  place on full sbp coverage for now with 2 g ceftriaxone daily   -will restart lasix and spironolactone that pt has not atken for several months, Is/Os -IR consult placed for therapeutic  paracentesis  -pt at very least meets criteria for sbp ppx with daily ceftriaxone 1g; However, pt at times notes abd pressure/ discomfort, at other times abd pain. Would therefore  place on full sbp coverage for now with 2 g ceftriaxone daily   -will restart lasix and spironolactone (as bp tolerates)  that pt has not taken for several months, Is/Os

## 2024-11-25 NOTE — PROGRESS NOTE ADULT - ASSESSMENT
49M, from home, ambulates with cane, PMH ETOH use disorder, cirrhosis c/b ascites, alcoholic hepatitis, GERD. Presenting with abdominal pain with worsening abdominal distention x1 month. Found to have Hb 6.7, K 3.1. Admitted for anemia and decompensated cirrhosis, likely will require paracentesis, r/o SBP, also c/f GIB in the setting of patient having melena stools at home. No melena or hematemesis since arrival to ED. GI consulted, and recommended transfer to [facility] for possible endoscopic intervention

## 2024-11-25 NOTE — PATIENT PROFILE ADULT - SURGICAL SITE INCISION
Problem: Dysphagia (Adult)  Goal: *Acute Goals and Plan of Care (Insert Text)  Description  LTG: Patient will tolerate least restrictive diet without overt signs or symptoms of airway compromise. STG: Patient will tolerate mechanical soft diet and thin liquids with no straws without overt signs or symptoms of airway compromise. Goal edited 1/8/20  STG: Patient will participate in chewable trials to determine ability to tolerate diet advancement  STG: Patient will participate in modified barium swallow study as clinically indicated. Goal met 1/8/20      Outcome: Progressing Towards Goal   SPEECH LANGUAGE PATHOLOGY: MODIFIED BARIUM SWALLOW STUDY: Initial Assessment    NAME/AGE/GENDER: Tammy Montes is a 56 y.o. male  DATE: 1/8/2020  PRIMARY DIAGNOSIS: Acute ischemic stroke [facility]) [I63.9]  Cerebrovascular accident (CVA) due to embolism ([facility] [I63.9]       ICD-10: Treatment Diagnosis: R13.12 oropharyngeal phase dysphagia  INTERDISCIPLINARY COLLABORATION: Registered Nurse  PRECAUTIONS/ALLERGIES: Patient has no known allergies. ASSESSMENT/PLAN OF CARE:   Based on the objective data described below, Mr. Radha Roldan presents with a delayed swallow down to the pyriform sinuses with thin liquids, mixed and solid. Pt with penetration. Pt aspirated during the swallow with thin liquids via a straw. Pt tolerated thin liquids via a cup. No residue. Recommend mechanical soft textures with thin liquids via a cup. Medication whole in pureed. Patient will benefit from skilled intervention to address the below impairments. ?????? ? ? This section established at most recent assessment??????????  RECOMMENDATIONS AND PLANNED INTERVENTIONS (Benefits and precautions of therapy have been discussed with the patient.):  PO:    Mechanical soft with chopped meat and vegetables  Liquids:    regular thinno straws  MEDICATIONS:whole in pureed   COMPENSATORY STRATEGIES/MODIFICATIONS INCLUDING:  Cup/sip  OTHER RECOMMENDATIONS (including follow up treatment recommendations):   Laryngeal exercises  FREQUENCY/DURATION: Continue to follow patient 3 times a week for duration of hospital stay to address above goals. RECOMMENDED REHABILITATION/EQUIPMENT: (at time of discharge pending progress): Due to the probability of continued deficits (see above) this patient will likely need continued skilled speech therapy after discharge. SUBJECTIVE:   Flat affect  History of Present Injury/Illness: Mr. Papito Ellison  has a past medical history of Acute ischemic stroke ([facility] (12/12/2019), Acute pancreatitis (11/19/2014), Cerebrovascular accident (CVA) due to embolism ([address] (12/12/2019), Diabetes ([address] (2002), Diabetes ([address] Diabetes mellitus, and Hypertension. He also has no past medical history of Arthritis, Asthma, Autoimmune disease ([address] CAD (coronary artery disease), Cancer ([address] Chronic kidney disease, COPD, Dementia, Dementia ([address] Heart failure ([address] Ill-defined condition, Infectious disease, Liver disease, Other ill-defined conditions(799.89), Psychiatric disorder, PUD (peptic ulcer disease), Seizures ([address] or Sleep disorder. Kamilah Han He also  has a past surgical history that includes hx hernia repair and hx orthopaedic.    Present Symptoms:   Pain Intensity 1: 0  Pain Location 1: Shoulder  Pain Orientation 1: Left  Pain Intervention(s) 1: Medication (see MAR)    Current Dietary Status:  mechanical soft/thin   Radiologist: [place]    Social History/Home Situation:   Home Environment: Apartment  # Steps to Enter: 12  Rails to Enter: Yes  [employer] : Bilateral  One/Two Story Residence: One story  Living Alone: No  Support Systems: Spouse/Significant Other/Partner  Patient Expects to be Discharged to[de-identified] Unknown  Current DME Used/Available at Home: None  Tub or Shower Type: Tub/Shower combination  OBJECTIVE:       Cognitive/Communication Status:  Mental Status  Neurologic State: Alert  Orientation Level: Oriented to person  Cognition: Follows commands  Perception: Appears intact  Perseveration: No perseveration noted  Safety/Judgement: Fall prevention    Oral Assessment:  Oral Assessment  Labial: No impairment  Oral Hygiene: adequate  Lingual: No impairment    Vocal Quality: clear    Patient Viewed: Patient Position: upright in chair  Film Views: Lateral, Fluoro    Oral Prepatory:  The patient was given the following: Consistency Presented: Puree, Solid, Mixed consistency, Thin liquid  How Presented: Spoon, Cup/sip, Self-fed/presented, SLP-fed/presented, Straw    Oral Phase:  Bolus Acceptance: No impairment  Bolus Formation/Control: Impaired  Propulsion: No impairment  Type of Impairment: Mastication  Oral Residue: None  Initiation of Swallow: Triggered at vallecula, Triggered at pyriform sinus(es)  Oral Phase Severity: Mild    Pharyngeal Phase:  Timing: Pooling 1-5 sec     Laryngeal Elevation: WFL (within functional limits)  Penetration: None  Aspiration/Timing: During  Aspiration/Penetration Score: 7 (Aspiration-Contrast passes below the cords/, but is not ejected despite attempt)  Pharyngeal Symmetry: Not assessed     Pharyngeal Phase Severity: Mild  Pharyngeal-Esophageal Segment: No impairment    Assessment/Reassessment only, no treatment provided today    Tool Used: Dysphagia Outcome and Severity Scale (ROCHELLE)    Score Comments   Normal Diet  [] 7 With no strategies or extra time needed       Functional Swallow  [x] 6 May have mild oral or pharyngeal delay       Mild Dysphagia    [] 5 Which may require one diet consistency restricted (those who demonstrate penetration which is entirely cleared on MBS would be included)   Mild-Moderate Dysphagia  [x] 4 With 1-2 diet consistencies restricted       Moderate Dysphagia  [] 3 With 2 or more diet consistencies restricted       Moderately Severe Dysphagia  [] 2 With partial PO strategies (trials with ST only)       Severe Dysphagia  [] 1 With inability to tolerate any PO safely          Score:  Initial:4 Most Recent: X (Date: -- ) Interpretation of Tool: The Dysphagia Outcome and Severity Scale (ROCHELLE) is a simple, easy-to-use, 7-point scale developed to systematically rate the functional severity of dysphagia based on objective assessment and make recommendations for diet level, independence level, and type of nutrition.      Score 7 6 5 4 3 2 1   Modifier CH CI CJ CK CL CM CN   Swallowing:     - CURRENT STATUS: CK - 40%-59% impaired, limited or restricted    - GOAL STATUS:  CI - 1%-19% impaired, limited or restricted    - D/C STATUS:  ---------------To be determined---------------  Payor: MEDICAID PENDING / Plan: [facility] MEDICAID PENDING / Product Type: Medicaid /   __________________________________________________________________________________________________  Safety:   After treatment position/precautions:  Upright in Bed  Recommendations for treatment: dysphagia management  Total Treatment Duration:  Time In: 1030   Time Out: 54 Doretha Sanchez MA/FROYLAN/SLP no

## 2024-11-25 NOTE — PROGRESS NOTE ADULT - PROBLEM SELECTOR PLAN 1
Hb 6.7on admission  Endorses 4-5 episodes of "dark stools"  ~1 week ago; no further episodes since then. Denies bloody vomit.  - EGD 5/2024 - Esophagus: 3 columns of grade II-III varices, not bleeding. Largest one reaches to mid-esophagus. .  Stomach: diffuse PHG. No HH.   Duodenum: somewhat edematous folds, though no evidence of ulcerations or bleeding.   - CBC q6  - maintain active T+S, transfuse for Hb <7  - iron studies  - Hb 6.7 >6.1 s/p 1u pRBC > CTA Abd pelvis to r/o bleed-pending  s/p 2nd Unit of PRBC Hgb 6.8  3rd unit ordered  - IV PPI BID  GI consulted recc'd transfer to [facility] for possible endoscopic intervention

## 2024-11-25 NOTE — H&P ADULT - HISTORY OF PRESENT ILLNESS
49M, from home, ambulates with cane, 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 Presenting with abdominal pain with worsening abdominal distention x1 month. States abdominal pain is diffuse, and the distention has made it difficult for him to walk and do ADLs. Endorses poor appetite, nausea, generalized weakness, . Endorses 4-5 episodes of "dark stools"  in last few days, but most recent stool yesterday normal appearing.  Denies fever, vomiting, dizziness, chest pain, palpitations, SOB, dysuria, night sweats, numbness or tingling in extremities. States last ETOH use was in May.     Chart reviewed - patient admitted to [facility] in 5/2024 during which time was dx with cirrhosis, alcoholic hepatitis, requiring paracentesis.     Patient has poor follow up with doctors. States he has not seen one since his discharge in 5/2024. Ran out of his medications (from prior hospitalization in July and has not taken any medications. 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 Presenting with abdominal discomfort  with worsening  distention x1 month. States  the distention has made it difficult for him to walk and do ADLs. Endorses poor appetite, nausea, generalized weakness, Endorses 4-5 episodes of "dark stools"  in last few days, but most recent stool yesterday normal appearing.  Denies fever, vomiting, dizziness, chest pain, palpitations. States last ETOH use was in May. Underwent therapeutic paracentesis in May and has not taken meds including lasix and spironolactone since scripts ran out several months ago Initial Hb at [facility] 6.7, then 6.1. Hb 6.8 following 2 prbcs; completed 3rd prbc upon [facility] arrival in early evening. Denies fevers, rigors

## 2024-11-25 NOTE — PROGRESS NOTE ADULT - PROBLEM SELECTOR PLAN 2
Presenting with abdominal pain associated with worsening abdominal distention x1 month.  Was discharged on spironolactone 25 4 tabs daily, lasix 20, carvedilol 3.125 q12 on last admission.  Not taking any meds currently.   AST 40, ALT 24, ALP 98, Tbili 2.6, PT INR 21/1.81, , albumin. SCr 0.78  - Cirrhosis likely due to EtOH, w/ CSPH, decompensated w/ ascites.  - MELD-Na: 20  - Maddrey: 49.1  - SBP ppx with CTX  - octreotide drip  -pending transfer to [facility]

## 2024-11-25 NOTE — PROGRESS NOTE ADULT - NS ATTEND AMEND GEN_ALL_CORE FT
a/p# Acute Blood loss anemia 2/2 upper GI bleed # Etoh Liver Cirrhosis # Ascites     - patient did not follow up with out-patient providers a/p# Acute Blood loss anemia 2/2 Possible Variceal bleed # Etoh Liver Cirrhosis # Ascites     - patient did not follow up with out-patient providers and did not take any prescribed medications. he received 2 PRBC prior to most recent labs with inadequate response. Patient was on PPI, Octreotide and CTx  - case was d/w Dr. Asuncion Feng - GI Fellow and Dr. Coello- hospitalist at [facility]- Patient transferred for GI eval and Possible EGD  tfer to [facility]

## 2024-11-25 NOTE — ACUTE INTERFACILITY TRANSFER NOTE - HOSPITAL COURSE
Assessment and Plan of Treatment: 49M, from home, ambulates with cane, PMH ETOH use disorder, cirrhosis c/b ascites, alcoholic hepatitis, GERD. Presenting with abdominal pain with worsening abdominal distention x1 month, found to have Have Hgb 6.1 Admitted for anemia and decompensated cirrhosis. Patient also endorsed melena at home, suspected GIB, started on octreotide, PPI, given 2 U PRBC. GI consulted, recommend transfer for possible EGD

## 2024-11-25 NOTE — H&P ADULT - PROBLEM SELECTOR PLAN 2
-possibly stemming from known esophogeal varices  -ppi  -octreotide  -post 3rd prbc Hb ordered tonight  -gi emailed, npo pot midnight

## 2024-11-25 NOTE — CHART NOTE - NSCHARTNOTEFT_GEN_A_CORE
EVENT:Asked by primary team for GI consult in this pt with acute anemia in setting of melena stool    SUBJECTIVE:Pt seen and examined at bedside. Pt awake/alert, oriented x 4. Pt endorses dark black stool x 3 days, now with mild dizziness and mild shortness of breath , distended abdomen for months . Pt denies N/V, NSAIDs.   s/p EGD in May 2024 , with findings of Esophagus: 3 columns of grade II-III varices, not bleeding. Largest one reaches to mid-esophagus      OBJECTIVE:  Vital Signs Last 24 Hrs  T(C): 36.7 (25 Nov 2024 09:44), Max: 37 (25 Nov 2024 06:54)  T(F): 98.1 (25 Nov 2024 09:44), Max: 98.6 (25 Nov 2024 06:54)  HR: 92 (25 Nov 2024 09:44) (89 - 105)  BP: 106/67 (25 Nov 2024 09:44) (100/63 - 127/63)  BP(mean): 84 (25 Nov 2024 09:06) (84 - 85)  RR: 18 (25 Nov 2024 09:44) (16 - 20)  SpO2: 97% (25 Nov 2024 09:44) (96% - 100%)    Parameters below as of 25 Nov 2024 09:44  Patient On (Oxygen Delivery Method): room air    Gen: mild dyspnea  HEENT: PERRL, icteric sclera bilaterally.   Resp: Clear breath sounds on auscultation. No rales, no wheezing  CVS: S1S2 present, regular  GI: BS(+), Softly distended, mild tenderness.   Extremities : BLE No edema or calf tenderness. PPP  Neuro: Awake/alert.  No new neuro deficits  Skin: warm,dry , jaundice      LABS:                        6.1    5.53  )-----------( 116      ( 25 Nov 2024 05:04 )             18.7     11-25    137  |  104  |  13  ----------------------------<  143[H]  3.9   |  28  |  0.80    Ca    7.7[L]      25 Nov 2024 05:04  Phos  2.7     11-25  Mg     1.8     11-25    TPro  6.8  /  Alb  1.8[L]  /  TBili  3.4[H]  /  DBili  x   /  AST  32  /  ALT  21  /  AlkPhos  77  11-25    PT/INR - ( 24 Nov 2024 18:55 )   PT: 21.1 sec;   INR: 1.81 ratio         PTT - ( 24 Nov 2024 18:55 )  PTT:35.1 sec  ASSESSMENT:  HPI:  49M, from home, ambulates with cane, PMH ETOH use disorder, cirrhosis c/b ascites, alcoholic hepatitis, GERD, who presented with abdominal pain with worsening abdominal distention x1 month. States abdominal pain is diffuse, and the distention has made it difficult for him to walk and do ADLs. Endorses poor appetite, nausea, generalized weakness, ~20 wt loss in past 3 months. Endorses 4-5 episodes of "dark stools"  ~1 week ago; no further episodes since then. Denies fever, vomiting, dizziness, chest pain, palpitations, SOB, dysuria, night sweats, numbness or tingling in extremities. States last ETOH use was in May.     Chart reviewed - patient admitted to [facility] in 5/2024 during which time was dx with cirrhosis, alcoholic hepatitis, requiring paracentesis.     Patient has poor follow up with doctors. States he has not seen one since his discharge in 5/2024. Ran out of his medications (from prior hospitalization in July and has not taken any medications.     (24 Nov 2024 22:10)        #Acute anemia   #Esophageal varices  #r/o GIB,     -  Maintain 2 large bore IV access ,  active T&S  - Trend H/H, CBC Q 6hrs  - Please transfuse to Hgb > 7-8 or with hemodynamic instability, platelets >50K.   -Monitor INR . Keep INR 1.5 or less.   - Continue PPI (IV Pantoprazole/Protonix 40mg Q12) / Pantoprazole/Protonix drip 80mg in NS, 50ml/hr or 8mg/hr  - Due to suspicion for esophageal varices continue Ceftriaxone 1g Q12hrs, Octreotide 50mcg/hr IV drip until any endoscopic intervention.  -Transfer to [facility] for emergent EGD  -D/w primary attending Dr. Ivory.       Ele Irwin NP   GI

## 2024-11-25 NOTE — PATIENT PROFILE ADULT - FALL HARM RISK - HARM RISK INTERVENTIONS

## 2024-11-25 NOTE — H&P ADULT - NSHPLABSRESULTS_GEN_ALL_CORE
6.8    5.19  )-----------( 118      ( 25 Nov 2024 11:12 )             20.8     11-25    137  |  104  |  13  ----------------------------<  143[H]  3.9   |  28  |  0.80    Ca    7.7[L]      25 Nov 2024 05:04  Phos  2.7     11-25  Mg     1.8     11-25    TPro  6.8  /  Alb  1.8[L]  /  TBili  3.4[H]  /  DBili  x   /  AST  32  /  ALT  21  /  AlkPhos  77  11-25    CAPILLARY BLOOD GLUCOSE        PT/INR - ( 24 Nov 2024 18:55 )   PT: 21.1 sec;   INR: 1.81 ratio         PTT - ( 24 Nov 2024 18:55 )  PTT:35.1 sec  Urinalysis Basic - ( 25 Nov 2024 05:04 )    Color: x / Appearance: x / SG: x / pH: x  Gluc: 143 mg/dL / Ketone: x  / Bili: x / Urobili: x   Blood: x / Protein: x / Nitrite: x   Leuk Esterase: x / RBC: x / WBC x   Sq Epi: x / Non Sq Epi: x / Bacteria: x      Vital Signs Last 24 Hrs  T(C): 36.9 (25 Nov 2024 12:31), Max: 37 (25 Nov 2024 06:54)  T(F): 98.4 (25 Nov 2024 12:31), Max: 98.6 (25 Nov 2024 06:54)  HR: 95 (25 Nov 2024 12:31) (89 - 104)  BP: 112/67 (25 Nov 2024 12:31) (100/63 - 122/81)  BP(mean): 84 (25 Nov 2024 09:06) (84 - 85)  RR: 18 (25 Nov 2024 12:31) (16 - 20)  SpO2: 95% (25 Nov 2024 12:31) (95% - 100%)

## 2024-11-25 NOTE — H&P ADULT - NSHPPHYSICALEXAM_GEN_ALL_CORE
PHYSICAL EXAM:      Constitutional: NAD, well-groomed, well-developed  HEENT:  EOMI, Normal Hearing  Neck: No LAD, No JVD  Back: Normal spine flexure, No CVA tenderness  Respiratory: CTAB  Cardiovascular: S1 and S2, RRR  Gastrointestinal: BS+, ++ distention   Extremities: + peripheral edema, L>R  Vascular: 2+ peripheral pulses  Neurological: A/O x 3, no focal deficits  Psychiatric: Normal mood, normal affect  Musculoskeletal: 5/5 strength b/l upper and lower extremities  Skin: No rashes

## 2024-11-25 NOTE — PROGRESS NOTE ADULT - SUBJECTIVE AND OBJECTIVE BOX
NP Note discussed with  Primary Attending    Patient is a 49y old  Male who presents with a chief complaint of abdominal pain (24 Nov 2024 22:10)      INTERVAL HPI/OVERNIGHT EVENTS: no acute events overnight  MEDICATIONS  (STANDING):  cefTRIAXone   IVPB 2000 milliGRAM(s) IV Intermittent every 24 hours  influenza   Vaccine 0.5 milliLiter(s) IntraMuscular once  nicotine  Polacrilex Lozenge 2 milliGRAM(s) Oral every 8 hours  octreotide  Infusion 50 MICROgram(s)/Hr (10 mL/Hr) IV Continuous <Continuous>  sodium chloride 0.9%. 1000 milliLiter(s) (85 mL/Hr) IV Continuous <Continuous>    MEDICATIONS  (PRN):      __________________________________________________  REVIEW OF SYSTEMS:    CONSTITUTIONAL: No fever,   EYES: no acute visual disturbances  NECK: No pain or stiffness  RESPIRATORY: No cough; No shortness of breath  CARDIOVASCULAR: No chest pain, no palpitations  GASTROINTESTINAL: No pain. No nausea or vomiting; No diarrhea   NEUROLOGICAL: No headache or numbness, no tremors  MUSCULOSKELETAL: No joint pain, no muscle pain  GENITOURINARY: no dysuria, no frequency, no hesitancy  PSYCHIATRY: no depression , no anxiety  ALL OTHER  ROS negative        Vital Signs Last 24 Hrs  T(C): 36.7 (25 Nov 2024 09:44), Max: 37 (25 Nov 2024 06:54)  T(F): 98.1 (25 Nov 2024 09:44), Max: 98.6 (25 Nov 2024 06:54)  HR: 92 (25 Nov 2024 09:44) (89 - 105)  BP: 106/67 (25 Nov 2024 09:44) (100/63 - 127/63)  BP(mean): 84 (25 Nov 2024 09:06) (84 - 85)  RR: 18 (25 Nov 2024 09:44) (16 - 20)  SpO2: 97% (25 Nov 2024 09:44) (96% - 100%)    Parameters below as of 25 Nov 2024 09:44  Patient On (Oxygen Delivery Method): room air        ________________________________________________  PHYSICAL EXAM:  GENERAL: NAD  HEENT: Normocephalic   NECK : supple  CHEST/LUNG: Clear to auscultation bilaterally  HEART: S1 S2  regular  ABDOMEN: +tenderness throughout, + large ascites   EXTREMITIES: no edema  SKIN: warm and dry; no rash  NERVOUS SYSTEM:  Awake and alert; Oriented  to place, person and time    _________________________________________________  LABS:                        6.8    5.19  )-----------( 118      ( 25 Nov 2024 11:12 )             20.8     11-25    137  |  104  |  13  ----------------------------<  143[H]  3.9   |  28  |  0.80    Ca    7.7[L]      25 Nov 2024 05:04  Phos  2.7     11-25  Mg     1.8     11-25    TPro  6.8  /  Alb  1.8[L]  /  TBili  3.4[H]  /  DBili  x   /  AST  32  /  ALT  21  /  AlkPhos  77  11-25    PT/INR - ( 24 Nov 2024 18:55 )   PT: 21.1 sec;   INR: 1.81 ratio         PTT - ( 24 Nov 2024 18:55 )  PTT:35.1 sec  Urinalysis Basic - ( 25 Nov 2024 05:04 )    Color: x / Appearance: x / SG: x / pH: x  Gluc: 143 mg/dL / Ketone: x  / Bili: x / Urobili: x   Blood: x / Protein: x / Nitrite: x   Leuk Esterase: x / RBC: x / WBC x   Sq Epi: x / Non Sq Epi: x / Bacteria: x      CAPILLARY BLOOD GLUCOSE            RADIOLOGY & ADDITIONAL TESTS:    Imaging Personally Reviewed:  YES/NO    Consultant(s) Notes Reviewed:   YES/ No    Care Discussed with Consultants :     Plan of care was discussed with patient and /or primary care giver; all questions and concerns were addressed and care was aligned with patient's wishes.     NP Note discussed with  Primary Attending    Patient is a 49y old  Male who presents with a chief complaint of abdominal pain (24 Nov 2024 22:10)      INTERVAL HPI/OVERNIGHT EVENTS: no acute events overnight    MEDICATIONS  (STANDING):  cefTRIAXone   IVPB 2000 milliGRAM(s) IV Intermittent every 24 hours  influenza   Vaccine 0.5 milliLiter(s) IntraMuscular once  nicotine  Polacrilex Lozenge 2 milliGRAM(s) Oral every 8 hours  octreotide  Infusion 50 MICROgram(s)/Hr (10 mL/Hr) IV Continuous <Continuous>  sodium chloride 0.9%. 1000 milliLiter(s) (85 mL/Hr) IV Continuous <Continuous>    MEDICATIONS  (PRN):      __________________________________________________  REVIEW OF SYSTEMS:    CONSTITUTIONAL: No fever,   EYES: no acute visual disturbances  NECK: No pain or stiffness  RESPIRATORY: No cough; No shortness of breath  CARDIOVASCULAR: No chest pain, no palpitations  GASTROINTESTINAL: No pain. No nausea or vomiting; No diarrhea   NEUROLOGICAL: No headache or numbness, no tremors  MUSCULOSKELETAL: No joint pain, no muscle pain  GENITOURINARY: no dysuria, no frequency, no hesitancy  PSYCHIATRY: no depression , no anxiety  ALL OTHER  ROS negative        Vital Signs Last 24 Hrs  T(C): 36.7 (25 Nov 2024 09:44), Max: 37 (25 Nov 2024 06:54)  T(F): 98.1 (25 Nov 2024 09:44), Max: 98.6 (25 Nov 2024 06:54)  HR: 92 (25 Nov 2024 09:44) (89 - 105)  BP: 106/67 (25 Nov 2024 09:44) (100/63 - 127/63)  BP(mean): 84 (25 Nov 2024 09:06) (84 - 85)  RR: 18 (25 Nov 2024 09:44) (16 - 20)  SpO2: 97% (25 Nov 2024 09:44) (96% - 100%)    Parameters below as of 25 Nov 2024 09:44  Patient On (Oxygen Delivery Method): room air        ________________________________________________  PHYSICAL EXAM:  GENERAL: NAD  HEENT: Normocephalic   NECK : supple  CHEST/LUNG: Clear to auscultation bilaterally  HEART: S1 S2  regular  ABDOMEN: +tenderness throughout, + large ascites   EXTREMITIES: no edema  SKIN: warm and dry; no rash  NERVOUS SYSTEM:  Awake and alert; Oriented  to place, person and time    _________________________________________________  LABS:                        6.8    5.19  )-----------( 118      ( 25 Nov 2024 11:12 )             20.8     11-25    137  |  104  |  13  ----------------------------<  143[H]  3.9   |  28  |  0.80    Ca    7.7[L]      25 Nov 2024 05:04  Phos  2.7     11-25  Mg     1.8     11-25    TPro  6.8  /  Alb  1.8[L]  /  TBili  3.4[H]  /  DBili  x   /  AST  32  /  ALT  21  /  AlkPhos  77  11-25    PT/INR - ( 24 Nov 2024 18:55 )   PT: 21.1 sec;   INR: 1.81 ratio         PTT - ( 24 Nov 2024 18:55 )  PTT:35.1 sec  Urinalysis Basic - ( 25 Nov 2024 05:04 )    Color: x / Appearance: x / SG: x / pH: x  Gluc: 143 mg/dL / Ketone: x  / Bili: x / Urobili: x   Blood: x / Protein: x / Nitrite: x   Leuk Esterase: x / RBC: x / WBC x   Sq Epi: x / Non Sq Epi: x / Bacteria: x      CAPILLARY BLOOD GLUCOSE            RADIOLOGY & ADDITIONAL TESTS:  < from: Xray Chest 1 View-PORTABLE IMMEDIATE (Xray Chest 1 View-PORTABLE IMMEDIATE .) (11.24.24 @ 18:21) >    INTERPRETATION:  AP chest on November 24, 2024 at 6:21 PM. Patient is   short of breath.    Elevated diaphragms crowds the chest. Heart magnified by technique.    May be slight atelectasis over the left hilum at this time new since   April 27.    IMPRESSION: Probable slight atelectasis over left hilum at this time.    --- End of Report ---    < end of copied text >      Imaging Personally Reviewed:  YES    Consultant(s) Notes Reviewed:   YES      Plan of care was discussed with patient and /or primary care giver; all questions and concerns were addressed and care was aligned with patient's wishes.

## 2024-11-25 NOTE — ACUTE INTERFACILITY TRANSFER NOTE - PLAN OF CARE
Endorses 4-5 episodes of "dark stools"  ~1 week ago; no further episodes since then.   Denies bloody vomit.    - EGD 5/2024 - Esophagus: 3 columns of grade II-III varices, not bleeding. Largest one reaches to mid-esophagus. .  Stomach: diffuse PHG. No HH.   Duodenum: somewhat edematous folds, though no evidence of ulcerations or bleeding.   - CBC q6  - maintain active T+S, transfuse for Hb <7  - iron studies  - Hb 6.7 >6.1 s/p 1u pRBC > CTA Abd pelvis to r/o bleed.  -S/p additional unit Hgb6.8  - IV PPI BID  -Gi consulted recommending transfer to [facility] for possible EGD

## 2024-11-26 VITALS
SYSTOLIC BLOOD PRESSURE: 112 MMHG | TEMPERATURE: 98 F | HEART RATE: 84 BPM | DIASTOLIC BLOOD PRESSURE: 71 MMHG | RESPIRATION RATE: 16 BRPM | OXYGEN SATURATION: 99 %

## 2024-11-26 DIAGNOSIS — K74.60 UNSPECIFIED CIRRHOSIS OF LIVER: ICD-10-CM

## 2024-11-26 DIAGNOSIS — R94.31 ABNORMAL ELECTROCARDIOGRAM [ECG] [EKG]: ICD-10-CM

## 2024-11-26 DIAGNOSIS — R60.0 LOCALIZED EDEMA: ICD-10-CM

## 2024-11-26 DIAGNOSIS — R63.8 OTHER SYMPTOMS AND SIGNS CONCERNING FOOD AND FLUID INTAKE: ICD-10-CM

## 2024-11-26 DIAGNOSIS — K21.9 GASTRO-ESOPHAGEAL REFLUX DISEASE WITHOUT ESOPHAGITIS: ICD-10-CM

## 2024-11-26 DIAGNOSIS — E80.6 OTHER DISORDERS OF BILIRUBIN METABOLISM: ICD-10-CM

## 2024-11-26 DIAGNOSIS — I85.01 ESOPHAGEAL VARICES WITH BLEEDING: ICD-10-CM

## 2024-11-26 LAB
ALBUMIN SERPL ELPH-MCNC: 2.3 G/DL — LOW (ref 3.3–5)
ALP SERPL-CCNC: 77 U/L — SIGNIFICANT CHANGE UP (ref 40–120)
ALT FLD-CCNC: 20 U/L — SIGNIFICANT CHANGE UP (ref 4–41)
AMPHET UR-MCNC: NEGATIVE — SIGNIFICANT CHANGE UP
ANION GAP SERPL CALC-SCNC: 13 MMOL/L — SIGNIFICANT CHANGE UP (ref 7–14)
ANION GAP SERPL CALC-SCNC: 9 MMOL/L — SIGNIFICANT CHANGE UP (ref 7–14)
APPEARANCE UR: CLEAR — SIGNIFICANT CHANGE UP
AST SERPL-CCNC: 32 U/L — SIGNIFICANT CHANGE UP (ref 4–40)
BACTERIA # UR AUTO: NEGATIVE /HPF — SIGNIFICANT CHANGE UP
BARBITURATES UR SCN-MCNC: NEGATIVE — SIGNIFICANT CHANGE UP
BASOPHILS # BLD AUTO: 0.05 K/UL — SIGNIFICANT CHANGE UP (ref 0–0.2)
BASOPHILS NFR BLD AUTO: 1 % — SIGNIFICANT CHANGE UP (ref 0–2)
BENZODIAZ UR-MCNC: NEGATIVE — SIGNIFICANT CHANGE UP
BILIRUB DIRECT SERPL-MCNC: 1.5 MG/DL — HIGH (ref 0–0.3)
BILIRUB SERPL-MCNC: 3.7 MG/DL — HIGH (ref 0.2–1.2)
BILIRUB UR-MCNC: ABNORMAL
BLD GP AB SCN SERPL QL: NEGATIVE — SIGNIFICANT CHANGE UP
BUN SERPL-MCNC: 11 MG/DL — SIGNIFICANT CHANGE UP (ref 7–23)
BUN SERPL-MCNC: 11 MG/DL — SIGNIFICANT CHANGE UP (ref 7–23)
CALCIUM SERPL-MCNC: 7.7 MG/DL — LOW (ref 8.4–10.5)
CALCIUM SERPL-MCNC: 7.8 MG/DL — LOW (ref 8.4–10.5)
CAST: 5 /LPF — HIGH (ref 0–4)
CHLORIDE SERPL-SCNC: 97 MMOL/L — LOW (ref 98–107)
CHLORIDE SERPL-SCNC: 99 MMOL/L — SIGNIFICANT CHANGE UP (ref 98–107)
CO2 SERPL-SCNC: 24 MMOL/L — SIGNIFICANT CHANGE UP (ref 22–31)
CO2 SERPL-SCNC: 26 MMOL/L — SIGNIFICANT CHANGE UP (ref 22–31)
COCAINE METAB.OTHER UR-MCNC: NEGATIVE — SIGNIFICANT CHANGE UP
COD CRY URNS QL: PRESENT
COLOR SPEC: ABNORMAL
CREAT SERPL-MCNC: 0.66 MG/DL — SIGNIFICANT CHANGE UP (ref 0.5–1.3)
CREAT SERPL-MCNC: 0.74 MG/DL — SIGNIFICANT CHANGE UP (ref 0.5–1.3)
CREATININE URINE RESULT, DAU: 118 MG/DL — SIGNIFICANT CHANGE UP
DIFF PNL FLD: NEGATIVE — SIGNIFICANT CHANGE UP
EGFR: 112 ML/MIN/1.73M2 — SIGNIFICANT CHANGE UP
EGFR: 116 ML/MIN/1.73M2 — SIGNIFICANT CHANGE UP
EOSINOPHIL # BLD AUTO: 0.15 K/UL — SIGNIFICANT CHANGE UP (ref 0–0.5)
EOSINOPHIL NFR BLD AUTO: 3 % — SIGNIFICANT CHANGE UP (ref 0–6)
FENTANYL UR QL SCN: POSITIVE
GLUCOSE SERPL-MCNC: 124 MG/DL — HIGH (ref 70–99)
GLUCOSE SERPL-MCNC: 140 MG/DL — HIGH (ref 70–99)
GLUCOSE UR QL: NEGATIVE MG/DL — SIGNIFICANT CHANGE UP
HCT VFR BLD CALC: 25.7 % — LOW (ref 39–50)
HCT VFR BLD CALC: 26.5 % — LOW (ref 39–50)
HGB BLD-MCNC: 8 G/DL — LOW (ref 13–17)
HGB BLD-MCNC: 8.3 G/DL — LOW (ref 13–17)
IANC: 2.39 K/UL — SIGNIFICANT CHANGE UP (ref 1.8–7.4)
IMM GRANULOCYTES NFR BLD AUTO: 0.6 % — SIGNIFICANT CHANGE UP (ref 0–0.9)
KETONES UR-MCNC: NEGATIVE MG/DL — SIGNIFICANT CHANGE UP
LEUKOCYTE ESTERASE UR-ACNC: ABNORMAL
LYMPHOCYTES # BLD AUTO: 1.7 K/UL — SIGNIFICANT CHANGE UP (ref 1–3.3)
LYMPHOCYTES # BLD AUTO: 33.9 % — SIGNIFICANT CHANGE UP (ref 13–44)
MAGNESIUM SERPL-MCNC: 1.7 MG/DL — SIGNIFICANT CHANGE UP (ref 1.6–2.6)
MCHC RBC-ENTMCNC: 29.1 PG — SIGNIFICANT CHANGE UP (ref 27–34)
MCHC RBC-ENTMCNC: 29.3 PG — SIGNIFICANT CHANGE UP (ref 27–34)
MCHC RBC-ENTMCNC: 31.1 G/DL — LOW (ref 32–36)
MCHC RBC-ENTMCNC: 31.3 G/DL — LOW (ref 32–36)
MCV RBC AUTO: 93.5 FL — SIGNIFICANT CHANGE UP (ref 80–100)
MCV RBC AUTO: 93.6 FL — SIGNIFICANT CHANGE UP (ref 80–100)
METHADONE UR-MCNC: NEGATIVE — SIGNIFICANT CHANGE UP
MONOCYTES # BLD AUTO: 0.69 K/UL — SIGNIFICANT CHANGE UP (ref 0–0.9)
MONOCYTES NFR BLD AUTO: 13.8 % — SIGNIFICANT CHANGE UP (ref 2–14)
MRSA PCR RESULT.: SIGNIFICANT CHANGE UP
NEUTROPHILS # BLD AUTO: 2.39 K/UL — SIGNIFICANT CHANGE UP (ref 1.8–7.4)
NEUTROPHILS NFR BLD AUTO: 47.7 % — SIGNIFICANT CHANGE UP (ref 43–77)
NITRITE UR-MCNC: NEGATIVE — SIGNIFICANT CHANGE UP
NRBC # BLD: 0 /100 WBCS — SIGNIFICANT CHANGE UP (ref 0–0)
NRBC # BLD: 0 /100 WBCS — SIGNIFICANT CHANGE UP (ref 0–0)
NRBC # FLD: 0 K/UL — SIGNIFICANT CHANGE UP (ref 0–0)
NRBC # FLD: 0 K/UL — SIGNIFICANT CHANGE UP (ref 0–0)
OPIATES UR-MCNC: NEGATIVE — SIGNIFICANT CHANGE UP
OXYCODONE UR-MCNC: NEGATIVE — SIGNIFICANT CHANGE UP
PCP SPEC-MCNC: SIGNIFICANT CHANGE UP
PCP UR-MCNC: NEGATIVE — SIGNIFICANT CHANGE UP
PH UR: 5.5 — SIGNIFICANT CHANGE UP (ref 5–8)
PHOSPHATE SERPL-MCNC: 2.3 MG/DL — LOW (ref 2.5–4.5)
PLATELET # BLD AUTO: 132 K/UL — LOW (ref 150–400)
PLATELET # BLD AUTO: 146 K/UL — LOW (ref 150–400)
POTASSIUM SERPL-MCNC: 3.2 MMOL/L — LOW (ref 3.5–5.3)
POTASSIUM SERPL-MCNC: 3.6 MMOL/L — SIGNIFICANT CHANGE UP (ref 3.5–5.3)
POTASSIUM SERPL-SCNC: 3.2 MMOL/L — LOW (ref 3.5–5.3)
POTASSIUM SERPL-SCNC: 3.6 MMOL/L — SIGNIFICANT CHANGE UP (ref 3.5–5.3)
PROT SERPL-MCNC: 7.2 G/DL — SIGNIFICANT CHANGE UP (ref 6–8.3)
PROT UR-MCNC: NEGATIVE MG/DL — SIGNIFICANT CHANGE UP
RBC # BLD: 2.75 M/UL — LOW (ref 4.2–5.8)
RBC # BLD: 2.83 M/UL — LOW (ref 4.2–5.8)
RBC # FLD: 17.4 % — HIGH (ref 10.3–14.5)
RBC # FLD: 17.9 % — HIGH (ref 10.3–14.5)
RBC CASTS # UR COMP ASSIST: 7 /HPF — HIGH (ref 0–4)
REVIEW: SIGNIFICANT CHANGE UP
RH IG SCN BLD-IMP: POSITIVE — SIGNIFICANT CHANGE UP
S AUREUS DNA NOSE QL NAA+PROBE: SIGNIFICANT CHANGE UP
SODIUM SERPL-SCNC: 134 MMOL/L — LOW (ref 135–145)
SODIUM SERPL-SCNC: 134 MMOL/L — LOW (ref 135–145)
SP GR SPEC: 1.02 — SIGNIFICANT CHANGE UP (ref 1–1.03)
SQUAMOUS # UR AUTO: 0 /HPF — SIGNIFICANT CHANGE UP (ref 0–5)
THC UR QL: NEGATIVE — SIGNIFICANT CHANGE UP
UROBILINOGEN FLD QL: 1 MG/DL — SIGNIFICANT CHANGE UP (ref 0.2–1)
WBC # BLD: 5.01 K/UL — SIGNIFICANT CHANGE UP (ref 3.8–10.5)
WBC # BLD: 7.77 K/UL — SIGNIFICANT CHANGE UP (ref 3.8–10.5)
WBC # FLD AUTO: 5.01 K/UL — SIGNIFICANT CHANGE UP (ref 3.8–10.5)
WBC # FLD AUTO: 7.77 K/UL — SIGNIFICANT CHANGE UP (ref 3.8–10.5)
WBC UR QL: 3 /HPF — SIGNIFICANT CHANGE UP (ref 0–5)

## 2024-11-26 PROCEDURE — 43244 EGD VARICES LIGATION: CPT | Mod: GC

## 2024-11-26 PROCEDURE — 76705 ECHO EXAM OF ABDOMEN: CPT | Mod: 26,59

## 2024-11-26 PROCEDURE — 99233 SBSQ HOSP IP/OBS HIGH 50: CPT

## 2024-11-26 PROCEDURE — 93975 VASCULAR STUDY: CPT | Mod: 26

## 2024-11-26 PROCEDURE — 99223 1ST HOSP IP/OBS HIGH 75: CPT

## 2024-11-26 DEVICE — SPEEDBAND SPRVW 7: Type: IMPLANTABLE DEVICE | Status: FUNCTIONAL

## 2024-11-26 RX ORDER — PHYTONADIONE 5 MG/1
5 TABLET ORAL ONCE
Refills: 0 | Status: COMPLETED | OUTPATIENT
Start: 2024-11-26 | End: 2024-11-26

## 2024-11-26 RX ORDER — POTASSIUM CHLORIDE 600 MG/1
40 TABLET, EXTENDED RELEASE ORAL ONCE
Refills: 0 | Status: COMPLETED | OUTPATIENT
Start: 2024-11-26 | End: 2024-11-26

## 2024-11-26 RX ORDER — ONDANSETRON HYDROCHLORIDE 4 MG/1
4 TABLET, FILM COATED ORAL EVERY 8 HOURS
Refills: 0 | Status: DISCONTINUED | OUTPATIENT
Start: 2024-11-26 | End: 2024-12-05

## 2024-11-26 RX ORDER — POTASSIUM CHLORIDE 600 MG/1
20 TABLET, EXTENDED RELEASE ORAL
Refills: 0 | Status: COMPLETED | OUTPATIENT
Start: 2024-11-26 | End: 2024-11-26

## 2024-11-26 RX ORDER — ACETAMINOPHEN 500MG 500 MG/1
650 TABLET, COATED ORAL EVERY 6 HOURS
Refills: 0 | Status: DISCONTINUED | OUTPATIENT
Start: 2024-11-26 | End: 2024-12-05

## 2024-11-26 RX ORDER — CARVEDILOL 25 MG/1
3.12 TABLET, FILM COATED ORAL EVERY 12 HOURS
Refills: 0 | Status: DISCONTINUED | OUTPATIENT
Start: 2024-11-26 | End: 2024-11-29

## 2024-11-26 RX ORDER — ACETAMINOPHEN, DIPHENHYDRAMINE HCL, PHENYLEPHRINE HCL 325; 25; 5 MG/1; MG/1; MG/1
3 TABLET ORAL AT BEDTIME
Refills: 0 | Status: DISCONTINUED | OUTPATIENT
Start: 2024-11-26 | End: 2024-11-28

## 2024-11-26 RX ORDER — MAGNESIUM, ALUMINUM HYDROXIDE 200-225/5
30 SUSPENSION, ORAL (FINAL DOSE FORM) ORAL EVERY 4 HOURS
Refills: 0 | Status: DISCONTINUED | OUTPATIENT
Start: 2024-11-26 | End: 2024-11-27

## 2024-11-26 RX ADMIN — PANTOPRAZOLE SODIUM 40 MILLIGRAM(S): 40 TABLET, DELAYED RELEASE ORAL at 05:24

## 2024-11-26 RX ADMIN — NICOTINE 1 PATCH: 21 PATCH, EXTENDED RELEASE TRANSDERMAL at 18:30

## 2024-11-26 RX ADMIN — Medication 25 MILLIGRAM(S): at 05:24

## 2024-11-26 RX ADMIN — POTASSIUM CHLORIDE 20 MILLIEQUIVALENT(S): 600 TABLET, EXTENDED RELEASE ORAL at 17:39

## 2024-11-26 RX ADMIN — PANTOPRAZOLE SODIUM 40 MILLIGRAM(S): 40 TABLET, DELAYED RELEASE ORAL at 17:41

## 2024-11-26 RX ADMIN — FUROSEMIDE 20 MILLIGRAM(S): 40 TABLET ORAL at 05:24

## 2024-11-26 RX ADMIN — POTASSIUM CHLORIDE 40 MILLIEQUIVALENT(S): 600 TABLET, EXTENDED RELEASE ORAL at 09:02

## 2024-11-26 RX ADMIN — Medication 100 MILLIGRAM(S): at 17:41

## 2024-11-26 RX ADMIN — OCTREOTIDE ACETATE 10 MICROGRAM(S)/HR: 500 INJECTION, SOLUTION INTRAVENOUS; SUBCUTANEOUS at 09:02

## 2024-11-26 RX ADMIN — NICOTINE 1 PATCH: 21 PATCH, EXTENDED RELEASE TRANSDERMAL at 17:41

## 2024-11-26 RX ADMIN — Medication 100 MILLIGRAM(S): at 21:12

## 2024-11-26 RX ADMIN — PHYTONADIONE 101 MILLIGRAM(S): 5 TABLET ORAL at 18:47

## 2024-11-26 RX ADMIN — CARVEDILOL 3.12 MILLIGRAM(S): 25 TABLET, FILM COATED ORAL at 18:44

## 2024-11-26 NOTE — PROGRESS NOTE ADULT - SUBJECTIVE AND OBJECTIVE BOX
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 Presenting with abdominal discomfort  with worsening  distention x1 month. States  the distention has made it difficult for him to walk and do ADLs. Endorses poor appetite, nausea, generalized weakness, Endorses 4-5 episodes of "dark stools"  in last few days, but most recent stool yesterday normal appearing.  Denies fever, vomiting, dizziness, chest pain, palpitations. States last ETOH use was in May. Underwent therapeutic paracentesis in May and has not taken meds including lasix and spironolactone since scripts ran out several months ago Initial Hb at [facility] 6.7, then 6.1. Hb 6.8 following 2 prbcs; completed 3rd prbc upon [facility] arrival in early evening. Denies fevers, rigors       (2024 17:03)    Subjective:  Pt seen and examined at bedside resting comfortably.     VITAL SIGNS:  T(C): 37.1 (24 @ 19:40), Max: 37.1 (24 @ 19:40)  T(F): 98.7 (24 @ 19:40), Max: 98.7 (24 @ 19:40)  HR: 87 (24 @ 19:40) (84 - 91)  BP: 102/68 (24 @ 19:40) (102/68 - 120/63)  BP(mean): --  RR: 18 (24 @ 19:40) (15 - 18)  SpO2: 100% (24 @ 19:40) (94% - 100%)  Wt(kg): --    PHYSICAL EXAM:  Constitutional: resting comfortably in bed; NAD  Head: NC/AT  Eyes: PERRL, EOMI, anicteric sclera  ENT: no nasal discharge; MMM  Neck: supple; no JVD  Respiratory: CTA B/L; no W/R/R; comfortable on RA  Cardiac: +S1/S2; RRR; no M/R/G  Gastrointestinal: soft, NT/ND; no rebound or guarding; +BSx4  Extremities: WWP, no clubbing or cyanosis; no peripheral edema  Musculoskeletal: moves extremities spontaneously   Vascular: 2+ radial, DP/PT pulses B/L  Dermatologic: skin warm, dry and intact; no rashes, wounds, or scars  Neurologic: AOx3; no focal deficits  Psychiatric: calm    MEDICATIONS  (STANDING):  carvedilol 3.125 milliGRAM(s) Oral every 12 hours  cefTRIAXone   IVPB 2000 milliGRAM(s) IV Intermittent every 24 hours  nicotine - 21 mG/24Hr(s) Patch 1 Patch Transdermal daily  octreotide  Infusion 50 MICROgram(s)/Hr (10 mL/Hr) IV Continuous <Continuous>  pantoprazole  Injectable 40 milliGRAM(s) IV Push every 12 hours  potassium chloride    Tablet ER 20 milliEquivalent(s) Oral every 2 hours  thiamine 100 milliGRAM(s) Oral daily    MEDICATIONS  (PRN):  acetaminophen     Tablet .. 650 milliGRAM(s) Oral every 6 hours PRN Temp greater or equal to 38C (100.4F), Mild Pain (1 - 3)  aluminum hydroxide/magnesium hydroxide/simethicone Suspension 30 milliLiter(s) Oral every 4 hours PRN Dyspepsia  melatonin 3 milliGRAM(s) Oral at bedtime PRN Insomnia  ondansetron Injectable 4 milliGRAM(s) IV Push every 8 hours PRN Nausea and/or Vomiting    LABS:                        8.0    5.01  )-----------( 146      ( 2024 06:20 )             25.7     -    134[L]  |  97[L]  |  11  ----------------------------<  124[H]  3.2[L]   |  24  |  0.66    Ca    7.8[L]      2024 06:20  Phos  2.7       Mg     1.8         TPro  7.2  /  Alb  2.3[L]  /  TBili  3.7[H]  /  DBili  1.5[H]  /  AST  32  /  ALT  20  /  AlkPhos  77      PT/INR - ( 2024 22:50 )   PT: 21.6 sec;   INR: 1.87 ratio         PTT - ( 2024 22:50 )  PTT:36.4 sec  Urinalysis Basic - ( 2024 17:52 )    Color: Orange / Appearance: Clear / S.016 / pH: x  Gluc: x / Ketone: Negative mg/dL  / Bili: Small / Urobili: 1.0 mg/dL   Blood: x / Protein: Negative mg/dL / Nitrite: Negative   Leuk Esterase: Trace / RBC: 7 /HPF / WBC 3 /HPF   Sq Epi: x / Non Sq Epi: 0 /HPF / Bacteria: Negative /HPF      CAPILLARY BLOOD GLUCOSE          RADIOLOGY & ADDITIONAL TESTS: Reviewed.   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 Presenting with abdominal discomfort  with worsening  distention x1 month. States  the distention has made it difficult for him to walk and do ADLs. Endorses poor appetite, nausea, generalized weakness, Endorses 4-5 episodes of "dark stools"  in last few days, but most recent stool yesterday normal appearing.  Denies fever, vomiting, dizziness, chest pain, palpitations. States last ETOH use was in May. Underwent therapeutic paracentesis in May and has not taken meds including lasix and spironolactone since scripts ran out several months ago Initial Hb at [facility] 6.7, then 6.1. Hb 6.8 following 2 prbcs; completed 3rd prbc upon [facility] arrival in early evening. Denies fevers, rigors       (2024 17:03)    Subjective:  Pt seen and examined at bedside resting comfortably. Waiting for EGD. Denies complaints.      VITAL SIGNS:  T(C): 37.1 (24 @ 19:40), Max: 37.1 (24 @ 19:40)  T(F): 98.7 (24 @ 19:40), Max: 98.7 (24 @ 19:40)  HR: 87 (24 @ 19:40) (84 - 91)  BP: 102/68 (24 @ 19:40) (102/68 - 120/63)  BP(mean): --  RR: 18 (24 @ 19:40) (15 - 18)  SpO2: 100% (24 @ 19:40) (94% - 100%)  Wt(kg): --    PHYSICAL EXAM:  Constitutional: resting comfortably in bed; NAD  Head: NC/AT  Eyes: PERRL, EOMI, anicteric sclera  ENT: no nasal discharge; MMM  Neck: supple; no JVD  Respiratory: CTA B/L; no W/R/R; comfortable on RA  Cardiac: +S1/S2; RRR; no M/R/G  Gastrointestinal: soft, NT/ND; no rebound or guarding; +BSx4  Extremities: WWP, no clubbing or cyanosis; no peripheral edema  Musculoskeletal: moves extremities spontaneously   Vascular: 2+ radial, DP/PT pulses B/L  Dermatologic: skin warm, dry and intact; no rashes, wounds, or scars  Neurologic: AOx3; no focal deficits  Psychiatric: calm    MEDICATIONS  (STANDING):  carvedilol 3.125 milliGRAM(s) Oral every 12 hours  cefTRIAXone   IVPB 2000 milliGRAM(s) IV Intermittent every 24 hours  nicotine - 21 mG/24Hr(s) Patch 1 Patch Transdermal daily  octreotide  Infusion 50 MICROgram(s)/Hr (10 mL/Hr) IV Continuous <Continuous>  pantoprazole  Injectable 40 milliGRAM(s) IV Push every 12 hours  potassium chloride    Tablet ER 20 milliEquivalent(s) Oral every 2 hours  thiamine 100 milliGRAM(s) Oral daily    MEDICATIONS  (PRN):  acetaminophen     Tablet .. 650 milliGRAM(s) Oral every 6 hours PRN Temp greater or equal to 38C (100.4F), Mild Pain (1 - 3)  aluminum hydroxide/magnesium hydroxide/simethicone Suspension 30 milliLiter(s) Oral every 4 hours PRN Dyspepsia  melatonin 3 milliGRAM(s) Oral at bedtime PRN Insomnia  ondansetron Injectable 4 milliGRAM(s) IV Push every 8 hours PRN Nausea and/or Vomiting    LABS:                        8.0    5.01  )-----------( 146      ( 2024 06:20 )             25.7         134[L]  |  97[L]  |  11  ----------------------------<  124[H]  3.2[L]   |  24  |  0.66    Ca    7.8[L]      2024 06:20  Phos  2.7       Mg     1.8         TPro  7.2  /  Alb  2.3[L]  /  TBili  3.7[H]  /  DBili  1.5[H]  /  AST  32  /  ALT  20  /  AlkPhos  77      PT/INR - ( 2024 22:50 )   PT: 21.6 sec;   INR: 1.87 ratio         PTT - ( 2024 22:50 )  PTT:36.4 sec  Urinalysis Basic - ( 2024 17:52 )    Color: Orange / Appearance: Clear / S.016 / pH: x  Gluc: x / Ketone: Negative mg/dL  / Bili: Small / Urobili: 1.0 mg/dL   Blood: x / Protein: Negative mg/dL / Nitrite: Negative   Leuk Esterase: Trace / RBC: 7 /HPF / WBC 3 /HPF   Sq Epi: x / Non Sq Epi: 0 /HPF / Bacteria: Negative /HPF      CAPILLARY BLOOD GLUCOSE          RADIOLOGY & ADDITIONAL TESTS: Reviewed.

## 2024-11-26 NOTE — DIETITIAN INITIAL EVALUATION ADULT - ORAL INTAKE PTA/DIET HISTORY
Pt is off of unit. Comprehensive chart review completed. Per H&P, Pt with poor PO intake PTA. Nausea PTA.

## 2024-11-26 NOTE — DIETITIAN INITIAL EVALUATION ADULT - PERTINENT LABORATORY DATA
11-26    134[L]  |  97[L]  |  11  ----------------------------<  124[H]  3.2[L]   |  24  |  0.66    Ca    7.8[L]      26 Nov 2024 06:20  Phos  2.7     11-25  Mg     1.8     11-25    TPro  7.2  /  Alb  2.3[L]  /  TBili  3.7[H]  /  DBili  1.5[H]  /  AST  32  /  ALT  20  /  AlkPhos  77  11-26  A1C with Estimated Average Glucose Result: 4.1 % (04-28-24 @ 05:45)

## 2024-11-26 NOTE — DIETITIAN INITIAL EVALUATION ADULT - OTHER CALCULATIONS
Defer fluid needs to MD/Team.  Ideal Body Weight: 184lbs / 83.4kg +/-10%. IBW used to calculate nutritional estimated needs.

## 2024-11-26 NOTE — DIETITIAN INITIAL EVALUATION ADULT - REASON FOR ADMISSION
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 Presenting with abdominal discomfort  with worsening  distention x1 month. States  the distention has made it difficult for him to walk and do ADLs. Endorses poor appetite, nausea, generalized weakness, Endorses 4-5 episodes of "dark stools"  in last few days, but most recent stool yesterday normal appearing.  Denies fever, vomiting, dizziness, chest pain, palpitations. States last ETOH use was in May. Underwent therapeutic paracentesis in May and has not taken meds including lasix and spironolactone since scripts ran out several months ago Initial Hb at [facility] 6.7, then 6.1. Hb 6.8 following 2 prbcs; completed 3rd prbc upon [facility] arrival in early evening. Denies fevers, rigors

## 2024-11-26 NOTE — DIETITIAN INITIAL EVALUATION ADULT - PROBLEM SELECTOR PLAN 1
-IR consult placed for therapeutic  paracentesis  -pt at very least meets criteria for sbp ppx with daily ceftriaxone 1g; However, pt at times notes abd pressure/ discomfort, at other times abd pain. Would therefore  place on full sbp coverage for now with 2 g ceftriaxone daily   -will restart lasix and spironolactone (as bp tolerates)  that pt has not taken for several months, Is/Os

## 2024-11-26 NOTE — CONSULT NOTE ADULT - SUBJECTIVE AND OBJECTIVE BOX
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 Presenting to [facility] as with abdominal discomfort  with worsening  distention x1 month. States  the distention has made it difficult for him to walk and do ADLs. Endorses poor appetite, nausea, generalized weakness, Endorses 4-5 episodes of "dark stools"  in last few days, but most recent stool yesterday normal appearing.  Denies fever, vomiting, dizziness, chest pain, palpitations. States last ETOH use was in May. Underwent therapeutic paracentesis in May and has not taken meds including lasix and spironolactone since scripts ran out several months ago Initial Hb at [facility] 6.7, then 6.1. Hb 6.8 following 2 prbcs; completed 3rd prbc upon [facility] arrival in early evening.     Allergies:  No Known Allergies      Home Medications:    Hospital Medications:  acetaminophen     Tablet .. 650 milliGRAM(s) Oral every 6 hours PRN  aluminum hydroxide/magnesium hydroxide/simethicone Suspension 30 milliLiter(s) Oral every 4 hours PRN  cefTRIAXone   IVPB 2000 milliGRAM(s) IV Intermittent every 24 hours  furosemide   Injectable 20 milliGRAM(s) IV Push every 12 hours  melatonin 3 milliGRAM(s) Oral at bedtime PRN  nicotine - 21 mG/24Hr(s) Patch 1 Patch Transdermal daily  octreotide  Infusion 50 MICROgram(s)/Hr IV Continuous <Continuous>  ondansetron Injectable 4 milliGRAM(s) IV Push every 8 hours PRN  pantoprazole  Injectable 40 milliGRAM(s) IV Push every 12 hours  potassium chloride    Tablet ER 20 milliEquivalent(s) Oral every 2 hours  spironolactone 25 milliGRAM(s) Oral daily  thiamine 100 milliGRAM(s) Oral daily      PMHX/PSHX:  ETOH abuse    Cirrhosis    Alcoholic hepatitis    GERD (gastroesophageal reflux disease)        Family history:      Denies family history of colon cancer/polyps, stomach cancer/polyps, pancreatic cancer/masses, liver cancer/disease, ovarian cancer and endometrial cancer.    Social History:   Tob: Denies  EtOH: Denies  Illicit Drugs: Denies    ROS:     General:  No wt loss, fevers, chills, night sweats, fatigue  Eyes:  Good vision, no reported pain  ENT:  No sore throat, pain, runny nose, dysphagia  CV:  No pain, palpitations, hypo/hypertension  Pulm:  No dyspnea, cough, tachypnea, wheezing  GI:  see HPI  :  No pain, bleeding, incontinence, nocturia  Muscle:  No pain, weakness  Neuro:  No weakness, tingling, memory problems  Psych:  No fatigue, insomnia, mood problems, depression  Endocrine:  No polyuria, polydipsia, cold/heat intolerance  Heme:  No petechiae, ecchymosis, easy bruisability  Skin:  No rash, tattoos, scars, edema    PHYSICAL EXAM:     GENERAL:  No acute distress  HEENT:  NCAT, no scleral icterus   CHEST:  no respiratory distress  HEART:  Regular rate and rhythm  ABDOMEN:  Soft, non-tender, non-distended, normoactive bowel sounds,  no masses, no hepato-splenomegaly, no signs of chronic liver disease  EXTREMITIES: No edema  SKIN:  No rash/erythema/ecchymoses/petechiae/wounds/abscess/warm/dry  NEURO:  Alert and oriented x 3, no asterixis    Vital Signs:  Vital Signs Last 24 Hrs  T(C): 36.8 (26 Nov 2024 10:13), Max: 36.9 (25 Nov 2024 12:31)  T(F): 98.2 (26 Nov 2024 10:13), Max: 98.4 (25 Nov 2024 12:31)  HR: 88 (26 Nov 2024 10:13) (84 - 95)  BP: 105/70 (26 Nov 2024 10:13) (105/70 - 120/63)  BP(mean): --  RR: 18 (26 Nov 2024 10:13) (16 - 18)  SpO2: 99% (26 Nov 2024 10:13) (95% - 100%)    Parameters below as of 26 Nov 2024 10:13  Patient On (Oxygen Delivery Method): room air      Daily     Daily     LABS:                        8.0    5.01  )-----------( 146      ( 26 Nov 2024 06:20 )             25.7     Mean Cell Volume: 93.5 fL (11-26-24 @ 06:20)    11-26    134[L]  |  97[L]  |  11  ----------------------------<  124[H]  3.2[L]   |  24  |  0.66    Ca    7.8[L]      26 Nov 2024 06:20  Phos  2.7     11-25  Mg     1.8     11-25    TPro  7.2  /  Alb  2.3[L]  /  TBili  3.7[H]  /  DBili  1.5[H]  /  AST  32  /  ALT  20  /  AlkPhos  77  11-26    LIVER FUNCTIONS - ( 26 Nov 2024 06:20 )  Alb: 2.3 g/dL / Pro: 7.2 g/dL / ALK PHOS: 77 U/L / ALT: 20 U/L / AST: 32 U/L / GGT: x           PT/INR - ( 25 Nov 2024 22:50 )   PT: 21.6 sec;   INR: 1.87 ratio         PTT - ( 25 Nov 2024 22:50 )  PTT:36.4 sec  Urinalysis Basic - ( 26 Nov 2024 06:20 )    Color: x / Appearance: x / SG: x / pH: x  Gluc: 124 mg/dL / Ketone: x  / Bili: x / Urobili: x   Blood: x / Protein: x / Nitrite: x   Leuk Esterase: x / RBC: x / WBC x   Sq Epi: x / Non Sq Epi: x / Bacteria: x                              8.0    5.01  )-----------( 146      ( 26 Nov 2024 06:20 )             25.7                         8.6    4.96  )-----------( 131      ( 25 Nov 2024 22:50 )             26.5                         6.8    5.19  )-----------( 118      ( 25 Nov 2024 11:12 )             20.8                         6.1    5.53  )-----------( 116      ( 25 Nov 2024 05:04 )             18.7                         6.7    4.95  )-----------( 157      ( 24 Nov 2024 18:55 )             21.0       Imaging:           Chief Complaint:  Patient is a 49y old  Male who presents with a chief complaint of decompensated cirrhosis , gi bleed (25 Nov 2024 17:26)      HPI:    49M with a PMHx ETOH use disorder, cirrhosis c/b ascites, 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 Presenting to [facility] as transfer from [facility] for urgent EGD iso GIB 2/2 suspected variceal bleed. Initially presented to [facility] with abdominal discomfort with worsening  distention x1 month. States  the distention has made it difficult for him to walk and do ADLs. Endorses poor appetite, nausea, generalized weakness, Endorses 4-5 episodes of "dark stools"  in last few days, but most recent stool yesterday normal appearing.  Denies fever, vomiting, dizziness, chest pain, palpitations. States last ETOH use was in May. Underwent therapeutic paracentesis in May and has not taken meds including lasix and spironolactone since scripts ran out several months ago Initial Hb at [facility] 6.7, then 6.1. Hb 6.8 following 2 prbcs; completed 3rd prbc upon J arrival in early evening.     Patient was admitted to [facility] in May 2024 at which time he was diagnosed with cirrhosis and alcoholic hepatitis requiring paracentesis and EGD. EGD at that time showed non-bleeding grade 2-3 varices reaching the mid-esophagus, diffused prtal hypertensive gastropathy without hiatal hernia, antral gastritis, and no evidence of ulcer/bleeding in the duodenum.     Allergies:  No Known Allergies      Home Medications:    Hospital Medications:  acetaminophen     Tablet .. 650 milliGRAM(s) Oral every 6 hours PRN  aluminum hydroxide/magnesium hydroxide/simethicone Suspension 30 milliLiter(s) Oral every 4 hours PRN  cefTRIAXone   IVPB 2000 milliGRAM(s) IV Intermittent every 24 hours  furosemide   Injectable 20 milliGRAM(s) IV Push every 12 hours  melatonin 3 milliGRAM(s) Oral at bedtime PRN  nicotine - 21 mG/24Hr(s) Patch 1 Patch Transdermal daily  octreotide  Infusion 50 MICROgram(s)/Hr IV Continuous <Continuous>  ondansetron Injectable 4 milliGRAM(s) IV Push every 8 hours PRN  pantoprazole  Injectable 40 milliGRAM(s) IV Push every 12 hours  potassium chloride    Tablet ER 20 milliEquivalent(s) Oral every 2 hours  spironolactone 25 milliGRAM(s) Oral daily  thiamine 100 milliGRAM(s) Oral daily      PMHX/PSHX:  ETOH abuse    Cirrhosis    Alcoholic hepatitis    GERD (gastroesophageal reflux disease)        Family history:      Denies family history of colon cancer/polyps, stomach cancer/polyps, pancreatic cancer/masses, liver cancer/disease, ovarian cancer and endometrial cancer.    Social History:   Tob: Denies  EtOH: Denies  Illicit Drugs: Denies    ROS:     General:  No wt loss, fevers, chills, night sweats, fatigue  Eyes:  Good vision, no reported pain  ENT:  No sore throat, pain, runny nose, dysphagia  CV:  No pain, palpitations, hypo/hypertension  Pulm:  No dyspnea, cough, tachypnea, wheezing  GI:  see HPI  :  No pain, bleeding, incontinence, nocturia  Muscle:  No pain, weakness  Neuro:  No weakness, tingling, memory problems  Psych:  No fatigue, insomnia, mood problems, depression  Endocrine:  No polyuria, polydipsia, cold/heat intolerance  Heme:  No petechiae, ecchymosis, easy bruisability  Skin:  No rash, tattoos, scars, edema    PHYSICAL EXAM:     GENERAL:  No acute distress  HEENT:  NCAT, no scleral icterus   CHEST:  no respiratory distress  HEART:  Regular rate and rhythm  ABDOMEN:  Soft, non-tender, non-distended, normoactive bowel sounds,  no masses, no hepato-splenomegaly, no signs of chronic liver disease  EXTREMITIES: No edema  SKIN:  No rash/erythema/ecchymoses/petechiae/wounds/abscess/warm/dry  NEURO:  Alert and oriented x 3, no asterixis    Vital Signs:  Vital Signs Last 24 Hrs  T(C): 36.8 (26 Nov 2024 10:13), Max: 36.9 (25 Nov 2024 12:31)  T(F): 98.2 (26 Nov 2024 10:13), Max: 98.4 (25 Nov 2024 12:31)  HR: 88 (26 Nov 2024 10:13) (84 - 95)  BP: 105/70 (26 Nov 2024 10:13) (105/70 - 120/63)  BP(mean): --  RR: 18 (26 Nov 2024 10:13) (16 - 18)  SpO2: 99% (26 Nov 2024 10:13) (95% - 100%)    Parameters below as of 26 Nov 2024 10:13  Patient On (Oxygen Delivery Method): room air      Daily     Daily     LABS:                        8.0    5.01  )-----------( 146      ( 26 Nov 2024 06:20 )             25.7     Mean Cell Volume: 93.5 fL (11-26-24 @ 06:20)    11-26    134[L]  |  97[L]  |  11  ----------------------------<  124[H]  3.2[L]   |  24  |  0.66    Ca    7.8[L]      26 Nov 2024 06:20  Phos  2.7     11-25  Mg     1.8     11-25    TPro  7.2  /  Alb  2.3[L]  /  TBili  3.7[H]  /  DBili  1.5[H]  /  AST  32  /  ALT  20  /  AlkPhos  77  11-26    LIVER FUNCTIONS - ( 26 Nov 2024 06:20 )  Alb: 2.3 g/dL / Pro: 7.2 g/dL / ALK PHOS: 77 U/L / ALT: 20 U/L / AST: 32 U/L / GGT: x           PT/INR - ( 25 Nov 2024 22:50 )   PT: 21.6 sec;   INR: 1.87 ratio         PTT - ( 25 Nov 2024 22:50 )  PTT:36.4 sec  Urinalysis Basic - ( 26 Nov 2024 06:20 )    Color: x / Appearance: x / SG: x / pH: x  Gluc: 124 mg/dL / Ketone: x  / Bili: x / Urobili: x   Blood: x / Protein: x / Nitrite: x   Leuk Esterase: x / RBC: x / WBC x   Sq Epi: x / Non Sq Epi: x / Bacteria: x                              8.0    5.01  )-----------( 146      ( 26 Nov 2024 06:20 )             25.7                         8.6    4.96  )-----------( 131      ( 25 Nov 2024 22:50 )             26.5                         6.8    5.19  )-----------( 118      ( 25 Nov 2024 11:12 )             20.8                         6.1    5.53  )-----------( 116      ( 25 Nov 2024 05:04 )             18.7                         6.7    4.95  )-----------( 157      ( 24 Nov 2024 18:55 )             21.0       Imaging:           Chief Complaint:  Patient is a 49y old  Male who presents with a chief complaint of decompensated cirrhosis , gi bleed (25 Nov 2024 17:26)      HPI:    49M with a PMHx ETOH use disorder, cirrhosis c/b ascites, 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 Presenting to [facility] as transfer from [facility] for urgent EGD iso GIB 2/2 suspected variceal bleed. Initially presented to [facility] with abdominal discomfort with worsening  distention x1 month. States  the distention has made it difficult for him to walk and do ADLs. Endorses poor appetite, nausea, generalized weakness, Endorses 4-5 episodes of "dark stools"  in last few days, but most recent stool yesterday normal appearing.  Denies fever, vomiting, dizziness, chest pain, palpitations. States last ETOH use was in May. Underwent therapeutic paracentesis in May and has not taken meds including lasix and spironolactone since scripts ran out several months ago Initial Hb at [facility] 6.7, then 6.1. Hb 6.8 following 2 prbcs; completed 3rd prbc upon J arrival in early evening.     Patient was admitted to [facility] in May 2024 at which time he was diagnosed with cirrhosis and alcoholic hepatitis requiring paracentesis and EGD. EGD at that time showed non-bleeding grade 2-3 varices reaching the mid-esophagus, diffused portal hypertensive gastropathy without hiatal hernia, antral gastritis, and no evidence of ulcer/bleeding in the duodenum.     Allergies:  No Known Allergies      Home Medications:    Hospital Medications:  acetaminophen     Tablet .. 650 milliGRAM(s) Oral every 6 hours PRN  aluminum hydroxide/magnesium hydroxide/simethicone Suspension 30 milliLiter(s) Oral every 4 hours PRN  cefTRIAXone   IVPB 2000 milliGRAM(s) IV Intermittent every 24 hours  furosemide   Injectable 20 milliGRAM(s) IV Push every 12 hours  melatonin 3 milliGRAM(s) Oral at bedtime PRN  nicotine - 21 mG/24Hr(s) Patch 1 Patch Transdermal daily  octreotide  Infusion 50 MICROgram(s)/Hr IV Continuous <Continuous>  ondansetron Injectable 4 milliGRAM(s) IV Push every 8 hours PRN  pantoprazole  Injectable 40 milliGRAM(s) IV Push every 12 hours  potassium chloride    Tablet ER 20 milliEquivalent(s) Oral every 2 hours  spironolactone 25 milliGRAM(s) Oral daily  thiamine 100 milliGRAM(s) Oral daily      PMHX/PSHX:  ETOH abuse    Cirrhosis    Alcoholic hepatitis    GERD (gastroesophageal reflux disease)        Family history:      Denies family history of colon cancer/polyps, stomach cancer/polyps, pancreatic cancer/masses, liver cancer/disease, ovarian cancer and endometrial cancer.    Social History:   Tob: Denies  EtOH: Denies  Illicit Drugs: Denies    ROS:     General:  No wt loss, fevers, chills, night sweats, fatigue  Eyes:  Good vision, no reported pain  ENT:  No sore throat, pain, runny nose, dysphagia  CV:  No pain, palpitations, hypo/hypertension  Pulm:  No dyspnea, cough, tachypnea, wheezing  GI:  see HPI  :  No pain, bleeding, incontinence, nocturia  Muscle:  No pain, weakness  Neuro:  No weakness, tingling, memory problems  Psych:  No fatigue, insomnia, mood problems, depression  Endocrine:  No polyuria, polydipsia, cold/heat intolerance  Heme:  No petechiae, ecchymosis, easy bruisability  Skin:  No rash, tattoos, scars, edema    PHYSICAL EXAM:     GENERAL:  No acute distress  HEENT:  NCAT, no scleral icterus   CHEST:  no respiratory distress  HEART:  Regular rate and rhythm  ABDOMEN:  Soft, mildy tender, distended  EXTREMITIES: No edema  SKIN:  No rash/erythema/ecchymoses/petechiae/wounds/abscess/warm/dry  NEURO:  Alert and oriented x 3, no asterixis    Vital Signs:  Vital Signs Last 24 Hrs  T(C): 36.8 (26 Nov 2024 10:13), Max: 36.9 (25 Nov 2024 12:31)  T(F): 98.2 (26 Nov 2024 10:13), Max: 98.4 (25 Nov 2024 12:31)  HR: 88 (26 Nov 2024 10:13) (84 - 95)  BP: 105/70 (26 Nov 2024 10:13) (105/70 - 120/63)  BP(mean): --  RR: 18 (26 Nov 2024 10:13) (16 - 18)  SpO2: 99% (26 Nov 2024 10:13) (95% - 100%)    Parameters below as of 26 Nov 2024 10:13  Patient On (Oxygen Delivery Method): room air      Daily         LABS:                        8.0    5.01  )-----------( 146      ( 26 Nov 2024 06:20 )             25.7     Mean Cell Volume: 93.5 fL (11-26-24 @ 06:20)    11-26    134[L]  |  97[L]  |  11  ----------------------------<  124[H]  3.2[L]   |  24  |  0.66    Ca    7.8[L]      26 Nov 2024 06:20  Phos  2.7     11-25  Mg     1.8     11-25    TPro  7.2  /  Alb  2.3[L]  /  TBili  3.7[H]  /  DBili  1.5[H]  /  AST  32  /  ALT  20  /  AlkPhos  77  11-26    LIVER FUNCTIONS - ( 26 Nov 2024 06:20 )  Alb: 2.3 g/dL / Pro: 7.2 g/dL / ALK PHOS: 77 U/L / ALT: 20 U/L / AST: 32 U/L / GGT: x           PT/INR - ( 25 Nov 2024 22:50 )   PT: 21.6 sec;   INR: 1.87 ratio         PTT - ( 25 Nov 2024 22:50 )  PTT:36.4 sec  Urinalysis Basic - ( 26 Nov 2024 06:20 )    Color: x / Appearance: x / SG: x / pH: x  Gluc: 124 mg/dL / Ketone: x  / Bili: x / Urobili: x   Blood: x / Protein: x / Nitrite: x   Leuk Esterase: x / RBC: x / WBC x   Sq Epi: x / Non Sq Epi: x / Bacteria: x                              8.0    5.01  )-----------( 146      ( 26 Nov 2024 06:20 )             25.7                         8.6    4.96  )-----------( 131      ( 25 Nov 2024 22:50 )             26.5                         6.8    5.19  )-----------( 118      ( 25 Nov 2024 11:12 )             20.8                         6.1    5.53  )-----------( 116      ( 25 Nov 2024 05:04 )             18.7                         6.7    4.95  )-----------( 157      ( 24 Nov 2024 18:55 )             21.0       Imaging:

## 2024-11-26 NOTE — CONSULT NOTE ADULT - ATTENDING COMMENTS
48yo  Male w/ Hx of AUD (4-5 drinks/day, Vodka, last drink reportedly in 5/2024), smoking (20-25 years) not on home O2,  family Hx of hepatitis C (mother), and decompensated EtOH cirrhosis (Dx 5/2024 at [facility]) c/b ascites, requiring prior paracentesis (no SBP, was supposed to be on SBP ppx for low protein ascites), also c/b EVs (Gr II-III EVs and PHG on 5/1/24 EGD no banding, was supposed to be on carvedilol), and alcoholic hepatitis (steroid responder), presented to [facility] ER on 11/24/24 w/ melena and anemia, and was transferred to [facility] 5/25/24 for higher level of care (no GI availability at [facility]). He was started on pantoprazole, octreotide, and ceftriaxone, in therapeutic dose b/o concern for possible SBP (high risk, worsening liver function, no paracentesis or abdominal imaging yet). Notably, he did have hypoxia during the 5/2024 [facility] hospitalization, but has not completed the evaluation for possible PPH or HPS, and has not followed up outpatient after.   Prior liver workup from 5/2024: Hep B neg, not immune, not exposed, Hep C ab neg. Hep A IgM neg. Hep E IgG/IgM neg. A1AT WNL. Ferritin 202. CMV negative. EBV past infection. Ceruloplasmin 15, possibly due to severe liver disease, but needed to r/o WD. IgA was elevated to 1658 and IgG was elevated to 1661 likely due to cirrhosis; ROSY neg, LKM neg, SMA 1:40, and AMA were neg.    Decompensated EtOH cirrhosis, MELD 3.0 21 (was 17 at time of d/c in 5/2024)  - Monitor MELD labs  - Will re-discuss transplant candidacy, concern is the poor outpatient follow up  - Please, send septic workup, obtain BCx 2 sets, ascites analysis, UA/UCx, CXR    Hx of low protein ascites, abdominal pain / distention  - Obtain complete US abd w/ Doppler  - Obtain Dx and therapeutic paracentesis if suitable pocket w/ 25% albumin replacement (6-8g/l ascites removed)  - Will need to c/w long term SBP ppx after completion of antibiotics course  - Hold off w/ diuretics in setting of UGIB for now  - Avoid NSAIDs    Anemia  UGIB  Hx of Gr II-III Evs, PHG  - C/w PPI, ceftriaxone, and octreotide  - NPO for EGD today  - Monitor H/H as above and keep Hb >7, keep PLT > 50, check and keep fibrinogen > 120  - Consider vit K  - Hold off w/ NSBB in setting of UGIB      Hx of AUD  - Please, send Peth  - CIWA per primary team  - Folic, thiamine    Thank you for consult  Hepatology will follow

## 2024-11-26 NOTE — DIETITIAN INITIAL EVALUATION ADULT - CONTINUE CURRENT NUTRITION CARE PLAN
Recommend advance diet as tolerated/medically feasible to clear liquid diet. Pending tolerance recommend advance to Regular diet.

## 2024-11-26 NOTE — PROGRESS NOTE ADULT - ASSESSMENT
Pt is a 48 yo M with PMH GERD, fTUD, prior ETOH use d/o, cirrhosis (ascites), esophageal varices, and alcoholic hepatitis p/w abd pain and distention, difficulty performing ADLs, poor appetite/PO intake, nausea, weakness, dark stool, and L>R LE edema. Ran out of meds x few months and has not f/u with outpt doctors. Found to have Hb 6.1 s/p 3U RBCs with Hb 8. Also with elevated bilirubin, direct predominance, and eelvated lipase. CXR benign, LE dopplers neg, and US abd with large volume ascites. Started on protonix IV BID and octreotide gtt. Hepatology following s/p EGD 11/26 with recently bleeding large esophageal varices s/p banding.

## 2024-11-26 NOTE — DIETITIAN INITIAL EVALUATION ADULT - ADD RECOMMEND
- Recommend advance diet as tolerated/medically feasible to clear liquid diet. Pending tolerance recommend advance to Regular diet. Defer food and fluid consistency to SLP/Team.   - Please consistently document % PO intake in nursing flowsheets to assess adequacy of nutritional intake/monitor need for further nutritional intervention(s).   - When on clear liquid diet, please add Ensure Clear x 3 per day and switch to Ensure Plus High Protein BID (provides 350 kcal, 20 gm protein per 8 oz serving)  per day on full liquids/solid diet as added calories and protein.   - Monitor weights, diet tolerance, skin integrity, BMs, pertinent labs.   - RDN services remain available as needed.

## 2024-11-26 NOTE — PHARMACOTHERAPY INTERVENTION NOTE - COMMENTS
Medication history update: Medication history is complete. Patient has not taken prescription medications since running out of those prescribed on discharge from [facility] in May 2024. Medication list in Outpatient Medication Record (OMR) left unchanged from May 2024 discharge for reference (verified with [facility]).     Home Medications:  carvedilol 3.125 mg oral tablet: 1 tab(s) orally every 12 hours (26 Nov 2024 21:10)  ciprofloxacin 500 mg oral tablet: 1 tab(s) orally once a day (26 Nov 2024 21:10)  folic acid 1 mg oral tablet: 1 tab(s) orally once a day (26 Nov 2024 21:10)  furosemide 20 mg oral tablet: 1 tab(s) orally once a day (26 Nov 2024 21:10)  Multiple Vitamins with Minerals oral tablet: 1 tab(s) orally once a day (26 Nov 2024 21:10)  pantoprazole 40 mg oral delayed release tablet: 1 tab(s) orally once a day (before a meal) (26 Nov 2024 21:10)  prednisoLONE (as sodium phosphate) 15 mg/5 mL oral liquid: 13.33 milliliter(s) orally once a day (26 Nov 2024 21:10)  spironolactone 25 mg oral tablet: 4 tab(s) orally once a day (26 Nov 2024 21:10)  thiamine 100 mg oral tablet: 1 tab(s) orally once a day (26 Nov 2024 21:10)    Please call spectra [phone] if you have any questions.

## 2024-11-26 NOTE — CONSULT NOTE ADULT - ASSESSMENT
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     #GI bleed  #Decompensated alcoholic cirrhosis     Recommendations:  - Hold lasix and spironolactone  - Maintain 2 large bore IVs, active T&S  - Keep NPO for urgent EGD today  - Abdominal doppler US prior to possible paracentesis  - Give Vit K to improve INR  - Obtain PEth level, UCx, UA, BCx, CHR    Patricio Guzman D.O. 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     #GI bleed  #Decompensated alcoholic cirrhosis     Recommendations:  - Hold lasix and spironolactone  - Maintain 2 large bore IVs, active T&S  - Keep NPO for urgent EGD today iso c/f variceal bleed  - Abdominal doppler US prior to possible paracentesis  - Give Vit K to improve INR  - Obtain PEth level, UCx, UA, BCx, CXR    Patricio Guzman D.O. 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     #GI bleed  #Decompensated alcoholic cirrhosis     HE: no evidence of encephalopathy   Ascites: abdomen distended, has been noncompliant with diuretics at home for the past few months. Needs abdominal imaging to evaluate for ascites   HCC screening: will require imaging   HRS: Crt at baseline    Recommendations:  - Hold lasix and spironolactone in setting of likely GIB   -please recheck hemoglobin this afternoon   - Maintain 2 large bore IVs, active T&S  - Keep NPO for urgent EGD today iso c/f variceal bleed  - Abdominal doppler US prior for diagnostic and therapeutic paracentesis  - Give Vit K to improve INR (could have component of malnutrition)   - Obtain PEth level, UCx, UA, BCx, CXR, fibrinogen     Patricio Guzman D.O. 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     #GI bleed  #Decompensated alcoholic cirrhosis     MELD 3.0=21  HE: no evidence of encephalopathy   Ascites: abdomen distended, has been noncompliant with diuretics at home for the past few months. Needs abdominal imaging to evaluate for ascites   HCC screening: will require imaging   HRS: Crt at baseline    Recommendations:  - Hold lasix and spironolactone in setting of likely GIB   -please recheck hemoglobin this afternoon   - Maintain 2 large bore IVs, active T&S  - Keep NPO for urgent EGD today iso c/f variceal bleed  - Abdominal doppler US prior for diagnostic and therapeutic paracentesis  - Give Vit K to improve INR (could have component of malnutrition)   - Obtain PEth level, UCx, UA, BCx, CXR, fibrinogen     Patricio Guzman D.O. PGY-1  Internal Medicine

## 2024-11-26 NOTE — DIETITIAN INITIAL EVALUATION ADULT - OTHER INFO
Pt is off of unit. Attempted to see patient three times not present. Information obtained from comprehensive chart review and per RN today. Pt s/p NPO, now advanced to clear liquid diet. No recent episodes of nausea, vomiting, diarrhea or constipation, last BM noted on 11/24 per RN flowsheets. Per chart, no chewing/swallowing difficulties noted. No food allergies noted. Labs noted for hyponatremia, hypokalemia. As per [facility] HIE wt hx: 96.2kg (dosing wt 11/26), 108kg (5/2), 108kg (4/27). Significant wt loss x6 months (10.9%). On previous admission (5/8/24) Pt triggered for moderate protein malnutrition.

## 2024-11-26 NOTE — DIETITIAN INITIAL EVALUATION ADULT - PERTINENT MEDS FT
MEDICATIONS  (STANDING):  cefTRIAXone   IVPB 2000 milliGRAM(s) IV Intermittent every 24 hours  nicotine - 21 mG/24Hr(s) Patch 1 Patch Transdermal daily  octreotide  Infusion 50 MICROgram(s)/Hr (10 mL/Hr) IV Continuous <Continuous>  pantoprazole  Injectable 40 milliGRAM(s) IV Push every 12 hours  phytonadione  IVPB 5 milliGRAM(s) IV Intermittent once  potassium chloride    Tablet ER 20 milliEquivalent(s) Oral every 2 hours  thiamine 100 milliGRAM(s) Oral daily    MEDICATIONS  (PRN):  acetaminophen     Tablet .. 650 milliGRAM(s) Oral every 6 hours PRN Temp greater or equal to 38C (100.4F), Mild Pain (1 - 3)  aluminum hydroxide/magnesium hydroxide/simethicone Suspension 30 milliLiter(s) Oral every 4 hours PRN Dyspepsia  melatonin 3 milliGRAM(s) Oral at bedtime PRN Insomnia  ondansetron Injectable 4 milliGRAM(s) IV Push every 8 hours PRN Nausea and/or Vomiting

## 2024-11-26 NOTE — PROGRESS NOTE ADULT - PROBLEM SELECTOR PLAN 2
- pt with hx ETOH use d/o with cirrhosis and esophageal varices; last paracentesis 5/2024; on appropriate med regimen outpt but ran out of meds and has yet to f/u with outpt doctors x few months   - p/w abd pain and distention x1mo with poor appetite, difficulty with ADLs, and weakness   - exam with distended abd  - US abd with large volume ascites  - procedure team consulted for para  - started on CTX 2g daily for SBP treatment given symptoms   - lasix and spironolactone on hold in setting of bleed, restart as appropriate  - hepatology following, appreciate recs

## 2024-11-27 LAB
ALBUMIN FLD-MCNC: 0.7 G/DL — SIGNIFICANT CHANGE UP
ALBUMIN SERPL ELPH-MCNC: 2 G/DL — LOW (ref 3.3–5)
ALBUMIN SERPL ELPH-MCNC: 2.1 G/DL — LOW (ref 3.3–5)
ALP SERPL-CCNC: 71 U/L — SIGNIFICANT CHANGE UP (ref 40–120)
ALP SERPL-CCNC: 74 U/L — SIGNIFICANT CHANGE UP (ref 40–120)
ALT FLD-CCNC: 14 U/L — SIGNIFICANT CHANGE UP (ref 4–41)
ALT FLD-CCNC: 15 U/L — SIGNIFICANT CHANGE UP (ref 4–41)
ANION GAP SERPL CALC-SCNC: 7 MMOL/L — SIGNIFICANT CHANGE UP (ref 7–14)
ANION GAP SERPL CALC-SCNC: 9 MMOL/L — SIGNIFICANT CHANGE UP (ref 7–14)
APTT BLD: 38.1 SEC — HIGH (ref 24.5–35.6)
APTT BLD: 38.5 SEC — HIGH (ref 24.5–35.6)
AST SERPL-CCNC: 33 U/L — SIGNIFICANT CHANGE UP (ref 4–40)
AST SERPL-CCNC: 33 U/L — SIGNIFICANT CHANGE UP (ref 4–40)
B PERT IGG+IGM PNL SER: CLEAR — SIGNIFICANT CHANGE UP
BILIRUB SERPL-MCNC: 2.4 MG/DL — HIGH (ref 0.2–1.2)
BILIRUB SERPL-MCNC: 2.4 MG/DL — HIGH (ref 0.2–1.2)
BLD GP AB SCN SERPL QL: NEGATIVE — SIGNIFICANT CHANGE UP
BUN SERPL-MCNC: 10 MG/DL — SIGNIFICANT CHANGE UP (ref 7–23)
BUN SERPL-MCNC: 10 MG/DL — SIGNIFICANT CHANGE UP (ref 7–23)
CALCIUM SERPL-MCNC: 7.7 MG/DL — LOW (ref 8.4–10.5)
CALCIUM SERPL-MCNC: 7.8 MG/DL — LOW (ref 8.4–10.5)
CHLORIDE SERPL-SCNC: 101 MMOL/L — SIGNIFICANT CHANGE UP (ref 98–107)
CHLORIDE SERPL-SCNC: 102 MMOL/L — SIGNIFICANT CHANGE UP (ref 98–107)
CO2 SERPL-SCNC: 25 MMOL/L — SIGNIFICANT CHANGE UP (ref 22–31)
CO2 SERPL-SCNC: 26 MMOL/L — SIGNIFICANT CHANGE UP (ref 22–31)
COLOR FLD: YELLOW
CREAT SERPL-MCNC: 0.71 MG/DL — SIGNIFICANT CHANGE UP (ref 0.5–1.3)
CREAT SERPL-MCNC: 0.73 MG/DL — SIGNIFICANT CHANGE UP (ref 0.5–1.3)
EGFR: 112 ML/MIN/1.73M2 — SIGNIFICANT CHANGE UP
EGFR: 113 ML/MIN/1.73M2 — SIGNIFICANT CHANGE UP
EOSINOPHIL # FLD: 0 % — SIGNIFICANT CHANGE UP
FLUID INTAKE SUBSTANCE CLASS: SIGNIFICANT CHANGE UP
FOLATE SERPL-MCNC: 6.1 NG/ML — SIGNIFICANT CHANGE UP (ref 3.1–17.5)
FOLATE+VIT B12 SERBLD-IMP: 0 % — SIGNIFICANT CHANGE UP
GLUCOSE FLD-MCNC: 114 MG/DL — SIGNIFICANT CHANGE UP
GLUCOSE SERPL-MCNC: 131 MG/DL — HIGH (ref 70–99)
GLUCOSE SERPL-MCNC: 99 MG/DL — SIGNIFICANT CHANGE UP (ref 70–99)
HCT VFR BLD CALC: 24.4 % — LOW (ref 39–50)
HCT VFR BLD CALC: 30.1 % — LOW (ref 39–50)
HGB BLD-MCNC: 7.9 G/DL — LOW (ref 13–17)
HGB BLD-MCNC: 9.2 G/DL — LOW (ref 13–17)
INR BLD: 1.89 RATIO — HIGH (ref 0.85–1.16)
INR BLD: 1.94 RATIO — HIGH (ref 0.85–1.16)
LDH SERPL L TO P-CCNC: 76 U/L — SIGNIFICANT CHANGE UP
LYMPHOCYTES # FLD: 71 % — SIGNIFICANT CHANGE UP
MAGNESIUM SERPL-MCNC: 1.6 MG/DL — SIGNIFICANT CHANGE UP (ref 1.6–2.6)
MAGNESIUM SERPL-MCNC: 1.6 MG/DL — SIGNIFICANT CHANGE UP (ref 1.6–2.6)
MCHC RBC-ENTMCNC: 28.9 PG — SIGNIFICANT CHANGE UP (ref 27–34)
MCHC RBC-ENTMCNC: 30.2 PG — SIGNIFICANT CHANGE UP (ref 27–34)
MCHC RBC-ENTMCNC: 30.6 G/DL — LOW (ref 32–36)
MCHC RBC-ENTMCNC: 32.4 G/DL — SIGNIFICANT CHANGE UP (ref 32–36)
MCV RBC AUTO: 93.1 FL — SIGNIFICANT CHANGE UP (ref 80–100)
MCV RBC AUTO: 94.7 FL — SIGNIFICANT CHANGE UP (ref 80–100)
MELD SCORE WITH DIALYSIS: 31 POINTS — SIGNIFICANT CHANGE UP
MELD SCORE WITHOUT DIALYSIS: 19 POINTS — SIGNIFICANT CHANGE UP
MESOTHL CELL # FLD: 0 % — SIGNIFICANT CHANGE UP
MONOS+MACROS # FLD: 27 % — SIGNIFICANT CHANGE UP
NEUTROPHILS-BODY FLUID: 2 % — SIGNIFICANT CHANGE UP
NRBC # BLD: 0 /100 WBCS — SIGNIFICANT CHANGE UP (ref 0–0)
NRBC # BLD: 0 /100 WBCS — SIGNIFICANT CHANGE UP (ref 0–0)
NRBC # FLD: 0 K/UL — SIGNIFICANT CHANGE UP (ref 0–0)
NRBC # FLD: 0 K/UL — SIGNIFICANT CHANGE UP (ref 0–0)
OTHER CELLS FLD MANUAL: 0 % — SIGNIFICANT CHANGE UP
PHOSPHATE SERPL-MCNC: 2.4 MG/DL — LOW (ref 2.5–4.5)
PHOSPHATE SERPL-MCNC: 2.5 MG/DL — SIGNIFICANT CHANGE UP (ref 2.5–4.5)
PLATELET # BLD AUTO: 121 K/UL — LOW (ref 150–400)
PLATELET # BLD AUTO: 151 K/UL — SIGNIFICANT CHANGE UP (ref 150–400)
POTASSIUM SERPL-MCNC: 3.7 MMOL/L — SIGNIFICANT CHANGE UP (ref 3.5–5.3)
POTASSIUM SERPL-MCNC: 4.1 MMOL/L — SIGNIFICANT CHANGE UP (ref 3.5–5.3)
POTASSIUM SERPL-SCNC: 3.7 MMOL/L — SIGNIFICANT CHANGE UP (ref 3.5–5.3)
POTASSIUM SERPL-SCNC: 4.1 MMOL/L — SIGNIFICANT CHANGE UP (ref 3.5–5.3)
PROT FLD-MCNC: 2 G/DL — SIGNIFICANT CHANGE UP
PROT SERPL-MCNC: 6.5 G/DL — SIGNIFICANT CHANGE UP (ref 6–8.3)
PROT SERPL-MCNC: 7 G/DL — SIGNIFICANT CHANGE UP (ref 6–8.3)
PROTHROM AB SERPL-ACNC: 21.8 SEC — HIGH (ref 9.9–13.4)
PROTHROM AB SERPL-ACNC: 22.3 SEC — HIGH (ref 9.9–13.4)
RBC # BLD: 2.62 M/UL — LOW (ref 4.2–5.8)
RBC # BLD: 3.18 M/UL — LOW (ref 4.2–5.8)
RBC # FLD: 16.6 % — HIGH (ref 10.3–14.5)
RBC # FLD: 16.9 % — HIGH (ref 10.3–14.5)
RCV VOL RI: <2000 CELLS/UL — HIGH (ref 0–5)
RH IG SCN BLD-IMP: POSITIVE — SIGNIFICANT CHANGE UP
SODIUM SERPL-SCNC: 135 MMOL/L — SIGNIFICANT CHANGE UP (ref 135–145)
SODIUM SERPL-SCNC: 135 MMOL/L — SIGNIFICANT CHANGE UP (ref 135–145)
T3 SERPL-MCNC: 56 NG/DL — LOW (ref 80–200)
T4 AB SER-ACNC: 3.78 UG/DL — LOW (ref 5.1–13)
TOTAL CELLS COUNTED, BODY FLUID: 100 CELLS — SIGNIFICANT CHANGE UP
TOTAL NUCLEATED CELL COUNT, BODY FLUID: 97 CELLS/UL — HIGH (ref 0–5)
TSH SERPL-MCNC: 0.82 UIU/ML — SIGNIFICANT CHANGE UP (ref 0.27–4.2)
TUBE TYPE: SIGNIFICANT CHANGE UP
VIT B12 SERPL-MCNC: 1331 PG/ML — HIGH (ref 200–900)
WBC # BLD: 6.15 K/UL — SIGNIFICANT CHANGE UP (ref 3.8–10.5)
WBC # BLD: 6.5 K/UL — SIGNIFICANT CHANGE UP (ref 3.8–10.5)
WBC # FLD AUTO: 6.15 K/UL — SIGNIFICANT CHANGE UP (ref 3.8–10.5)
WBC # FLD AUTO: 6.5 K/UL — SIGNIFICANT CHANGE UP (ref 3.8–10.5)

## 2024-11-27 PROCEDURE — 93010 ELECTROCARDIOGRAM REPORT: CPT

## 2024-11-27 PROCEDURE — 99232 SBSQ HOSP IP/OBS MODERATE 35: CPT | Mod: GC

## 2024-11-27 PROCEDURE — 99233 SBSQ HOSP IP/OBS HIGH 50: CPT | Mod: 25

## 2024-11-27 PROCEDURE — 49083 ABD PARACENTESIS W/IMAGING: CPT

## 2024-11-27 RX ORDER — CHLORHEXIDINE GLUCONATE 1.2 MG/ML
1 RINSE ORAL DAILY
Refills: 0 | Status: DISCONTINUED | OUTPATIENT
Start: 2024-11-27 | End: 2024-12-05

## 2024-11-27 RX ORDER — POTASSIUM CHLORIDE 600 MG/1
40 TABLET, EXTENDED RELEASE ORAL ONCE
Refills: 0 | Status: COMPLETED | OUTPATIENT
Start: 2024-11-27 | End: 2024-11-27

## 2024-11-27 RX ORDER — NICOTINE 21 MG/24H
2 PATCH, EXTENDED RELEASE TRANSDERMAL
Refills: 0 | Status: DISCONTINUED | OUTPATIENT
Start: 2024-11-27 | End: 2024-12-05

## 2024-11-27 RX ORDER — POTASSIUM CHLORIDE 600 MG/1
20 TABLET, EXTENDED RELEASE ORAL ONCE
Refills: 0 | Status: DISCONTINUED | OUTPATIENT
Start: 2024-11-27 | End: 2024-11-27

## 2024-11-27 RX ORDER — SODIUM,POTASSIUM PHOSPHATES 278-250MG
1 POWDER IN PACKET (EA) ORAL ONCE
Refills: 0 | Status: COMPLETED | OUTPATIENT
Start: 2024-11-27 | End: 2024-11-27

## 2024-11-27 RX ADMIN — OCTREOTIDE ACETATE 10 MICROGRAM(S)/HR: 500 INJECTION, SOLUTION INTRAVENOUS; SUBCUTANEOUS at 00:15

## 2024-11-27 RX ADMIN — ONDANSETRON HYDROCHLORIDE 4 MILLIGRAM(S): 4 TABLET, FILM COATED ORAL at 06:37

## 2024-11-27 RX ADMIN — OCTREOTIDE ACETATE 10 MICROGRAM(S)/HR: 500 INJECTION, SOLUTION INTRAVENOUS; SUBCUTANEOUS at 14:46

## 2024-11-27 RX ADMIN — PANTOPRAZOLE SODIUM 40 MILLIGRAM(S): 40 TABLET, DELAYED RELEASE ORAL at 18:22

## 2024-11-27 RX ADMIN — POTASSIUM CHLORIDE 40 MILLIEQUIVALENT(S): 600 TABLET, EXTENDED RELEASE ORAL at 13:11

## 2024-11-27 RX ADMIN — ACETAMINOPHEN 500MG 650 MILLIGRAM(S): 500 TABLET, COATED ORAL at 02:29

## 2024-11-27 RX ADMIN — CHLORHEXIDINE GLUCONATE 1 APPLICATION(S): 1.2 RINSE ORAL at 13:16

## 2024-11-27 RX ADMIN — Medication 1 PACKET(S): at 13:12

## 2024-11-27 RX ADMIN — ACETAMINOPHEN, DIPHENHYDRAMINE HCL, PHENYLEPHRINE HCL 3 MILLIGRAM(S): 325; 25; 5 TABLET ORAL at 01:59

## 2024-11-27 RX ADMIN — Medication 100 MILLIGRAM(S): at 21:08

## 2024-11-27 RX ADMIN — ACETAMINOPHEN 500MG 650 MILLIGRAM(S): 500 TABLET, COATED ORAL at 01:59

## 2024-11-27 RX ADMIN — Medication 100 MILLIGRAM(S): at 13:09

## 2024-11-27 RX ADMIN — PANTOPRAZOLE SODIUM 40 MILLIGRAM(S): 40 TABLET, DELAYED RELEASE ORAL at 06:28

## 2024-11-27 RX ADMIN — Medication 25 GRAM(S): at 13:06

## 2024-11-27 RX ADMIN — Medication 12.5 MILLILITER(S): at 18:22

## 2024-11-27 RX ADMIN — OCTREOTIDE ACETATE 10 MICROGRAM(S)/HR: 500 INJECTION, SOLUTION INTRAVENOUS; SUBCUTANEOUS at 21:42

## 2024-11-27 RX ADMIN — Medication 50 MILLILITER(S): at 13:56

## 2024-11-27 NOTE — PROGRESS NOTE ADULT - NUTRITIONAL ASSESSMENT
This patient has been assessed with a concern for Malnutrition and has been determined to have a diagnosis/diagnoses of Severe protein-calorie malnutrition.    This patient is being managed with:   Diet Soft and Bite Sized-  Entered: Nov 27 2024 12:40PM

## 2024-11-27 NOTE — PROGRESS NOTE ADULT - PROBLEM SELECTOR PLAN 2
- pt with hx ETOH use d/o with cirrhosis and esophageal varices; last paracentesis 5/2024; on appropriate med regimen outpt but ran out of meds and has yet to f/u with outpt doctors x few months   - p/w abd pain and distention x1mo with poor appetite, difficulty with ADLs, and weakness   - exam with distended abd  - US abd with large volume ascites  - procedure team consulted for para -- s/p 18L out 11/27  - give albumin   - started on CTX 2g daily for SBP treatment given symptoms -- plan for full course and then long-term ppx   - lasix and spironolactone on hold in setting of low BP, restart as appropriate  - hepatology following, appreciate recs  - c/w coreg as above Azithromycin Counseling:  I discussed with the patient the risks of azithromycin including but not limited to GI upset, allergic reaction, drug rash, diarrhea, and yeast infections.

## 2024-11-27 NOTE — PROGRESS NOTE ADULT - SUBJECTIVE AND OBJECTIVE BOX
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 Presenting with abdominal discomfort  with worsening  distention x1 month. States  the distention has made it difficult for him to walk and do ADLs. Endorses poor appetite, nausea, generalized weakness, Endorses 4-5 episodes of "dark stools"  in last few days, but most recent stool yesterday normal appearing.  Denies fever, vomiting, dizziness, chest pain, palpitations. States last ETOH use was in May. Underwent therapeutic paracentesis in May and has not taken meds including lasix and spironolactone since scripts ran out several months ago Initial Hb at [facility] 6.7, then 6.1. Hb 6.8 following 2 prbcs; completed 3rd prbc upon [facility] arrival in early evening. Denies fevers, rigors       (26 Nov 2024 17:03)      INTERVAL HPI/OVERNIGHT EVENTS:   Patient has no acute events overnight. S/p EGD with banding yesterday.    SUBJECTIVE: Pt seen at bedside being prepped to undergo paracentesis. Continues to have abdominal distention and tenderness but no new symptoms since scope (chest pain, SOB).        Allergies:  No Known Allergies      Home Medications:    Hospital Medications:  acetaminophen     Tablet .. 650 milliGRAM(s) Oral every 6 hours PRN  aluminum hydroxide/magnesium hydroxide/simethicone Suspension 30 milliLiter(s) Oral every 4 hours PRN  cefTRIAXone   IVPB 2000 milliGRAM(s) IV Intermittent every 24 hours  furosemide   Injectable 20 milliGRAM(s) IV Push every 12 hours  melatonin 3 milliGRAM(s) Oral at bedtime PRN  nicotine - 21 mG/24Hr(s) Patch 1 Patch Transdermal daily  octreotide  Infusion 50 MICROgram(s)/Hr IV Continuous <Continuous>  ondansetron Injectable 4 milliGRAM(s) IV Push every 8 hours PRN  pantoprazole  Injectable 40 milliGRAM(s) IV Push every 12 hours  potassium chloride    Tablet ER 20 milliEquivalent(s) Oral every 2 hours  spironolactone 25 milliGRAM(s) Oral daily  thiamine 100 milliGRAM(s) Oral daily      PMHX/PSHX:  ETOH abuse    Cirrhosis    Alcoholic hepatitis    GERD (gastroesophageal reflux disease)        Family history:      Denies family history of colon cancer/polyps, stomach cancer/polyps, pancreatic cancer/masses, liver cancer/disease, ovarian cancer and endometrial cancer.    Social History:   Tob: Denies  EtOH: Denies  Illicit Drugs: Denies    ROS:     General:  No wt loss, fevers, chills, night sweats, fatigue  Eyes:  Good vision, no reported pain  ENT:  No sore throat, pain, runny nose, dysphagia  CV:  No pain, palpitations, hypo/hypertension  Pulm:  No dyspnea, cough, tachypnea, wheezing  GI: +abominal pain  :  No pain, bleeding, incontinence, nocturia  Muscle:  No pain, weakness  Neuro:  No weakness, tingling, memory problems  Psych:  No fatigue, insomnia, mood problems, depression  Endocrine:  No polyuria, polydipsia, cold/heat intolerance  Heme:  No petechiae, ecchymosis, easy bruisability  Skin:  No rash, tattoos, scars, edema    PHYSICAL EXAM:     GENERAL:  No acute distress  HEENT:  NCAT, no scleral icterus   CHEST:  no respiratory distress  HEART:  Regular rate and rhythm  ABDOMEN:  ++distension, +TTP all quadrants  EXTREMITIES: No edema  SKIN:  No rash/erythema/ecchymoses/petechiae/wounds/abscess/warm/dry  NEURO:  Alert and oriented x 4, no asterixis    Vital Signs:  Vital Signs Last 24 Hrs  T(C): 36.8 (26 Nov 2024 10:13), Max: 36.9 (25 Nov 2024 12:31)  T(F): 98.2 (26 Nov 2024 10:13), Max: 98.4 (25 Nov 2024 12:31)  HR: 88 (26 Nov 2024 10:13) (84 - 95)  BP: 105/70 (26 Nov 2024 10:13) (105/70 - 120/63)  BP(mean): --  RR: 18 (26 Nov 2024 10:13) (16 - 18)  SpO2: 99% (26 Nov 2024 10:13) (95% - 100%)    Parameters below as of 26 Nov 2024 10:13  Patient On (Oxygen Delivery Method): room air      Daily         LABS:                        8.0    5.01  )-----------( 146      ( 26 Nov 2024 06:20 )             25.7     Mean Cell Volume: 93.5 fL (11-26-24 @ 06:20)    11-26    134[L]  |  97[L]  |  11  ----------------------------<  124[H]  3.2[L]   |  24  |  0.66    Ca    7.8[L]      26 Nov 2024 06:20  Phos  2.7     11-25  Mg     1.8     11-25    TPro  7.2  /  Alb  2.3[L]  /  TBili  3.7[H]  /  DBili  1.5[H]  /  AST  32  /  ALT  20  /  AlkPhos  77  11-26    LIVER FUNCTIONS - ( 26 Nov 2024 06:20 )  Alb: 2.3 g/dL / Pro: 7.2 g/dL / ALK PHOS: 77 U/L / ALT: 20 U/L / AST: 32 U/L / GGT: x           PT/INR - ( 25 Nov 2024 22:50 )   PT: 21.6 sec;   INR: 1.87 ratio         PTT - ( 25 Nov 2024 22:50 )  PTT:36.4 sec  Urinalysis Basic - ( 26 Nov 2024 06:20 )    Color: x / Appearance: x / SG: x / pH: x  Gluc: 124 mg/dL / Ketone: x  / Bili: x / Urobili: x   Blood: x / Protein: x / Nitrite: x   Leuk Esterase: x / RBC: x / WBC x   Sq Epi: x / Non Sq Epi: x / Bacteria: x                              8.0    5.01  )-----------( 146      ( 26 Nov 2024 06:20 )             25.7                         8.6    4.96  )-----------( 131      ( 25 Nov 2024 22:50 )             26.5                         6.8    5.19  )-----------( 118      ( 25 Nov 2024 11:12 )             20.8                         6.1    5.53  )-----------( 116      ( 25 Nov 2024 05:04 )             18.7                         6.7    4.95  )-----------( 157      ( 24 Nov 2024 18:55 )             21.0       Imaging:           Chief Complaint:  Patient is a 49y old  Male who presents with a chief complaint of decompensated cirrhosis , gi bleed (25 Nov 2024 17:26)      INTERVAL HPI/OVERNIGHT EVENTS:   Patient has no acute events overnight. S/p EGD with banding yesterday.    SUBJECTIVE: Pt seen at bedside being prepped to undergo paracentesis. Continues to have abdominal distention and tenderness but no new symptoms since scope (no chest pain, SOB).        Allergies:  No Known Allergies      Home Medications:    Hospital Medications:  acetaminophen     Tablet .. 650 milliGRAM(s) Oral every 6 hours PRN  aluminum hydroxide/magnesium hydroxide/simethicone Suspension 30 milliLiter(s) Oral every 4 hours PRN  cefTRIAXone   IVPB 2000 milliGRAM(s) IV Intermittent every 24 hours  furosemide   Injectable 20 milliGRAM(s) IV Push every 12 hours  melatonin 3 milliGRAM(s) Oral at bedtime PRN  nicotine - 21 mG/24Hr(s) Patch 1 Patch Transdermal daily  octreotide  Infusion 50 MICROgram(s)/Hr IV Continuous <Continuous>  ondansetron Injectable 4 milliGRAM(s) IV Push every 8 hours PRN  pantoprazole  Injectable 40 milliGRAM(s) IV Push every 12 hours  potassium chloride    Tablet ER 20 milliEquivalent(s) Oral every 2 hours  spironolactone 25 milliGRAM(s) Oral daily  thiamine 100 milliGRAM(s) Oral daily      PMHX/PSHX:  ETOH abuse    Cirrhosis    Alcoholic hepatitis    GERD (gastroesophageal reflux disease)        Family history:      Denies family history of colon cancer/polyps, stomach cancer/polyps, pancreatic cancer/masses, liver cancer/disease, ovarian cancer and endometrial cancer.    Social History:   Tob: Denies  EtOH: Denies  Illicit Drugs: Denies    ROS:     General:  No wt loss, fevers, chills, night sweats, fatigue  Eyes:  Good vision, no reported pain  ENT:  No sore throat, pain, runny nose, dysphagia  CV:  No pain, palpitations, hypo/hypertension  Pulm:  No dyspnea, cough, tachypnea, wheezing  GI: +abominal pain  :  No pain, bleeding, incontinence, nocturia  Muscle:  No pain, weakness  Neuro:  No weakness, tingling, memory problems  Psych:  No fatigue, insomnia, mood problems, depression  Endocrine:  No polyuria, polydipsia, cold/heat intolerance  Heme:  No petechiae, ecchymosis, easy bruisability  Skin:  No rash, tattoos, scars, edema    PHYSICAL EXAM:     GENERAL:  No acute distress  HEENT:  NCAT, no scleral icterus   CHEST:  no respiratory distress  HEART:  Regular rate and rhythm  ABDOMEN:  ++distension, +TTP all quadrants  EXTREMITIES: No edema  SKIN:  No rash/erythema/ecchymoses/petechiae/wounds/abscess/warm/dry  NEURO:  Alert and oriented x 4, no asterixis    Vital Signs:  Vital Signs Last 24 Hrs  T(C): 36.8 (26 Nov 2024 10:13), Max: 36.9 (25 Nov 2024 12:31)  T(F): 98.2 (26 Nov 2024 10:13), Max: 98.4 (25 Nov 2024 12:31)  HR: 88 (26 Nov 2024 10:13) (84 - 95)  BP: 105/70 (26 Nov 2024 10:13) (105/70 - 120/63)  BP(mean): --  RR: 18 (26 Nov 2024 10:13) (16 - 18)  SpO2: 99% (26 Nov 2024 10:13) (95% - 100%)    Parameters below as of 26 Nov 2024 10:13  Patient On (Oxygen Delivery Method): room air      Daily         LABS:                        8.0    5.01  )-----------( 146      ( 26 Nov 2024 06:20 )             25.7     Mean Cell Volume: 93.5 fL (11-26-24 @ 06:20)    11-26    134[L]  |  97[L]  |  11  ----------------------------<  124[H]  3.2[L]   |  24  |  0.66    Ca    7.8[L]      26 Nov 2024 06:20  Phos  2.7     11-25  Mg     1.8     11-25    TPro  7.2  /  Alb  2.3[L]  /  TBili  3.7[H]  /  DBili  1.5[H]  /  AST  32  /  ALT  20  /  AlkPhos  77  11-26    LIVER FUNCTIONS - ( 26 Nov 2024 06:20 )  Alb: 2.3 g/dL / Pro: 7.2 g/dL / ALK PHOS: 77 U/L / ALT: 20 U/L / AST: 32 U/L / GGT: x           PT/INR - ( 25 Nov 2024 22:50 )   PT: 21.6 sec;   INR: 1.87 ratio         PTT - ( 25 Nov 2024 22:50 )  PTT:36.4 sec  Urinalysis Basic - ( 26 Nov 2024 06:20 )    Color: x / Appearance: x / SG: x / pH: x  Gluc: 124 mg/dL / Ketone: x  / Bili: x / Urobili: x   Blood: x / Protein: x / Nitrite: x   Leuk Esterase: x / RBC: x / WBC x   Sq Epi: x / Non Sq Epi: x / Bacteria: x                              8.0    5.01  )-----------( 146      ( 26 Nov 2024 06:20 )             25.7                         8.6    4.96  )-----------( 131      ( 25 Nov 2024 22:50 )             26.5                         6.8    5.19  )-----------( 118      ( 25 Nov 2024 11:12 )             20.8                         6.1    5.53  )-----------( 116      ( 25 Nov 2024 05:04 )             18.7                         6.7    4.95  )-----------( 157      ( 24 Nov 2024 18:55 )             21.0       Imaging:

## 2024-11-27 NOTE — PROGRESS NOTE ADULT - ASSESSMENT
Pt is a 50 yo M with PMH GERD, fTUD, prior ETOH use d/o, cirrhosis (ascites), esophageal varices, and alcoholic hepatitis p/w abd pain and distention, difficulty performing ADLs, poor appetite/PO intake, nausea, weakness, dark stool, and L>R LE edema. Ran out of meds x few months and has not f/u with outpt doctors. Found to have Hb 6.1 s/p 3U RBCs with Hb 8. Also with elevated bilirubin, direct predominance, and eelvated lipase. CXR benign, LE dopplers neg, and US abd with large volume ascites. Started on protonix IV BID and octreotide gtt -- continuing for 72h. Hepatology following s/p EGD 11/26 with recently bleeding large esophageal varices s/p banding; now on soft diet. Procedure team performed para 11/27 with 18L output and s/p albumin.

## 2024-11-27 NOTE — PROCEDURE NOTE - ADDITIONAL PROCEDURE DETAILS
Invasive procedure team was consulted for diagnostic/therapeutic paracentesis due to abdominal discomfort. Explained risks (including bleeding, infection, and bowel perforation) and benefits to patient and patient expressed understanding and consented. Ultrasound was utilized and visualized __10__cm Ascitic fluid pocket identified w/ no epigastric vessels visualized. No rash or vessels overlying skin site. Pts plts _132___, INR ___1.89_,  not on NOACs or coumadin. Sterile technique was used and ___mL of 1% lidocaine was used for local anesthesia. Small incision with scalpel done and 18 Fr Arrow Paracentesis catheter used. _18___L of clear yellow ascitic fluid drained. Catheter removed and dressed. Pt tolerated procedure well and no complications. Lab Analysis left at bedside. Communicated to primary team. recommend 108 - 144 g of albumin. Invasive procedure team was consulted for diagnostic/therapeutic paracentesis due to abdominal discomfort. Explained risks (including bleeding, infection, and bowel perforation) and benefits to patient and patient expressed understanding and consented. Ultrasound was utilized and visualized 10cm Ascitic fluid pocket identified w/ no epigastric vessels visualized. No rash or vessels overlying skin site. Pts plts 132, INR 1.89,  not on NOACs or coumadin. Sterile technique was used and 1% lidocaine was used for local anesthesia. Small incision with scalpel done and 18 Fr Arrow Paracentesis catheter used. 18L of clear yellow ascitic fluid drained. Catheter removed and dressed. Pt tolerated procedure well and no complications. Lab Analysis left at bedside. Communicated to primary team. Recommend 108 - 144 g of albumin.

## 2024-11-27 NOTE — PROGRESS NOTE ADULT - NUTRITIONAL ASSESSMENT
This patient has been assessed with a concern for Malnutrition and has been determined to have a diagnosis/diagnoses of Severe protein-calorie malnutrition.    This patient is being managed with:   Diet Clear Liquid-  Entered: Nov 25 2024  7:27PM

## 2024-11-27 NOTE — PROGRESS NOTE ADULT - SUBJECTIVE AND OBJECTIVE BOX
[facility] Department of Hospital Medicine  Mónica DO Pedro Pablo  Available on MS Teams  Pager: [phone]    Patient is a 49y old  Male who presents with a chief complaint of Per chart, Pt is 49M, from home, ambulates with cane, [facility] ETOH use disorder, cirrhosis c/b ascites, 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 Presenting with abdominal discomfort  with worsening  distention x1 month. States  the distention has made it difficult for him to walk and do ADLs. Endorses poor appetite, nausea, generalized weakness, Endorses 4-5 episodes of "dark stools"  in last few days, but most recent stool yesterday normal appearing.  Denies fever, vomiting, dizziness, chest pain, palpitations. States last ETOH use was in May. Underwent therapeutic paracentesis in May and has not taken meds including lasix and spironolactone since scripts ran out several months ago Initial Hb at [facility] 6.7, then 6.1. Hb 6.8 following 2 prbcs; completed 3rd prbc upon [facility] arrival in early evening. Denies fevers, rigors       (26 Nov 2024 17:03)    Subjective:  Pt seen and examined at bedside undergoing paracentesis.     Vital Signs Last 24 Hrs  T(C): 37 (27 Nov 2024 13:22), Max: 37.1 (26 Nov 2024 19:40)  T(F): 98.6 (27 Nov 2024 13:22), Max: 98.7 (26 Nov 2024 19:40)  HR: 73 (27 Nov 2024 13:22) (73 - 91)  BP: 95/50 (27 Nov 2024 13:22) (95/50 - 111/65)  BP(mean): --  RR: 16 (27 Nov 2024 13:22) (16 - 18)  SpO2: 94% (27 Nov 2024 13:22) (94% - 100%)    Parameters below as of 27 Nov 2024 13:22  Patient On (Oxygen Delivery Method): room air    PHYSICAL EXAM:  Constitutional: resting comfortably in bed; NAD  Head: NC/AT  Eyes: PERRL, EOMI, anicteric sclera  ENT: no nasal discharge; MMM  Neck: supple; no JVD  Respiratory: CTA B/L; no W/R/R; comfortable on RA  Cardiac: +S1/S2; RRR; no M/R/G  Gastrointestinal: firm, distended; no rebound or guarding; +BSx4  Extremities: WWP, no clubbing or cyanosis; no peripheral edema  Musculoskeletal: moves extremities spontaneously   Vascular: 2+ radial, DP/PT pulses B/L  Dermatologic: skin warm, dry and intact; no rashes, wounds, or scars  Neurologic: AOx3; no focal deficits  Psychiatric: calm    MEDICATIONS  (STANDING):  carvedilol 3.125 milliGRAM(s) Oral every 12 hours  cefTRIAXone   IVPB 2000 milliGRAM(s) IV Intermittent every 24 hours  chlorhexidine 2% Cloths 1 Application(s) Topical daily  magnesium sulfate  IVPB 2 Gram(s) IV Intermittent once  nicotine - 21 mG/24Hr(s) Patch 1 Patch Transdermal daily  octreotide  Infusion 50 MICROgram(s)/Hr (10 mL/Hr) IV Continuous <Continuous>  pantoprazole  Injectable 40 milliGRAM(s) IV Push every 12 hours  thiamine 100 milliGRAM(s) Oral daily    MEDICATIONS  (PRN):  acetaminophen     Tablet .. 650 milliGRAM(s) Oral every 6 hours PRN Temp greater or equal to 38C (100.4F), Mild Pain (1 - 3)  melatonin 3 milliGRAM(s) Oral at bedtime PRN Insomnia  ondansetron Injectable 4 milliGRAM(s) IV Push every 8 hours PRN Nausea and/or Vomiting    LABS:                        9.2    6.15  )-----------( 151      ( 27 Nov 2024 12:30 )             30.1     11-27    135  |  102  |  10  ----------------------------<  131[H]  4.1   |  26  |  0.73    Ca    7.8[L]      27 Nov 2024 12:30  Phos  2.5     11-27  Mg     1.60     11-27    TPro  7.0  /  Alb  2.1[L]  /  TBili  2.4[H]  /  DBili  x   /  AST  33  /  ALT  14  /  AlkPhos  74  11-27    PT/INR - ( 27 Nov 2024 12:30 )   PT: 22.3 sec;   INR: 1.94 ratio         PTT - ( 27 Nov 2024 12:30 )  PTT:38.1 sec  Urinalysis Basic - ( 27 Nov 2024 12:30 )    Color: x / Appearance: x / SG: x / pH: x  Gluc: 131 mg/dL / Ketone: x  / Bili: x / Urobili: x   Blood: x / Protein: x / Nitrite: x   Leuk Esterase: x / RBC: x / WBC x   Sq Epi: x / Non Sq Epi: x / Bacteria: x      CAPILLARY BLOOD GLUCOSE          RADIOLOGY & ADDITIONAL TESTS: Reviewed.

## 2024-11-27 NOTE — PROCEDURE NOTE - NSDIAGNOSTIC_RESP_A_CORE
Diagnostic/Therapeutic
I have examined the patient and confirmed the essential components of the history, physical examination, diagnosis, and treatment plan. I agree with the patient's care as documented by the PA and amended herein by me.

## 2024-11-27 NOTE — SBIRT NOTE ADULT - NSSBIRTALCPOSREINDET_GEN_A_CORE
SBIRT completed with the pt, he endored he used to heavily drink 7 months ago but reports he has since stopped. Pt reports smoking marijuanna daily before bed. Pt reports he does not want any resources/referrals. SBIRT closed.

## 2024-11-27 NOTE — PROGRESS NOTE ADULT - ASSESSMENT
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 S/p EGD on 11/26 with banding but no active bleeding.     #GI bleed  #Decompensated alcoholic cirrhosis     MELD 3.0=21  HE: no evidence of encephalopathy   Ascites: abdomen distended, has been noncompliant with diuretics at home for the past few months. US abd showing large ascites.  HCC screening: will require imaging   HRS: Creatinine at baseline    Recommendations:  - Soft diet today, can resume normal diet on day 4.  - Start low dose beta blocker (carvedilol) as blood pressure and heart rate tolerates.  - Repeat upper endoscopy in 4-6 weeks to evaluate the response to therapy.  - If patient continues to bleed, will need colonoscopy  - Hold lasix and spironolactone in setting of likely GIB   - Please recheck hemoglobin this afternoon   - Maintain 2 large bore IVs, active T&S  - Give Vit K to improve INR (could have component of malnutrition)   - Obtain PEth level, UCx, UA, BCx, CXR, fibrinogen     Patricio Guzman D.O. 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 S/p EGD on 11/26 with banding but no active bleeding.     #GI bleed  #Decompensated alcoholic cirrhosis     MELD 3.0=21  HE: no evidence of encephalopathy   Ascites: abdomen distended, has been noncompliant with diuretics at home for the past few months. US abd showing large ascites.  HCC screening: will require imaging   HRS: Creatinine at baseline    Recommendations:  - Soft diet today, can resume normal diet on day 4.  - Start low dose beta blocker (carvedilol) as blood pressure and heart rate tolerates.  - Repeat upper endoscopy in 4-6 weeks to evaluate the response to therapy.  - If patient continues to bleed, will need colonoscopy  - Can restart Lasix and Spironolactone as BP tolerates  - Please recheck hemoglobin this afternoon   - Maintain 2 large bore IVs, active T&S  - Give Vit K to improve INR (could have component of malnutrition)   - Obtain PEth level, UCx, UA, BCx, CXR, fibrinogen     Patricio Guzman D.O. 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 S/p EGD on 11/26 with banding but no active bleeding.     #GI bleed  #Decompensated alcoholic cirrhosis   #Ascites  #Varices s/p banding     MELD 3.0=21  HE: no evidence of encephalopathy   Ascites: abdomen distended, has been noncompliant with diuretics at home for the past few months. US abd showing large ascites.  HCC screening: will require imaging   HRS: Creatinine at baseline    Recommendations:  -Can restart Lasix and Spironolactone at lose dose on 11/28 or 11/29, would hold off today given 18L removed during paracentesis. Increase dose as tolerated.   - Soft diet today, can resume normal diet on day 4.  - C/w carvedilol BID as blood pressure and heart rate tolerates.  - Repeat upper endoscopy in 4-6 weeks to evaluate the response to therapy.  - If patient continues to bleed, will need colonoscopy  - Maintain 2 large bore IVs, active T&S  - Obtain PEth level, UCx, UA, BCx, CXR, fibrinogen     Patricio Guzman D.O. 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 S/p EGD on 11/26 with banding but no active bleeding.     #GI bleed  #Decompensated alcoholic cirrhosis   #Ascites  #Varices s/p banding     MELD 3.0=21  HE: no evidence of encephalopathy   Ascites: abdomen distended, has been noncompliant with diuretics at home for the past few months. US abd showing large ascites.  HCC screening: will require imaging   HRS: Creatinine at baseline    Recommendations:  -Can restart Lasix and Spironolactone (40/100) on 11/28 if electrolytes normal and blood pressure tolerated. Would hold off restarting  today given large volume paracentesis   - Soft diet today, can resume normal diet on day 4.  - C/w carvedilol BID as blood pressure and heart rate tolerates.  - Repeat upper endoscopy in 4-6 weeks to evaluate the response to therapy.  - If patient continues to bleed, will need colonoscopy  - Maintain 2 large bore IVs, active T&S  - Obtain PEth level, UCx, UA, BCx, CXR, fibrinogen     Patricio Guzman D.O. PGY-1  Internal Medicine    **note not finalized until attending attestation**

## 2024-11-27 NOTE — PROGRESS NOTE ADULT - ATTENDING COMMENTS
49M, AUD, h/o alcoholic hepatitis, alcohol-related cirrhosis, esophageal varices 5/2024, GERD    - init. admitted to [facility] with melena, found acute blood loss anemia, Hb 6.1 g/dL, transferred to [facility] regular hospital bed for EGD, done 11/26: large varices, 6 bands placed  - ascites on diuretics at home, large  - HE: -  - elevated bilirubin and INR, normal liver enzymes    Plan:  - continue ceftriaxone for total of 5 days  - monitor Hb, transfuse for Hb < 7 g/dL  - continue carvedilol, stop octreotide, decrease PPI to once daily  - advance diet as in endoscopy report  - daily MELD labs, may consider transplant evaluation

## 2024-11-28 LAB
ALBUMIN SERPL ELPH-MCNC: 2 G/DL — LOW (ref 3.3–5)
ALP SERPL-CCNC: 59 U/L — SIGNIFICANT CHANGE UP (ref 40–120)
ALT FLD-CCNC: 12 U/L — SIGNIFICANT CHANGE UP (ref 4–41)
ANION GAP SERPL CALC-SCNC: 7 MMOL/L — SIGNIFICANT CHANGE UP (ref 7–14)
APTT BLD: 42.9 SEC — HIGH (ref 24.5–35.6)
AST SERPL-CCNC: 21 U/L — SIGNIFICANT CHANGE UP (ref 4–40)
BASOPHILS # BLD AUTO: 0.06 K/UL — SIGNIFICANT CHANGE UP (ref 0–0.2)
BASOPHILS NFR BLD AUTO: 0.9 % — SIGNIFICANT CHANGE UP (ref 0–2)
BILIRUB SERPL-MCNC: 1.8 MG/DL — HIGH (ref 0.2–1.2)
BUN SERPL-MCNC: 8 MG/DL — SIGNIFICANT CHANGE UP (ref 7–23)
CALCIUM SERPL-MCNC: 7.4 MG/DL — LOW (ref 8.4–10.5)
CHLORIDE SERPL-SCNC: 103 MMOL/L — SIGNIFICANT CHANGE UP (ref 98–107)
CO2 SERPL-SCNC: 25 MMOL/L — SIGNIFICANT CHANGE UP (ref 22–31)
CREAT SERPL-MCNC: 0.64 MG/DL — SIGNIFICANT CHANGE UP (ref 0.5–1.3)
EGFR: 117 ML/MIN/1.73M2 — SIGNIFICANT CHANGE UP
EOSINOPHIL # BLD AUTO: 0.34 K/UL — SIGNIFICANT CHANGE UP (ref 0–0.5)
EOSINOPHIL NFR BLD AUTO: 5.1 % — SIGNIFICANT CHANGE UP (ref 0–6)
GLUCOSE SERPL-MCNC: 100 MG/DL — HIGH (ref 70–99)
GRAM STN FLD: SIGNIFICANT CHANGE UP
HCT VFR BLD CALC: 24.9 % — LOW (ref 39–50)
HGB BLD-MCNC: 8 G/DL — LOW (ref 13–17)
IANC: 3.57 K/UL — SIGNIFICANT CHANGE UP (ref 1.8–7.4)
IMM GRANULOCYTES NFR BLD AUTO: 0.2 % — SIGNIFICANT CHANGE UP (ref 0–0.9)
INR BLD: 1.98 RATIO — HIGH (ref 0.85–1.16)
LYMPHOCYTES # BLD AUTO: 1.82 K/UL — SIGNIFICANT CHANGE UP (ref 1–3.3)
LYMPHOCYTES # BLD AUTO: 27.5 % — SIGNIFICANT CHANGE UP (ref 13–44)
MAGNESIUM SERPL-MCNC: 1.7 MG/DL — SIGNIFICANT CHANGE UP (ref 1.6–2.6)
MCHC RBC-ENTMCNC: 29.9 PG — SIGNIFICANT CHANGE UP (ref 27–34)
MCHC RBC-ENTMCNC: 32.1 G/DL — SIGNIFICANT CHANGE UP (ref 32–36)
MCV RBC AUTO: 92.9 FL — SIGNIFICANT CHANGE UP (ref 80–100)
MONOCYTES # BLD AUTO: 0.81 K/UL — SIGNIFICANT CHANGE UP (ref 0–0.9)
MONOCYTES NFR BLD AUTO: 12.3 % — SIGNIFICANT CHANGE UP (ref 2–14)
MRSA PCR RESULT.: SIGNIFICANT CHANGE UP
NEUTROPHILS # BLD AUTO: 3.57 K/UL — SIGNIFICANT CHANGE UP (ref 1.8–7.4)
NEUTROPHILS NFR BLD AUTO: 54 % — SIGNIFICANT CHANGE UP (ref 43–77)
NRBC # BLD: 0 /100 WBCS — SIGNIFICANT CHANGE UP (ref 0–0)
NRBC # FLD: 0 K/UL — SIGNIFICANT CHANGE UP (ref 0–0)
PHOSPHATE SERPL-MCNC: 2.5 MG/DL — SIGNIFICANT CHANGE UP (ref 2.5–4.5)
PLATELET # BLD AUTO: 131 K/UL — LOW (ref 150–400)
POTASSIUM SERPL-MCNC: 4.4 MMOL/L — SIGNIFICANT CHANGE UP (ref 3.5–5.3)
POTASSIUM SERPL-SCNC: 4.4 MMOL/L — SIGNIFICANT CHANGE UP (ref 3.5–5.3)
PROT SERPL-MCNC: 5.8 G/DL — LOW (ref 6–8.3)
PROTHROM AB SERPL-ACNC: 23.4 SEC — HIGH (ref 9.9–13.4)
RBC # BLD: 2.68 M/UL — LOW (ref 4.2–5.8)
RBC # FLD: 16.8 % — HIGH (ref 10.3–14.5)
S AUREUS DNA NOSE QL NAA+PROBE: SIGNIFICANT CHANGE UP
SODIUM SERPL-SCNC: 135 MMOL/L — SIGNIFICANT CHANGE UP (ref 135–145)
SPECIMEN SOURCE: SIGNIFICANT CHANGE UP
WBC # BLD: 6.61 K/UL — SIGNIFICANT CHANGE UP (ref 3.8–10.5)
WBC # FLD AUTO: 6.61 K/UL — SIGNIFICANT CHANGE UP (ref 3.8–10.5)

## 2024-11-28 PROCEDURE — 99233 SBSQ HOSP IP/OBS HIGH 50: CPT

## 2024-11-28 RX ORDER — CYCLOBENZAPRINE HCL 10 MG
5 TABLET ORAL ONCE
Refills: 0 | Status: COMPLETED | OUTPATIENT
Start: 2024-11-28 | End: 2024-11-28

## 2024-11-28 RX ORDER — ACETAMINOPHEN, DIPHENHYDRAMINE HCL, PHENYLEPHRINE HCL 325; 25; 5 MG/1; MG/1; MG/1
6 TABLET ORAL AT BEDTIME
Refills: 0 | Status: DISCONTINUED | OUTPATIENT
Start: 2024-11-28 | End: 2024-12-05

## 2024-11-28 RX ORDER — PANTOPRAZOLE SODIUM 40 MG/1
40 TABLET, DELAYED RELEASE ORAL DAILY
Refills: 0 | Status: DISCONTINUED | OUTPATIENT
Start: 2024-11-28 | End: 2024-11-30

## 2024-11-28 RX ORDER — CYCLOBENZAPRINE HCL 10 MG
10 TABLET ORAL THREE TIMES A DAY
Refills: 0 | Status: DISCONTINUED | OUTPATIENT
Start: 2024-11-28 | End: 2024-12-05

## 2024-11-28 RX ORDER — CYCLOBENZAPRINE HCL 10 MG
10 TABLET ORAL ONCE
Refills: 0 | Status: DISCONTINUED | OUTPATIENT
Start: 2024-11-28 | End: 2024-11-28

## 2024-11-28 RX ADMIN — Medication 5 MILLIGRAM(S): at 13:03

## 2024-11-28 RX ADMIN — Medication 25 GRAM(S): at 13:10

## 2024-11-28 RX ADMIN — CHLORHEXIDINE GLUCONATE 1 APPLICATION(S): 1.2 RINSE ORAL at 13:11

## 2024-11-28 RX ADMIN — ACETAMINOPHEN 500MG 650 MILLIGRAM(S): 500 TABLET, COATED ORAL at 22:06

## 2024-11-28 RX ADMIN — Medication 10 MILLIGRAM(S): at 22:09

## 2024-11-28 RX ADMIN — Medication 100 MILLIGRAM(S): at 13:03

## 2024-11-28 RX ADMIN — ACETAMINOPHEN 500MG 650 MILLIGRAM(S): 500 TABLET, COATED ORAL at 22:36

## 2024-11-28 RX ADMIN — CARVEDILOL 3.12 MILLIGRAM(S): 25 TABLET, FILM COATED ORAL at 05:25

## 2024-11-28 RX ADMIN — ACETAMINOPHEN, DIPHENHYDRAMINE HCL, PHENYLEPHRINE HCL 6 MILLIGRAM(S): 325; 25; 5 TABLET ORAL at 22:06

## 2024-11-28 RX ADMIN — Medication 100 MILLIGRAM(S): at 22:05

## 2024-11-28 RX ADMIN — PANTOPRAZOLE SODIUM 40 MILLIGRAM(S): 40 TABLET, DELAYED RELEASE ORAL at 05:26

## 2024-11-28 RX ADMIN — PANTOPRAZOLE SODIUM 40 MILLIGRAM(S): 40 TABLET, DELAYED RELEASE ORAL at 13:03

## 2024-11-28 NOTE — PROVIDER CONTACT NOTE (OTHER) - ACTION/TREATMENT ORDERED:
Provider made aware. No orders at the time. Provider made aware. Keep monitoring as per provider. No orders at the time.

## 2024-11-28 NOTE — PROGRESS NOTE ADULT - ASSESSMENT
Pt is a 48 yo M with PMH GERD, fTUD, prior ETOH use d/o, cirrhosis (ascites), esophageal varices, and alcoholic hepatitis p/w abd pain and distention, difficulty performing ADLs, poor appetite/PO intake, nausea, weakness, dark stool, and L>R LE edema. Ran out of meds x few months and has not f/u with outpt doctors. Found to have Hb 6.1 s/p 3U RBCs with Hb 8. Also with elevated bilirubin, direct predominance, and eelvated lipase. CXR benign, LE dopplers neg, and US abd with large volume ascites. Now s/p 72h protonix IV BID and octreotide gtt; on daily PPI. Hepatology following s/p EGD 11/26 with recently bleeding large esophageal varices s/p banding; now on soft diet. Procedure team performed para 11/27 with 18L output and s/p albumin.

## 2024-11-28 NOTE — PROVIDER CONTACT NOTE (OTHER) - ACTION/TREATMENT ORDERED:
Provider made aware. As per provider pt BP has been running low after procedure if patient has no symptoms just continue to monitor

## 2024-11-28 NOTE — PROGRESS NOTE ADULT - SUBJECTIVE AND OBJECTIVE BOX
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 Presenting with abdominal discomfort  with worsening  distention x1 month. States  the distention has made it difficult for him to walk and do ADLs. Endorses poor appetite, nausea, generalized weakness, Endorses 4-5 episodes of "dark stools"  in last few days, but most recent stool yesterday normal appearing.  Denies fever, vomiting, dizziness, chest pain, palpitations. States last ETOH use was in May. Underwent therapeutic paracentesis in May and has not taken meds including lasix and spironolactone since scripts ran out several months ago Initial Hb at [facility] 6.7, then 6.1. Hb 6.8 following 2 prbcs; completed 3rd prbc upon [facility] arrival in early evening. Denies fevers, rigors       (26 Nov 2024 17:03)    Subjective:  Pt seen and examined at bedside eating lunch. Frustrated with course and restricted diet. Complains of muscle spasms in back, wants flexiril. Wants melatonin at night. Denies other complaints.    Vital Signs Last 24 Hrs  T(C): 36.6 (28 Nov 2024 05:15), Max: 36.9 (27 Nov 2024 17:35)  T(F): 97.9 (28 Nov 2024 05:15), Max: 98.4 (27 Nov 2024 17:35)  HR: 76 (28 Nov 2024 05:15) (75 - 78)  BP: 96/50 (28 Nov 2024 09:50) (85/50 - 104/58)  BP(mean): --  RR: 18 (28 Nov 2024 05:15) (17 - 18)  SpO2: 96% (28 Nov 2024 05:15) (95% - 96%)    Parameters below as of 28 Nov 2024 05:15  Patient On (Oxygen Delivery Method): room air    PHYSICAL EXAM:  Constitutional: resting comfortably in bed; NAD; irritable   Head: NC/AT  Eyes: PERRL, EOMI, anicteric sclera  ENT: no nasal discharge; MMM  Neck: supple; no JVD  Respiratory: CTA B/L; no W/R/R; comfortable on RA  Cardiac: +S1/S2; RRR; no M/R/G  Gastrointestinal: firm, distended; no rebound or guarding; +BSx4  Extremities: WWP, no clubbing or cyanosis; no peripheral edema  Musculoskeletal: moves extremities spontaneously   Vascular: 2+ radial, DP/PT pulses B/L  Dermatologic: skin warm, dry and intact; no rashes, wounds, or scars  Neurologic: AOx3; no focal deficits  Psychiatric: irritable     MEDICATIONS  (STANDING):  carvedilol 3.125 milliGRAM(s) Oral every 12 hours  cefTRIAXone   IVPB 2000 milliGRAM(s) IV Intermittent every 24 hours  chlorhexidine 2% Cloths 1 Application(s) Topical daily  cyclobenzaprine 10 milliGRAM(s) Oral once  melatonin 6 milliGRAM(s) Oral at bedtime  pantoprazole  Injectable 40 milliGRAM(s) IV Push daily  thiamine 100 milliGRAM(s) Oral daily    MEDICATIONS  (PRN):  acetaminophen     Tablet .. 650 milliGRAM(s) Oral every 6 hours PRN Temp greater or equal to 38C (100.4F), Mild Pain (1 - 3)  cyclobenzaprine 10 milliGRAM(s) Oral three times a day PRN Muscle Spasm  nicotine  Polacrilex Gum 2 milliGRAM(s) Oral every 2 hours PRN breakthrough cravings  ondansetron Injectable 4 milliGRAM(s) IV Push every 8 hours PRN Nausea and/or Vomiting    LABS:                        8.0    6.61  )-----------( 131      ( 28 Nov 2024 07:55 )             24.9     11-28    135  |  103  |  8   ----------------------------<  100[H]  4.4   |  25  |  0.64    Ca    7.4[L]      28 Nov 2024 07:55  Phos  2.5     11-28  Mg     1.70     11-28    TPro  5.8[L]  /  Alb  2.0[L]  /  TBili  1.8[H]  /  DBili  x   /  AST  21  /  ALT  12  /  AlkPhos  59  11-28    PT/INR - ( 28 Nov 2024 07:55 )   PT: 23.4 sec;   INR: 1.98 ratio         PTT - ( 28 Nov 2024 07:55 )  PTT:42.9 sec  Urinalysis Basic - ( 28 Nov 2024 07:55 )    Color: x / Appearance: x / SG: x / pH: x  Gluc: 100 mg/dL / Ketone: x  / Bili: x / Urobili: x   Blood: x / Protein: x / Nitrite: x   Leuk Esterase: x / RBC: x / WBC x   Sq Epi: x / Non Sq Epi: x / Bacteria: x      CAPILLARY BLOOD GLUCOSE          RADIOLOGY & ADDITIONAL TESTS: Reviewed.

## 2024-11-28 NOTE — PROGRESS NOTE ADULT - PROBLEM SELECTOR PLAN 2
- pt with hx ETOH use d/o with cirrhosis and esophageal varices; last paracentesis 5/2024; on appropriate med regimen outpt but ran out of meds and has yet to f/u with outpt doctors x few months   - p/w abd pain and distention x1mo with poor appetite, difficulty with ADLs, and weakness   - exam with distended abd  - US abd with large volume ascites  - procedure team consulted for para -- s/p 18L out 11/27  - give albumin   - started on CTX 2g daily for SBP treatment given symptoms -- plan for full course and then long-term ppx   - lasix and spironolactone on hold in setting of low BP, restart as appropriate  - hepatology following, appreciate recs  - c/w coreg as above

## 2024-11-29 LAB
ALBUMIN SERPL ELPH-MCNC: 2.2 G/DL — LOW (ref 3.3–5)
ALP SERPL-CCNC: 78 U/L — SIGNIFICANT CHANGE UP (ref 40–120)
ALT FLD-CCNC: 12 U/L — SIGNIFICANT CHANGE UP (ref 4–41)
ANION GAP SERPL CALC-SCNC: 8 MMOL/L — SIGNIFICANT CHANGE UP (ref 7–14)
APTT BLD: 38.7 SEC — HIGH (ref 24.5–35.6)
AST SERPL-CCNC: 27 U/L — SIGNIFICANT CHANGE UP (ref 4–40)
BASOPHILS # BLD AUTO: 0.06 K/UL — SIGNIFICANT CHANGE UP (ref 0–0.2)
BASOPHILS NFR BLD AUTO: 0.7 % — SIGNIFICANT CHANGE UP (ref 0–2)
BILIRUB SERPL-MCNC: 1.7 MG/DL — HIGH (ref 0.2–1.2)
BUN SERPL-MCNC: 10 MG/DL — SIGNIFICANT CHANGE UP (ref 7–23)
CALCIUM SERPL-MCNC: 7.5 MG/DL — LOW (ref 8.4–10.5)
CHLORIDE SERPL-SCNC: 102 MMOL/L — SIGNIFICANT CHANGE UP (ref 98–107)
CO2 SERPL-SCNC: 25 MMOL/L — SIGNIFICANT CHANGE UP (ref 22–31)
CREAT SERPL-MCNC: 0.69 MG/DL — SIGNIFICANT CHANGE UP (ref 0.5–1.3)
EGFR: 114 ML/MIN/1.73M2 — SIGNIFICANT CHANGE UP
EOSINOPHIL # BLD AUTO: 0.38 K/UL — SIGNIFICANT CHANGE UP (ref 0–0.5)
EOSINOPHIL NFR BLD AUTO: 4.4 % — SIGNIFICANT CHANGE UP (ref 0–6)
GLUCOSE SERPL-MCNC: 83 MG/DL — SIGNIFICANT CHANGE UP (ref 70–99)
HCT VFR BLD CALC: 28.7 % — LOW (ref 39–50)
HGB BLD-MCNC: 9.3 G/DL — LOW (ref 13–17)
IANC: 4.76 K/UL — SIGNIFICANT CHANGE UP (ref 1.8–7.4)
IMM GRANULOCYTES NFR BLD AUTO: 0.3 % — SIGNIFICANT CHANGE UP (ref 0–0.9)
INR BLD: 1.5 RATIO — HIGH (ref 0.85–1.16)
LYMPHOCYTES # BLD AUTO: 2.45 K/UL — SIGNIFICANT CHANGE UP (ref 1–3.3)
LYMPHOCYTES # BLD AUTO: 28.3 % — SIGNIFICANT CHANGE UP (ref 13–44)
MAGNESIUM SERPL-MCNC: 1.9 MG/DL — SIGNIFICANT CHANGE UP (ref 1.6–2.6)
MCHC RBC-ENTMCNC: 29.9 PG — SIGNIFICANT CHANGE UP (ref 27–34)
MCHC RBC-ENTMCNC: 32.4 G/DL — SIGNIFICANT CHANGE UP (ref 32–36)
MCV RBC AUTO: 92.3 FL — SIGNIFICANT CHANGE UP (ref 80–100)
MONOCYTES # BLD AUTO: 0.97 K/UL — HIGH (ref 0–0.9)
MONOCYTES NFR BLD AUTO: 11.2 % — SIGNIFICANT CHANGE UP (ref 2–14)
NEUTROPHILS # BLD AUTO: 4.76 K/UL — SIGNIFICANT CHANGE UP (ref 1.8–7.4)
NEUTROPHILS NFR BLD AUTO: 55.1 % — SIGNIFICANT CHANGE UP (ref 43–77)
NRBC # BLD: 0 /100 WBCS — SIGNIFICANT CHANGE UP (ref 0–0)
NRBC # FLD: 0 K/UL — SIGNIFICANT CHANGE UP (ref 0–0)
PHOSPHATE SERPL-MCNC: 2.8 MG/DL — SIGNIFICANT CHANGE UP (ref 2.5–4.5)
PLATELET # BLD AUTO: 175 K/UL — SIGNIFICANT CHANGE UP (ref 150–400)
POTASSIUM SERPL-MCNC: 4.3 MMOL/L — SIGNIFICANT CHANGE UP (ref 3.5–5.3)
POTASSIUM SERPL-SCNC: 4.3 MMOL/L — SIGNIFICANT CHANGE UP (ref 3.5–5.3)
PROT SERPL-MCNC: 6.5 G/DL — SIGNIFICANT CHANGE UP (ref 6–8.3)
PROTHROM AB SERPL-ACNC: 17.3 SEC — HIGH (ref 9.9–13.4)
RBC # BLD: 3.11 M/UL — LOW (ref 4.2–5.8)
RBC # FLD: 17.1 % — HIGH (ref 10.3–14.5)
SODIUM SERPL-SCNC: 135 MMOL/L — SIGNIFICANT CHANGE UP (ref 135–145)
WBC # BLD: 8.65 K/UL — SIGNIFICANT CHANGE UP (ref 3.8–10.5)
WBC # FLD AUTO: 8.65 K/UL — SIGNIFICANT CHANGE UP (ref 3.8–10.5)

## 2024-11-29 PROCEDURE — 99232 SBSQ HOSP IP/OBS MODERATE 35: CPT | Mod: GC

## 2024-11-29 PROCEDURE — 99232 SBSQ HOSP IP/OBS MODERATE 35: CPT

## 2024-11-29 RX ORDER — CARVEDILOL 25 MG/1
3.12 TABLET, FILM COATED ORAL DAILY
Refills: 0 | Status: DISCONTINUED | OUTPATIENT
Start: 2024-11-29 | End: 2024-12-01

## 2024-11-29 RX ADMIN — ACETAMINOPHEN 500MG 650 MILLIGRAM(S): 500 TABLET, COATED ORAL at 21:51

## 2024-11-29 RX ADMIN — ACETAMINOPHEN 500MG 650 MILLIGRAM(S): 500 TABLET, COATED ORAL at 11:11

## 2024-11-29 RX ADMIN — ACETAMINOPHEN 500MG 650 MILLIGRAM(S): 500 TABLET, COATED ORAL at 21:21

## 2024-11-29 RX ADMIN — ACETAMINOPHEN 500MG 650 MILLIGRAM(S): 500 TABLET, COATED ORAL at 11:41

## 2024-11-29 RX ADMIN — Medication 50 MILLILITER(S): at 18:47

## 2024-11-29 RX ADMIN — CHLORHEXIDINE GLUCONATE 1 APPLICATION(S): 1.2 RINSE ORAL at 11:11

## 2024-11-29 RX ADMIN — Medication 10 MILLIGRAM(S): at 11:11

## 2024-11-29 RX ADMIN — ACETAMINOPHEN, DIPHENHYDRAMINE HCL, PHENYLEPHRINE HCL 6 MILLIGRAM(S): 325; 25; 5 TABLET ORAL at 21:21

## 2024-11-29 RX ADMIN — Medication 10 MILLIGRAM(S): at 21:21

## 2024-11-29 RX ADMIN — Medication 100 MILLIGRAM(S): at 11:11

## 2024-11-29 RX ADMIN — Medication 100 MILLIGRAM(S): at 21:21

## 2024-11-29 RX ADMIN — PANTOPRAZOLE SODIUM 40 MILLIGRAM(S): 40 TABLET, DELAYED RELEASE ORAL at 11:11

## 2024-11-29 NOTE — PHYSICAL THERAPY INITIAL EVALUATION ADULT - PLANNED THERAPY INTERVENTIONS, PT EVAL
no
Patient left positioned for safety in bed in NAD, call bell in reach, all lines intact. +bed alarm.

## 2024-11-29 NOTE — PROGRESS NOTE ADULT - SUBJECTIVE AND OBJECTIVE BOX
[facility] Department of Hospital Medicine  Mónica Chamorro DO  Available on MS Teams  Pager: [phone]    Patient is a 49y old  Male who presents with a chief complaint of Per chart, Pt is 49M, from home, ambulates with cane, 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 Presenting with abdominal discomfort  with worsening  distention x1 month. States  the distention has made it difficult for him to walk and do ADLs. Endorses poor appetite, nausea, generalized weakness, Endorses 4-5 episodes of "dark stools"  in last few days, but most recent stool yesterday normal appearing.  Denies fever, vomiting, dizziness, chest pain, palpitations. States last ETOH use was in May. Underwent therapeutic paracentesis in May and has not taken meds including lasix and spironolactone since scripts ran out several months ago Initial Hb at [facility] 6.7, then 6.1. Hb 6.8 following 2 prbcs; completed 3rd prbc upon [facility] arrival in early evening. Denies fevers, rigors       (26 Nov 2024 17:03)    Subjective:  Pt seen and examined at bedside resting comfortably. Feels better today but tired. Says flexeril is helping. No other concerns/complaints.    Vital Signs Last 24 Hrs  T(C): 36.4 (29 Nov 2024 10:12), Max: 37 (28 Nov 2024 17:18)  T(F): 97.6 (29 Nov 2024 10:12), Max: 98.6 (28 Nov 2024 17:18)  HR: 73 (29 Nov 2024 10:12) (63 - 79)  BP: 100/60 (29 Nov 2024 10:28) (90/58 - 100/60)  BP(mean): --  RR: 18 (29 Nov 2024 10:12) (18 - 19)  SpO2: 100% (29 Nov 2024 10:12) (96% - 100%)    Parameters below as of 29 Nov 2024 10:12  Patient On (Oxygen Delivery Method): room air    PHYSICAL EXAM:  Constitutional: resting comfortably in bed; NAD; irritable   Head: NC/AT  Eyes: PERRL, EOMI, anicteric sclera  ENT: no nasal discharge; MMM  Neck: supple; no JVD  Respiratory: CTA B/L; no W/R/R; comfortable on RA  Cardiac: +S1/S2; RRR; no M/R/G  Gastrointestinal: firm, distended; no rebound or guarding; +BSx4  Extremities: WWP, no clubbing or cyanosis; no peripheral edema  Musculoskeletal: moves extremities spontaneously   Vascular: 2+ radial, DP/PT pulses B/L  Dermatologic: skin warm, dry and intact; no rashes, wounds, or scars  Neurologic: AOx3; no focal deficits  Psychiatric: irritable     MEDICATIONS  (STANDING):  carvedilol 3.125 milliGRAM(s) Oral every 12 hours  cefTRIAXone   IVPB 2000 milliGRAM(s) IV Intermittent every 24 hours  chlorhexidine 2% Cloths 1 Application(s) Topical daily  cyclobenzaprine 10 milliGRAM(s) Oral once  melatonin 6 milliGRAM(s) Oral at bedtime  pantoprazole  Injectable 40 milliGRAM(s) IV Push daily  thiamine 100 milliGRAM(s) Oral daily    MEDICATIONS  (PRN):  acetaminophen     Tablet .. 650 milliGRAM(s) Oral every 6 hours PRN Temp greater or equal to 38C (100.4F), Mild Pain (1 - 3)  cyclobenzaprine 10 milliGRAM(s) Oral three times a day PRN Muscle Spasm  nicotine  Polacrilex Gum 2 milliGRAM(s) Oral every 2 hours PRN breakthrough cravings  ondansetron Injectable 4 milliGRAM(s) IV Push every 8 hours PRN Nausea and/or Vomiting    LABS:                        9.3    8.65  )-----------( 175      ( 29 Nov 2024 04:30 )             28.7     11-29    135  |  102  |  10  ----------------------------<  83  4.3   |  25  |  0.69    Ca    7.5[L]      29 Nov 2024 04:30  Phos  2.8     11-29  Mg     1.90     11-29    TPro  6.5  /  Alb  2.2[L]  /  TBili  1.7[H]  /  DBili  x   /  AST  27  /  ALT  12  /  AlkPhos  78  11-29    PT/INR - ( 29 Nov 2024 04:30 )   PT: 17.3 sec;   INR: 1.50 ratio         PTT - ( 29 Nov 2024 04:30 )  PTT:38.7 sec  Urinalysis Basic - ( 29 Nov 2024 04:30 )    Color: x / Appearance: x / SG: x / pH: x  Gluc: 83 mg/dL / Ketone: x  / Bili: x / Urobili: x   Blood: x / Protein: x / Nitrite: x   Leuk Esterase: x / RBC: x / WBC x   Sq Epi: x / Non Sq Epi: x / Bacteria: x      CAPILLARY BLOOD GLUCOSE          RADIOLOGY & ADDITIONAL TESTS: Reviewed.

## 2024-11-29 NOTE — PROGRESS NOTE ADULT - ASSESSMENT
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 S/p EGD on 11/26 with banding but no active bleeding.     #GI bleed  #Decompensated alcoholic cirrhosis   #Ascites  #Varices s/p banding     MELD 3.0=21  HE: no evidence of encephalopathy   Ascites: s/p paracentesis (18L removed) on 11/27, Will start diuretics when BP can tolerate   HCC screening: will require imaging   HRS: Creatinine at baseline    Recommendations:  -Can restart Lasix and Spironolactone (40/100)  when BP can tolerate  -if BP remains soft, please repeat infectious workup (blood cultures, cxr, UA)  - Can resume normal diet on day 4.  - C/w carvedilol BID as blood pressure and heart rate tolerates.  - Repeat upper endoscopy in 4-6 weeks to evaluate the response to therapy.  - If patient continues to bleed, will need colonoscopy  - Maintain 2 large bore IVs, active T&S  -FU Peth level    Note incomplete until finalized by attending signature/attestation.    Romana Argueta  GI/Hepatology Fellow    MONDAY-FRIDAY 8AM-5PM:  Pager# [phone] ([facility]) or [phone] ([facility])    NON-URGENT CONSULTS:  Please email 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 S/p EGD on 11/26 with banding but no active bleeding.     #GI bleed  #Decompensated alcoholic cirrhosis   #Ascites  #Varices s/p banding     MELD 3.0=21  HE: no evidence of encephalopathy   Ascites: s/p paracentesis (18L removed) on 11/27, Will start diuretics when BP can tolerate   HCC screening: will require imaging   HRS: Creatinine at baseline    Recommendations:  -please give more albumin 25%, patient only received 125mL after 18L paracentesis, which is about 37g.   -Can restart Lasix and Spironolactone (40/100)  when BP can tolerate  -if BP remains soft, please repeat infectious workup (blood cultures, cxr, UA)  - Can resume normal diet on day 4.  - decrease carvedilol to QD from BID  - Repeat upper endoscopy in 4-6 weeks to evaluate the response to therapy.  - If patient continues to bleed, will need colonoscopy  - Maintain 2 large bore IVs, active T&S  -FU Peth level    Note incomplete until finalized by attending signature/attestation.    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 S/p EGD on 11/26 with banding but no active bleeding.     #GI bleed  #Decompensated alcoholic cirrhosis   #Ascites  #Varices s/p banding     MELD 3.0=21  HE: no evidence of encephalopathy   Ascites: s/p paracentesis (18L removed) on 11/27, Will start diuretics when BP can tolerate   HCC screening: will require imaging   HRS: Creatinine at baseline    Recommendations:  -please give more albumin 25%, patient only received 125mL after 18L paracentesis, which is about 37g.   -Can restart Lasix and Spironolactone (40/100)  when BP can tolerate  -if BP remains soft, please repeat infectious workup (blood cultures, cxr, UA)  - Can resume normal diet on day 4.  - decrease carvedilol to QD from BID  - Repeat upper endoscopy in 4-6 weeks to evaluate the response to therapy.  - If patient continues to bleed, will need colonoscopy  - Maintain 2 large bore IVs, active T&S  -FU Peth level  - Obtain AFP and multiphase CT vs liver protocol MRKaris Noted significant weight loss.   - Nutritional optimization, PT    Note incomplete until finalized by attending signature/attestation.    Romana Argueta  GI/Hepatology Fellow    MONDAY-FRIDAY 8AM-5PM:  Pager# [phone] ([facility]) or [phone] ([facility])    NON-URGENT CONSULTS:  Please email [email or web address]  AT NIGHT AND ON WEEKENDS:  Contact on-call GI fellow from 5pm-8am and on weekends/holidays

## 2024-11-29 NOTE — PROGRESS NOTE ADULT - ASSESSMENT
Pt is a 50 yo M with PMH GERD, fTUD, prior ETOH use d/o, cirrhosis (ascites), esophageal varices, and alcoholic hepatitis p/w abd pain and distention, difficulty performing ADLs, poor appetite/PO intake, nausea, weakness, dark stool, and L>R LE edema. Ran out of meds x few months and has not f/u with outpt doctors. Found to have Hb 6.1 s/p 3U RBCs with Hb 8. Also with elevated bilirubin, direct predominance, and eelvated lipase. CXR benign, LE dopplers neg, and US abd with large volume ascites. Now s/p 72h protonix IV BID and octreotide gtt; on daily PPI. Hepatology following s/p EGD 11/26 with recently bleeding large esophageal varices s/p banding; now on soft diet. Procedure team performed para 11/27 with 18L output and s/p albumin.

## 2024-11-29 NOTE — CHART NOTE - NSCHARTNOTEFT_GEN_A_CORE
NUTRITION FOLLOW UP NOTE          Source: Patient A&Ox2-3    Family [ ]     RN [ ]    Chart [x ]     Pt seen for nutrition follow up due to nutrition consult service with diagnosis of severe malnutrition.    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 Presenting with abdominal discomfort  with worsening  distention x1 month. States  the distention has made it difficult for him to walk and do ADLs. Endorses poor appetite, nausea, generalized weakness, Endorses 4-5 episodes of "dark stools"  in last few days, but most recent stool yesterday normal appearing.  Denies fever, vomiting, dizziness, chest pain, palpitations. States last ETOH use was in May. Underwent therapeutic paracentesis in May and has not taken meds including lasix and spironolactone since scripts ran out several months ago Initial Hb at [facility] 6.7, then 6.1. Hb 6.8 following 2 prbcs; completed 3rd prbc upon [facility] arrival in early evening. Denies fevers, rigors      ACTIVE DIET ORDER  Diet, Soft and Bite Sized (11-27-24 @ 12:40) [Active]    NUTRITION COURSE  Patient seen in his room. Reports dislike food consistency with limited food choices in house. Observed poor dentition and reported eating chicken without bone at baseline. Patient requests diet advancement if possible. Defer diet/texture modification to medical team/SLP as indicated. RD offered nutritional supplement drinks, patient declined and preferred to have solid foods however did not review food preferences with writer. Patient is unable to confirm UBW PTA. Noted 18 liters of fluid removed s/p paracentesis 11/27. RD was unable to obtain bed scale weight to verify weight change. No reported GI issues such as nausea/vomiting/diarrhea/constipation.  +BM documented on 11/24 per RN flow sheet.       PERTINENT MEDICATION  MEDICATIONS  (STANDING):  carvedilol 3.125 milliGRAM(s) Oral daily  cefTRIAXone   IVPB 2000 milliGRAM(s) IV Intermittent every 24 hours  chlorhexidine 2% Cloths 1 Application(s) Topical daily  melatonin 6 milliGRAM(s) Oral at bedtime  pantoprazole  Injectable 40 milliGRAM(s) IV Push daily  thiamine 100 milliGRAM(s) Oral daily    MEDICATIONS  (PRN):  acetaminophen     Tablet .. 650 milliGRAM(s) Oral every 6 hours PRN Temp greater or equal to 38C (100.4F), Mild Pain (1 - 3)  cyclobenzaprine 10 milliGRAM(s) Oral three times a day PRN Muscle Spasm  nicotine  Polacrilex Gum 2 milliGRAM(s) Oral every 2 hours PRN breakthrough cravings  ondansetron Injectable 4 milliGRAM(s) IV Push every 8 hours PRN Nausea and/or Vomiting      PERTINENT LABS  11-29    135  |  102  |  10  ----------------------------<  83  4.3   |  25  |  0.69    Ca    7.5[L]      29 Nov 2024 04:30  Phos  2.8     11-29  Mg     1.90     11-29    TPro  6.5  /  Alb  2.2[L]  /  TBili  1.7[H]  /  DBili  x   /  AST  27  /  ALT  12  /  AlkPhos  78  11-29                          9.3    8.65  )-----------( 175      ( 29 Nov 2024 04:30 )             28.7       ANTHROPOMETRICS  Height (cm): 185.4 (11-26-24), 185.4 (11-24-24), 185.4 (05-01-24)  Weight (kg): 96.2 (11-26-24), 95.3 (11-24-24), 113.3 (05-01-24)  BMI (kg/m2): 28 (11-26-24), 27.7 (11-24-24), 33 (05-01-24)  IBW: 184 lbs/83.4kg  +/- 10%  Weight Assessment: per RN flowsheet in KG  no new weight to assess    % weight change : n/a in house with possible weight loss from fluid loss 2/2 paracentesis on 11/24 with 18 L removed.     EDEMA: none    SKIN: surgical wounds from paracentesis    ESTIMATED NEEDS ASSESSMENT  [ x ] No change in need assessment, weight Used: 184 lbs/ 83.4kg  Estimated Energy: 7234-2720 Kcal/kg/day ( 28-33 kcal/kg)  Estimated Protein: 100..76 gm/kg/day ( 1.2-1.4 gm/kg)    PREVIOUS NUTRITION DIAGNOSIS  [ x ] Severe malnutrition  Nutrition Diagnosis is : [ x ] ongoing    New Nutrition Diagnosis :  [ x ] not applicable     EDUCATION  [ x ] Not applicable 2/2 current prognosis      RECOMMENDATION  [ x ] Continue present PO diet/supplement order as prescribed. Defer diet/texture modification to medical team/SLP as indicated.   [ x ] Honor food and fluid preferences as able.  [ x ] Replete electrolytes (Na, K, Phos, Mg) PRN    MONITORING AND EVALUATION   [ x ] Monitor PO intake, skin integrity, bowel regimen, and nutrition pertinent labs.  [ x ] Tolerance to diet prescription [ x ] weights [ x ] follow up per protocol NUTRITION FOLLOW UP NOTE          Source: Patient A&Ox2-3    Family [ ]     RN [ ]    Chart [x ]     Pt seen for nutrition follow up due to nutrition consult service with diagnosis of severe malnutrition.    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 Presenting with abdominal discomfort  with worsening  distention x1 month. States  the distention has made it difficult for him to walk and do ADLs. Endorses poor appetite, nausea, generalized weakness, Endorses 4-5 episodes of "dark stools"  in last few days, but most recent stool yesterday normal appearing.  Denies fever, vomiting, dizziness, chest pain, palpitations. States last ETOH use was in May. Underwent therapeutic paracentesis in May and has not taken meds including lasix and spironolactone since scripts ran out several months ago Initial Hb at [facility] 6.7, then 6.1. Hb 6.8 following 2 prbcs; completed 3rd prbc upon [facility] arrival in early evening. Denies fevers, rigors      ACTIVE DIET ORDER  Diet, Soft and Bite Sized (11-27-24 @ 12:40) [Active]    NUTRITION COURSE  Patient seen in his room. Reports dislike food consistency with limited food choices in house. Observed poor dentition and reported eating chicken without bone at baseline. Patient requests diet advancement if possible. Defer diet/texture modification to medical team/SLP as indicated. RD offered nutritional supplement drinks, patient declined and preferred to have solid foods however did not review food preferences with writer. Patient is unable to confirm UBW PTA. Noted 18 liters of fluid removed s/p paracentesis 11/27. RD was unable to obtain bed scale weight to verify weight change. No reported GI issues such as nausea/vomiting/diarrhea/constipation.  +BM documented on 11/24 per RN flow sheet.       PERTINENT MEDICATION  MEDICATIONS  (STANDING):  carvedilol 3.125 milliGRAM(s) Oral daily  cefTRIAXone   IVPB 2000 milliGRAM(s) IV Intermittent every 24 hours  chlorhexidine 2% Cloths 1 Application(s) Topical daily  melatonin 6 milliGRAM(s) Oral at bedtime  pantoprazole  Injectable 40 milliGRAM(s) IV Push daily  thiamine 100 milliGRAM(s) Oral daily    MEDICATIONS  (PRN):  acetaminophen     Tablet .. 650 milliGRAM(s) Oral every 6 hours PRN Temp greater or equal to 38C (100.4F), Mild Pain (1 - 3)  cyclobenzaprine 10 milliGRAM(s) Oral three times a day PRN Muscle Spasm  nicotine  Polacrilex Gum 2 milliGRAM(s) Oral every 2 hours PRN breakthrough cravings  ondansetron Injectable 4 milliGRAM(s) IV Push every 8 hours PRN Nausea and/or Vomiting      PERTINENT LABS  11-29    135  |  102  |  10  ----------------------------<  83  4.3   |  25  |  0.69    Ca    7.5[L]      29 Nov 2024 04:30  Phos  2.8     11-29  Mg     1.90     11-29    TPro  6.5  /  Alb  2.2[L]  /  TBili  1.7[H]  /  DBili  x   /  AST  27  /  ALT  12  /  AlkPhos  78  11-29                          9.3    8.65  )-----------( 175      ( 29 Nov 2024 04:30 )             28.7       ANTHROPOMETRICS  Height (cm): 185.4 (11-26-24), 185.4 (11-24-24), 185.4 (05-01-24)  Weight (kg): 96.2 (11-26-24), 95.3 (11-24-24), 113.3 (05-01-24)  BMI (kg/m2): 28 (11-26-24), 27.7 (11-24-24), 33 (05-01-24)  IBW: 184 lbs/83.4kg  +/- 10%  Weight Assessment: per RN flowsheet in KG  no new weight to assess    % weight change : n/a in house with possible weight loss from fluid loss 2/2 paracentesis on 11/24 with 18 L removed.     EDEMA: none    SKIN: surgical wounds from paracentesis    ESTIMATED NEEDS ASSESSMENT  [ x ] No change in need assessment, weight Used: 184 lbs/ 83.4kg  Estimated Energy: 1725-4785 Kcal/kg/day ( 28-33 kcal/kg)  Estimated Protein: 100..76 gm/kg/day ( 1.2-1.4 gm/kg)    PREVIOUS NUTRITION DIAGNOSIS  [ x ] Severe malnutrition  Nutrition Diagnosis is : [ x ] ongoing    New Nutrition Diagnosis :  [ x ] not applicable     EDUCATION  [ x ] Not applicable 2/2 current prognosis, pt also exhibited low interest in education and conversation.      RECOMMENDATION  [ x ] Continue present PO diet/supplement order as prescribed. Defer diet/texture modification to medical team/SLP as indicated.   [ x ] Honor food and fluid preferences as able.  [ x ] Replete electrolytes (Na, K, Phos, Mg) PRN    MONITORING AND EVALUATION   [ x ] Monitor PO intake, skin integrity, bowel regimen, and nutrition pertinent labs.  [ x ] Tolerance to diet prescription [ x ] weights [ x ] follow up per protocol

## 2024-11-29 NOTE — PROGRESS NOTE ADULT - ATTENDING COMMENTS
50yo  Male w/ Hx of AUD (4-5 drinks/day, Vodka, last drink reportedly in 5/2024), smoking (20-25 years) not on home O2,  family Hx of hepatitis C (mother), and decompensated EtOH cirrhosis (Dx 5/2024 at [facility]) c/b ascites, requiring prior paracentesis (no SBP, was supposed to be on SBP ppx for low protein ascites), also c/b EVs (Gr II-III EVs and PHG on 5/1/24 EGD no banding, was supposed to be on carvedilol), and alcoholic hepatitis (steroid responder), presented to [facility] ER on 11/24/24 w/ melena and anemia, and was transferred to [facility] 5/25/24 for higher level of care (no GI availability at [facility]). He was started on pantoprazole, octreotide, and ceftriaxone, in therapeutic dose b/o concern for possible SBP (high risk, worsening liver function, no paracentesis or abdominal imaging yet). Notably, he did have hypoxia during the 5/2024 [facility] hospitalization, but has not completed the evaluation for possible PPH or HPS, and has not followed up outpatient after.   Prior liver workup from 5/2024: Hep B neg, not immune, not exposed, Hep C ab neg. Hep A IgM neg. Hep E IgG/IgM neg. A1AT WNL. Ferritin 202. CMV negative. EBV past infection. Ceruloplasmin 15, possibly due to severe liver disease, but needed to r/o WD. IgA was elevated to 1658 and IgG was elevated to 1661 likely due to cirrhosis; ROSY neg, LKM neg, SMA 1:40, and AMA were neg.    Decompensated EtOH cirrhosis, MELD 3.0 21 (was 17 at time of d/c in 5/2024), w/ ascites, EVs/PHG, but no overt PSE  - Monitor MELD labs  - Will re-discuss transplant candidacy, concern is the poor outpatient follow up but reports abstinence and appears motivated.  - Please, follow up septic workup, BCx, ascites Cx neg so far (both were done on antibiotics), UA and CXR neg.   - Noted significant weight loss, muscle wasting since 5/2024 admission. Please, obtain AFP and multiphase CT vs. liver protocol MRI abd.   - Nutritional optimization, PT    Hx of low protein ascites, abdominal pain / distention  - US abd showed large ascites, patent hepatic vasculature  - s/p LVP 11/27 w/ 18 l ascites removal. Upon chart review it appears he received 37.5g 25 % albumin after the paracentesis, which is below the recommended dose. Given the hypoalbuminemia, borderline BP, consider trial of 25% albumin 1g/bwkg/day for at least 24 hrs.   - Repeat therapeutic paracentesis as needed w/ 25% albumin replacement (6-8g/l ascites removed)  - Will need to c/w long term SBP ppx after completion of antibiotics course  - Hold off w/ diuretics in setting of UGIB and borderline hypotension for now  - Avoid NSAIDs  - Low salt diet    Anemia - now stable, s/p 3 U PRBCs  UGIB  Hx of Gr II-III Evs, PHG  - S/p EGD 11/26: Recently bleeding large (> 5 mm) esophageal varices. Incompletely eradicated. Banded.  - C/w PPI, to complete ceftriaxone course and previously also met criteria for primary SBP ppx, completed octreotide (11/25-28)  - Monitor H/H , keep Hb >7, keep PLT > 50,  fibrinogen > 120 if bleeding  - Was already started on carvedilol 3.125 mg bid, given borderline hypotension, reduce for once daily for now.   - Will need repeat EGD in 4-6 weeks  - If HB drops again, might need colonoscopy too    Hx of AUD  - F/u Peth  - CIWA per primary team  - Folic, thiamine    Thank you for consult  Hepatology will follow .

## 2024-11-29 NOTE — PROGRESS NOTE ADULT - SUBJECTIVE AND OBJECTIVE BOX
Chief Complaint:  Patient is a 49y old  Male who presents with a chief complaint of decompensated cirrhosis , gi bleed (28 Nov 2024 13:49)      Interval Events:   -feeling well this morning, no melena or hematochezia  -BP remains soft     Allergies:  No Known Allergies      Hospital Medications:  acetaminophen     Tablet .. 650 milliGRAM(s) Oral every 6 hours PRN  carvedilol 3.125 milliGRAM(s) Oral every 12 hours  cefTRIAXone   IVPB 2000 milliGRAM(s) IV Intermittent every 24 hours  chlorhexidine 2% Cloths 1 Application(s) Topical daily  cyclobenzaprine 10 milliGRAM(s) Oral three times a day PRN  melatonin 6 milliGRAM(s) Oral at bedtime  nicotine  Polacrilex Gum 2 milliGRAM(s) Oral every 2 hours PRN  ondansetron Injectable 4 milliGRAM(s) IV Push every 8 hours PRN  pantoprazole  Injectable 40 milliGRAM(s) IV Push daily  thiamine 100 milliGRAM(s) Oral daily        PHYSICAL EXAM:   Vital Signs:  Vital Signs Last 24 Hrs  T(C): 36.6 (29 Nov 2024 05:54), Max: 37 (28 Nov 2024 17:18)  T(F): 97.8 (29 Nov 2024 05:54), Max: 98.6 (28 Nov 2024 17:18)  HR: 71 (29 Nov 2024 05:54) (63 - 79)  BP: 98/64 (29 Nov 2024 05:54) (90/58 - 98/64)  BP(mean): --  RR: 18 (29 Nov 2024 05:54) (18 - 19)  SpO2: 98% (29 Nov 2024 05:54) (96% - 98%)    Parameters below as of 29 Nov 2024 05:54  Patient On (Oxygen Delivery Method): room air      Daily       GENERAL:  No acute distress  HEENT:  NCAT, no scleral icterus  CHEST: no resp distress  HEART:  RRR  ABDOMEN:  Soft, non-tender, non-distended, normoactive bowel sounds, no masses, no hepato-splenomegaly, no signs of chronic liver disease  EXTREMITIES:  No cyanosis, clubbing, or edema  SKIN:  No rash/erythema/ecchymoses/petechiae/wounds/abscess/warm/dry  NEURO:  Alert and oriented x 3, no asterixis, no tremor    LABS:                        9.3    8.65  )-----------( 175      ( 29 Nov 2024 04:30 )             28.7     Mean Cell Volume: 92.3 fL (11-29-24 @ 04:30)    11-29    135  |  102  |  10  ----------------------------<  83  4.3   |  25  |  0.69    Ca    7.5[L]      29 Nov 2024 04:30  Phos  2.8     11-29  Mg     1.90     11-29    TPro  6.5  /  Alb  2.2[L]  /  TBili  1.7[H]  /  DBili  x   /  AST  27  /  ALT  12  /  AlkPhos  78  11-29    LIVER FUNCTIONS - ( 29 Nov 2024 04:30 )  Alb: 2.2 g/dL / Pro: 6.5 g/dL / ALK PHOS: 78 U/L / ALT: 12 U/L / AST: 27 U/L / GGT: x           PT/INR - ( 29 Nov 2024 04:30 )   PT: 17.3 sec;   INR: 1.50 ratio         PTT - ( 29 Nov 2024 04:30 )  PTT:38.7 sec  Urinalysis Basic - ( 29 Nov 2024 04:30 )    Color: x / Appearance: x / SG: x / pH: x  Gluc: 83 mg/dL / Ketone: x  / Bili: x / Urobili: x   Blood: x / Protein: x / Nitrite: x   Leuk Esterase: x / RBC: x / WBC x   Sq Epi: x / Non Sq Epi: x / Bacteria: x      Imaging:    < from: Upper Endoscopy (11.26.24 @ 12:45) >  Impression:          - Portal hypertensive gastropathy.                       - Noetiology of 3U hemoglobin drop was found.                       - Recently bleeding large (> 5 mm) esophageal varices.                        Incompletely eradicated. Banded.                       - Erythematous mucosa in the stomach.        - Normal duodenal bulb and second portion of the                        duodenum.                       - No specimens collected.  Recommendation:      - Return patient to hospital byrnes for ongoing care.                       - Clear liquid diet today. Soft diet tomorrow, can                        resume normal diet on day 4.                       - Start low dose beta blocker (carvedilol) as blood                        pressure and heart rate tolerates.                       - Repeat upper endoscopy in 4-6 weeks to evaluate the                        response to therapy.                       - If patients continues to bleed, will need colonoscopy.                                                                                   Attending Participation:       I was present and participated during the entire procedure, including        non-key portions.                                                             < end of copied text >

## 2024-11-29 NOTE — PHYSICAL THERAPY INITIAL EVALUATION ADULT - LEVEL OF INDEPENDENCE, REHAB EVAL
patient deferred and requesting pain medication, spoke with LEAH Mckeon who addressed pt's pain but patient still deferred and requesting to sleep today

## 2024-11-29 NOTE — PHYSICAL THERAPY INITIAL EVALUATION ADULT - PERTINENT HX OF CURRENT PROBLEM, REHAB EVAL
As per chart, patient is a 49 year old male, from home, ambulates with cane, [facility] ETOH use disorder, cirrhosis c/b ascites, 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 Presenting with abdominal discomfort with worsening distention x1 month. States the distention has made it difficult for him to walk and do ADLs. Endorses poor appetite, nausea, generalized weakness, Endorses 4-5 episodes of "dark stools"  in last few days, but most recent stool yesterday normal appearing.

## 2024-11-30 LAB
ANION GAP SERPL CALC-SCNC: 10 MMOL/L — SIGNIFICANT CHANGE UP (ref 7–14)
BUN SERPL-MCNC: 12 MG/DL — SIGNIFICANT CHANGE UP (ref 7–23)
CALCIUM SERPL-MCNC: 7.6 MG/DL — LOW (ref 8.4–10.5)
CHLORIDE SERPL-SCNC: 101 MMOL/L — SIGNIFICANT CHANGE UP (ref 98–107)
CO2 SERPL-SCNC: 23 MMOL/L — SIGNIFICANT CHANGE UP (ref 22–31)
CREAT SERPL-MCNC: 0.62 MG/DL — SIGNIFICANT CHANGE UP (ref 0.5–1.3)
EGFR: 118 ML/MIN/1.73M2 — SIGNIFICANT CHANGE UP
GLUCOSE SERPL-MCNC: 92 MG/DL — SIGNIFICANT CHANGE UP (ref 70–99)
HCT VFR BLD CALC: 24.7 % — LOW (ref 39–50)
HGB BLD-MCNC: 8 G/DL — LOW (ref 13–17)
MAGNESIUM SERPL-MCNC: 1.8 MG/DL — SIGNIFICANT CHANGE UP (ref 1.6–2.6)
MCHC RBC-ENTMCNC: 29.6 PG — SIGNIFICANT CHANGE UP (ref 27–34)
MCHC RBC-ENTMCNC: 32.4 G/DL — SIGNIFICANT CHANGE UP (ref 32–36)
MCV RBC AUTO: 91.5 FL — SIGNIFICANT CHANGE UP (ref 80–100)
NRBC # BLD: 0 /100 WBCS — SIGNIFICANT CHANGE UP (ref 0–0)
NRBC # FLD: 0 K/UL — SIGNIFICANT CHANGE UP (ref 0–0)
PHOSPHATE SERPL-MCNC: 3.2 MG/DL — SIGNIFICANT CHANGE UP (ref 2.5–4.5)
PLATELET # BLD AUTO: 114 K/UL — LOW (ref 150–400)
POTASSIUM SERPL-MCNC: 3.9 MMOL/L — SIGNIFICANT CHANGE UP (ref 3.5–5.3)
POTASSIUM SERPL-SCNC: 3.9 MMOL/L — SIGNIFICANT CHANGE UP (ref 3.5–5.3)
RBC # BLD: 2.7 M/UL — LOW (ref 4.2–5.8)
RBC # FLD: 17 % — HIGH (ref 10.3–14.5)
SODIUM SERPL-SCNC: 134 MMOL/L — LOW (ref 135–145)
WBC # BLD: 6.4 K/UL — SIGNIFICANT CHANGE UP (ref 3.8–10.5)
WBC # FLD AUTO: 6.4 K/UL — SIGNIFICANT CHANGE UP (ref 3.8–10.5)

## 2024-11-30 PROCEDURE — 99232 SBSQ HOSP IP/OBS MODERATE 35: CPT

## 2024-11-30 RX ORDER — CIPROFLOXACIN HCL 750 MG
500 TABLET ORAL DAILY
Refills: 0 | Status: DISCONTINUED | OUTPATIENT
Start: 2024-12-02 | End: 2024-12-05

## 2024-11-30 RX ORDER — POTASSIUM CHLORIDE 600 MG/1
10 TABLET, EXTENDED RELEASE ORAL ONCE
Refills: 0 | Status: COMPLETED | OUTPATIENT
Start: 2024-11-30 | End: 2024-11-30

## 2024-11-30 RX ORDER — SPIRONOLACTONE 25 MG
25 TABLET ORAL DAILY
Refills: 0 | Status: DISCONTINUED | OUTPATIENT
Start: 2024-11-30 | End: 2024-12-02

## 2024-11-30 RX ORDER — PANTOPRAZOLE SODIUM 40 MG/1
40 TABLET, DELAYED RELEASE ORAL
Refills: 0 | Status: DISCONTINUED | OUTPATIENT
Start: 2024-12-01 | End: 2024-12-05

## 2024-11-30 RX ADMIN — ACETAMINOPHEN 500MG 650 MILLIGRAM(S): 500 TABLET, COATED ORAL at 22:25

## 2024-11-30 RX ADMIN — ACETAMINOPHEN, DIPHENHYDRAMINE HCL, PHENYLEPHRINE HCL 6 MILLIGRAM(S): 325; 25; 5 TABLET ORAL at 22:25

## 2024-11-30 RX ADMIN — CHLORHEXIDINE GLUCONATE 1 APPLICATION(S): 1.2 RINSE ORAL at 12:35

## 2024-11-30 RX ADMIN — Medication 100 MILLIGRAM(S): at 22:24

## 2024-11-30 RX ADMIN — Medication 10 MILLIGRAM(S): at 22:25

## 2024-11-30 RX ADMIN — ACETAMINOPHEN 500MG 650 MILLIGRAM(S): 500 TABLET, COATED ORAL at 12:33

## 2024-11-30 RX ADMIN — Medication 25 GRAM(S): at 17:42

## 2024-11-30 RX ADMIN — ACETAMINOPHEN 500MG 650 MILLIGRAM(S): 500 TABLET, COATED ORAL at 13:03

## 2024-11-30 RX ADMIN — ACETAMINOPHEN 500MG 650 MILLIGRAM(S): 500 TABLET, COATED ORAL at 23:25

## 2024-11-30 RX ADMIN — Medication 10 MILLIGRAM(S): at 12:34

## 2024-11-30 RX ADMIN — POTASSIUM CHLORIDE 10 MILLIEQUIVALENT(S): 600 TABLET, EXTENDED RELEASE ORAL at 12:34

## 2024-11-30 RX ADMIN — Medication 10 MILLIGRAM(S): at 05:31

## 2024-11-30 RX ADMIN — Medication 100 MILLIGRAM(S): at 12:35

## 2024-11-30 NOTE — PROGRESS NOTE ADULT - PROBLEM SELECTOR PLAN 2
- pt with hx ETOH use d/o with cirrhosis and esophageal varices; last paracentesis 5/2024; on appropriate med regimen outpt but ran out of meds and has yet to f/u with outpt doctors x few months   - p/w abd pain and distention x1mo with poor appetite, difficulty with ADLs, and weakness   - exam with distended abd  - US abd with large volume ascites  - procedure team consulted for para -- s/p 18L out 11/27  - s/p albumin   - started on CTX 2g daily for SBP treatment given symptoms -- plan for full course and then long-term ppx (cipro 500mg daily starting 12/2)  - lasix and spironolactone as above  - hepatology following, appreciate recs  - c/w coreg as above

## 2024-11-30 NOTE — PROGRESS NOTE ADULT - NUTRITIONAL ASSESSMENT
This patient has been assessed with a concern for Malnutrition and has been determined to have a diagnosis/diagnoses of Severe protein-calorie malnutrition.    This patient is being managed with:   Diet Regular-  Entered: Nov 29 2024  6:12PM

## 2024-11-30 NOTE — PROGRESS NOTE ADULT - ASSESSMENT
Pt is a 48 yo M with PMH GERD, fTUD, prior ETOH use d/o, cirrhosis (ascites), esophageal varices, and alcoholic hepatitis p/w abd pain and distention, difficulty performing ADLs, poor appetite/PO intake, nausea, weakness, dark stool, and L>R LE edema. Ran out of meds x few months and has not f/u with outpt doctors. Found to have Hb 6.1 s/p 3U RBCs with Hb 8. Also with elevated bilirubin, direct predominance, and eelvated lipase. CXR benign, LE dopplers neg, and US abd with large volume ascites. Now s/p 72h protonix IV BID and octreotide gtt; on daily PPI. Hepatology following s/p EGD 11/26 with recently bleeding large esophageal varices s/p banding; now on regular diet. Procedure team performed para 11/27 with 18L output and s/p albumin. Resuming home meds.

## 2024-11-30 NOTE — PROGRESS NOTE ADULT - SUBJECTIVE AND OBJECTIVE BOX
[facility] Department of Hospital Medicine  Mónica Chamorro DO  Available on MS Teams  Pager: [phone]     (26 Nov 2024 17:03)    Subjective:  Pt seen and examined at bedside resting comfortably. Denies concerns/complaints.    Vital Signs Last 24 Hrs  T(C): 36.9 (30 Nov 2024 09:25), Max: 36.9 (30 Nov 2024 09:25)  T(F): 98.5 (30 Nov 2024 09:25), Max: 98.5 (30 Nov 2024 09:25)  HR: 80 (30 Nov 2024 09:25) (76 - 80)  BP: 105/65 (30 Nov 2024 09:25) (97/60 - 105/65)  BP(mean): --  RR: 18 (30 Nov 2024 09:25) (18 - 18)  SpO2: 100% (30 Nov 2024 09:25) (100% - 100%)    Parameters below as of 30 Nov 2024 05:28  Patient On (Oxygen Delivery Method): room air    PHYSICAL EXAM:  Constitutional: resting comfortably in bed; NAD; irritable   Head: NC/AT  Eyes: PERRL, EOMI, anicteric sclera  ENT: no nasal discharge; MMM  Neck: supple; no JVD  Respiratory: CTA B/L; no W/R/R; comfortable on RA  Cardiac: +S1/S2; RRR; no M/R/G  Gastrointestinal: firm, distended; no rebound or guarding; +BSx4  Extremities: WWP, no clubbing or cyanosis; no peripheral edema  Musculoskeletal: moves extremities spontaneously   Vascular: 2+ radial, DP/PT pulses B/L  Dermatologic: skin warm, dry and intact; no rashes, wounds, or scars  Neurologic: AOx3; no focal deficits  Psychiatric: calm    MEDICATIONS  (STANDING):  carvedilol 3.125 milliGRAM(s) Oral daily  cefTRIAXone   IVPB 2000 milliGRAM(s) IV Intermittent every 24 hours  chlorhexidine 2% Cloths 1 Application(s) Topical daily  magnesium sulfate  IVPB 2 Gram(s) IV Intermittent once  melatonin 6 milliGRAM(s) Oral at bedtime  pantoprazole  Injectable 40 milliGRAM(s) IV Push daily  potassium chloride    Tablet ER 10 milliEquivalent(s) Oral once  spironolactone 25 milliGRAM(s) Oral daily  thiamine 100 milliGRAM(s) Oral daily    MEDICATIONS  (PRN):  acetaminophen     Tablet .. 650 milliGRAM(s) Oral every 6 hours PRN Temp greater or equal to 38C (100.4F), Mild Pain (1 - 3)  cyclobenzaprine 10 milliGRAM(s) Oral three times a day PRN Muscle Spasm  nicotine  Polacrilex Gum 2 milliGRAM(s) Oral every 2 hours PRN breakthrough cravings  ondansetron Injectable 4 milliGRAM(s) IV Push every 8 hours PRN Nausea and/or Vomiting    LABS:                        8.0    6.40  )-----------( 114      ( 30 Nov 2024 05:08 )             24.7     11-30    134[L]  |  101  |  12  ----------------------------<  92  3.9   |  23  |  0.62    Ca    7.6[L]      30 Nov 2024 05:08  Phos  3.2     11-30  Mg     1.80     11-30    TPro  6.5  /  Alb  2.2[L]  /  TBili  1.7[H]  /  DBili  x   /  AST  27  /  ALT  12  /  AlkPhos  78  11-29    PT/INR - ( 29 Nov 2024 04:30 )   PT: 17.3 sec;   INR: 1.50 ratio         PTT - ( 29 Nov 2024 04:30 )  PTT:38.7 sec  Urinalysis Basic - ( 30 Nov 2024 05:08 )    Color: x / Appearance: x / SG: x / pH: x  Gluc: 92 mg/dL / Ketone: x  / Bili: x / Urobili: x   Blood: x / Protein: x / Nitrite: x   Leuk Esterase: x / RBC: x / WBC x   Sq Epi: x / Non Sq Epi: x / Bacteria: x      CAPILLARY BLOOD GLUCOSE          RADIOLOGY & ADDITIONAL TESTS: Reviewed.

## 2024-12-01 LAB
ANION GAP SERPL CALC-SCNC: 10 MMOL/L — SIGNIFICANT CHANGE UP (ref 7–14)
BUN SERPL-MCNC: 15 MG/DL — SIGNIFICANT CHANGE UP (ref 7–23)
CALCIUM SERPL-MCNC: 7.7 MG/DL — LOW (ref 8.4–10.5)
CHLORIDE SERPL-SCNC: 102 MMOL/L — SIGNIFICANT CHANGE UP (ref 98–107)
CO2 SERPL-SCNC: 24 MMOL/L — SIGNIFICANT CHANGE UP (ref 22–31)
CREAT SERPL-MCNC: 0.74 MG/DL — SIGNIFICANT CHANGE UP (ref 0.5–1.3)
EGFR: 112 ML/MIN/1.73M2 — SIGNIFICANT CHANGE UP
GLUCOSE SERPL-MCNC: 95 MG/DL — SIGNIFICANT CHANGE UP (ref 70–99)
HCT VFR BLD CALC: 27.4 % — LOW (ref 39–50)
HGB BLD-MCNC: 8.5 G/DL — LOW (ref 13–17)
MAGNESIUM SERPL-MCNC: 1.8 MG/DL — SIGNIFICANT CHANGE UP (ref 1.6–2.6)
MCHC RBC-ENTMCNC: 29.4 PG — SIGNIFICANT CHANGE UP (ref 27–34)
MCHC RBC-ENTMCNC: 31 G/DL — LOW (ref 32–36)
MCV RBC AUTO: 94.8 FL — SIGNIFICANT CHANGE UP (ref 80–100)
NRBC # BLD: 0 /100 WBCS — SIGNIFICANT CHANGE UP (ref 0–0)
NRBC # FLD: 0 K/UL — SIGNIFICANT CHANGE UP (ref 0–0)
PHOSPHATE SERPL-MCNC: 2.7 MG/DL — SIGNIFICANT CHANGE UP (ref 2.5–4.5)
PLATELET # BLD AUTO: 118 K/UL — LOW (ref 150–400)
POTASSIUM SERPL-MCNC: 4 MMOL/L — SIGNIFICANT CHANGE UP (ref 3.5–5.3)
POTASSIUM SERPL-SCNC: 4 MMOL/L — SIGNIFICANT CHANGE UP (ref 3.5–5.3)
RBC # BLD: 2.89 M/UL — LOW (ref 4.2–5.8)
RBC # FLD: 16.9 % — HIGH (ref 10.3–14.5)
SODIUM SERPL-SCNC: 136 MMOL/L — SIGNIFICANT CHANGE UP (ref 135–145)
WBC # BLD: 7.4 K/UL — SIGNIFICANT CHANGE UP (ref 3.8–10.5)
WBC # FLD AUTO: 7.4 K/UL — SIGNIFICANT CHANGE UP (ref 3.8–10.5)

## 2024-12-01 PROCEDURE — 99232 SBSQ HOSP IP/OBS MODERATE 35: CPT

## 2024-12-01 RX ORDER — SUMATRIPTAN SUCCINATE 100 MG/1
25 TABLET, FILM COATED ORAL ONCE
Refills: 0 | Status: COMPLETED | OUTPATIENT
Start: 2024-12-01 | End: 2024-12-01

## 2024-12-01 RX ORDER — SODIUM,POTASSIUM PHOSPHATES 278-250MG
2 POWDER IN PACKET (EA) ORAL ONCE
Refills: 0 | Status: COMPLETED | OUTPATIENT
Start: 2024-12-01 | End: 2024-12-01

## 2024-12-01 RX ORDER — FUROSEMIDE 40 MG/1
20 TABLET ORAL DAILY
Refills: 0 | Status: DISCONTINUED | OUTPATIENT
Start: 2024-12-01 | End: 2024-12-02

## 2024-12-01 RX ORDER — CARVEDILOL 25 MG/1
3.12 TABLET, FILM COATED ORAL EVERY 12 HOURS
Refills: 0 | Status: DISCONTINUED | OUTPATIENT
Start: 2024-12-01 | End: 2024-12-05

## 2024-12-01 RX ADMIN — Medication 10 MILLIGRAM(S): at 18:56

## 2024-12-01 RX ADMIN — Medication 10 MILLIGRAM(S): at 06:31

## 2024-12-01 RX ADMIN — CARVEDILOL 3.12 MILLIGRAM(S): 25 TABLET, FILM COATED ORAL at 06:31

## 2024-12-01 RX ADMIN — CHLORHEXIDINE GLUCONATE 1 APPLICATION(S): 1.2 RINSE ORAL at 13:38

## 2024-12-01 RX ADMIN — ACETAMINOPHEN, DIPHENHYDRAMINE HCL, PHENYLEPHRINE HCL 6 MILLIGRAM(S): 325; 25; 5 TABLET ORAL at 21:48

## 2024-12-01 RX ADMIN — Medication 400 MILLIGRAM(S): at 13:38

## 2024-12-01 RX ADMIN — Medication 25 MILLIGRAM(S): at 06:31

## 2024-12-01 RX ADMIN — CARVEDILOL 3.12 MILLIGRAM(S): 25 TABLET, FILM COATED ORAL at 18:57

## 2024-12-01 RX ADMIN — ACETAMINOPHEN 500MG 650 MILLIGRAM(S): 500 TABLET, COATED ORAL at 22:21

## 2024-12-01 RX ADMIN — Medication 10 MILLIGRAM(S): at 21:52

## 2024-12-01 RX ADMIN — Medication 100 MILLIGRAM(S): at 13:37

## 2024-12-01 RX ADMIN — PANTOPRAZOLE SODIUM 40 MILLIGRAM(S): 40 TABLET, DELAYED RELEASE ORAL at 06:31

## 2024-12-01 RX ADMIN — Medication 400 MILLIGRAM(S): at 18:56

## 2024-12-01 RX ADMIN — SUMATRIPTAN SUCCINATE 25 MILLIGRAM(S): 100 TABLET, FILM COATED ORAL at 13:37

## 2024-12-01 RX ADMIN — Medication 400 MILLIGRAM(S): at 10:07

## 2024-12-01 RX ADMIN — ACETAMINOPHEN 500MG 650 MILLIGRAM(S): 500 TABLET, COATED ORAL at 21:51

## 2024-12-01 RX ADMIN — Medication 2 TABLET(S): at 10:07

## 2024-12-01 NOTE — DISCHARGE NOTE PROVIDER - CARE PROVIDER_API CALL
You will be contacted to, make appointment with  primary doctor in [facility]  Phone: (   )    -  Fax: (   )    -  Follow Up Time:

## 2024-12-01 NOTE — PROGRESS NOTE ADULT - TIME BILLING
review of laboratory data, radiology results, consultants' recommendations, documentation in [place], discussion with patient/ACP and interdisciplinary staff (such as , social workers, etc). Interventions were performed as documented above.
review of laboratory data, radiology results, consultants' recommendations, documentation in [place], discussion with patient/ACP and interdisciplinary staff (such as , social workers, etc). Interventions were performed as documented above.
review of laboratory data, radiology results, consultants' recommendations, documentation in [place], discussion with patient/ACP and interdisciplinary staff (such as , social workers, etc). Interventions were performed as documented above.
review of laboratory data, radiology results, consultants' recommendations, documentation in [place], discussion with patient/ACP and interdisciplinary staff (such as , social workers, etc). Interventions were performed as documented above.
review of laboratory data, radiology results, consultants' recommendations, documentation in [place], discussion with patient/ACP and interdisciplinary staff (such as , social workers, etc). Interventions were performed as documented above.

## 2024-12-01 NOTE — DISCHARGE NOTE PROVIDER - PROVIDER TOKENS
FREE:[LAST:[You will be contacted to],FIRST:[make appointment with],PHONE:[(   )    -],FAX:[(   )    -],ADDRESS:[primary doctor in [facility]]]

## 2024-12-01 NOTE — PROGRESS NOTE ADULT - ASSESSMENT
Pt is a 48 yo M with PMH GERD, fTUD, prior ETOH use d/o, cirrhosis (ascites), esophageal varices, and alcoholic hepatitis p/w abd pain and distention, difficulty performing ADLs, poor appetite/PO intake, nausea, weakness, dark stool, and L>R LE edema. Ran out of meds x few months and has not f/u with outpt doctors. Found to have Hb 6.1 s/p 3U RBCs with Hb 8. Also with elevated bilirubin, direct predominance, and eelvated lipase. CXR benign, LE dopplers neg, and US abd with large volume ascites. Now s/p 72h protonix IV BID and octreotide gtt; on daily PPI. Hepatology following s/p EGD 11/26 with recently bleeding large esophageal varices s/p banding; now on regular diet. Procedure team performed para 11/27 with 18L output and s/p albumin. BP improving and now starting home meds - spironolactone, lasix, and coreg; uptitrating as able.

## 2024-12-01 NOTE — DISCHARGE NOTE PROVIDER - DETAILS OF MALNUTRITION DIAGNOSIS/DIAGNOSES
This patient has been assessed with a concern for Malnutrition and was treated during this hospitalization for the following Nutrition diagnosis/diagnoses:     -  11/26/2024: Severe protein-calorie malnutrition

## 2024-12-01 NOTE — DISCHARGE NOTE PROVIDER - NSDCMRMEDTOKEN_GEN_ALL_CORE_FT
carvedilol 3.125 mg oral tablet: 1 tab(s) orally every 12 hours  ciprofloxacin 500 mg oral tablet: 1 tab(s) orally once a day  folic acid 1 mg oral tablet: 1 tab(s) orally once a day  furosemide 20 mg oral tablet: 1 tab(s) orally once a day  Multiple Vitamins with Minerals oral tablet: 1 tab(s) orally once a day  pantoprazole 40 mg oral delayed release tablet: 1 tab(s) orally once a day (before a meal)  prednisoLONE (as sodium phosphate) 15 mg/5 mL oral liquid: 13.33 milliliter(s) orally once a day  spironolactone 25 mg oral tablet: 4 tab(s) orally once a day  thiamine 100 mg oral tablet: 1 tab(s) orally once a day   acetaminophen 325 mg oral tablet: 2 tab(s) orally every 6 hours as needed for Temp greater or equal to 38C (100.4F), Mild Pain (1 - 3)  carvedilol 3.125 mg oral tablet: 1 tab(s) orally every 12 hours  ciprofloxacin 500 mg oral tablet: 1 tab(s) orally once a day SBP ppx  famotidine 40 mg oral tablet: 1 tab(s) orally once a day  folic acid 1 mg oral tablet: 1 tab(s) orally once a day  furosemide 40 mg oral tablet: 1 tab(s) orally 2 times a day hold for systolic blood pressure less than 100  melatonin 3 mg oral tablet: 2 tab(s) orally once a day (at bedtime)  Multiple Vitamins with Minerals oral tablet: 1 tab(s) orally once a day  nicotine 2 mg oral transmucosal gum: 1 gum by transmucosal administration every 2 hours as needed for  breakthrough cravings for breakthrough cravings  pantoprazole 40 mg oral delayed release tablet: 1 tab(s) orally once a day (before a meal)  spironolactone 25 mg oral tablet: 4 tab(s) orally 2 times a day  thiamine 100 mg oral tablet: 1 tab(s) orally once a day

## 2024-12-01 NOTE — DISCHARGE NOTE PROVIDER - NSDCFUADDAPPT_GEN_ALL_CORE_FT
Repeat upper endoscopy in 4-6 weeks, Followup with hepatology in 1-2 weeks      APPTS ARE READY TO BE MADE: [X] YES    Best Family or Patient Contact (if needed):  Nigel Collier  [email or web address]  phone [phone]    Additional Information about above appointments (if needed):    Nigel Collier  [email or web address]  phone [phone]    1: Emailed LOLA to make appointment in medical clinic  for him with primary doctor in 1 week   2: Patient needs appointment with Hepatology in [facility]ology Clinic in 1-2 weeks to   Repeat upper endoscopy in 4-6 weeks      Other comments or requests:           Repeat upper endoscopy in 4-6 weeks, Followup with hepatology in 1-2 weeks      APPTS ARE READY TO BE MADE: [X] YES    Best Family or Patient Contact (if needed):  Nigel [email or web address]  phone [phone]    Additional Information about above appointments (if needed):    Nigel [email or web address]  phone [phone]    1: Emailed LOLA to make appointment in medical clinic  for him with primary doctor in 1 week   2: Patient needs appointment with Hepatology in Hepatology Clinic in 1-2 weeks to   Repeat upper endoscopy in 4-6 weeks      Other comments or requests:   Call Gastroenterology/Hepatology clinic  [phone] ([facility] at [address] [place] [address]) or [phone] ([facility] at [address] [place] [address]) or [phone] ([facility] at [address] [facility]).      Repeat upper endoscopy in 4-6 weeks, Followup with hepatology in 1-2 weeks      APPTS ARE READY TO BE MADE: [X] YES    Best Family or Patient Contact (if needed):  Nigel [email or web address]  phone [phone]    Additional Information about above appointments (if needed):    Nigel [email or web address]  phone [phone]    1: Emailed LOLA to make appointment in medical clinic  for him with primary doctor in 1 week   2: Patient needs appointment with Hepatology in [facility]atology Clinic in 1-2 weeks to   Repeat upper endoscopy in 4-6 weeks      Other comments or requests:   Call Gastroenterology/Hepatology clinic  [phone] ([facility] at [address] [place] [address]) or [phone] ([facility] at [address] [place] [address]) or [phone] ([facility] at [address] [facility]).      Repeat upper endoscopy in 4-6 weeks, Followup with hepatology in 1-2 weeks     Patient was outreached but did not answer. A voicemail was left for the patient to return our call.

## 2024-12-01 NOTE — DISCHARGE NOTE PROVIDER - NSDCCPCAREPLAN_GEN_ALL_CORE_FT
PRINCIPAL DISCHARGE DIAGNOSIS  Diagnosis: Cirrhosis  Assessment and Plan of Treatment: You came to the hospital with abdominal pain and distention. You were found to have decompensated cirrhosis and were evaluated by hepatology. Your medications were adjusted and you had a paracentesis. You also had an EGD that showed varices. You will need a repeat EGD in 4-6 weeks. It is important that you take your medications as prescribed and follow with your doctors after discharge.     PRINCIPAL DISCHARGE DIAGNOSIS  Diagnosis: Cirrhosis  Assessment and Plan of Treatment: You came to the hospital with abdominal pain and distention. You were found to have decompensated cirrhosis and were evaluated by hepatology. Your medications were adjusted and you had two paracentesis done to remove massive ascites fluid. You also had an EGD that showed varices. You will need a repeat EGD in 4-6 weeks. It is important that you take your medications as prescribed and follow with your hepatology doctors after discharge.

## 2024-12-01 NOTE — DISCHARGE NOTE PROVIDER - HOSPITAL COURSE
Pt is a 50 yo M with PMH GERD, fTUD, prior ETOH use d/o, cirrhosis (ascites), esophageal varices, and alcoholic hepatitis p/w abd pain and distention, difficulty performing ADLs, poor appetite/PO intake, nausea, weakness, dark stool, and L>R LE edema. Ran out of meds x few months and has not f/u with outpt doctors. Found to have Hb 6.1 s/p 3U RBCs with Hb 8. Also with elevated bilirubin, direct predominance, and eelvated lipase. CXR benign, LE dopplers neg, and US abd with large volume ascites. Now s/p 72h protonix IV BID and octreotide gtt; on daily PPI. Hepatology following s/p EGD 11/26 with recently bleeding large esophageal varices s/p banding; now on regular diet. Procedure team performed para 11/27 with 18L output and s/p albumin. BP improving and now starting home meds - spironolactone, lasix, and coreg; uptitrating as able.        Nutritional Assessment:  · Nutritional Assessment	This patient has been assessed with a concern for Malnutrition and has been determined to have a diagnosis/diagnoses of Severe protein-calorie malnutrition.    This patient is being managed with:   Diet Regular-  Entered: Nov 29 2024  6:12PM     Problem/Plan - 1:  ·  Problem: Bleeding esophageal varices.   ·  Plan: - pt p/w abd pain, distention, weakness, poor appetite, and dark stool; known hx cirrhosis with esophageal varices as below; out of home meds x months and has yet to f/u with outpt doctors  - Hb 6.7 on arrival, 6.1 on repeat --> s/p 3U RBC with repeat Hb 8  - hemodynamically stable  - suspected in setting of known esophageal varices with med non-compliance  - hold DVT ppx  - INR 1.81 --> given vitamin K x2  - BID IV protonix and octreotide dc'd  - c/w protonix 40mg PO daily   - BP improving -- start lasix 20mg PO daily; increase coreg to 3.125mg BID -- uptitrate as able  - hepatology following, appreciate recs  - s/p EGD 11/26 with recently bleeding large esophageal varices s/p banding  - c/w regular diet   - c/w spironolactone 25mg daily  - repeat EGD in 4-6wks  - if continued bleeding, will need c-scope.     Problem/Plan - 2:  ·  Problem: Decompensated cirrhosis.   ·  Plan: - pt with hx ETOH use d/o with cirrhosis and esophageal varices; last paracentesis 5/2024; on appropriate med regimen outpt but ran out of meds and has yet to f/u with outpt doctors x few months   - p/w abd pain and distention x1mo with poor appetite, difficulty with ADLs, and weakness   - exam with distended abd  - US abd with large volume ascites  - procedure team consulted for para -- s/p 18L out 11/27  - s/p albumin   - s/p CTX 2g daily for SBP treatment given symptoms  - start cipro 500mg daily 12/2 for long-term ppx  - lasix, coreg, and spironolactone as above  - hepatology following, appreciate recs.     Problem/Plan - 3:  ·  Problem: Lower extremity edema.   ·  Plan: - LLE>RLE edema  - LE dopplers neg  - suspect in setting of decompensated cirrhosis  - management as above.     Problem/Plan - 4:  ·  Problem: Hyperbilirubinemia.   ·  Plan: - T bili 3.7, direct predominance  - likely in setting of known cirrhosis   - US abd with cirrhosis and large volume ascites.     Problem/Plan - 5:  ·  Problem: Prolonged QT interval.   ·  Plan: - QTc 510 on arrival  - repeat QTc 473 11/27.     Problem/Plan - 6:  ·  Problem: GERD (gastroesophageal reflux disease).   ·  Plan: - protonix as above. Pt is a 48 yo M with PMH GERD, fTUD, prior ETOH use d/o, cirrhosis (ascites), esophageal varices, and alcoholic hepatitis p/w abd pain and distention, difficulty performing ADLs, poor appetite/PO intake, nausea, weakness, dark stool, and L>R LE edema. Ran out of meds x few months and has not f/u with outpt doctors. Found to have Hb 6.1 s/p 3U RBCs with Hb 8. Also with elevated bilirubin, direct predominance, and eelvated lipase. CXR benign, LE dopplers neg, and US abd with large volume ascites. Now s/p 72h protonix IV BID and octreotide gtt; on daily PPI. Hepatology following s/p EGD 11/26 with recently bleeding large esophageal varices s/p banding; now on regular diet. Procedure team performed para 11/27 with 18L output and s/p albumin. BP improving and now starting home meds - spironolactone, lasix, and coreg; uptitrating as able.        Nutritional Assessment:  · Nutritional Assessment	This patient has been assessed with a concern for Malnutrition and has been determined to have a diagnosis/diagnoses of Severe protein-calorie malnutrition.    This patient is being managed with:   Diet Regular-  Entered: Nov 29 2024  6:12PM     Problem/Plan - 1:  ·  Problem: Bleeding esophageal varices.   ·  Plan: - pt p/w abd pain, distention, weakness, poor appetite, and dark stool; known hx cirrhosis with esophageal varices as below; out of home meds x months and has yet to f/u with outpt doctors  - Hb 6.7 on arrival, 6.1 on repeat --> s/p 3U RBC with repeat Hb 8  - hemodynamically stable  - suspected in setting of known esophageal varices with med non-compliance  - hold DVT ppx  - INR 1.81 --> given vitamin K x2  - BID IV protonix and octreotide dc'd  - c/w protonix 40mg PO daily   - BP improving -- start lasix 20mg PO daily; increase coreg to 3.125mg BID -- uptitrate as able  - hepatology following, appreciate recs  - s/p EGD 11/26 with recently bleeding large esophageal varices s/p banding  - c/w regular diet   - c/w spironolactone 25mg daily  - repeat EGD in 4-6wks  - if continued bleeding, will need c-scope.     Problem/Plan - 2:  ·  Problem: Decompensated cirrhosis.   ·  Plan: - pt with hx ETOH use d/o with cirrhosis and esophageal varices; last paracentesis 5/2024; on appropriate med regimen outpt but ran out of meds and has yet to f/u with outpt doctors x few months   - p/w abd pain and distention x1mo with poor appetite, difficulty with ADLs, and weakness   - exam with distended abd  - US abd with large volume ascites  - procedure team consulted for para -- s/p 18L out 11/27  - s/p albumin   - s/p CTX 2g daily for SBP treatment given symptoms  - start cipro 500mg daily 12/2 for long-term ppx  - lasix, coreg, and spironolactone as above  - hepatology following, appreciate recs.     Problem/Plan - 3:  ·  Problem: Lower extremity edema.   ·  Plan: - LLE>RLE edema  - LE dopplers neg  - suspect in setting of decompensated cirrhosis  - management as above.     Problem/Plan - 4:  ·  Problem: Hyperbilirubinemia.   ·  Plan: - T bili 3.7, direct predominance  - likely in setting of known cirrhosis   - US abd with cirrhosis and large volume ascites.     Problem/Plan - 5:  ·  Problem: Prolonged QT interval.   ·  Plan: - QTc 510 on arrival  - repeat QTc 473 11/27.     Problem/Plan - 6:  ·  Problem: GERD (gastroesophageal reflux disease).   ·  Plan: - protonix as above.    On 12/5/2024 case was discussed with Erma Irving,  patient is medically cleared and optimized for discharge today. All medications were reviewed with attending, and sent to mutually agreed upon pharmacy.   Pt is a 48 yo M with PMH GERD, fTUD, prior ETOH use d/o, cirrhosis (ascites), esophageal varices, and alcoholic hepatitis p/w abd pain and distention, difficulty performing ADLs, poor appetite/PO intake, nausea, weakness, dark stool, and L>R LE edema. Ran out of meds x few months and has not f/u with outpt doctors. Found to have Hb 6.1 s/p 3U RBCs with Hb 8. Also with elevated bilirubin, direct predominance, and eelvated lipase. CXR benign, LE dopplers neg, and US abd with large volume ascites. Now s/p 72h protonix IV BID and octreotide gtt; on daily PPI. Hepatology following s/p EGD 11/26 with recently bleeding large esophageal varices s/p banding; now on regular diet. Procedure team performed para 11/27 with 18L output and s/p albumin.     Problem/Plan - 1:  ·  Problem: Bleeding esophageal varices.   ·  Plan: - pt p/w abd pain, distention, weakness, poor appetite, and dark stool; known hx cirrhosis with esophageal varices as below; out of home meds x months and has yet to f/u with outpt doctors  - Hb 6.7 on arrival, 6.1 on repeat --> s/p 3U RBC with repeat Hb 8  - hemodynamically stable  - suspected in setting of known esophageal varices with med non-compliance  - hold DVT ppx  - INR 1.81 --> given vitamin K x2  - BID IV protonix and octreotide dc'd  - c/w protonix 40mg PO daily   - c/w coreg 3.125 mg bid for portal HTN, diuretic increased to lasix 40 mg bid and aldactone 100 mg bid   - hepatology following, appreciate recs  - s/p EGD 11/26 with recently bleeding large esophageal varices s/p banding, repeat EGD in 4-6 wks  - c/w regular low salt diet   - if continued bleeding, will need c-scope.     Problem/Plan - 2:  ·  Problem: Decompensated cirrhosis.   ·  Plan: - pt with hx ETOH use d/o with cirrhosis and esophageal varices; last paracentesis 5/2024; on appropriate med regimen outpt but ran out of meds and has yet to f/u with outpt doctors x few months   - p/w abd pain and distention x1mo with poor appetite, difficulty with ADLs, and weakness   - exam with distended abd  - US abd with large volume ascites  - procedure team consulted for paracentesis -- s/p LVP with 18L out 11/27  -  repeat LVP 12/3 , 17.5L removed   - c/w lasix 40 bid and aldactone 100 bid  - given albumin post tap  - s/p CTX 2g daily for SBP treatment given symptoms, now on Cipro 500mg qd for long term SBP ppx  - lasix, coreg, and spironolactone as above  - hepatology following, appreciate recs, no plan for peritoneal pleurx catheter at this time   AFP<1.8,   Multiphase CT abd/pelvis (12/4) No evidence of HCC.  Cirrhosis with large volume ascites and stigmata of portal hypertension.  F/up hepatology to assist with outpt f/up.     Problem/Plan - 3:  ·  Problem: Lower extremity edema.   ·  Plan: - LLE>RLE edema  - LE dopplers neg  - suspect in setting of decompensated cirrhosis  - management as above.     Problem/Plan - 4:  ·  Problem: Hyperbilirubinemia.   ·  Plan: - T bili 3.7, direct predominance  - likely in setting of known cirrhosis   - US abd with cirrhosis and large volume ascites.      On 12/5/2024 case was discussed with Erma Joshi,  patient is medically cleared and optimized for discharge today. All medications were reviewed with attending, and sent to mutually agreed upon pharmacy.

## 2024-12-01 NOTE — PROGRESS NOTE ADULT - PROBLEM SELECTOR PLAN 2
- pt with hx ETOH use d/o with cirrhosis and esophageal varices; last paracentesis 5/2024; on appropriate med regimen outpt but ran out of meds and has yet to f/u with outpt doctors x few months   - p/w abd pain and distention x1mo with poor appetite, difficulty with ADLs, and weakness   - exam with distended abd  - US abd with large volume ascites  - procedure team consulted for para -- s/p 18L out 11/27  - s/p albumin   - s/p CTX 2g daily for SBP treatment given symptoms  - start cipro 500mg daily 12/2 for long-term ppx  - lasix, coreg, and spironolactone as above  - hepatology following, appreciate recs

## 2024-12-01 NOTE — PROGRESS NOTE ADULT - SUBJECTIVE AND OBJECTIVE BOX
[facility] Department of Hospital Medicine  Mónica Chamorro DO  Available on MS Teams  Pager: [phone]     (26 Nov 2024 17:03)    Subjective:  Pt seen and examined at bedside resting comfortably. Denies concerns/complaints.    Vital Signs Last 24 Hrs  T(C): 36.9 (01 Dec 2024 06:30), Max: 36.9 (01 Dec 2024 06:30)  T(F): 98.4 (01 Dec 2024 06:30), Max: 98.4 (01 Dec 2024 06:30)  HR: 90 (01 Dec 2024 06:30) (88 - 91)  BP: 111/70 (01 Dec 2024 06:30) (101/64 - 111/70)  BP(mean): --  RR: 18 (01 Dec 2024 06:30) (18 - 18)  SpO2: 95% (01 Dec 2024 06:30) (95% - 98%)    Parameters below as of 01 Dec 2024 06:30  Patient On (Oxygen Delivery Method): room air    PHYSICAL EXAM:  Constitutional: resting comfortably in bed; NAD; irritable   Head: NC/AT  Eyes: PERRL, EOMI, anicteric sclera  ENT: no nasal discharge; MMM  Neck: supple; no JVD  Respiratory: CTA B/L; no W/R/R; comfortable on RA  Cardiac: +S1/S2; RRR; no M/R/G  Gastrointestinal: firm, distended; no rebound or guarding; +BSx4  Extremities: WWP, no clubbing or cyanosis; no peripheral edema  Musculoskeletal: moves extremities spontaneously   Vascular: 2+ radial, DP/PT pulses B/L  Dermatologic: skin warm, dry and intact; no rashes, wounds, or scars  Neurologic: AOx3; no focal deficits  Psychiatric: calm    MEDICATIONS  (STANDING):  carvedilol 3.125 milliGRAM(s) Oral every 12 hours  chlorhexidine 2% Cloths 1 Application(s) Topical daily  furosemide    Tablet 20 milliGRAM(s) Oral daily  magnesium oxide 400 milliGRAM(s) Oral three times a day with meals  magnesium sulfate  IVPB 2 Gram(s) IV Intermittent once  melatonin 6 milliGRAM(s) Oral at bedtime  pantoprazole    Tablet 40 milliGRAM(s) Oral before breakfast  spironolactone 25 milliGRAM(s) Oral daily  thiamine 100 milliGRAM(s) Oral daily    MEDICATIONS  (PRN):  acetaminophen     Tablet .. 650 milliGRAM(s) Oral every 6 hours PRN Temp greater or equal to 38C (100.4F), Mild Pain (1 - 3)  cyclobenzaprine 10 milliGRAM(s) Oral three times a day PRN Muscle Spasm  nicotine  Polacrilex Gum 2 milliGRAM(s) Oral every 2 hours PRN breakthrough cravings  ondansetron Injectable 4 milliGRAM(s) IV Push every 8 hours PRN Nausea and/or Vomiting    LABS:                        8.5    7.40  )-----------( 118      ( 01 Dec 2024 04:56 )             27.4     12-01    136  |  102  |  15  ----------------------------<  95  4.0   |  24  |  0.74    Ca    7.7[L]      01 Dec 2024 04:56  Phos  2.7     12-01  Mg     1.80     12-01        Urinalysis Basic - ( 01 Dec 2024 04:56 )    Color: x / Appearance: x / SG: x / pH: x  Gluc: 95 mg/dL / Ketone: x  / Bili: x / Urobili: x   Blood: x / Protein: x / Nitrite: x   Leuk Esterase: x / RBC: x / WBC x   Sq Epi: x / Non Sq Epi: x / Bacteria: x      CAPILLARY BLOOD GLUCOSE          RADIOLOGY & ADDITIONAL TESTS: Reviewed.

## 2024-12-02 LAB
CULTURE RESULTS: SIGNIFICANT CHANGE UP
PETH 16:0/18:1: NEGATIVE NG/ML — SIGNIFICANT CHANGE UP
PETH 16:0/18:2: NEGATIVE NG/ML — SIGNIFICANT CHANGE UP
PETH COMMENTS: SIGNIFICANT CHANGE UP
SPECIMEN SOURCE: SIGNIFICANT CHANGE UP

## 2024-12-02 PROCEDURE — 99232 SBSQ HOSP IP/OBS MODERATE 35: CPT | Mod: GC

## 2024-12-02 PROCEDURE — 99232 SBSQ HOSP IP/OBS MODERATE 35: CPT

## 2024-12-02 RX ORDER — SPIRONOLACTONE 25 MG
50 TABLET ORAL DAILY
Refills: 0 | Status: DISCONTINUED | OUTPATIENT
Start: 2024-12-02 | End: 2024-12-02

## 2024-12-02 RX ORDER — SPIRONOLACTONE 25 MG
100 TABLET ORAL DAILY
Refills: 0 | Status: DISCONTINUED | OUTPATIENT
Start: 2024-12-03 | End: 2024-12-03

## 2024-12-02 RX ORDER — FUROSEMIDE 40 MG/1
40 TABLET ORAL DAILY
Refills: 0 | Status: DISCONTINUED | OUTPATIENT
Start: 2024-12-02 | End: 2024-12-03

## 2024-12-02 RX ADMIN — Medication 10 MILLIGRAM(S): at 05:18

## 2024-12-02 RX ADMIN — ACETAMINOPHEN 500MG 650 MILLIGRAM(S): 500 TABLET, COATED ORAL at 21:39

## 2024-12-02 RX ADMIN — ACETAMINOPHEN, DIPHENHYDRAMINE HCL, PHENYLEPHRINE HCL 6 MILLIGRAM(S): 325; 25; 5 TABLET ORAL at 21:41

## 2024-12-02 RX ADMIN — Medication 25 MILLIGRAM(S): at 05:18

## 2024-12-02 RX ADMIN — Medication 500 MILLIGRAM(S): at 13:38

## 2024-12-02 RX ADMIN — CARVEDILOL 3.12 MILLIGRAM(S): 25 TABLET, FILM COATED ORAL at 05:18

## 2024-12-02 RX ADMIN — Medication 10 MILLIGRAM(S): at 21:40

## 2024-12-02 RX ADMIN — Medication 100 MILLIGRAM(S): at 13:38

## 2024-12-02 RX ADMIN — FUROSEMIDE 20 MILLIGRAM(S): 40 TABLET ORAL at 05:18

## 2024-12-02 RX ADMIN — Medication 10 MILLIGRAM(S): at 13:38

## 2024-12-02 RX ADMIN — CHLORHEXIDINE GLUCONATE 1 APPLICATION(S): 1.2 RINSE ORAL at 13:39

## 2024-12-02 RX ADMIN — ACETAMINOPHEN 500MG 650 MILLIGRAM(S): 500 TABLET, COATED ORAL at 22:09

## 2024-12-02 RX ADMIN — PANTOPRAZOLE SODIUM 40 MILLIGRAM(S): 40 TABLET, DELAYED RELEASE ORAL at 05:19

## 2024-12-02 RX ADMIN — CARVEDILOL 3.12 MILLIGRAM(S): 25 TABLET, FILM COATED ORAL at 18:26

## 2024-12-02 NOTE — PROGRESS NOTE ADULT - NUTRITIONAL ASSESSMENT
This patient has been assessed with a concern for Malnutrition and has been determined to have a diagnosis/diagnoses of Severe protein-calorie malnutrition.    This patient is being managed with:   Diet Regular-  Low Sodium  Entered: Dec  2 2024  1:57PM

## 2024-12-02 NOTE — PROGRESS NOTE ADULT - PROBLEM SELECTOR PLAN 2
- pt with hx ETOH use d/o with cirrhosis and esophageal varices; last paracentesis 5/2024; on appropriate med regimen outpt but ran out of meds and has yet to f/u with outpt doctors x few months   - p/w abd pain and distention x1mo with poor appetite, difficulty with ADLs, and weakness   - exam with distended abd  - US abd with large volume ascites  - procedure team consulted for paracentesis -- s/p 18L out 11/27  - s/p albumin   - reaccumulating ascites, reconsult IPT for repeat paracentesis  - s/p CTX 2g daily for SBP treatment given symptoms  - started cipro 500mg 12/2 for long term SBP ppx  - lasix, coreg, and spironolactone as above  - hepatology following, appreciate recs, check AFP and multiphase CT abd/pelvis vs. liver protocol MRI. CT ordered.

## 2024-12-02 NOTE — PROGRESS NOTE ADULT - ASSESSMENT
49M with a PMHx ETOH use disorder, cirrhosis c/b ascites, esophogeal varices seen on May 2024 EGD, alcoholic hepatitis, GERD presenting to [facility] from [facility] for urgent EGD iso suspected variceal bleed. S/p EGD on 11/26 with banding but no active bleeding.     #GI bleed  #Decompensated alcoholic cirrhosis   #Ascites  #Varices s/p banding     MELD 3.0=21  HE: no evidence of encephalopathy   Ascites: s/p paracentesis (18L removed) on 11/27, Will start diuretics when BP can tolerate   HCC screening: will require imaging   HRS: Creatinine at baseline    Recommendations:  -please repeat paracentesis, patient with tense distended albumin  -increase Lasix and Spironolactone as tolerated (ratio 40/100)    -low salt diet, nutrition consult for patient education   - c/w carvedilol BID  - Repeat upper endoscopy in 4-6 weeks to evaluate the response to therapy.  - Maintain 2 IVs, active T&S  - Obtain AFP and multiphase CT vs liver protocol MRKaris Noted significant weight loss.   - Nutritional optimization, PT    Note incomplete until finalized by attending signature/attestation.    Romana Argueta  GI/Hepatology Fellow    MONDAY-FRIDAY 8AM-5PM:  Pager# [phone] ([facility]) or [phone] ([facility])    NON-URGENT CONSULTS:  Please email [email or web address]  AT NIGHT AND ON WEEKENDS:  Contact on-call GI fellow from 5pm-8am and on weekends/holidays   49M with a PMHx ETOH use disorder, cirrhosis c/b ascites, esophogeal varices seen on May 2024 EGD, alcoholic hepatitis, GERD presenting to [facility] from [facility] for urgent EGD iso suspected variceal bleed. S/p EGD on 11/26 with banding but no active bleeding.     #GI bleed  #Decompensated alcoholic cirrhosis   #Ascites  #Varices s/p banding     MELD 3.0=21  HE: no evidence of encephalopathy   Ascites: s/p paracentesis (18L removed) on 11/27, Will start diuretics when BP can tolerate   HCC screening: will require imaging   HRS: Creatinine at baseline    Recommendations:  -please repeat paracentesis, patient with tense distended albumin  -increase Lasix and Spironolactone as tolerated (ratio 40/100)  - hold on day of repeat inpatient paracentesis   -low salt diet, nutrition consult for patient education   - c/w carvedilol BID  - Repeat upper endoscopy in 4-6 weeks to evaluate the response to therapy.  - Maintain 2 IVs, active T&S  - Obtain AFP and multiphase CT vs liver protocol  Noted significant weight loss.   - Nutritional optimization, PT    Note incomplete until finalized by attending signature/attestation.    Romana Argueta  GI/Hepatology Fellow    MONDAY-FRIDAY 8AM-5PM:  Pager# [phone] ([facility]) or [phone] ([facility])    NON-URGENT CONSULTS:  Please email [email or web address]  AT NIGHT AND ON WEEKENDS:  Contact on-call GI fellow from 5pm-8am and on weekends/holidays

## 2024-12-02 NOTE — PROGRESS NOTE ADULT - SUBJECTIVE AND OBJECTIVE BOX
Chief Complaint:  Patient is a 49y old  Male who presents with a chief complaint of decompensated cirrhosis , gi bleed (01 Dec 2024 18:22)      Interval Events:   -abdomen with worsening distension, started on low dose diuretics this weekend     Allergies:  No Known Allergies      Hospital Medications:  acetaminophen     Tablet .. 650 milliGRAM(s) Oral every 6 hours PRN  carvedilol 3.125 milliGRAM(s) Oral every 12 hours  chlorhexidine 2% Cloths 1 Application(s) Topical daily  ciprofloxacin     Tablet 500 milliGRAM(s) Oral daily  cyclobenzaprine 10 milliGRAM(s) Oral three times a day PRN  furosemide    Tablet 40 milliGRAM(s) Oral daily  magnesium sulfate  IVPB 2 Gram(s) IV Intermittent once  melatonin 6 milliGRAM(s) Oral at bedtime  nicotine  Polacrilex Gum 2 milliGRAM(s) Oral every 2 hours PRN  ondansetron Injectable 4 milliGRAM(s) IV Push every 8 hours PRN  pantoprazole    Tablet 40 milliGRAM(s) Oral before breakfast  spironolactone 50 milliGRAM(s) Oral daily  thiamine 100 milliGRAM(s) Oral daily        PHYSICAL EXAM:   Vital Signs:  Vital Signs Last 24 Hrs  T(C): 36.7 (02 Dec 2024 10:20), Max: 37.2 (01 Dec 2024 22:34)  T(F): 98 (02 Dec 2024 10:20), Max: 98.9 (01 Dec 2024 22:34)  HR: 89 (02 Dec 2024 10:20) (85 - 101)  BP: 100/57 (02 Dec 2024 10:20) (100/57 - 127/79)  BP(mean): --  RR: 18 (02 Dec 2024 10:20) (17 - 19)  SpO2: 97% (02 Dec 2024 10:20) (95% - 97%)    Parameters below as of 02 Dec 2024 10:20  Patient On (Oxygen Delivery Method): room air      Daily     Daily     GENERAL:  No acute distress  HEENT:  NCAT, no scleral icterus  CHEST: no resp distress  HEART:  RRR  ABDOMEN:  Soft, distended, mildly TTP  EXTREMITIES:  No cyanosis, clubbing, or edema  SKIN:  No rash/erythema/ecchymoses/petechiae/wounds/abscess/warm/dry  NEURO:  Alert and oriented x 3, no asterixis, no tremor    LABS:                        8.5    7.40  )-----------( 118      ( 01 Dec 2024 04:56 )             27.4       12-01    136  |  102  |  15  ----------------------------<  95  4.0   |  24  |  0.74    Ca    7.7[L]      01 Dec 2024 04:56  Phos  2.7     12-01  Mg     1.80     12-01          Urinalysis Basic - ( 01 Dec 2024 04:56 )    Color: x / Appearance: x / SG: x / pH: x  Gluc: 95 mg/dL / Ketone: x  / Bili: x / Urobili: x   Blood: x / Protein: x / Nitrite: x   Leuk Esterase: x / RBC: x / WBC x   Sq Epi: x / Non Sq Epi: x / Bacteria: x            Imaging:

## 2024-12-02 NOTE — PROGRESS NOTE ADULT - ATTENDING COMMENTS
49M, AUD, h/o alcoholic hepatitis, alcohol-related cirrhosis, esophageal varices 5/2024, GERD, init. admitted to [facility] with melena, found acute blood loss anemia, Hb 6.1 g/dL, transferred to [facility] regular hospital bed for EGD, done 11/26: large varices, 6 bands placed, then LVP 18L on 11/27, then low BP, then >100 since 11/30, then 12/01 Lasix/patrick 20/25.    - alcoholic cirrhosis with ascites, adm. variceal bleed, MELD 15  - EV bleed, EGD 11/26: large EV, 6 bands  - ascites on diuretics at home, large again with diffusely tender abdomen; s/p para 11/27 18L, very little albumin given  - HE: -  - cachectic    - Lasix 40 bid, spironolactone 100 bid today, then hold and LVP tomorrow - remove all ascites, give albumin 6-8 g/L removed ascites; decide on diuretics tomorrow depending on clinical course

## 2024-12-02 NOTE — PROGRESS NOTE ADULT - SUBJECTIVE AND OBJECTIVE BOX
Dr. Erma Slater  Pager [phone]    PROGRESS NOTE:     Patient is a 49y old  Male who presents with a chief complaint of decompensated cirrhosis , gi bleed (02 Dec 2024 12:58)      SUBJECTIVE / OVERNIGHT EVENTS: reports increasing abdominal girth again,  he lives in [place] and hasn't followed up  ADDITIONAL REVIEW OF SYSTEMS: afebrile, no abd pain    MEDICATIONS  (STANDING):  carvedilol 3.125 milliGRAM(s) Oral every 12 hours  chlorhexidine 2% Cloths 1 Application(s) Topical daily  ciprofloxacin     Tablet 500 milliGRAM(s) Oral daily  furosemide    Tablet 40 milliGRAM(s) Oral daily  magnesium sulfate  IVPB 2 Gram(s) IV Intermittent once  melatonin 6 milliGRAM(s) Oral at bedtime  pantoprazole    Tablet 40 milliGRAM(s) Oral before breakfast  thiamine 100 milliGRAM(s) Oral daily    MEDICATIONS  (PRN):  acetaminophen     Tablet .. 650 milliGRAM(s) Oral every 6 hours PRN Temp greater or equal to 38C (100.4F), Mild Pain (1 - 3)  cyclobenzaprine 10 milliGRAM(s) Oral three times a day PRN Muscle Spasm  nicotine  Polacrilex Gum 2 milliGRAM(s) Oral every 2 hours PRN breakthrough cravings  ondansetron Injectable 4 milliGRAM(s) IV Push every 8 hours PRN Nausea and/or Vomiting      CAPILLARY BLOOD GLUCOSE        I&O's Summary    01 Dec 2024 07:01  -  02 Dec 2024 07:00  --------------------------------------------------------  IN: 1110 mL / OUT: 0 mL / NET: 1110 mL    02 Dec 2024 07:01  -  02 Dec 2024 14:55  --------------------------------------------------------  IN: 300 mL / OUT: 0 mL / NET: 300 mL        PHYSICAL EXAM:  Vital Signs Last 24 Hrs  T(C): 36.7 (02 Dec 2024 10:20), Max: 37.2 (01 Dec 2024 22:34)  T(F): 98 (02 Dec 2024 10:20), Max: 98.9 (01 Dec 2024 22:34)  HR: 89 (02 Dec 2024 10:20) (85 - 101)  BP: 100/57 (02 Dec 2024 10:20) (100/57 - 127/79)  BP(mean): --  RR: 18 (02 Dec 2024 10:20) (17 - 19)  SpO2: 97% (02 Dec 2024 10:20) (95% - 97%)    Parameters below as of 02 Dec 2024 10:20  Patient On (Oxygen Delivery Method): room air      CONSTITUTIONAL: nad, cachectic   RESPIRATORY: Normal respiratory effort; lungs are clear to auscultation bilaterally  CARDIOVASCULAR: Regular rate and rhythm, normal S1 and S2, no murmur/rub/gallop; No lower extremity edema; Peripheral pulses are 2+ bilaterally  ABDOMEN: soft, distended, no rebound/guarding;   MUSCULOSKELETAL: no clubbing or cyanosis of digits; no joint swelling or tenderness to palpation  PSYCH: A+O to person, place, and time; affect appropriate    LABS:                        8.5    7.40  )-----------( 118      ( 01 Dec 2024 04:56 )             27.4     12-01    136  |  102  |  15  ----------------------------<  95  4.0   |  24  |  0.74    Ca    7.7[L]      01 Dec 2024 04:56  Phos  2.7     12-01  Mg     1.80     12-01    Urinalysis Basic - ( 01 Dec 2024 04:56 )    Color: x / Appearance: x / SG: x / pH: x  Gluc: 95 mg/dL / Ketone: x  / Bili: x / Urobili: x   Blood: x / Protein: x / Nitrite: x   Leuk Esterase: x / RBC: x / WBC x   Sq Epi: x / Non Sq Epi: x / Bacteria: x      RADIOLOGY & ADDITIONAL TESTS:  Results Reviewed:   Imaging Personally Reviewed:  < from: US Abdomen Doppler (11.26.24 @ 11:30) >  IMPRESSION:  Cirrhotic morphology of the liver.  Duplex Doppler evaluation was limited but unremarkable.  Large amount of ascites throughout the abdomen      Electrocardiogram Personally Reviewed:    COORDINATION OF CARE:  Care Discussed with Consultants/Other Providers [Y/N]:  Prior or Outpatient Records Reviewed [Y/N]:

## 2024-12-03 LAB
AFP-TM SERPL-MCNC: <1.8 NG/ML — SIGNIFICANT CHANGE UP
ALBUMIN SERPL ELPH-MCNC: 2.3 G/DL — LOW (ref 3.3–5)
ALP SERPL-CCNC: 87 U/L — SIGNIFICANT CHANGE UP (ref 40–120)
ALT FLD-CCNC: 13 U/L — SIGNIFICANT CHANGE UP (ref 4–41)
ANION GAP SERPL CALC-SCNC: 11 MMOL/L — SIGNIFICANT CHANGE UP (ref 7–14)
AST SERPL-CCNC: 33 U/L — SIGNIFICANT CHANGE UP (ref 4–40)
BILIRUB SERPL-MCNC: 1.6 MG/DL — HIGH (ref 0.2–1.2)
BUN SERPL-MCNC: 18 MG/DL — SIGNIFICANT CHANGE UP (ref 7–23)
CALCIUM SERPL-MCNC: 7.9 MG/DL — LOW (ref 8.4–10.5)
CHLORIDE SERPL-SCNC: 103 MMOL/L — SIGNIFICANT CHANGE UP (ref 98–107)
CO2 SERPL-SCNC: 23 MMOL/L — SIGNIFICANT CHANGE UP (ref 22–31)
CREAT SERPL-MCNC: 0.61 MG/DL — SIGNIFICANT CHANGE UP (ref 0.5–1.3)
CULTURE RESULTS: SIGNIFICANT CHANGE UP
CULTURE RESULTS: SIGNIFICANT CHANGE UP
EGFR: 118 ML/MIN/1.73M2 — SIGNIFICANT CHANGE UP
GLUCOSE SERPL-MCNC: 71 MG/DL — SIGNIFICANT CHANGE UP (ref 70–99)
HCT VFR BLD CALC: 25.4 % — LOW (ref 39–50)
HGB BLD-MCNC: 8.1 G/DL — LOW (ref 13–17)
INR BLD: 1.58 RATIO — HIGH (ref 0.85–1.16)
MAGNESIUM SERPL-MCNC: 1.6 MG/DL — SIGNIFICANT CHANGE UP (ref 1.6–2.6)
MCHC RBC-ENTMCNC: 29.9 PG — SIGNIFICANT CHANGE UP (ref 27–34)
MCHC RBC-ENTMCNC: 31.9 G/DL — LOW (ref 32–36)
MCV RBC AUTO: 93.7 FL — SIGNIFICANT CHANGE UP (ref 80–100)
NRBC # BLD: 0 /100 WBCS — SIGNIFICANT CHANGE UP (ref 0–0)
NRBC # FLD: 0 K/UL — SIGNIFICANT CHANGE UP (ref 0–0)
PHOSPHATE SERPL-MCNC: 2.5 MG/DL — SIGNIFICANT CHANGE UP (ref 2.5–4.5)
PLATELET # BLD AUTO: 106 K/UL — LOW (ref 150–400)
POTASSIUM SERPL-MCNC: 3.7 MMOL/L — SIGNIFICANT CHANGE UP (ref 3.5–5.3)
POTASSIUM SERPL-SCNC: 3.7 MMOL/L — SIGNIFICANT CHANGE UP (ref 3.5–5.3)
PROT SERPL-MCNC: 6.6 G/DL — SIGNIFICANT CHANGE UP (ref 6–8.3)
PROTHROM AB SERPL-ACNC: 18.3 SEC — HIGH (ref 9.9–13.4)
RBC # BLD: 2.71 M/UL — LOW (ref 4.2–5.8)
RBC # FLD: 17.2 % — HIGH (ref 10.3–14.5)
SODIUM SERPL-SCNC: 137 MMOL/L — SIGNIFICANT CHANGE UP (ref 135–145)
SPECIMEN SOURCE: SIGNIFICANT CHANGE UP
SPECIMEN SOURCE: SIGNIFICANT CHANGE UP
WBC # BLD: 6.73 K/UL — SIGNIFICANT CHANGE UP (ref 3.8–10.5)
WBC # FLD AUTO: 6.73 K/UL — SIGNIFICANT CHANGE UP (ref 3.8–10.5)

## 2024-12-03 PROCEDURE — 49083 ABD PARACENTESIS W/IMAGING: CPT | Mod: GC,59

## 2024-12-03 PROCEDURE — 99233 SBSQ HOSP IP/OBS HIGH 50: CPT

## 2024-12-03 RX ORDER — SPIRONOLACTONE 25 MG
100 TABLET ORAL
Refills: 0 | Status: DISCONTINUED | OUTPATIENT
Start: 2024-12-03 | End: 2024-12-04

## 2024-12-03 RX ORDER — FUROSEMIDE 40 MG/1
40 TABLET ORAL
Refills: 0 | Status: DISCONTINUED | OUTPATIENT
Start: 2024-12-03 | End: 2024-12-05

## 2024-12-03 RX ADMIN — Medication 50 MILLILITER(S): at 15:54

## 2024-12-03 RX ADMIN — Medication 100 MILLIGRAM(S): at 11:05

## 2024-12-03 RX ADMIN — ACETAMINOPHEN, DIPHENHYDRAMINE HCL, PHENYLEPHRINE HCL 6 MILLIGRAM(S): 325; 25; 5 TABLET ORAL at 21:22

## 2024-12-03 RX ADMIN — CHLORHEXIDINE GLUCONATE 1 APPLICATION(S): 1.2 RINSE ORAL at 11:07

## 2024-12-03 RX ADMIN — ONDANSETRON HYDROCHLORIDE 4 MILLIGRAM(S): 4 TABLET, FILM COATED ORAL at 06:40

## 2024-12-03 RX ADMIN — Medication 10 MILLIGRAM(S): at 21:22

## 2024-12-03 RX ADMIN — Medication 50 MILLILITER(S): at 13:38

## 2024-12-03 RX ADMIN — ACETAMINOPHEN 500MG 650 MILLIGRAM(S): 500 TABLET, COATED ORAL at 21:52

## 2024-12-03 RX ADMIN — Medication 500 MILLIGRAM(S): at 11:05

## 2024-12-03 RX ADMIN — Medication 50 MILLILITER(S): at 20:15

## 2024-12-03 RX ADMIN — PANTOPRAZOLE SODIUM 40 MILLIGRAM(S): 40 TABLET, DELAYED RELEASE ORAL at 06:18

## 2024-12-03 RX ADMIN — Medication 10 MILLIGRAM(S): at 06:40

## 2024-12-03 RX ADMIN — Medication 50 MILLILITER(S): at 18:16

## 2024-12-03 RX ADMIN — CARVEDILOL 3.12 MILLIGRAM(S): 25 TABLET, FILM COATED ORAL at 06:17

## 2024-12-03 RX ADMIN — Medication 25 GRAM(S): at 22:14

## 2024-12-03 RX ADMIN — ACETAMINOPHEN 500MG 650 MILLIGRAM(S): 500 TABLET, COATED ORAL at 21:22

## 2024-12-03 NOTE — PROVIDER CONTACT NOTE (OTHER) - SITUATION
Pt blood pressure 96/50.
BP manual 90/58
Patient blood pressure is 96/50
Patient remained hypotensive post paracentesis done on bedside by provider

## 2024-12-03 NOTE — PROGRESS NOTE ADULT - SUBJECTIVE AND OBJECTIVE BOX
Dr. Erma Slater  Pager [phone]    PROGRESS NOTE:     Patient is a 49y old  Male who presents with a chief complaint of decompensated cirrhosis , gi bleed (02 Dec 2024 14:55)      SUBJECTIVE / OVERNIGHT EVENTS: pt with worsening abdominal distention   ADDITIONAL REVIEW OF SYSTEMS: afebrile , no abd pain    MEDICATIONS  (STANDING):  albumin human 25% IVPB 100 milliLiter(s) IV Intermittent once  carvedilol 3.125 milliGRAM(s) Oral every 12 hours  chlorhexidine 2% Cloths 1 Application(s) Topical daily  ciprofloxacin     Tablet 500 milliGRAM(s) Oral daily  furosemide    Tablet 40 milliGRAM(s) Oral two times a day  magnesium sulfate  IVPB 2 Gram(s) IV Intermittent once  melatonin 6 milliGRAM(s) Oral at bedtime  pantoprazole    Tablet 40 milliGRAM(s) Oral before breakfast  spironolactone 100 milliGRAM(s) Oral two times a day  thiamine 100 milliGRAM(s) Oral daily    MEDICATIONS  (PRN):  acetaminophen     Tablet .. 650 milliGRAM(s) Oral every 6 hours PRN Temp greater or equal to 38C (100.4F), Mild Pain (1 - 3)  cyclobenzaprine 10 milliGRAM(s) Oral three times a day PRN Muscle Spasm  nicotine  Polacrilex Gum 2 milliGRAM(s) Oral every 2 hours PRN breakthrough cravings  ondansetron Injectable 4 milliGRAM(s) IV Push every 8 hours PRN Nausea and/or Vomiting      CAPILLARY BLOOD GLUCOSE        I&O's Summary    02 Dec 2024 07:01  -  03 Dec 2024 07:00  --------------------------------------------------------  IN: 1000 mL / OUT: 0 mL / NET: 1000 mL    03 Dec 2024 07:01  -  03 Dec 2024 13:34  --------------------------------------------------------  IN: 250 mL / OUT: 0 mL / NET: 250 mL        PHYSICAL EXAM:  Vital Signs Last 24 Hrs  T(C): 36.7 (03 Dec 2024 09:57), Max: 36.8 (02 Dec 2024 23:21)  T(F): 98.1 (03 Dec 2024 09:57), Max: 98.2 (02 Dec 2024 23:21)  HR: 94 (03 Dec 2024 09:57) (85 - 94)  BP: 115/76 (03 Dec 2024 09:57) (112/72 - 118/70)  BP(mean): --  RR: 18 (03 Dec 2024 09:57) (17 - 19)  SpO2: 95% (03 Dec 2024 09:57) (95% - 100%)    Parameters below as of 03 Dec 2024 05:30  Patient On (Oxygen Delivery Method): room air      CONSTITUTIONAL: nad, cachectic   RESPIRATORY: Normal respiratory effort; lungs are clear to auscultation bilaterally  CARDIOVASCULAR: Regular rate and rhythm, normal S1 and S2, no murmur/rub/gallop; No lower extremity edema; Peripheral pulses are 2+ bilaterally  ABDOMEN: soft, distended, no rebound/guarding;   MUSCULOSKELETAL: no clubbing or cyanosis of digits; no joint swelling or tenderness to palpation  PSYCH: A+O to person, place, and time; affect appropriate    LABS:                        8.1    6.73  )-----------( 106      ( 03 Dec 2024 03:20 )             25.4     12-03    137  |  103  |  18  ----------------------------<  71  3.7   |  23  |  0.61    Ca    7.9[L]      03 Dec 2024 03:20  Phos  2.5     12-03  Mg     1.60     12-03    TPro  6.6  /  Alb  2.3[L]  /  TBili  1.6[H]  /  DBili  x   /  AST  33  /  ALT  13  /  AlkPhos  87  12-03    PT/INR - ( 03 Dec 2024 03:20 )   PT: 18.3 sec;   INR: 1.58 ratio               Urinalysis Basic - ( 03 Dec 2024 03:20 )    Color: x / Appearance: x / SG: x / pH: x  Gluc: 71 mg/dL / Ketone: x  / Bili: x / Urobili: x   Blood: x / Protein: x / Nitrite: x   Leuk Esterase: x / RBC: x / WBC x   Sq Epi: x / Non Sq Epi: x / Bacteria: x          RADIOLOGY & ADDITIONAL TESTS:  Results Reviewed:   Imaging Personally Reviewed:  Electrocardiogram Personally Reviewed:    COORDINATION OF CARE:  Care Discussed with Consultants/Other Providers [Y/N]:  Prior or Outpatient Records Reviewed [Y/N]:

## 2024-12-03 NOTE — PROVIDER CONTACT NOTE (OTHER) - ASSESSMENT
Pt A&Ox4, v/s stable except BP 96/50. denies chest pain, sob, dizziness, respiratory distress.
Pt A&Ox4, v/s stable except BP manual 90/58, denies chest pain, sob, dizziness, respiratory distress.
Patient is A&Ox4, verbalized feeling abdominal discomfort. No other complaint reported per patient.
Patient blood pressure is 96/50, T:98.6, P:94, R 10, SPO2:100. Asymptomatic

## 2024-12-03 NOTE — PROGRESS NOTE ADULT - ATTENDING COMMENTS
- resolved hepatic encephalopathy with resumption of lactulose, ID workup negative  - Hb drop, but no overt bleed and normal vital signs - may be dilutional. Pt. wants to go home    - diarrhea resolved, had 3 soft BM today on lactulose  - ID workup unrevealing    - ok to dsicharge home with lactulose, rifaximin. Importance of adherence to lactulose and rifaximin explained to the patient, including risk of fatal accidents in case of noncompliance  - f/u with Dr. Rangel within 1 week Pt. evaluated and discussed with the fellow, assessment and plan as above.

## 2024-12-03 NOTE — PROCEDURE NOTE - ADDITIONAL PROCEDURE DETAILS
CONSENT: Consent was obtained from the patient prior to the procedure. I explained at length the indications, risks, benefits and alternatives to treatment, as well as the right to refuse treatment. The patient was given an opportunity to ask questions, and concerns were addressed. (see separate Consent form in chart)  PROCEDURE SUMMARY: Ultrasound guidance was used, an appropriate fluid pocket was identified, and site was marked on patient.    ULTRASOUND IMAGES OF THE FLUID POCKET OBTAINED:  Selected site was not overlying appreciably distended veins, infected skin or scar tissue  A time-out was performed, verifying patient identification, procedure, site, and positioning.  An 18 gauge needle connected to a 60 cc syringe,   A standard paracentesis tray was used and an 8F catheter over needle, was introduced into the peritoneal space and fluid was removed into the collection system. A total of 17.5L was removed.    Typically 6-8gm of Albumin per 1L of ascites fluid removed is recommended by the [place] [employer] and the American Association of the Study of Liver Diseases in therapeutic paracenteses where >5L are removed. There are 25 grams of Albumin in 100mL of 25% IV Albumin solution. The evidence showing benefit of this regimen is based on studies performed in cirrhotic patients to prevent PRETTY and hyponatremia after the procedure. If your patient has ascites from an alternative diagnosis such as ESRD or Heart failure, and you feel that they are already volume overloaded at this time, you can use this recommendation with caution and decide to give albumin 25% for intravascular volume expansion on a case by case basis.

## 2024-12-03 NOTE — PROGRESS NOTE ADULT - ASSESSMENT
49M with a PMHx ETOH use disorder, cirrhosis c/b ascites, esophogeal varices seen on May 2024 EGD, alcoholic hepatitis, GERD presenting to [facility] from [facility] for urgent EGD iso suspected variceal bleed. S/p EGD on 11/26 with banding but no active bleeding.     #GI bleed  #Decompensated alcoholic cirrhosis   #Ascites  #Varices s/p banding     MELD 3.0=21  HE: no evidence of encephalopathy   Ascites: s/p paracentesis (18L removed) on 11/27 and 17L on 12/3  HCC screening: will require imaging. AFP WNL  HRS: Creatinine at baseline    Recommendations:  -restart Lasix and Spironolactone 12/4 if BP and electrolytes are OK  -low salt diet, nutrition consult for patient education   - c/w carvedilol BID  - Repeat upper endoscopy in 4-6 weeks to evaluate the response to therapy.  - Maintain 2 IVs, active T&S  -multiphase CT vs liver protocol  Noted significant weight loss.   - Nutritional optimization,     Note incomplete until finalized by attending signature/attestation.    Romana Argueta  GI/Hepatology Fellow    MONDAY-FRIDAY 8AM-5PM:  Pager# [phone] ([facility]) or [phone] ([facility])    NON-URGENT CONSULTS:  Please email [email or web address]  AT NIGHT AND ON WEEKENDS:  Contact on-call GI fellow from 5pm-8am and on weekends/holidays

## 2024-12-03 NOTE — CHART NOTE - NSCHARTNOTEFT_GEN_A_CORE
49 year old male, from home, ambulates with cane, PMH ETOH use disorder, cirrhosis c/b ascites, 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 Presenting with abdominal discomfort with worsening distention x1 month, pt had 11/27 paracentesis with 18 L of fluid removal and replaced with 100g of human albumin 25 % ( 6 g per 1 L of fluid removed). Today pt had diuretics on hold , procedure team performed paracentesis , , removed 17.7 L of fluid with replacing of 100g of human albumin 25 % ( 6 g per 1 L of fluid removed). Dressing on left upper quadrant dry, clean , abdomen , soft to palpation, diuretics, spironolactone and lasix to restart 12/4, VS q 4 hrs , pt has soft BP, asymptomatic , endorsed to night ACP to monitor BP. Pt I awaiting for CT abd/ pelvis  to evaluate for HCC.    ICU Vital Signs Last 24 Hrs  T(C): 37 (03 Dec 2024 18:00), Max: 37.1 (03 Dec 2024 13:41)  T(F): 98.6 (03 Dec 2024 18:00), Max: 98.8 (03 Dec 2024 13:41)  HR: 94 (03 Dec 2024 18:00) (85 - 94)  BP: 96/50 (03 Dec 2024 18:00) (96/50 - 118/70)  BP(mean): --  ABP: --  ABP(mean): --  RR: 18 (03 Dec 2024 18:00) (17 - 18)  SpO2: 100% (03 Dec 2024 18:00) (95% - 100%)    O2 Parameters below as of 03 Dec 2024 05:30  Patient On (Oxygen Delivery Method): room air    Lo Ronquillo NP-C  Medicine Department   [facility]   [phone]

## 2024-12-03 NOTE — PROVIDER CONTACT NOTE (OTHER) - REASON
BP manual 90/58
Hypotension
Low blood pressure
Patient remained hypotensive post paracentesis done on bedside by provider

## 2024-12-03 NOTE — PROGRESS NOTE ADULT - ASSESSMENT
Pt is a 48 yo M with PMH GERD, fTUD, prior ETOH use d/o, cirrhosis (ascites), esophageal varices, and alcoholic hepatitis p/w abd pain and distention, difficulty performing ADLs, poor appetite/PO intake, nausea, weakness, dark stool, and L>R LE edema. Ran out of meds x few months and has not f/u with outpt doctors. Found to have Hb 6.1 s/p 3U RBCs with Hb 8. Also with elevated bilirubin, direct predominance, and elevated lipase. CXR benign, LE dopplers neg, and US abd with large volume ascites. Now s/p 72h protonix IV BID and octreotide gtt; on daily PPI. Hepatology following s/p EGD 11/26 with recently bleeding large esophageal varices s/p banding; now on regular diet. Procedure team performed para 11/27 with 18L output and s/p albumin. BP improving and now starting home meds - spironolactone, lasix, and coreg; uptitrating as able.

## 2024-12-03 NOTE — PROVIDER CONTACT NOTE (OTHER) - BACKGROUND
49 year old male, PMH GERD, alcoholic hepatitis, cirrhosis, etoh abuse, presenting with ascites, suspected variceal bleed
Patient is 49y old admitted with the DX of anemia due to acute blood loss.
49 year old male, PMH GERD, alcoholic hepatitis, cirrhosis, etoh abuse, presenting with ascites, suspected variceal bleed
Patient is admitted for acute blood loss and asitics. Patient is s/p blood transfusion, EGD and paracentesis. PMH includes GERD, ETOH abuse, Cirrhosis and alcohol hepatitis.

## 2024-12-03 NOTE — CHART NOTE - NSCHARTNOTEFT_GEN_A_CORE
Brief Hepatology Note:    Patient okay to discharge home from hepatology standpoint after paracentesis.   Would discharge on furosemide 40/ spironolactone 100.   Will need cross sectional imaging for HCC screening, can occur as outpatient if patient otherwise ready for discharge.   Please ensure close hepatology follow up, hepatology team will schedule.       Romana Argueta  GI/Hepatology Fellow    MONDAY-FRIDAY 8AM-5PM:  Pager# [phone] ([facility]) or [phone] ([facility])    NON-URGENT CONSULTS:  Please email [email or web address]  AT NIGHT AND ON WEEKENDS:  Contact on-call GI fellow from 5pm-8am and on weekends/holidays

## 2024-12-03 NOTE — PROGRESS NOTE ADULT - PROBLEM SELECTOR PLAN 2
- pt with hx ETOH use d/o with cirrhosis and esophageal varices; last paracentesis 5/2024; on appropriate med regimen outpt but ran out of meds and has yet to f/u with outpt doctors x few months   - p/w abd pain and distention x1mo with poor appetite, difficulty with ADLs, and weakness   - exam with distended abd  - US abd with large volume ascites  - procedure team consulted for paracentesis -- s/p 18L out 11/27  - s/p repeat LVP 12/3 , 17.5L removed , will give albumin  - s/p albumin   - s/p CTX 2g daily for SBP treatment given symptoms  - started cipro 500mg 12/2 for long term SBP ppx  - lasix, coreg, and spironolactone as above  - hepatology following, appreciate recs, check AFP and multiphase CT abd/pelvis vs. liver protocol MRI. CT ordered. Pt will need close hepatology f/up on discharge. Apprec hepatology assistance in setting up appointment. - pt with hx ETOH use d/o with cirrhosis and esophageal varices; last paracentesis 5/2024; on appropriate med regimen outpt but ran out of meds and has yet to f/u with outpt doctors x few months   - p/w abd pain and distention x1mo with poor appetite, difficulty with ADLs, and weakness   - exam with distended abd  - US abd with large volume ascites  - procedure team consulted for paracentesis -- s/p 18L out 11/27  - s/p repeat LVP 12/3 , 17.5L removed , will give albumin 6g/L removed, d/w GI fellow  - s/p albumin   - s/p CTX 2g daily for SBP treatment given symptoms  - started cipro 500mg 12/2 for long term SBP ppx  - lasix, coreg, and spironolactone as above  - hepatology following, appreciate recs, check AFP and multiphase CT abd/pelvis vs. liver protocol MRI. CT ordered. Pt will need close hepatology f/up on discharge. Apprec hepatology assistance in setting up appointment.

## 2024-12-03 NOTE — PROCEDURE NOTE - NSPROCDETAILS_GEN_ALL_CORE
location identified, sterile technique used, catheter introduced, fluid drained/Seldinger technique/sterile dressing applied
location identified, sterile technique used, catheter introduced, fluid drained/sterile dressing applied

## 2024-12-03 NOTE — PROGRESS NOTE ADULT - SUBJECTIVE AND OBJECTIVE BOX
Chief Complaint:  Patient is a 49y old  Male who presents with a chief complaint of decompensated cirrhosis , gi bleed (03 Dec 2024 13:34)      Interval Events:   -paracentesis today, 17L removed    Allergies:  No Known Allergies      Hospital Medications:  acetaminophen     Tablet .. 650 milliGRAM(s) Oral every 6 hours PRN  carvedilol 3.125 milliGRAM(s) Oral every 12 hours  chlorhexidine 2% Cloths 1 Application(s) Topical daily  ciprofloxacin     Tablet 500 milliGRAM(s) Oral daily  cyclobenzaprine 10 milliGRAM(s) Oral three times a day PRN  furosemide    Tablet 40 milliGRAM(s) Oral two times a day  magnesium sulfate  IVPB 2 Gram(s) IV Intermittent once  melatonin 6 milliGRAM(s) Oral at bedtime  nicotine  Polacrilex Gum 2 milliGRAM(s) Oral every 2 hours PRN  ondansetron Injectable 4 milliGRAM(s) IV Push every 8 hours PRN  pantoprazole    Tablet 40 milliGRAM(s) Oral before breakfast  spironolactone 100 milliGRAM(s) Oral two times a day  thiamine 100 milliGRAM(s) Oral daily        PHYSICAL EXAM:   Vital Signs:  Vital Signs Last 24 Hrs  T(C): 37 (03 Dec 2024 18:00), Max: 37.1 (03 Dec 2024 13:41)  T(F): 98.6 (03 Dec 2024 18:00), Max: 98.8 (03 Dec 2024 13:41)  HR: 94 (03 Dec 2024 18:00) (85 - 94)  BP: 96/50 (03 Dec 2024 18:00) (96/50 - 118/70)  BP(mean): --  RR: 18 (03 Dec 2024 18:00) (17 - 18)  SpO2: 100% (03 Dec 2024 18:00) (95% - 100%)    Parameters below as of 03 Dec 2024 05:30  Patient On (Oxygen Delivery Method): room air      Daily     Daily     GENERAL:  No acute distress  HEENT:  NCAT, no scleral icterus  CHEST: no resp distress  HEART:  RRR  ABDOMEN:  Soft, non-tender, non-distended, normoactive bowel sounds, no masses, no hepato-splenomegaly, no signs of chronic liver disease  EXTREMITIES:  No cyanosis, clubbing, or edema  SKIN:  No rash/erythema/ecchymoses/petechiae/wounds/abscess/warm/dry  NEURO:  Alert and oriented x 3, no asterixis, no tremor    LABS:                        8.1    6.73  )-----------( 106      ( 03 Dec 2024 03:20 )             25.4     Mean Cell Volume: 93.7 fL (12-03-24 @ 03:20)    12-03    137  |  103  |  18  ----------------------------<  71  3.7   |  23  |  0.61    Ca    7.9[L]      03 Dec 2024 03:20  Phos  2.5     12-03  Mg     1.60     12-03    TPro  6.6  /  Alb  2.3[L]  /  TBili  1.6[H]  /  DBili  x   /  AST  33  /  ALT  13  /  AlkPhos  87  12-03    LIVER FUNCTIONS - ( 03 Dec 2024 03:20 )  Alb: 2.3 g/dL / Pro: 6.6 g/dL / ALK PHOS: 87 U/L / ALT: 13 U/L / AST: 33 U/L / GGT: x           PT/INR - ( 03 Dec 2024 03:20 )   PT: 18.3 sec;   INR: 1.58 ratio           Urinalysis Basic - ( 03 Dec 2024 03:20 )    Color: x / Appearance: x / SG: x / pH: x  Gluc: 71 mg/dL / Ketone: x  / Bili: x / Urobili: x   Blood: x / Protein: x / Nitrite: x   Leuk Esterase: x / RBC: x / WBC x   Sq Epi: x / Non Sq Epi: x / Bacteria: x            Imaging:

## 2024-12-04 PROCEDURE — 99233 SBSQ HOSP IP/OBS HIGH 50: CPT

## 2024-12-04 PROCEDURE — 74177 CT ABD & PELVIS W/CONTRAST: CPT | Mod: 26

## 2024-12-04 PROCEDURE — 99232 SBSQ HOSP IP/OBS MODERATE 35: CPT | Mod: GC

## 2024-12-04 RX ORDER — FAMOTIDINE 20 MG/1
40 TABLET, FILM COATED ORAL DAILY
Refills: 0 | Status: DISCONTINUED | OUTPATIENT
Start: 2024-12-04 | End: 2024-12-05

## 2024-12-04 RX ORDER — SPIRONOLACTONE 25 MG
100 TABLET ORAL
Refills: 0 | Status: DISCONTINUED | OUTPATIENT
Start: 2024-12-04 | End: 2024-12-05

## 2024-12-04 RX ADMIN — FAMOTIDINE 40 MILLIGRAM(S): 20 TABLET, FILM COATED ORAL at 22:00

## 2024-12-04 RX ADMIN — CHLORHEXIDINE GLUCONATE 1 APPLICATION(S): 1.2 RINSE ORAL at 12:44

## 2024-12-04 RX ADMIN — Medication 100 MILLIGRAM(S): at 12:43

## 2024-12-04 RX ADMIN — ACETAMINOPHEN 500MG 650 MILLIGRAM(S): 500 TABLET, COATED ORAL at 22:00

## 2024-12-04 RX ADMIN — PANTOPRAZOLE SODIUM 40 MILLIGRAM(S): 40 TABLET, DELAYED RELEASE ORAL at 08:03

## 2024-12-04 RX ADMIN — ACETAMINOPHEN 500MG 650 MILLIGRAM(S): 500 TABLET, COATED ORAL at 22:45

## 2024-12-04 RX ADMIN — Medication 10 MILLIGRAM(S): at 22:13

## 2024-12-04 RX ADMIN — ACETAMINOPHEN, DIPHENHYDRAMINE HCL, PHENYLEPHRINE HCL 6 MILLIGRAM(S): 325; 25; 5 TABLET ORAL at 22:21

## 2024-12-04 RX ADMIN — Medication 10 MILLIGRAM(S): at 12:43

## 2024-12-04 RX ADMIN — Medication 500 MILLIGRAM(S): at 12:43

## 2024-12-04 NOTE — CHART NOTE - NSCHARTNOTEFT_GEN_A_CORE
NUTRITION FOLLOW UP NOTE    Pt seen for f/u severe malnutrition     SOURCE: [] Patient [X] Medical record [] RN/PCA [] Family/Caregiver [] Patient unavailable [] Patient inappropriate (disoriented, nonverbal, intubated/sedated) [] Other:    Medical Course: 12/3/24 - Pt is a 50 yo M with PMH GERD, fTUD, prior ETOH use d/o, cirrhosis (ascites), esophageal varices, and alcoholic hepatitis p/w abd pain and distention, difficulty performing ADLs, poor appetite/PO intake, nausea, weakness, dark stool, and L>R LE edema. Ran out of meds x few months and has not f/u with outpt doctors. Found to have Hb 6.1 s/p 3U RBCs with Hb 8. Also with elevated bilirubin, direct predominance, and elevated lipase. CXR benign, LE dopplers neg, and US abd with large volume ascites. Now s/p 72h protonix IV BID and octreotide gtt; on daily PPI. Hepatology following s/p EGD 11/26 with recently bleeding large esophageal varices s/p banding; now on regular diet. Procedure team performed para 11/27 with 18L output and s/p albumin. BP improving and now starting home meds - spironolactone, lasix, and coreg; uptitrating as able.     Diet Prescription: Diet, Regular:   Low Sodium (12-02-24 @ 13:56)    Food Allergy/Intolerance: [facility]     Nutrition Course: Patient with fair PO intake as being recorded in RN flowsheets.  Diet consistency advanced to regular from soft and bite sized. Low sodium diet remains appropriate at this time.  Patient with abdominal distention, last BM 12/2 per RN flowsheet documentation.  Patient would likely benefit from provision of oral supplement to aid in nutritional optimization    Pertinent Medications: MEDICATIONS  (STANDING):  carvedilol 3.125 milliGRAM(s) Oral every 12 hours  chlorhexidine 2% Cloths 1 Application(s) Topical daily  ciprofloxacin     Tablet 500 milliGRAM(s) Oral daily  furosemide    Tablet 40 milliGRAM(s) Oral two times a day  melatonin 6 milliGRAM(s) Oral at bedtime  pantoprazole    Tablet 40 milliGRAM(s) Oral before breakfast  spironolactone 100 milliGRAM(s) Oral two times a day  thiamine 100 milliGRAM(s) Oral daily    MEDICATIONS  (PRN):  acetaminophen     Tablet .. 650 milliGRAM(s) Oral every 6 hours PRN Temp greater or equal to 38C (100.4F), Mild Pain (1 - 3)  cyclobenzaprine 10 milliGRAM(s) Oral three times a day PRN Muscle Spasm  nicotine  Polacrilex Gum 2 milliGRAM(s) Oral every 2 hours PRN breakthrough cravings  ondansetron Injectable 4 milliGRAM(s) IV Push every 8 hours PRN Nausea and/or Vomiting    [] Relevant notes on medications: n/a     Pertinent Labs: 12-03 Na137 mmol/L Glu 71 mg/dL K+ 3.7 mmol/L Cr  0.61 mg/dL BUN 18 mg/dL 12-03 Phos 2.5 mg/dL 12-03 Alb 2.3 g/dL[L]    [] Relevant notes on labs: n/a     ANTHROPOMETRICS   96.2 (11-26-24), 95.3 (11-24-24), 113.3 (05-01-24)     Weight loss likely in setting of improved fluid status / diuresis / paracentesis  Height (cm): 185.4 (11-26-24), 185.4 (11-24-24), 185.4 (05-01-24)  Weight (kg): 96.2 (11-26-24), 95.3 (11-24-24), 113.3 (05-01-24)  BMI (kg/m2): 28 (11-26-24), 27.7 (11-24-24), 33 (05-01-24)  IBW: 184 lbs/83.4kg  +/- 10%    Physical Assessment, per flowsheets:  Edema: none   Pressure Injury: none     Estimated Needs:   [] No change since previous assessment -OR - [X] Recalculated  Est Needs based on: [] actual weight [] dosing weight [X] IBW 83.4kg  Kcal 30-35kcal/ir=[id number]  Protein 1.4-1.5gm/zd=740-936im    Previous Nutrition Diagnosis: Severe Malnutrition   Nutrition Diagnosis is [X] ongoing  [ ] resolved [ ] not applicable   Additional Nutrition Diagnosis (#2): Increased Nutrient Needs (calories, protein) related to impaired GI fxn / liver disease as evidenced by decompensated alcoholic cirrhosis with ascites     [ ] not applicable     Education:  [ ] Provided pt with verbal / written education on   [ ] Provided on previous assessment by RD  [ ] Not applicable secondary to   [X] Pt refused     Interventions:   1) Continue diet as ordered / tolerated (Low Sodium); Suggest Ensure Plus High Protein Therapeutic Shake 3x daily (1050kcal, 60g protein) for nutrition optimization efforts.  2) Please document % meal intake in flowsheets to assess adequacy of PO Intake.  3) Obtain and record current weight to best assess for acute changes in nutritional status.    Monitor & Evaluate:  PO intake, tolerance to diet/supplement, nutrition related lab values, weight trends, BMs/GI distress, hydration status, skin integrity.    Asuncion Jensen, MS, RD, CDN; Pager #[phone]  Also available on [employer] Teams NUTRITION FOLLOW UP NOTE    Pt seen for f/u severe malnutrition     SOURCE: [] Patient [X] Medical record [] RN/PCA [] Family/Caregiver [] Patient unavailable [] Patient inappropriate (disoriented, nonverbal, intubated/sedated) [] Other:    Medical Course: 12/3/24 - Pt is a 48 yo M with PMH GERD, fTUD, prior ETOH use d/o, cirrhosis (ascites), esophageal varices, and alcoholic hepatitis p/w abd pain and distention, difficulty performing ADLs, poor appetite/PO intake, nausea, weakness, dark stool, and L>R LE edema. Ran out of meds x few months and has not f/u with outpt doctors. Found to have Hb 6.1 s/p 3U RBCs with Hb 8. Also with elevated bilirubin, direct predominance, and elevated lipase. CXR benign, LE dopplers neg, and US abd with large volume ascites. Now s/p 72h protonix IV BID and octreotide gtt; on daily PPI. Hepatology following s/p EGD 11/26 with recently bleeding large esophageal varices s/p banding; now on regular diet. Procedure team performed para 11/27 with 18L output and s/p albumin. BP improving and now starting home meds - spironolactone, lasix, and coreg; uptitrating as able.     Diet Prescription: Diet, Regular:   Low Sodium (12-02-24 @ 13:56)    Food Allergy/Intolerance: NK     Nutrition Course: Patient with fair PO intake as being recorded in RN flowsheets.  Diet consistency advanced to regular from soft and bite sized. Low sodium diet remains appropriate at this time.  Patient with abdominal distention, last BM 12/2 per RN flowsheet documentation.  Patient would likely benefit from provision of oral supplement to aid in nutritional optimization.  Patient amenable to accepting oral supplement - will recommend Ensure Plus High Protein Therapeutic Shake 3x daily (1050kcal, 60g protein).  Patient asked for diet education materials on low sodium diet - RD provided written and verbal diet education; also reviewed importance of consuming sufficient calories and protein related to liver disease (cirrhosis). Patient acknowledged he lost a significant amount of weight, but unable to quantify amount or timeframe.    Pertinent Medications: MEDICATIONS  (STANDING):  carvedilol 3.125 milliGRAM(s) Oral every 12 hours  chlorhexidine 2% Cloths 1 Application(s) Topical daily  ciprofloxacin     Tablet 500 milliGRAM(s) Oral daily  furosemide    Tablet 40 milliGRAM(s) Oral two times a day  melatonin 6 milliGRAM(s) Oral at bedtime  pantoprazole    Tablet 40 milliGRAM(s) Oral before breakfast  spironolactone 100 milliGRAM(s) Oral two times a day  thiamine 100 milliGRAM(s) Oral daily    MEDICATIONS  (PRN):  acetaminophen     Tablet .. 650 milliGRAM(s) Oral every 6 hours PRN Temp greater or equal to 38C (100.4F), Mild Pain (1 - 3)  cyclobenzaprine 10 milliGRAM(s) Oral three times a day PRN Muscle Spasm  nicotine  Polacrilex Gum 2 milliGRAM(s) Oral every 2 hours PRN breakthrough cravings  ondansetron Injectable 4 milliGRAM(s) IV Push every 8 hours PRN Nausea and/or Vomiting    [] Relevant notes on medications: n/a     Pertinent Labs: 12-03 Na137 mmol/L Glu 71 mg/dL K+ 3.7 mmol/L Cr  0.61 mg/dL BUN 18 mg/dL 12-03 Phos 2.5 mg/dL 12-03 Alb 2.3 g/dL[L]    [] Relevant notes on labs: n/a     ANTHROPOMETRICS   96.2 (11-26-24), 95.3 (11-24-24), 113.3 (05-01-24)     Weight loss likely in setting of improved fluid status / diuresis / paracentesis  Height (cm): 185.4 (11-26-24), 185.4 (11-24-24), 185.4 (05-01-24)  Weight (kg): 96.2 (11-26-24), 95.3 (11-24-24), 113.3 (05-01-24)  BMI (kg/m2): 28 (11-26-24), 27.7 (11-24-24), 33 (05-01-24)  IBW: 184 lbs/83.4kg  +/- 10%    Physical Assessment, per flowsheets:  Edema: none   Pressure Injury: none   Nutrition focused physical exam conducted - found signs of malnutrition [ ]absent [X]present - 12/4/24  Subcutaneous fat loss: [MODERATE] Orbital fat pads region, [SEVERE]Buccal fat region, [SEVERE]Triceps region  Muscle wasting: [MODERATE]Temples region, [SEVERE]Clavicle region, [SEVERE]Shoulder region, [SEVERE ]Interosseous region,      Estimated Needs:   [] No change since previous assessment -OR - [X] Recalculated  Est Needs based on: [] actual weight [] dosing weight [X] IBW 83.4kg  Kcal 30-35kcal/at=2485-7136tpms  Protein 1.4-1.5gm/ng=400-110zn    Previous Nutrition Diagnosis: Severe Malnutrition   Nutrition Diagnosis is [X] ongoing  [ ] resolved [ ] not applicable   Additional Nutrition Diagnosis (#2): Increased Nutrient Needs (calories, protein) related to impaired GI fxn / liver disease as evidenced by decompensated alcoholic cirrhosis with ascites      Education:  [X] Provided pt with verbal / written education on Low sodium / Liver Cirrhosis Nutrition Therapy   [ ] Provided on previous assessment by RD  [ ] Not applicable secondary to   [ ] Pt refused     Interventions:   1) Continue diet as ordered / tolerated (Low Sodium); Suggest Ensure Plus High Protein Therapeutic Shake 3x daily (1050kcal, 60g protein) for nutrition optimization efforts.  2) Please document % meal intake in flowsheets to assess adequacy of PO Intake.  3) Obtain and record current weight to best assess for acute changes in nutritional status.    Monitor & Evaluate:  PO intake, tolerance to diet/supplement, nutrition related lab values, weight trends, BMs/GI distress, hydration status, skin integrity.    Asuncion Jensen, MS, RD, CDN; Pager #[phone]  Also available on [employer] Teams

## 2024-12-04 NOTE — PROGRESS NOTE ADULT - ASSESSMENT
49M with a PMHx ETOH use disorder, cirrhosis c/b ascites, esophogeal varices seen on May 2024 EGD, alcoholic hepatitis, GERD presenting to [facility] from [facility] for urgent EGD iso suspected variceal bleed. S/p EGD on 11/26 with banding but no active bleeding.     #GI bleed  #Decompensated alcoholic cirrhosis   #Ascites  #Varices s/p banding     MELD 3.0=21  HE: no evidence of encephalopathy   Ascites: s/p paracentesis (18L removed) on 11/27 and 17L on 12/3  HCC screening: will require imaging. AFP WNL  HRS: Creatinine at baseline    Recommendations:  - restart Lasix 40 BID and Spironolactone 100 BID, decrease to daily if BP unable to tolerate   -low salt diet, nutrition consult for patient education   - c/w carvedilol BID  - Repeat upper endoscopy in 4-6 weeks to evaluate the response to therapy.  - Maintain 2 IVs, active T&S  -FU multiphase CT for HCC screening.   - Nutritional optimization,    Note incomplete until finalized by attending signature/attestation.    Romana Argueta  GI/Hepatology Fellow    MONDAY-FRIDAY 8AM-5PM:  Pager# [phone] ([facility]) or [phone] ([facility])    NON-URGENT CONSULTS:  Please email [email or web address]  AT NIGHT AND ON WEEKENDS:  Contact on-call GI fellow from 5pm-8am and on weekends/holidays

## 2024-12-04 NOTE — PROGRESS NOTE ADULT - ATTENDING COMMENTS
49M, AUD, h/o alcoholic hepatitis, alcohol-related cirrhosis, esophageal varices 5/2024, GERD, init. admitted to [facility] with melena, found acute blood loss anemia, Hb 6.1 g/dL, transferred to [facility] regular hospital bed for EGD, done 11/26: large varices, 6 bands placed, then LVP 18L on 11/27, subsequently low BP, slow increase of diuretics, on Lasix 40bid/100bid since 12/03, LVP again 17L 12/03, still mod. ascites.    Test results:  - 12/4/24 CT abdomen IV: cirrhosis, no liver lesion seen, large ascites, abdom. varices, splenomegaly  - 11/26/24 EGD: large varices, 6 bands placed. Normal stomach and duodenum  - 11/26/24 US abdomen Doppler: limited exam small cirrhotic liver, patent hepatic veins and portal vein, hepatic artery and splenic vein not visualized, large ascites    - alcoholic cirrhosis with massive ascites, adm. variceal bleed, MELD 15 (12/04)  - EV bleed, banded as above  - ascites on diuretics at home, large; LVP 18L (11/27), 17L (12/03), no SBP, ascites protein       - no edema      - creatinine normal      - hypoalbuminemia  - HE: -  - elevated bilirubin and INR, normal liver enzymes  - cachectic, hypoalbuminemia  - AUD    Plan:  - continue diuretics bid, daily MELD labs  - low Na diet - pt. educated. He ate self-prepared low-salt diet at home, but also often restaurant food i.e. high-salt diet  - d/c soon, would give albumin 25%, 100 mL tid for now

## 2024-12-04 NOTE — PROGRESS NOTE ADULT - SUBJECTIVE AND OBJECTIVE BOX
Dr. Erma Slater  Pager [phone]    PROGRESS NOTE:     Patient is a 49y old  Male who presents with a chief complaint of decompensated cirrhosis , gi bleed (04 Dec 2024 11:48)      SUBJECTIVE / OVERNIGHT EVENTS: denies chest pain or sob   ADDITIONAL REVIEW OF SYSTEMS: afebrile    MEDICATIONS  (STANDING):  carvedilol 3.125 milliGRAM(s) Oral every 12 hours  chlorhexidine 2% Cloths 1 Application(s) Topical daily  ciprofloxacin     Tablet 500 milliGRAM(s) Oral daily  furosemide    Tablet 40 milliGRAM(s) Oral two times a day  melatonin 6 milliGRAM(s) Oral at bedtime  pantoprazole    Tablet 40 milliGRAM(s) Oral before breakfast  spironolactone 100 milliGRAM(s) Oral two times a day  thiamine 100 milliGRAM(s) Oral daily    MEDICATIONS  (PRN):  acetaminophen     Tablet .. 650 milliGRAM(s) Oral every 6 hours PRN Temp greater or equal to 38C (100.4F), Mild Pain (1 - 3)  cyclobenzaprine 10 milliGRAM(s) Oral three times a day PRN Muscle Spasm  nicotine  Polacrilex Gum 2 milliGRAM(s) Oral every 2 hours PRN breakthrough cravings  ondansetron Injectable 4 milliGRAM(s) IV Push every 8 hours PRN Nausea and/or Vomiting      CAPILLARY BLOOD GLUCOSE        I&O's Summary    03 Dec 2024 07:01  -  04 Dec 2024 07:00  --------------------------------------------------------  IN: 1050 mL / OUT: 200 mL / NET: 850 mL        PHYSICAL EXAM:  Vital Signs Last 24 Hrs  T(C): 36.8 (04 Dec 2024 11:05), Max: 37.1 (03 Dec 2024 13:41)  T(F): 98.2 (04 Dec 2024 11:05), Max: 98.8 (03 Dec 2024 13:41)  HR: 79 (04 Dec 2024 11:05) (79 - 94)  BP: 110/69 (04 Dec 2024 11:05) (96/50 - 110/69)  BP(mean): --  RR: 18 (04 Dec 2024 11:05) (17 - 18)  SpO2: 98% (04 Dec 2024 11:05) (95% - 100%)    Parameters below as of 04 Dec 2024 11:05  Patient On (Oxygen Delivery Method): room air      CONSTITUTIONAL: nad, cachectic   RESPIRATORY: Normal respiratory effort; lungs are clear to auscultation bilaterally  CARDIOVASCULAR: Regular rate and rhythm, normal S1 and S2, no murmur/rub/gallop; No lower extremity edema; Peripheral pulses are 2+ bilaterally  ABDOMEN: soft, less distended, no rebound/guarding;   MUSCULOSKELETAL: no clubbing or cyanosis of digits; no joint swelling or tenderness to palpation  PSYCH: A+O to person, place, and time; affect appropriate  LABS:                        8.1    6.73  )-----------( 106      ( 03 Dec 2024 03:20 )             25.4     12-03    137  |  103  |  18  ----------------------------<  71  3.7   |  23  |  0.61    Ca    7.9[L]      03 Dec 2024 03:20  Phos  2.5     12-03  Mg     1.60     12-03    TPro  6.6  /  Alb  2.3[L]  /  TBili  1.6[H]  /  DBili  x   /  AST  33  /  ALT  13  /  AlkPhos  87  12-03    PT/INR - ( 03 Dec 2024 03:20 )   PT: 18.3 sec;   INR: 1.58 ratio               Urinalysis Basic - ( 03 Dec 2024 03:20 )    Color: x / Appearance: x / SG: x / pH: x  Gluc: 71 mg/dL / Ketone: x  / Bili: x / Urobili: x   Blood: x / Protein: x / Nitrite: x   Leuk Esterase: x / RBC: x / WBC x   Sq Epi: x / Non Sq Epi: x / Bacteria: x      RADIOLOGY & ADDITIONAL TESTS:  Results Reviewed:   Imaging Personally Reviewed:  < from: CT Abdomen and Pelvis w/ IV Cont (12.04.24 @ 10:01) >  No evidence of HCC.  Cirrhosis with large volume ascites and stigmata of portal hypertension.      Electrocardiogram Personally Reviewed:    COORDINATION OF CARE:  Care Discussed with Consultants/Other Providers [Y/N]:  Prior or Outpatient Records Reviewed [Y/N]:

## 2024-12-04 NOTE — PROGRESS NOTE ADULT - SUBJECTIVE AND OBJECTIVE BOX
Chief Complaint:  Patient is a 49y old  Male who presents with a chief complaint of decompensated cirrhosis , gi bleed (03 Dec 2024 20:27)      Interval Events:   -s/p repeat paracentesis yesterday, 17L removed    Allergies:  No Known Allergies      Hospital Medications:  acetaminophen     Tablet .. 650 milliGRAM(s) Oral every 6 hours PRN  carvedilol 3.125 milliGRAM(s) Oral every 12 hours  chlorhexidine 2% Cloths 1 Application(s) Topical daily  ciprofloxacin     Tablet 500 milliGRAM(s) Oral daily  cyclobenzaprine 10 milliGRAM(s) Oral three times a day PRN  furosemide    Tablet 40 milliGRAM(s) Oral two times a day  melatonin 6 milliGRAM(s) Oral at bedtime  nicotine  Polacrilex Gum 2 milliGRAM(s) Oral every 2 hours PRN  ondansetron Injectable 4 milliGRAM(s) IV Push every 8 hours PRN  pantoprazole    Tablet 40 milliGRAM(s) Oral before breakfast  spironolactone 100 milliGRAM(s) Oral two times a day  thiamine 100 milliGRAM(s) Oral daily        PHYSICAL EXAM:   Vital Signs:  Vital Signs Last 24 Hrs  T(C): 36.8 (04 Dec 2024 11:05), Max: 37.1 (03 Dec 2024 13:41)  T(F): 98.2 (04 Dec 2024 11:05), Max: 98.8 (03 Dec 2024 13:41)  HR: 79 (04 Dec 2024 11:05) (79 - 94)  BP: 110/69 (04 Dec 2024 11:05) (96/50 - 110/69)  BP(mean): --  RR: 18 (04 Dec 2024 11:05) (17 - 18)  SpO2: 98% (04 Dec 2024 11:05) (95% - 100%)    Parameters below as of 04 Dec 2024 11:05  Patient On (Oxygen Delivery Method): room air      Daily       GENERAL:  No acute distress  HEENT:  NCAT, no scleral icterus  CHEST: no resp distress  HEART:  RRR  ABDOMEN:  distended with fluid wave, mildly TTP diffusely. Soft   EXTREMITIES:  No cyanosis, clubbing, or edema  SKIN:  No rash/erythema/ecchymoses/petechiae/wounds/abscess/warm/dry  NEURO:  Alert and oriented x 3, no asterixis, no tremor    LABS:                        8.1    6.73  )-----------( 106      ( 03 Dec 2024 03:20 )             25.4       12-03    137  |  103  |  18  ----------------------------<  71  3.7   |  23  |  0.61    Ca    7.9[L]      03 Dec 2024 03:20  Phos  2.5     12-03  Mg     1.60     12-03    TPro  6.6  /  Alb  2.3[L]  /  TBili  1.6[H]  /  DBili  x   /  AST  33  /  ALT  13  /  AlkPhos  87  12-03    LIVER FUNCTIONS - ( 03 Dec 2024 03:20 )  Alb: 2.3 g/dL / Pro: 6.6 g/dL / ALK PHOS: 87 U/L / ALT: 13 U/L / AST: 33 U/L / GGT: x           PT/INR - ( 03 Dec 2024 03:20 )   PT: 18.3 sec;   INR: 1.58 ratio           Urinalysis Basic - ( 03 Dec 2024 03:20 )    Color: x / Appearance: x / SG: x / pH: x  Gluc: 71 mg/dL / Ketone: x  / Bili: x / Urobili: x   Blood: x / Protein: x / Nitrite: x   Leuk Esterase: x / RBC: x / WBC x   Sq Epi: x / Non Sq Epi: x / Bacteria: x            Imaging:    < from: US Abdomen Doppler (11.26.24 @ 11:30) >  IMPRESSION:  Cirrhotic morphology of the liver.  Duplex Doppler evaluation was limited but unremarkable.  Large amount of ascites throughout the abdomen        --- End of Report ---      < end of copied text >

## 2024-12-04 NOTE — PROGRESS NOTE ADULT - ASSESSMENT
Pt is a 50 yo M with PMH GERD, fTUD, prior ETOH use d/o, cirrhosis (ascites), esophageal varices, and alcoholic hepatitis p/w abd pain and distention, difficulty performing ADLs, poor appetite/PO intake, nausea, weakness, dark stool, and L>R LE edema. Ran out of meds x few months and has not f/u with outpt doctors. Found to have Hb 6.1 s/p 3U RBCs with Hb 8. Also with elevated bilirubin, direct predominance, and elevated lipase. CXR benign, LE dopplers neg, and US abd with large volume ascites. Now s/p 72h protonix IV BID and octreotide gtt; on daily PPI. Hepatology following s/p EGD 11/26 with recently bleeding large esophageal varices s/p banding; now on regular diet. Procedure team performed para 11/27 with 18L output and s/p albumin. BP improving and now starting home meds - spironolactone, lasix, and coreg; uptitrating as able.

## 2024-12-04 NOTE — PROGRESS NOTE ADULT - PROBLEM SELECTOR PLAN 2
- pt with hx ETOH use d/o with cirrhosis and esophageal varices; last paracentesis 5/2024; on appropriate med regimen outpt but ran out of meds and has yet to f/u with outpt doctors x few months   - p/w abd pain and distention x1mo with poor appetite, difficulty with ADLs, and weakness   - exam with distended abd  - US abd with large volume ascites  - procedure team consulted for paracentesis -- s/p 18L out 11/27  - s/p repeat LVP 12/3 , 17.5L removed   - s/p albumin post tap  - s/p CTX 2g daily for SBP treatment given symptoms, now on Cipro 500mg qd for long term SBP ppx  - lasix, coreg, and spironolactone as above  - hepatology following, appreciate recs,   AFP<1.8,   check AFP and multiphase CT abd/pelvis (12/4) No evidence of HCC.  Cirrhosis with large volume ascites and stigmata of portal hypertension.  F/up hepatology to assist with outpt f/up - pt with hx ETOH use d/o with cirrhosis and esophageal varices; last paracentesis 5/2024; on appropriate med regimen outpt but ran out of meds and has yet to f/u with outpt doctors x few months   - p/w abd pain and distention x1mo with poor appetite, difficulty with ADLs, and weakness   - exam with distended abd  - US abd with large volume ascites  - procedure team consulted for paracentesis -- s/p 18L out 11/27  - s/p repeat LVP 12/3 , 17.5L removed   - c/w lasix 40 bid and aldactone 100 bid  - given albumin post tap  - s/p CTX 2g daily for SBP treatment given symptoms, now on Cipro 500mg qd for long term SBP ppx  - lasix, coreg, and spironolactone as above  - hepatology following, appreciate recs,   AFP<1.8,   Multiphase CT abd/pelvis (12/4) No evidence of HCC.  Cirrhosis with large volume ascites and stigmata of portal hypertension.  F/up hepatology to assist with outpt f/up

## 2024-12-05 ENCOUNTER — TRANSCRIPTION ENCOUNTER (OUTPATIENT)
Age: 49
End: 2024-12-05

## 2024-12-05 VITALS
TEMPERATURE: 98 F | SYSTOLIC BLOOD PRESSURE: 104 MMHG | OXYGEN SATURATION: 96 % | HEART RATE: 94 BPM | RESPIRATION RATE: 17 BRPM | DIASTOLIC BLOOD PRESSURE: 63 MMHG

## 2024-12-05 LAB
ALBUMIN SERPL ELPH-MCNC: 2.7 G/DL — LOW (ref 3.3–5)
ALP SERPL-CCNC: 76 U/L — SIGNIFICANT CHANGE UP (ref 40–120)
ALT FLD-CCNC: 17 U/L — SIGNIFICANT CHANGE UP (ref 4–41)
ANION GAP SERPL CALC-SCNC: 11 MMOL/L — SIGNIFICANT CHANGE UP (ref 7–14)
AST SERPL-CCNC: 34 U/L — SIGNIFICANT CHANGE UP (ref 4–40)
BILIRUB SERPL-MCNC: 1.8 MG/DL — HIGH (ref 0.2–1.2)
BUN SERPL-MCNC: 18 MG/DL — SIGNIFICANT CHANGE UP (ref 7–23)
CALCIUM SERPL-MCNC: 8.3 MG/DL — LOW (ref 8.4–10.5)
CHLORIDE SERPL-SCNC: 102 MMOL/L — SIGNIFICANT CHANGE UP (ref 98–107)
CO2 SERPL-SCNC: 23 MMOL/L — SIGNIFICANT CHANGE UP (ref 22–31)
CREAT SERPL-MCNC: 0.58 MG/DL — SIGNIFICANT CHANGE UP (ref 0.5–1.3)
EGFR: 120 ML/MIN/1.73M2 — SIGNIFICANT CHANGE UP
GLUCOSE SERPL-MCNC: 80 MG/DL — SIGNIFICANT CHANGE UP (ref 70–99)
HCT VFR BLD CALC: 25.4 % — LOW (ref 39–50)
HGB BLD-MCNC: 8.3 G/DL — LOW (ref 13–17)
INR BLD: 1.6 RATIO — HIGH (ref 0.85–1.16)
MAGNESIUM SERPL-MCNC: 1.6 MG/DL — SIGNIFICANT CHANGE UP (ref 1.6–2.6)
MCHC RBC-ENTMCNC: 29.4 PG — SIGNIFICANT CHANGE UP (ref 27–34)
MCHC RBC-ENTMCNC: 32.7 G/DL — SIGNIFICANT CHANGE UP (ref 32–36)
MCV RBC AUTO: 90.1 FL — SIGNIFICANT CHANGE UP (ref 80–100)
NRBC # BLD: 0 /100 WBCS — SIGNIFICANT CHANGE UP (ref 0–0)
NRBC # FLD: 0 K/UL — SIGNIFICANT CHANGE UP (ref 0–0)
PHOSPHATE SERPL-MCNC: 3 MG/DL — SIGNIFICANT CHANGE UP (ref 2.5–4.5)
PLATELET # BLD AUTO: 100 K/UL — LOW (ref 150–400)
POTASSIUM SERPL-MCNC: 3.4 MMOL/L — LOW (ref 3.5–5.3)
POTASSIUM SERPL-SCNC: 3.4 MMOL/L — LOW (ref 3.5–5.3)
PROT SERPL-MCNC: 6.6 G/DL — SIGNIFICANT CHANGE UP (ref 6–8.3)
PROTHROM AB SERPL-ACNC: 18.5 SEC — HIGH (ref 9.9–13.4)
RBC # BLD: 2.82 M/UL — LOW (ref 4.2–5.8)
RBC # FLD: 17.2 % — HIGH (ref 10.3–14.5)
SODIUM SERPL-SCNC: 136 MMOL/L — SIGNIFICANT CHANGE UP (ref 135–145)
WBC # BLD: 6.6 K/UL — SIGNIFICANT CHANGE UP (ref 3.8–10.5)
WBC # FLD AUTO: 6.6 K/UL — SIGNIFICANT CHANGE UP (ref 3.8–10.5)

## 2024-12-05 PROCEDURE — 99232 SBSQ HOSP IP/OBS MODERATE 35: CPT | Mod: GC

## 2024-12-05 PROCEDURE — 99239 HOSP IP/OBS DSCHRG MGMT >30: CPT

## 2024-12-05 RX ORDER — GLUCOSAMINE SULFATE DIPOT CHLR 500 MG
1 CAPSULE ORAL
Qty: 30 | Refills: 0
Start: 2024-12-05 | End: 2025-01-03

## 2024-12-05 RX ORDER — PANTOPRAZOLE SODIUM 40 MG/1
1 TABLET, DELAYED RELEASE ORAL
Qty: 30 | Refills: 0
Start: 2024-12-05 | End: 2025-01-03

## 2024-12-05 RX ORDER — SPIRONOLACTONE 25 MG
4 TABLET ORAL
Qty: 240 | Refills: 0
Start: 2024-12-05 | End: 2025-01-03

## 2024-12-05 RX ORDER — LANOLIN ALCOHOL/MO/W.PET/CERES
1 CREAM (GRAM) TOPICAL
Qty: 30 | Refills: 0
Start: 2024-12-05 | End: 2025-01-03

## 2024-12-05 RX ORDER — POTASSIUM CHLORIDE 600 MG/1
20 TABLET, EXTENDED RELEASE ORAL ONCE
Refills: 0 | Status: DISCONTINUED | OUTPATIENT
Start: 2024-12-05 | End: 2024-12-05

## 2024-12-05 RX ORDER — FAMOTIDINE 20 MG/1
1 TABLET, FILM COATED ORAL
Qty: 30 | Refills: 0
Start: 2024-12-05 | End: 2025-01-03

## 2024-12-05 RX ORDER — ACETAMINOPHEN, DIPHENHYDRAMINE HCL, PHENYLEPHRINE HCL 325; 25; 5 MG/1; MG/1; MG/1
2 TABLET ORAL
Qty: 60 | Refills: 0
Start: 2024-12-05 | End: 2025-01-03

## 2024-12-05 RX ORDER — POTASSIUM CHLORIDE 600 MG/1
20 TABLET, EXTENDED RELEASE ORAL ONCE
Refills: 0 | Status: COMPLETED | OUTPATIENT
Start: 2024-12-05 | End: 2024-12-05

## 2024-12-05 RX ORDER — CIPROFLOXACIN HCL 750 MG
1 TABLET ORAL
Qty: 30 | Refills: 0
Start: 2024-12-05 | End: 2025-01-03

## 2024-12-05 RX ORDER — ACETAMINOPHEN 500MG 500 MG/1
2 TABLET, COATED ORAL
Qty: 240 | Refills: 0
Start: 2024-12-05 | End: 2025-01-03

## 2024-12-05 RX ORDER — FUROSEMIDE 40 MG/1
1 TABLET ORAL
Qty: 60 | Refills: 0
Start: 2024-12-05 | End: 2025-01-03

## 2024-12-05 RX ORDER — NICOTINE 21 MG/24H
1 PATCH, EXTENDED RELEASE TRANSDERMAL
Qty: 18 | Refills: 0
Start: 2024-12-05 | End: 2024-12-19

## 2024-12-05 RX ORDER — CARVEDILOL 25 MG/1
1 TABLET, FILM COATED ORAL
Qty: 60 | Refills: 0
Start: 2024-12-05 | End: 2025-01-03

## 2024-12-05 RX ADMIN — CARVEDILOL 3.12 MILLIGRAM(S): 25 TABLET, FILM COATED ORAL at 17:04

## 2024-12-05 RX ADMIN — Medication 10 MILLIGRAM(S): at 20:45

## 2024-12-05 RX ADMIN — Medication 100 MILLIGRAM(S): at 17:02

## 2024-12-05 RX ADMIN — FUROSEMIDE 40 MILLIGRAM(S): 40 TABLET ORAL at 13:20

## 2024-12-05 RX ADMIN — FUROSEMIDE 40 MILLIGRAM(S): 40 TABLET ORAL at 06:51

## 2024-12-05 RX ADMIN — FAMOTIDINE 40 MILLIGRAM(S): 20 TABLET, FILM COATED ORAL at 13:20

## 2024-12-05 RX ADMIN — PANTOPRAZOLE SODIUM 40 MILLIGRAM(S): 40 TABLET, DELAYED RELEASE ORAL at 06:51

## 2024-12-05 RX ADMIN — Medication 10 MILLIGRAM(S): at 13:22

## 2024-12-05 RX ADMIN — CHLORHEXIDINE GLUCONATE 1 APPLICATION(S): 1.2 RINSE ORAL at 13:21

## 2024-12-05 RX ADMIN — Medication 100 MILLIGRAM(S): at 13:20

## 2024-12-05 RX ADMIN — Medication 500 MILLIGRAM(S): at 13:20

## 2024-12-05 RX ADMIN — CARVEDILOL 3.12 MILLIGRAM(S): 25 TABLET, FILM COATED ORAL at 06:51

## 2024-12-05 RX ADMIN — POTASSIUM CHLORIDE 20 MILLIEQUIVALENT(S): 600 TABLET, EXTENDED RELEASE ORAL at 10:12

## 2024-12-05 RX ADMIN — Medication 100 MILLIGRAM(S): at 06:52

## 2024-12-05 NOTE — DISCHARGE NOTE NURSING/CASE MANAGEMENT/SOCIAL WORK - FINANCIAL ASSISTANCE
[facility] provides services at a reduced cost to those who are determined to be eligible through [facility]’s financial assistance program. Information regarding [facility]’s financial assistance program can be found by going to [email or web address] or by calling [phone].

## 2024-12-05 NOTE — PROGRESS NOTE ADULT - SUBJECTIVE AND OBJECTIVE BOX
Chief Complaint:  Patient is a 49y old  Male who presents with a chief complaint of decompensated cirrhosis , gi bleed (04 Dec 2024 12:57)      Interval Events:   -hypokalemic this morning, soft BP   -denying any complaints     Allergies:  No Known Allergies      Hospital Medications:  acetaminophen     Tablet .. 650 milliGRAM(s) Oral every 6 hours PRN  carvedilol 3.125 milliGRAM(s) Oral every 12 hours  chlorhexidine 2% Cloths 1 Application(s) Topical daily  ciprofloxacin     Tablet 500 milliGRAM(s) Oral daily  cyclobenzaprine 10 milliGRAM(s) Oral three times a day PRN  famotidine    Tablet 40 milliGRAM(s) Oral daily  furosemide    Tablet 40 milliGRAM(s) Oral two times a day  melatonin 6 milliGRAM(s) Oral at bedtime  nicotine  Polacrilex Gum 2 milliGRAM(s) Oral every 2 hours PRN  ondansetron Injectable 4 milliGRAM(s) IV Push every 8 hours PRN  pantoprazole    Tablet 40 milliGRAM(s) Oral before breakfast  spironolactone 100 milliGRAM(s) Oral two times a day  thiamine 100 milliGRAM(s) Oral daily        PHYSICAL EXAM:   Vital Signs:  Vital Signs Last 24 Hrs  T(C): 36.6 (05 Dec 2024 10:27), Max: 36.9 (04 Dec 2024 17:03)  T(F): 97.9 (05 Dec 2024 10:27), Max: 98.5 (04 Dec 2024 17:03)  HR: 70 (05 Dec 2024 10:27) (67 - 93)  BP: 115/74 (05 Dec 2024 10:27) (98/60 - 115/74)  BP(mean): --  RR: 18 (05 Dec 2024 10:27) (17 - 18)  SpO2: 98% (05 Dec 2024 10:27) (94% - 100%)    Parameters below as of 05 Dec 2024 10:27  Patient On (Oxygen Delivery Method): room air      Daily     Daily     GENERAL:  No acute distress  HEENT:  NCAT, no scleral icterus  CHEST: no resp distress  HEART:  RRR  ABDOMEN:  Soft, distended with fluid wave   EXTREMITIES:  No cyanosis, clubbing, or edema  SKIN:  No rash/erythema/ecchymoses/petechiae/wounds/abscess/warm/dry  NEURO:  Alert and oriented x 3, no asterixis, no tremor    LABS:                        8.3    6.60  )-----------( 100      ( 05 Dec 2024 05:20 )             25.4     Mean Cell Volume: 90.1 fL (12-05-24 @ 05:20)    12-05    136  |  102  |  18  ----------------------------<  80  3.4[L]   |  23  |  0.58    Ca    8.3[L]      05 Dec 2024 05:20  Phos  3.0     12-05  Mg     1.60     12-05    TPro  6.6  /  Alb  2.7[L]  /  TBili  1.8[H]  /  DBili  x   /  AST  34  /  ALT  17  /  AlkPhos  76  12-05    LIVER FUNCTIONS - ( 05 Dec 2024 05:20 )  Alb: 2.7 g/dL / Pro: 6.6 g/dL / ALK PHOS: 76 U/L / ALT: 17 U/L / AST: 34 U/L / GGT: x           PT/INR - ( 05 Dec 2024 07:03 )   PT: 18.5 sec;   INR: 1.60 ratio           Urinalysis Basic - ( 05 Dec 2024 05:20 )    Color: x / Appearance: x / SG: x / pH: x  Gluc: 80 mg/dL / Ketone: x  / Bili: x / Urobili: x   Blood: x / Protein: x / Nitrite: x   Leuk Esterase: x / RBC: x / WBC x   Sq Epi: x / Non Sq Epi: x / Bacteria: x        Imaging:  < from: CT Abdomen and Pelvis w/ IV Cont (12.04.24 @ 10:01) >    IMPRESSION:  No evidence of HCC.  Cirrhosis with large volume ascites and stigmata of portal hypertension.    --- End of Report ---            < end of copied text >

## 2024-12-05 NOTE — PROGRESS NOTE ADULT - PROBLEM SELECTOR PROBLEM 1
Bleeding esophageal varices

## 2024-12-05 NOTE — PROGRESS NOTE ADULT - PROBLEM SELECTOR PLAN 5
- QTc 510 on arrival  - repeat QTc 473 11/27
- QTc 510  - f/u repeat EKG  - avoid QT prolonging agents
- QTc 510 on arrival  - repeat QTc 473 11/27
- QTc 510 on arrival  - repeat QTc 473 11/27
- QTc 510  - f/u repeat EKG  - avoid QT prolonging agents
- QTc 510 on arrival  - repeat QTc 473 11/27

## 2024-12-05 NOTE — PROGRESS NOTE ADULT - PROBLEM SELECTOR PLAN 6
- protonix as above
- c/w protonix IV BID --> transition to PO as able
- protonix as above
- c/w protonix IV BID --> x72h
- protonix as above

## 2024-12-05 NOTE — PROGRESS NOTE ADULT - PROVIDER SPECIALTY LIST ADULT
Hepatology
Hospitalist
Hepatology
Hospitalist
Hepatology
Hospitalist

## 2024-12-05 NOTE — DISCHARGE NOTE NURSING/CASE MANAGEMENT/SOCIAL WORK - PATIENT PORTAL LINK FT
You can access the FollowMyHealth Patient Portal offered by [facility] by registering at the following website: [email or web address]. By joining [facility]’s FollowMyHealth portal, you will also be able to view your health information using other applications (apps) compatible with our system.

## 2024-12-05 NOTE — PROGRESS NOTE ADULT - ATTENDING COMMENTS
49M, AUD, h/o alcoholic hepatitis, alcohol-related cirrhosis, esophageal varices 5/2024, GERD, init. admitted to [facility] with melena, found acute blood loss anemia, Hb 6.1 g/dL, transferred to [facility] regular hospital bed for EGD, done 11/26: large varices, 6 bands placed, then LVP 18L on 11/27, subsequently low BP, slow increase of diuretics, on Lasix 40bid/100bid since 12/03, LVP again 17L 12/03, still mod. ascites.    Test results:  - 12/4/24 CT abdomen IV: cirrhosis, no liver lesion seen, large ascites, abdom. varices, splenomegaly  - 11/26/24 EGD: large varices, 6 bands placed. Normal stomach and duodenum  - 11/26/24 US abdomen Doppler: limited exam small cirrhotic liver, patent hepatic veins and portal vein, hepatic artery and splenic vein not visualized, large ascites    - alcoholic cirrhosis with massive ascites, adm. variceal bleed, MELD 15 (12/04)  - EV bleed, banded as above  - ascites on diuretics at home, large; LVP 18L (11/27), 17L (12/03), no SBP, ascites protein - still moderate ascites      - no edema      - creatinine normal      - hypoalbuminemia  - HE: -  - elevated bilirubin and INR, normal liver enzymes  - cachectic, hypoalbuminemia  - AUD    Plan:  - continue diuretics bid, discharge home, need CMP within 5 days. If portal HTN does not improve with alcohol abstinence and improved nutrition with regaining of muscle mass, may benefit from TIPS placement  - low Na diet - pt. educated. He ate self-prepared low-salt diet at home, but also often restaurant food i.e. high-salt diet

## 2024-12-05 NOTE — PROGRESS NOTE ADULT - PROBLEM SELECTOR PLAN 1
- pt p/w abd pain, distention, weakness, poor appetite, and dark stool; known hx cirrhosis with esophageal varices as below; out of home meds x months and has yet to f/u with outpt doctors  - Hb 6.7 on arrival, 6.1 on repeat --> s/p 3U RBC with repeat Hb 8  - hemodynamically stable  - suspected in setting of known esophageal varices with med non-compliance  - hold DVT ppx  - INR 1.81 --> given vitamin K x2  - BID IV protonix and octreotide dc'd  - c/w protonix 40mg PO daily   - BP improving -- inc lasix to 40 mg and spironolactone to 100 mg qd per hepatology recs  c/w coreg 3.125 mg bid - uptitrate as able  - hepatology following, appreciate recs  - s/p EGD 11/26 with recently bleeding large esophageal varices s/p banding  - c/w regular diet   - c/w spironolactone 25mg daily  - repeat EGD in 4-6wks  - if continued bleeding, will need c-scope
- pt p/w abd pain, distention, weakness, poor appetite, and dark stool; known hx cirrhosis with esophageal varices as below; out of home meds x months and has yet to f/u with outpt doctors  - Hb 6.7 on arrival, 6.1 on repeat --> s/p 3U RBC with repeat Hb 8  - hemodynamically stable  - suspected in setting of known esophageal varices with med non-compliance  - hold DVT ppx  - INR 1.81 --> given vitamin K x2  - BID IV protonix and octreotide dc'd  - c/w protonix 40mg PO daily   - c/w coreg 3.125 mg bid for portal HTN, diuretic increased to lasix 40 mg bid and aldactone 100 mg bid   - hepatology following, appreciate recs  - s/p EGD 11/26 with recently bleeding large esophageal varices s/p banding  - c/w regular low salt diet   - repeat EGD in 4-6wks  - if continued bleeding, will need c-scope
- pt p/w abd pain, distention, weakness, poor appetite, and dark stool; known hx cirrhosis with esophageal varices as below; out of home meds x months and has yet to f/u with outpt doctors  - Hb 6.7 on arrival, 6.1 on repeat --> s/p 3U RBC with repeat Hb 8  - hemodynamically stable  - suspected in setting of known esophageal varices with med non-compliance  - hold DVT ppx  - INR 1.81 --> given vitamin K x2  - BID IV protonix and octreotide dc'd  - c/w protonix 40mg PO daily   - c/w coreg 3.125 mg bid for portal HTN, diuretic increased to lasix 40 mg bid and aldactone 100 mg bid   - hepatology following, appreciate recs  - s/p EGD 11/26 with recently bleeding large esophageal varices s/p banding, repeat EGD in 4-6 wks  - c/w regular low salt diet   - if continued bleeding, will need c-scope
- pt p/w abd pain, distention, weakness, poor appetite, and dark stool; known hx cirrhosis with esophageal varices as below; out of home meds x months and has yet to f/u with outpt doctors  - Hb 6.7 on arrival, 6.1 on repeat --> s/p 3U RBC with repeat Hb 8  - hemodynamically stable  - suspected in setting of known esophageal varices with med non-compliance  - hold DVT ppx  - INR 1.81 --> given vitamin K x2  - BID IV protonix and octreotide dc'd  - c/w protonix 40mg PO daily   - hold lasix resume as able  - hepatology following, appreciate recs  - s/p EGD 11/26 with recently bleeding large esophageal varices s/p banding  - advance to soft diet; back to regular diet on day 4 (11/30)  - c/w coreg 3.125mg daily   - start spironolactone 25mg daily  - repeat EGD in 4-6wks  - if continued bleeding, will need c-scope
- pt p/w abd pain, distention, weakness, poor appetite, and dark stool; known hx cirrhosis with esophageal varices as below; out of home meds x months and has yet to f/u with outpt doctors  - Hb 6.7 on arrival, 6.1 on repeat --> s/p 3U RBC with repeat Hb 8  - hemodynamically stable  - suspected in setting of known esophageal varices with med non-compliance  - hold DVT ppx  - INR 1.81 --> given vitamin K   - continue to trend CBC and coags  - c/w protonix 40mg IV BID, octreotide gtt   - hold lasix and spironolactone  - hepatology following, appreciate recs  - s/p EGD 11/26 with recently bleeding large esophageal varices s/p banding  - resume CLD   - start coreg 3.125mg BID  - repeat EGD in 4-6wks  - if continued bleeding, will need c-scope   - keep active T&S
- pt p/w abd pain, distention, weakness, poor appetite, and dark stool; known hx cirrhosis with esophageal varices as below; out of home meds x months and has yet to f/u with outpt doctors  - Hb 6.7 on arrival, 6.1 on repeat --> s/p 3U RBC with repeat Hb 8  - hemodynamically stable  - suspected in setting of known esophageal varices with med non-compliance  - hold DVT ppx  - INR 1.81 --> given vitamin K x2  - BID IV protonix and octreotide dc'd  - c/w protonix 40mg PO daily   - c/w coreg 3.125 mg bid for portal HTN, diuretic increased to lasix 40 mg bid and aldactone 100 mg bid   - hepatology following, appreciate recs  - s/p EGD 11/26 with recently bleeding large esophageal varices s/p banding  - c/w regular low salt diet   - repeat EGD in 4-6wks  - if continued bleeding, will need c-scope
- pt p/w abd pain, distention, weakness, poor appetite, and dark stool; known hx cirrhosis with esophageal varices as below; out of home meds x months and has yet to f/u with outpt doctors  - Hb 6.7 on arrival, 6.1 on repeat --> s/p 3U RBC with repeat Hb 8  - hemodynamically stable  - suspected in setting of known esophageal varices with med non-compliance  - hold DVT ppx  - INR 1.81 --> given vitamin K x2  - continue to trend CBC and coags  - BID IV protonix and octreotide dc'd  - c/w protonix 40mg IV daily   - hold lasix and spironolactone, resume as able  - hepatology following, appreciate recs  - s/p EGD 11/26 with recently bleeding large esophageal varices s/p banding  - advance to soft diet; back to regular diet on day 4 (11/30)  - c/w coreg 3.125mg daily   - repeat EGD in 4-6wks  - if continued bleeding, will need c-scope   - keep active T&S
- pt p/w abd pain, distention, weakness, poor appetite, and dark stool; known hx cirrhosis with esophageal varices as below; out of home meds x months and has yet to f/u with outpt doctors  - Hb 6.7 on arrival, 6.1 on repeat --> s/p 3U RBC with repeat Hb 8  - hemodynamically stable  - suspected in setting of known esophageal varices with med non-compliance  - hold DVT ppx  - INR 1.81 --> given vitamin K x2  - continue to trend CBC and coags  - BID IV protonix and octreotide dc'd  - c/w protonix 40mg IV daily   - hold lasix and spironolactone, resume as able  - hepatology following, appreciate recs  - s/p EGD 11/26 with recently bleeding large esophageal varices s/p banding  - advance to soft diet; back to regular diet on day 4  - c/w coreg 3.125mg BID  - repeat EGD in 4-6wks  - if continued bleeding, will need c-scope   - keep active T&S
- pt p/w abd pain, distention, weakness, poor appetite, and dark stool; known hx cirrhosis with esophageal varices as below; out of home meds x months and has yet to f/u with outpt doctors  - Hb 6.7 on arrival, 6.1 on repeat --> s/p 3U RBC with repeat Hb 8  - hemodynamically stable  - suspected in setting of known esophageal varices with med non-compliance  - hold DVT ppx  - INR 1.81 --> given vitamin K x2  - continue to trend CBC and coags  - c/w protonix 40mg IV BID, octreotide gtt -- plan for 72h  - hold lasix and spironolactone, resume as able  - hepatology following, appreciate recs  - s/p EGD 11/26 with recently bleeding large esophageal varices s/p banding  - advance to soft diet; back to regular diet on day 4  - c/w coreg 3.125mg BID  - repeat EGD in 4-6wks  - if continued bleeding, will need c-scope   - keep active T&S
- pt p/w abd pain, distention, weakness, poor appetite, and dark stool; known hx cirrhosis with esophageal varices as below; out of home meds x months and has yet to f/u with outpt doctors  - Hb 6.7 on arrival, 6.1 on repeat --> s/p 3U RBC with repeat Hb 8  - hemodynamically stable  - suspected in setting of known esophageal varices with med non-compliance  - hold DVT ppx  - INR 1.81 --> given vitamin K x2  - BID IV protonix and octreotide dc'd  - c/w protonix 40mg PO daily   - BP improving -- start lasix 20mg PO daily; increase coreg to 3.125mg BID -- uptitrate as able  - hepatology following, appreciate recs  - s/p EGD 11/26 with recently bleeding large esophageal varices s/p banding  - c/w regular diet   - c/w spironolactone 25mg daily  - repeat EGD in 4-6wks  - if continued bleeding, will need c-scope

## 2024-12-05 NOTE — PROGRESS NOTE ADULT - SUBJECTIVE AND OBJECTIVE BOX
Dr. Erma Slater  Pager [phone]    PROGRESS NOTE:     Patient is a 49y old  Male who presents with a chief complaint of decompensated cirrhosis , gi bleed (05 Dec 2024 13:32)      SUBJECTIVE / OVERNIGHT EVENTS: pt agreeable to go home today  ADDITIONAL REVIEW OF SYSTEMS: afebrile, no abd pain    MEDICATIONS  (STANDING):  carvedilol 3.125 milliGRAM(s) Oral every 12 hours  chlorhexidine 2% Cloths 1 Application(s) Topical daily  ciprofloxacin     Tablet 500 milliGRAM(s) Oral daily  famotidine    Tablet 40 milliGRAM(s) Oral daily  furosemide    Tablet 40 milliGRAM(s) Oral two times a day  melatonin 6 milliGRAM(s) Oral at bedtime  pantoprazole    Tablet 40 milliGRAM(s) Oral before breakfast  spironolactone 100 milliGRAM(s) Oral two times a day  thiamine 100 milliGRAM(s) Oral daily    MEDICATIONS  (PRN):  acetaminophen     Tablet .. 650 milliGRAM(s) Oral every 6 hours PRN Temp greater or equal to 38C (100.4F), Mild Pain (1 - 3)  cyclobenzaprine 10 milliGRAM(s) Oral three times a day PRN Muscle Spasm  nicotine  Polacrilex Gum 2 milliGRAM(s) Oral every 2 hours PRN breakthrough cravings  ondansetron Injectable 4 milliGRAM(s) IV Push every 8 hours PRN Nausea and/or Vomiting      CAPILLARY BLOOD GLUCOSE        I&O's Summary    04 Dec 2024 07:01  -  05 Dec 2024 07:00  --------------------------------------------------------  IN: 500 mL / OUT: 500 mL / NET: 0 mL        PHYSICAL EXAM:  Vital Signs Last 24 Hrs  T(C): 36.6 (05 Dec 2024 10:27), Max: 36.9 (04 Dec 2024 17:03)  T(F): 97.9 (05 Dec 2024 10:27), Max: 98.5 (04 Dec 2024 17:03)  HR: 70 (05 Dec 2024 10:27) (70 - 93)  BP: 115/74 (05 Dec 2024 10:27) (99/60 - 115/74)  BP(mean): --  RR: 18 (05 Dec 2024 10:27) (17 - 18)  SpO2: 98% (05 Dec 2024 10:27) (94% - 98%)    Parameters below as of 05 Dec 2024 10:27  Patient On (Oxygen Delivery Method): room air      CONSTITUTIONAL: nad, cachectic   RESPIRATORY: Normal respiratory effort; lungs are clear to auscultation bilaterally  CARDIOVASCULAR: Regular rate and rhythm, normal S1 and S2, no murmur/rub/gallop; No lower extremity edema; Peripheral pulses are 2+ bilaterally  ABDOMEN: soft, less distended, no rebound/guarding;   MUSCULOSKELETAL: no clubbing or cyanosis of digits; no joint swelling or tenderness to palpation  PSYCH: A+O to person, place, and time; affect appropriate    LABS:                        8.3    6.60  )-----------( 100      ( 05 Dec 2024 05:20 )             25.4     12-05    136  |  102  |  18  ----------------------------<  80  3.4[L]   |  23  |  0.58    Ca    8.3[L]      05 Dec 2024 05:20  Phos  3.0     12-05  Mg     1.60     12-05    TPro  6.6  /  Alb  2.7[L]  /  TBili  1.8[H]  /  DBili  x   /  AST  34  /  ALT  17  /  AlkPhos  76  12-05    PT/INR - ( 05 Dec 2024 07:03 )   PT: 18.5 sec;   INR: 1.60 ratio         Urinalysis Basic - ( 05 Dec 2024 05:20 )    Color: x / Appearance: x / SG: x / pH: x  Gluc: 80 mg/dL / Ketone: x  / Bili: x / Urobili: x   Blood: x / Protein: x / Nitrite: x   Leuk Esterase: x / RBC: x / WBC x   Sq Epi: x / Non Sq Epi: x / Bacteria: x      RADIOLOGY & ADDITIONAL TESTS:  Results Reviewed:   Imaging Personally Reviewed:  Electrocardiogram Personally Reviewed:    COORDINATION OF CARE:  Care Discussed with Consultants/Other Providers [Y/N]: hepatology attending, no plan to place peritoneal drain for recurrent ascites at this time  Prior or Outpatient Records Reviewed [Y/N]:

## 2024-12-05 NOTE — PROGRESS NOTE ADULT - REASON FOR ADMISSION
decompensated cirrhosis , gi bleed

## 2024-12-05 NOTE — PROGRESS NOTE ADULT - ASSESSMENT
Pt is a 48 yo M with PMH GERD, fTUD, prior ETOH use d/o, cirrhosis (ascites), esophageal varices, and alcoholic hepatitis p/w abd pain and distention, difficulty performing ADLs, poor appetite/PO intake, nausea, weakness, dark stool, and L>R LE edema. Ran out of meds x few months and has not f/u with outpt doctors. Found to have Hb 6.1 s/p 3U RBCs with Hb 8. Also with elevated bilirubin, direct predominance, and elevated lipase. CXR benign, LE dopplers neg, and US abd with large volume ascites. Now s/p 72h protonix IV BID and octreotide gtt; on daily PPI. Hepatology following s/p EGD 11/26 with recently bleeding large esophageal varices s/p banding; now on regular diet. Procedure team performed para 11/27 with 18L output and again on 12/3 with 17.5L output, given albumin

## 2024-12-05 NOTE — PROGRESS NOTE ADULT - ASSESSMENT
49M with a PMHx ETOH use disorder, cirrhosis c/b ascites, esophogeal varices seen on May 2024 EGD, alcoholic hepatitis, GERD presenting to [facility] from [facility] for urgent EGD iso suspected variceal bleed. S/p EGD on 11/26 with banding but no active bleeding.     #GI bleed  #Decompensated alcoholic cirrhosis   #Ascites  #Varices s/p banding     MELD 3.0=21  HE: no evidence of encephalopathy   Ascites: s/p paracentesis (18L removed) on 11/27 and 17L on 12/3  HCC screening: CT without  lesion, AFP WNL.   HRS: Creatinine at baseline    Recommendations:  -continue furosemide and spironolactone   -low salt diet, nutrition consult for patient education   - c/w carvedilol BID  - Repeat upper endoscopy in 4-6 weeks to evaluate the response to therapy.  - Maintain 2 IVs, active T&S   - Nutritional optimization,     Note incomplete until finalized by attending signature/attestation.    Romana Argueta  GI/Hepatology Fellow    MONDAY-FRIDAY 8AM-5PM:  Pager# [phone] ([facility]) or [phone] ([facility])    NON-URGENT CONSULTS:  Please email [email or web address]  AT NIGHT AND ON WEEKENDS:  Contact on-call GI fellow from 5pm-8am and on weekends/holidays

## 2024-12-05 NOTE — PROGRESS NOTE ADULT - PROBLEM SELECTOR PLAN 2
- pt with hx ETOH use d/o with cirrhosis and esophageal varices; last paracentesis 5/2024; on appropriate med regimen outpt but ran out of meds and has yet to f/u with outpt doctors x few months   - p/w abd pain and distention x1mo with poor appetite, difficulty with ADLs, and weakness   - exam with distended abd  - US abd with large volume ascites  - procedure team consulted for paracentesis -- s/p LVP with 18L out 11/27  -  repeat LVP 12/3 , 17.5L removed   - c/w lasix 40 bid and aldactone 100 bid  - given albumin post tap  - s/p CTX 2g daily for SBP treatment given symptoms, now on Cipro 500mg qd for long term SBP ppx  - lasix, coreg, and spironolactone as above  - hepatology following, appreciate recs, no plan for peritoneal pleurx catheter at this time   AFP<1.8,   Multiphase CT abd/pelvis (12/4) No evidence of HCC.  Cirrhosis with large volume ascites and stigmata of portal hypertension.  F/up hepatology to assist with outpt f/up

## 2024-12-05 NOTE — DISCHARGE NOTE NURSING/CASE MANAGEMENT/SOCIAL WORK - NSDCFUADDAPPT_GEN_ALL_CORE_FT
APPTS ARE READY TO BE MADE: [X] YES    Best Family or Patient Contact (if needed):  Nigel [email or web address]  phone [phone]    Additional Information about above appointments (if needed):    Nigel [email or web address]  phone [phone]    1: Emailed LOLA to make appointment in medical clinic  for him with primary doctor in 1 week   2: Patient needs appointment with Hepatology in Hepatology Clinic in 1-2 weeks to   Repeat upper endoscopy in 4-6 weeks      Other comments or requests:   Call Gastroenterology/Hepatology clinic  [phone] ([facility] at [address] [place] [address]) or [phone] ([facility] at [address] [place] [address]) or [phone] ([facility] at [address] [facility]).      Repeat upper endoscopy in 4-6 weeks, Followup with hepatology in 1-2 weeks

## 2024-12-05 NOTE — PROGRESS NOTE ADULT - PROBLEM SELECTOR PLAN 3
- LLE>RLE edema  - LE dopplers neg  - suspect in setting of decompensated cirrhosis  - management as above

## 2024-12-05 NOTE — PROGRESS NOTE ADULT - PROBLEM SELECTOR PROBLEM 2
Decompensated cirrhosis

## 2024-12-05 NOTE — DISCHARGE NOTE NURSING/CASE MANAGEMENT/SOCIAL WORK - NSDCPEFALRISK_GEN_ALL_CORE
For information on Fall & Injury Prevention, visit: [email or web address] OR  [email or web address]/[email or web address] OR  https://www.cdc.gov/steadi/patient.html

## 2024-12-05 NOTE — PROGRESS NOTE ADULT - PROBLEM SELECTOR PROBLEM 5
Prolonged QT interval

## 2024-12-05 NOTE — PROGRESS NOTE ADULT - PROBLEM SELECTOR PROBLEM 4
Hyperbilirubinemia

## 2024-12-05 NOTE — PROGRESS NOTE ADULT - PROBLEM SELECTOR PLAN 4
- T bili 3.7, direct predominance  - likely in setting of known cirrhosis   - US abd with cirrhosis and large volume ascites
- T bili 3.7, direct predominance  - likely in setting of known cirrhosis   - US abd with cirrhosis and large volume ascites   - continue to trend
- T bili 3.7, direct predominance  - likely in setting of known cirrhosis   - US abd with cirrhosis and large volume ascites
- T bili 3.7, direct predominance  - likely in setting of known cirrhosis   - US abd with cirrhosis and large volume ascites   - continue to trend
- T bili 3.7, direct predominance  - likely in setting of known cirrhosis   - US abd with cirrhosis and large volume ascites
- T bili 3.7, direct predominance  - likely in setting of known cirrhosis   - US abd with cirrhosis and large volume ascites   - continue to trend
- T bili 3.7, direct predominance  - likely in setting of known cirrhosis   - US abd with cirrhosis and large volume ascites   - continue to trend

## 2024-12-05 NOTE — PROGRESS NOTE ADULT - PROBLEM SELECTOR PLAN 7
- F: none  - E: replete K<4, Mg<2  - N: regular diet  - D: hold in setting of bleed  - G: protonix daily    code: full  dispo: pending medical optimization and PT eval  Pt lives in [place], will need outpatient PCP referral and Hepatology follow up
- F: none  - E: replete K<4, Mg<2  - N: soft diet  - D: hold in setting of bleed  - G: protonix IV daily    code: full  dispo: pending medical optimization and PT eval
- F: none  - E: replete K<4, Mg<2  - N: regular diet  - D: hold in setting of bleed  - G: protonix daily    code: full  dispo: DC home tomorrow.   Pt lives in [place], will need outpatient PCP referral and Hepatology follow up  email: [email or web address]
- F: none  - E: replete K<4, Mg<2  - N: soft diet  - D: hold in setting of bleed  - G: protonix IV daily    code: full  dispo: pending medical optimization and PT eval
- F: none  - E: replete K<4, Mg<2  - N: regular diet  - D: hold in setting of bleed  - G: protonix daily    code: full  dispo: pending medical optimization and PT eval  Pt lives in [place], will need outpatient PCP referral and Hepatology follow up
- F: none  - E: replete K<4, Mg<2  - N: regular diet  - D: hold in setting of bleed  - G: protonix daily    code: full  dispo: DC home today, emailed [facility] for PCP, also needs referral to hepatology clinic.   Time spent on discharge 32 minutes coordinating discharge plan and discussing with patient and family.
- F: none  - E: replete K<4, Mg<2  - N: soft diet  - D: hold in setting of bleed  - G: protonix daily    code: full  dispo: pending medical optimization and PT eval
- F: none  - E: replete K<4, Mg<2  - N: regular diet  - D: hold in setting of bleed  - G: protonix daily    code: full  dispo: pending medical optimization and PT eval
- F: none  - E: replete K<4, Mg<2  - N: CLD  - D: hold in setting of bleed  - G: protonix IV BID    code: full  dispo: pending medical optimization and PT eval
- F: none  - E: replete K<4, Mg<2  - N: soft diet  - D: hold in setting of bleed  - G: protonix IV BID    code: full  dispo: pending medical optimization and PT eval

## 2024-12-05 NOTE — PROGRESS NOTE ADULT - NUTRITIONAL ASSESSMENT
This patient has been assessed with a concern for Malnutrition and has been determined to have a diagnosis/diagnoses of Severe protein-calorie malnutrition.    This patient is being managed with:   Diet Regular-  Low Sodium  Supplement Feeding Modality:  Oral  Ensure Plus High Protein Cans or Servings Per Day:  1       Frequency:  Three Times a day  Entered: Dec  4 2024  3:00PM

## 2024-12-05 NOTE — PROGRESS NOTE ADULT - PROBLEM SELECTOR PROBLEM 3
Lower extremity edema

## 2024-12-10 PROBLEM — K70.10 ALCOHOLIC HEPATITIS WITHOUT ASCITES: Chronic | Status: ACTIVE | Noted: 2024-11-24

## 2024-12-10 PROBLEM — F10.10 ALCOHOL ABUSE, UNCOMPLICATED: Chronic | Status: ACTIVE | Noted: 2024-11-24

## 2024-12-10 PROBLEM — K74.60 UNSPECIFIED CIRRHOSIS OF LIVER: Chronic | Status: ACTIVE | Noted: 2024-11-24

## 2024-12-10 PROBLEM — K21.9 GASTRO-ESOPHAGEAL REFLUX DISEASE WITHOUT ESOPHAGITIS: Chronic | Status: ACTIVE | Noted: 2024-11-24

## 2024-12-16 PROBLEM — Z00.00 ENCOUNTER FOR PREVENTIVE HEALTH EXAMINATION: Status: ACTIVE | Noted: 2024-12-16

## 2024-12-17 ENCOUNTER — NON-APPOINTMENT (OUTPATIENT)
Age: 49
End: 2024-12-17

## 2024-12-17 ENCOUNTER — APPOINTMENT (OUTPATIENT)
Dept: INTERNAL MEDICINE | Facility: CLINIC | Age: 49
End: 2024-12-17
Payer: MEDICAID

## 2024-12-17 VITALS
OXYGEN SATURATION: 98 % | RESPIRATION RATE: 18 BRPM | TEMPERATURE: 97.9 F | HEART RATE: 94 BPM | DIASTOLIC BLOOD PRESSURE: 72 MMHG | SYSTOLIC BLOOD PRESSURE: 123 MMHG | WEIGHT: 166 LBS

## 2024-12-17 DIAGNOSIS — K74.60 UNSPECIFIED CIRRHOSIS OF LIVER: ICD-10-CM

## 2024-12-17 DIAGNOSIS — Z83.3 FAMILY HISTORY OF DIABETES MELLITUS: ICD-10-CM

## 2024-12-17 DIAGNOSIS — K76.6 PORTAL HYPERTENSION: ICD-10-CM

## 2024-12-17 DIAGNOSIS — Z83.79 FAMILY HISTORY OF OTHER DISEASES OF THE DIGESTIVE SYSTEM: ICD-10-CM

## 2024-12-17 DIAGNOSIS — I85.00 ESOPHAGEAL VARICES W/OUT BLEEDING: ICD-10-CM

## 2024-12-17 DIAGNOSIS — Z00.00 ENCOUNTER FOR GENERAL ADULT MEDICAL EXAMINATION W/OUT ABNORMAL FINDINGS: ICD-10-CM

## 2024-12-17 PROCEDURE — G2211 COMPLEX E/M VISIT ADD ON: CPT | Mod: NC

## 2024-12-17 PROCEDURE — 99495 TRANSJ CARE MGMT MOD F2F 14D: CPT

## 2024-12-18 ENCOUNTER — NON-APPOINTMENT (OUTPATIENT)
Age: 49
End: 2024-12-18

## 2024-12-18 PROBLEM — I85.00 ESOPHAGEAL VARICES: Status: ACTIVE | Noted: 2024-12-17

## 2024-12-18 PROBLEM — K74.60 CIRRHOSIS: Status: ACTIVE | Noted: 2024-12-17

## 2024-12-18 PROBLEM — K76.6 PORTAL HYPERTENSION: Status: ACTIVE | Noted: 2024-12-18

## 2024-12-18 PROBLEM — Z00.00 HEALTH CARE MAINTENANCE: Status: ACTIVE | Noted: 2024-12-17

## 2024-12-18 LAB
ALBUMIN SERPL ELPH-MCNC: 2.8 G/DL
ALP BLD-CCNC: 159 U/L
ALT SERPL-CCNC: 30 U/L
ANION GAP SERPL CALC-SCNC: 15 MMOL/L
AST SERPL-CCNC: 52 U/L
BASOPHILS # BLD AUTO: 0.1 K/UL
BASOPHILS NFR BLD AUTO: 1.3 %
BILIRUB SERPL-MCNC: 1.3 MG/DL
BUN SERPL-MCNC: 17 MG/DL
CALCIUM SERPL-MCNC: 8.4 MG/DL
CHLORIDE SERPL-SCNC: 105 MMOL/L
CO2 SERPL-SCNC: 22 MMOL/L
CREAT SERPL-MCNC: 0.72 MG/DL
EGFR: 113 ML/MIN/1.73M2
EOSINOPHIL # BLD AUTO: 0.21 K/UL
EOSINOPHIL NFR BLD AUTO: 2.6 %
ESTIMATED AVERAGE GLUCOSE: 74 MG/DL
GLUCOSE SERPL-MCNC: 130 MG/DL
HBA1C MFR BLD HPLC: 4.2 %
HCT VFR BLD CALC: 28.5 %
HGB BLD-MCNC: 8.6 G/DL
IMM GRANULOCYTES NFR BLD AUTO: 0.1 %
LYMPHOCYTES # BLD AUTO: 1.88 K/UL
LYMPHOCYTES NFR BLD AUTO: 23.6 %
MAN DIFF?: NORMAL
MCHC RBC-ENTMCNC: 29.7 PG
MCHC RBC-ENTMCNC: 30.2 G/DL
MCV RBC AUTO: 98.3 FL
MONOCYTES # BLD AUTO: 0.99 K/UL
MONOCYTES NFR BLD AUTO: 12.4 %
NEUTROPHILS # BLD AUTO: 4.78 K/UL
NEUTROPHILS NFR BLD AUTO: 60 %
PLATELET # BLD AUTO: 240 K/UL
POTASSIUM SERPL-SCNC: 4 MMOL/L
PROT SERPL-MCNC: 8.4 G/DL
RBC # BLD: 2.9 M/UL
RBC # FLD: 18.8 %
SODIUM SERPL-SCNC: 142 MMOL/L
WBC # FLD AUTO: 7.97 K/UL

## 2024-12-18 RX ORDER — FOLIC ACID 1 MG/1
1 TABLET ORAL DAILY
Refills: 0 | Status: ACTIVE | COMMUNITY
Start: 2024-12-18

## 2024-12-18 RX ORDER — CARVEDILOL 3.12 MG/1
3.12 TABLET, FILM COATED ORAL TWICE DAILY
Refills: 0 | Status: ACTIVE | COMMUNITY
Start: 2024-12-18

## 2024-12-18 RX ORDER — SPIRONOLACTONE 25 MG/1
25 TABLET ORAL DAILY
Refills: 0 | Status: ACTIVE | COMMUNITY
Start: 2024-12-18

## 2024-12-18 RX ORDER — FAMOTIDINE 40 MG/1
40 TABLET, FILM COATED ORAL DAILY
Refills: 0 | Status: ACTIVE | COMMUNITY
Start: 2024-12-18

## 2024-12-18 RX ORDER — PANTOPRAZOLE 40 MG/1
40 TABLET, DELAYED RELEASE ORAL DAILY
Refills: 0 | Status: ACTIVE | COMMUNITY
Start: 2024-12-18

## 2024-12-18 RX ORDER — FUROSEMIDE 40 MG/1
40 TABLET ORAL TWICE DAILY
Refills: 0 | Status: ACTIVE | COMMUNITY
Start: 2024-12-18

## 2024-12-25 LAB
CULTURE RESULTS: SIGNIFICANT CHANGE UP
SPECIMEN SOURCE: SIGNIFICANT CHANGE UP

## 2025-01-02 ENCOUNTER — TRANSCRIPTION ENCOUNTER (OUTPATIENT)
Age: 50
End: 2025-01-02

## 2025-01-02 ENCOUNTER — OUTPATIENT (OUTPATIENT)
Dept: OUTPATIENT SERVICES | Facility: HOSPITAL | Age: 50
LOS: 1 days | End: 2025-01-02

## 2025-01-02 ENCOUNTER — APPOINTMENT (OUTPATIENT)
Dept: GASTROENTEROLOGY | Facility: CLINIC | Age: 50
End: 2025-01-02
Payer: MEDICAID

## 2025-01-02 VITALS
WEIGHT: 192 LBS | DIASTOLIC BLOOD PRESSURE: 62 MMHG | OXYGEN SATURATION: 98 % | BODY MASS INDEX: 25.45 KG/M2 | SYSTOLIC BLOOD PRESSURE: 110 MMHG | HEIGHT: 73 IN | RESPIRATION RATE: 18 BRPM | HEART RATE: 93 BPM

## 2025-01-02 DIAGNOSIS — K76.82 HEPATIC ENCEPHALOPATHY: ICD-10-CM

## 2025-01-02 DIAGNOSIS — E53.8 DEFICIENCY OF OTHER SPECIFIED B GROUP VITAMINS: ICD-10-CM

## 2025-01-02 DIAGNOSIS — K92.1 MELENA: ICD-10-CM

## 2025-01-02 DIAGNOSIS — D50.9 IRON DEFICIENCY ANEMIA, UNSPECIFIED: ICD-10-CM

## 2025-01-02 DIAGNOSIS — K74.60 UNSPECIFIED CIRRHOSIS OF LIVER: ICD-10-CM

## 2025-01-02 DIAGNOSIS — F10.90 ALCOHOL USE, UNSPECIFIED, UNCOMPLICATED: ICD-10-CM

## 2025-01-02 DIAGNOSIS — I85.10 SECONDARY ESOPHAGEAL VARICES W/OUT BLEEDING: ICD-10-CM

## 2025-01-02 DIAGNOSIS — R18.8 OTHER ASCITES: ICD-10-CM

## 2025-01-02 LAB
ALBUMIN SERPL ELPH-MCNC: 3 G/DL
ALP BLD-CCNC: 150 U/L
ALT SERPL-CCNC: 25 U/L
ANION GAP SERPL CALC-SCNC: 11 MMOL/L
AST SERPL-CCNC: 41 U/L
BILIRUB SERPL-MCNC: 2.1 MG/DL
BUN SERPL-MCNC: 17 MG/DL
CALCIUM SERPL-MCNC: 9 MG/DL
CHLORIDE SERPL-SCNC: 102 MMOL/L
CO2 SERPL-SCNC: 25 MMOL/L
CREAT SERPL-MCNC: 0.65 MG/DL
EGFR: 116 ML/MIN/1.73M2
GGT SERPL-CCNC: 23 U/L
GLUCOSE SERPL-MCNC: 125 MG/DL
HCT VFR BLD CALC: 29 %
HEPATITIS A IGG ANTIBODY: NONREACTIVE
HGB BLD-MCNC: 8.5 G/DL
INR PPP: 1.39 RATIO
MCHC RBC-ENTMCNC: 26.7 PG
MCHC RBC-ENTMCNC: 29.3 G/DL
MCV RBC AUTO: 91.2 FL
PLATELET # BLD AUTO: 173 K/UL
POTASSIUM SERPL-SCNC: 4.4 MMOL/L
PROT SERPL-MCNC: 8.7 G/DL
PT BLD: 16.4 SEC
RBC # BLD: 3.18 M/UL
RBC # FLD: 18.6 %
SODIUM SERPL-SCNC: 138 MMOL/L
WBC # FLD AUTO: 7.93 K/UL

## 2025-01-02 PROCEDURE — 99215 OFFICE O/P EST HI 40 MIN: CPT | Mod: GC

## 2025-01-02 RX ORDER — LACTULOSE 10 G/15ML
20 SOLUTION ORAL
Qty: 1 | Refills: 3 | Status: ACTIVE | COMMUNITY
Start: 2025-01-02 | End: 1900-01-01

## 2025-01-02 RX ORDER — POLYETHYLENE GLYCOL 3350 AND ELECTROLYTES WITH LEMON FLAVOR 236; 22.74; 6.74; 5.86; 2.97 G/4L; G/4L; G/4L; G/4L; G/4L
236 POWDER, FOR SOLUTION ORAL
Qty: 1 | Refills: 0 | Status: ACTIVE | COMMUNITY
Start: 2025-01-02 | End: 1900-01-01

## 2025-01-02 RX ORDER — HEPATITIS B VACCINE (RECOMBINANT) ADJUVANTED 20 UG/.5ML
20 INJECTION, SOLUTION INTRAMUSCULAR
Qty: 1 | Refills: 0 | Status: ACTIVE | OUTPATIENT
Start: 2025-01-02

## 2025-01-05 LAB
PETH 16:0/18:1: NEGATIVE NG/ML
PETH 16:0/18:2: NEGATIVE NG/ML
PETH COMMENTS: NORMAL

## 2025-01-06 DIAGNOSIS — I85.10 SECONDARY ESOPHAGEAL VARICES WITHOUT BLEEDING: ICD-10-CM

## 2025-01-06 DIAGNOSIS — D50.9 IRON DEFICIENCY ANEMIA, UNSPECIFIED: ICD-10-CM

## 2025-01-06 DIAGNOSIS — R18.8 OTHER ASCITES: ICD-10-CM

## 2025-01-06 DIAGNOSIS — K76.82 HEPATIC ENCEPHALOPATHY: ICD-10-CM

## 2025-01-06 DIAGNOSIS — F10.90 ALCOHOL USE, UNSPECIFIED, UNCOMPLICATED: ICD-10-CM

## 2025-01-06 DIAGNOSIS — E53.8 DEFICIENCY OF OTHER SPECIFIED B GROUP VITAMINS: ICD-10-CM

## 2025-01-06 DIAGNOSIS — K74.60 UNSPECIFIED CIRRHOSIS OF LIVER: ICD-10-CM

## 2025-01-06 DIAGNOSIS — K92.1 MELENA: ICD-10-CM

## 2025-01-07 ENCOUNTER — APPOINTMENT (OUTPATIENT)
Dept: INTERVENTIONAL RADIOLOGY/VASCULAR | Facility: HOSPITAL | Age: 50
End: 2025-01-07

## 2025-01-07 ENCOUNTER — RESULT REVIEW (OUTPATIENT)
Age: 50
End: 2025-01-07

## 2025-01-07 ENCOUNTER — OUTPATIENT (OUTPATIENT)
Dept: OUTPATIENT SERVICES | Facility: HOSPITAL | Age: 50
LOS: 1 days | End: 2025-01-07
Payer: MEDICAID

## 2025-01-07 DIAGNOSIS — K74.60 UNSPECIFIED CIRRHOSIS OF LIVER: ICD-10-CM

## 2025-01-07 PROCEDURE — 49083 ABD PARACENTESIS W/IMAGING: CPT

## 2025-01-07 PROCEDURE — C1729: CPT

## 2025-01-07 PROCEDURE — 76942 ECHO GUIDE FOR BIOPSY: CPT | Mod: 26,59

## 2025-01-07 PROCEDURE — C1769: CPT

## 2025-01-07 PROCEDURE — P9047: CPT

## 2025-01-07 PROCEDURE — 76942 ECHO GUIDE FOR BIOPSY: CPT

## 2025-01-07 RX ADMIN — Medication 250 MILLILITER(S): at 13:39

## 2025-01-07 NOTE — PROCEDURE NOTE - PROCEDURE FINDINGS AND DETAILS
10 Liters  serous fluid drained. Catheter removed and a sterile  dressing applied. 10 Liters  serous fluid drained. Catheter removed and a sterile  dressing applied. 250 cc of 25% albumin was administered during the procedure

## 2025-01-08 ENCOUNTER — RESULT REVIEW (OUTPATIENT)
Age: 50
End: 2025-01-08

## 2025-01-08 ENCOUNTER — OUTPATIENT (OUTPATIENT)
Dept: OUTPATIENT SERVICES | Facility: HOSPITAL | Age: 50
LOS: 1 days | Discharge: HOME | End: 2025-01-08
Payer: MEDICAID

## 2025-01-08 ENCOUNTER — TRANSCRIPTION ENCOUNTER (OUTPATIENT)
Age: 50
End: 2025-01-08

## 2025-01-08 VITALS
SYSTOLIC BLOOD PRESSURE: 107 MMHG | OXYGEN SATURATION: 100 % | RESPIRATION RATE: 20 BRPM | HEART RATE: 75 BPM | DIASTOLIC BLOOD PRESSURE: 83 MMHG

## 2025-01-08 VITALS
SYSTOLIC BLOOD PRESSURE: 112 MMHG | HEART RATE: 89 BPM | DIASTOLIC BLOOD PRESSURE: 71 MMHG | OXYGEN SATURATION: 99 % | RESPIRATION RATE: 15 BRPM | HEIGHT: 73 IN | TEMPERATURE: 98 F | WEIGHT: 218.92 LBS

## 2025-01-08 DIAGNOSIS — K74.60 UNSPECIFIED CIRRHOSIS OF LIVER: ICD-10-CM

## 2025-01-08 PROCEDURE — 45385 COLONOSCOPY W/LESION REMOVAL: CPT | Mod: GC

## 2025-01-08 PROCEDURE — 88305 TISSUE EXAM BY PATHOLOGIST: CPT | Mod: 26

## 2025-01-08 PROCEDURE — 43244 EGD VARICES LIGATION: CPT | Mod: GC

## 2025-01-08 DEVICE — SPEEDBAND SPRVW 7: Type: IMPLANTABLE DEVICE | Status: FUNCTIONAL

## 2025-01-08 RX ORDER — SODIUM CHLORIDE 9 MG/ML
500 INJECTION, SOLUTION INTRAVENOUS
Refills: 0 | Status: ACTIVE | OUTPATIENT
Start: 2025-01-08

## 2025-01-08 NOTE — ASU PATIENT PROFILE, ADULT - PATIENT REPRESENTATIVE PHONE
POD 1 s/p lap appy  Pt feelng well.  Wants to go home.  Pain well controlled.  No n/v    Wt Readings from Last 3 Encounters:   09/22/19 79.4 kg (175 lb) (76 %, Z= 0.71)*   12/26/18 84.5 kg (186 lb 4.6 oz) (87 %, Z= 1.13)*   12/15/17 83.1 kg (183 lb 3.2 oz) (88 %, Z= 1.17)*     * Growth percentiles are based on [facility] (Boys, 2-20 Years) data.     Temp Readings from Last 3 Encounters:   09/22/19 98.2 °F (36.8 °C) (Oral)   12/15/17 98.1 °F (36.7 °C) (Oral)   06/03/16 98.3 °F (36.8 °C) (Oral)     BP Readings from Last 3 Encounters:   09/22/19 123/61   12/26/18 128/60   12/15/17 (!) 119/56     Pulse Readings from Last 3 Encounters:   09/22/19 (!) 54   12/26/18 (!) 50   12/15/17 (!) 54     AAOx3  CTA B  Sinus  Soft/nt/nd  2+ pulses B    Lab Results   Component Value Date    WBC 10.62 09/22/2019    HGB 14.7 09/22/2019    HCT 43.5 09/22/2019    MCV 92 09/22/2019     (L) 09/22/2019     BMP  Lab Results   Component Value Date     09/22/2019    K 4.4 09/22/2019     09/22/2019    CO2 26 09/22/2019    BUN 18 09/22/2019    CREATININE 0.9 09/22/2019    CALCIUM 9.4 09/22/2019    ANIONGAP 7 (L) 09/22/2019    ESTGFRAFRICA >60.0 09/22/2019    EGFRNONAA >60.0 09/22/2019     A/P: POD 1 s/p lap appy  Doing well  Ok for d/c   F/u with me in 2 weeks   [phone]

## 2025-01-08 NOTE — PRE PROCEDURE NOTE - PRE PROCEDURE EVALUATION
Attending Physician:  Nick    Procedure: EGD/colonoscopy    Indication for Procedure: variceal surveillance, iron deficiency anemia, melena  ________________________________________________________  PAST MEDICAL & SURGICAL HISTORY:  ETOH abuse      Cirrhosis      Alcoholic hepatitis      GERD (gastroesophageal reflux disease)        ALLERGIES:  No Known Allergies    HOME MEDICATIONS:    AICD/PPM: [ ] yes   [X ] no    PERTINENT LAB DATA:                      PHYSICAL EXAMINATION:    T(C): --  HR: --  BP: --  RR: --  SpO2: --    Constitutional: NAD    Respiratory: Normal effort  Cardiovascular: Regular rate and rhythm   Gastrointestinal: soft, NT/ND  Extremities: No peripheral edema  Neurological: A/O x 3, no focal deficits    COMMENTS:    The patient is a suitable candidate for the planned procedure unless box checked [ ]  No, explain:

## 2025-01-08 NOTE — ASU DISCHARGE PLAN (ADULT/PEDIATRIC) - NS MD DC FALL RISK RISK
For information on Fall & Injury Prevention, visit: [email or web address] OR  [email or web address] OR  https://www.cdc.gov/steadi/patient.html

## 2025-01-08 NOTE — ASU DISCHARGE PLAN (ADULT/PEDIATRIC) - FINANCIAL ASSISTANCE
[facility] provides services at a reduced cost to those who are determined to be eligible through [facility]’s financial assistance program. Information regarding [facility]’s financial assistance program can be found by going to [email or web address] or by calling [phone].

## 2025-01-13 LAB — SURGICAL PATHOLOGY STUDY: SIGNIFICANT CHANGE UP

## 2025-01-14 ENCOUNTER — NON-APPOINTMENT (OUTPATIENT)
Age: 50
End: 2025-01-14

## 2025-01-14 DIAGNOSIS — D12.6 BENIGN NEOPLASM OF COLON, UNSPECIFIED: ICD-10-CM

## 2025-01-21 ENCOUNTER — RESULT REVIEW (OUTPATIENT)
Age: 50
End: 2025-01-21

## 2025-01-21 ENCOUNTER — APPOINTMENT (OUTPATIENT)
Dept: INTERVENTIONAL RADIOLOGY/VASCULAR | Facility: HOSPITAL | Age: 50
End: 2025-01-21

## 2025-01-21 ENCOUNTER — OUTPATIENT (OUTPATIENT)
Dept: OUTPATIENT SERVICES | Facility: HOSPITAL | Age: 50
LOS: 1 days | End: 2025-01-21
Payer: MEDICAID

## 2025-01-21 DIAGNOSIS — K74.60 UNSPECIFIED CIRRHOSIS OF LIVER: ICD-10-CM

## 2025-01-21 PROCEDURE — 76942 ECHO GUIDE FOR BIOPSY: CPT

## 2025-01-21 PROCEDURE — P9047: CPT

## 2025-01-21 PROCEDURE — C1769: CPT

## 2025-01-21 PROCEDURE — 49083 ABD PARACENTESIS W/IMAGING: CPT

## 2025-01-21 PROCEDURE — 76942 ECHO GUIDE FOR BIOPSY: CPT | Mod: 26,59

## 2025-01-21 PROCEDURE — C1729: CPT

## 2025-01-21 RX ORDER — ALBUMIN HUMAN 50 G/1000ML
150 SOLUTION INTRAVENOUS ONCE
Refills: 0 | Status: COMPLETED | OUTPATIENT
Start: 2025-01-21 | End: 2025-01-21

## 2025-01-21 RX ADMIN — ALBUMIN HUMAN 150 MILLILITER(S): 50 SOLUTION INTRAVENOUS at 12:58

## 2025-01-26 NOTE — PROGRESS NOTE ADULT - ASSESSMENT
Pt is a 48 yo M with PMH GERD, fTUD, prior ETOH use d/o, cirrhosis (ascites), esophageal varices, and alcoholic hepatitis p/w abd pain and distention, difficulty performing ADLs, poor appetite/PO intake, nausea, weakness, dark stool, and L>R LE edema. Ran out of meds x few months and has not f/u with outpt doctors. Found to have Hb 6.1 s/p 3U RBCs with Hb 8. Also with elevated bilirubin, direct predominance, and elevated lipase. CXR benign, LE dopplers neg, and US abd with large volume ascites. Now s/p 72h protonix IV BID and octreotide gtt; on daily PPI. Hepatology following s/p EGD 11/26 with recently bleeding large esophageal varices s/p banding; now on regular diet. Procedure team performed para 11/27 with 18L output and s/p albumin. BP improving and now starting home meds - spironolactone, lasix, and coreg; uptitrating as able.  show

## 2025-02-11 ENCOUNTER — APPOINTMENT (OUTPATIENT)
Dept: INTERVENTIONAL RADIOLOGY/VASCULAR | Facility: HOSPITAL | Age: 50
End: 2025-02-11

## 2025-02-18 ENCOUNTER — APPOINTMENT (OUTPATIENT)
Dept: INTERVENTIONAL RADIOLOGY/VASCULAR | Facility: HOSPITAL | Age: 50
End: 2025-02-18

## 2025-02-20 DIAGNOSIS — K70.31 ALCOHOLIC CIRRHOSIS OF LIVER WITH ASCITES: ICD-10-CM

## 2025-02-25 ENCOUNTER — APPOINTMENT (OUTPATIENT)
Dept: INTERVENTIONAL RADIOLOGY/VASCULAR | Facility: HOSPITAL | Age: 50
End: 2025-02-25

## 2025-02-27 ENCOUNTER — OUTPATIENT (OUTPATIENT)
Dept: OUTPATIENT SERVICES | Facility: HOSPITAL | Age: 50
LOS: 1 days | End: 2025-02-27
Payer: MEDICAID

## 2025-02-27 ENCOUNTER — RESULT REVIEW (OUTPATIENT)
Age: 50
End: 2025-02-27

## 2025-02-27 ENCOUNTER — APPOINTMENT (OUTPATIENT)
Dept: INTERVENTIONAL RADIOLOGY/VASCULAR | Facility: HOSPITAL | Age: 50
End: 2025-02-27

## 2025-02-27 DIAGNOSIS — K74.60 UNSPECIFIED CIRRHOSIS OF LIVER: ICD-10-CM

## 2025-02-27 LAB
HCT VFR BLD CALC: 24.7 % — LOW (ref 39–50)
HGB BLD-MCNC: 7.8 G/DL — LOW (ref 13–17)
MCHC RBC-ENTMCNC: 25.7 PG — LOW (ref 27–34)
MCHC RBC-ENTMCNC: 31.6 G/DL — LOW (ref 32–36)
MCV RBC AUTO: 81.3 FL — SIGNIFICANT CHANGE UP (ref 80–100)
NRBC BLD AUTO-RTO: 0 /100 WBCS — SIGNIFICANT CHANGE UP (ref 0–0)
PLATELET # BLD AUTO: 152 K/UL — SIGNIFICANT CHANGE UP (ref 150–400)
RBC # BLD: 3.04 M/UL — LOW (ref 4.2–5.8)
RBC # FLD: 17.6 % — HIGH (ref 10.3–14.5)
WBC # BLD: 4.54 K/UL — SIGNIFICANT CHANGE UP (ref 3.8–10.5)
WBC # FLD AUTO: 4.54 K/UL — SIGNIFICANT CHANGE UP (ref 3.8–10.5)

## 2025-02-27 PROCEDURE — 76942 ECHO GUIDE FOR BIOPSY: CPT | Mod: 26,59

## 2025-02-27 PROCEDURE — C1729: CPT

## 2025-02-27 PROCEDURE — C1769: CPT

## 2025-02-27 PROCEDURE — 76942 ECHO GUIDE FOR BIOPSY: CPT

## 2025-02-27 PROCEDURE — P9047: CPT

## 2025-02-27 PROCEDURE — 36415 COLL VENOUS BLD VENIPUNCTURE: CPT

## 2025-02-27 PROCEDURE — 49083 ABD PARACENTESIS W/IMAGING: CPT

## 2025-02-27 PROCEDURE — 85027 COMPLETE CBC AUTOMATED: CPT

## 2025-02-27 RX ORDER — ALBUMIN (HUMAN) 12.5 G/50ML
200 INJECTION, SOLUTION INTRAVENOUS ONCE
Refills: 0 | Status: COMPLETED | OUTPATIENT
Start: 2025-02-27 | End: 2025-02-27

## 2025-02-27 RX ADMIN — ALBUMIN (HUMAN) 100 MILLILITER(S): 12.5 INJECTION, SOLUTION INTRAVENOUS at 13:11

## 2025-03-06 ENCOUNTER — OUTPATIENT (OUTPATIENT)
Dept: OUTPATIENT SERVICES | Facility: HOSPITAL | Age: 50
LOS: 1 days | End: 2025-03-06

## 2025-03-06 ENCOUNTER — APPOINTMENT (OUTPATIENT)
Dept: GASTROENTEROLOGY | Facility: CLINIC | Age: 50
End: 2025-03-06

## 2025-03-06 VITALS
DIASTOLIC BLOOD PRESSURE: 75 MMHG | HEART RATE: 88 BPM | HEIGHT: 73 IN | WEIGHT: 161 LBS | SYSTOLIC BLOOD PRESSURE: 127 MMHG | OXYGEN SATURATION: 98 % | BODY MASS INDEX: 21.34 KG/M2

## 2025-03-06 DIAGNOSIS — K74.60 UNSPECIFIED CIRRHOSIS OF LIVER: ICD-10-CM

## 2025-03-06 DIAGNOSIS — D12.6 BENIGN NEOPLASM OF COLON, UNSPECIFIED: ICD-10-CM

## 2025-03-06 DIAGNOSIS — F10.90 ALCOHOL USE, UNSPECIFIED, UNCOMPLICATED: ICD-10-CM

## 2025-03-06 DIAGNOSIS — K70.31 ALCOHOLIC CIRRHOSIS OF LIVER WITH ASCITES: ICD-10-CM

## 2025-03-06 DIAGNOSIS — I85.00 ESOPHAGEAL VARICES W/OUT BLEEDING: ICD-10-CM

## 2025-03-06 PROCEDURE — 99214 OFFICE O/P EST MOD 30 MIN: CPT | Mod: GC

## 2025-03-06 RX ORDER — HEPATITIS B VACCINE (RECOMBINANT) ADJUVANTED 20 UG/.5ML
20 INJECTION, SOLUTION INTRAMUSCULAR
Qty: 1 | Refills: 0 | Status: ACTIVE | COMMUNITY
Start: 2025-03-06 | End: 1900-01-01

## 2025-03-13 DIAGNOSIS — K74.60 UNSPECIFIED CIRRHOSIS OF LIVER: ICD-10-CM

## 2025-03-13 DIAGNOSIS — K70.31 ALCOHOLIC CIRRHOSIS OF LIVER WITH ASCITES: ICD-10-CM

## 2025-03-13 DIAGNOSIS — F10.90 ALCOHOL USE, UNSPECIFIED, UNCOMPLICATED: ICD-10-CM

## 2025-03-13 DIAGNOSIS — D12.6 BENIGN NEOPLASM OF COLON, UNSPECIFIED: ICD-10-CM

## 2025-03-13 DIAGNOSIS — I85.00 ESOPHAGEAL VARICES WITHOUT BLEEDING: ICD-10-CM

## 2025-03-14 RX ORDER — RIFAXIMIN 550 MG/1
550 TABLET ORAL
Qty: 60 | Refills: 6 | Status: ACTIVE | COMMUNITY
Start: 2025-03-06 | End: 1900-01-01

## 2025-03-20 NOTE — PATIENT PROFILE ADULT - CENTRAL VENOUS CATHETER/PICC LINE
3/20/2025    Guillermo Martinez   2016        To Whom it May Concern;    Please excuse Guillermo Martinez's intermittent absences over the past 2 weeks due to illness.     Sincerely,        YE Barr CNP  
no

## 2025-04-02 ENCOUNTER — NON-APPOINTMENT (OUTPATIENT)
Age: 50
End: 2025-04-02

## 2025-04-02 ENCOUNTER — OUTPATIENT (OUTPATIENT)
Dept: OUTPATIENT SERVICES | Facility: HOSPITAL | Age: 50
LOS: 1 days | Discharge: HOME | End: 2025-04-02

## 2025-04-02 DIAGNOSIS — K74.60 UNSPECIFIED CIRRHOSIS OF LIVER: ICD-10-CM

## 2025-04-02 NOTE — PRE PROCEDURE NOTE - PRE PROCEDURE EVALUATION
Pre-Endoscopy Evaluation    Attending Physician:  Dr. Araujo    Procedure: EGD    Indication for Procedure: S/p variceal banding x 3    PAST MEDICAL & SURGICAL HISTORY:  ETOH abuse      Cirrhosis      Alcoholic hepatitis      GERD (gastroesophageal reflux disease)          Allergies    No Known Allergies    Intolerances        Medications: MEDICATIONS  (STANDING):    MEDICATIONS  (PRN):      Pertinent lab data:                      Physical Examination:  Daily     Daily   Vital Signs Last 24 Hrs  T(C): --  T(F): --  HR: --  BP: --  BP(mean): --  RR: --  SpO2: --      GENERAL: no acute distress  NEURO: alert  HEENT: NCAT, no conjunctival pallor appreciated  CHEST: no respiratory distress, no accessory muscle use  CARDIAC: regular rate, +S1/S2  ABDOMEN: soft, nontender, no rebound or guarding  EXTREMITIES: warm, well perfused  SKIN: no lesions noted    Comments:    ASA Class: I []  II []  III [X]  IV []    The patient is a suitable candidate for the planned procedure unless box checked [ ]  No, explain:

## 2025-04-17 ENCOUNTER — NON-APPOINTMENT (OUTPATIENT)
Age: 50
End: 2025-04-17

## 2025-05-17 NOTE — PATIENT PROFILE ADULT - INTERNATIONAL TRAVEL
S/p laparoscopic paraesophageal repair with hiwot fundoplication on 5/15     - FLD  - Start robaxin  - Restart warfarin and therapeutic Lovenox bridge  - Multimodal analgesia  - Encourage ambulation and incentive spirometry  - DC home today      No

## 2025-05-20 NOTE — ASU PATIENT PROFILE, ADULT - FALL HARM RISK - RISK INTERVENTIONS

## 2025-05-20 NOTE — PRE PROCEDURE NOTE - PRE PROCEDURE EVALUATION
Attending Physician: Van                           Procedure: Upper endoscopy    Indication for Procedure: F/u varices  ________________________________________________________  PAST MEDICAL & SURGICAL HISTORY:  ETOH abuse      Cirrhosis      Alcoholic hepatitis      GERD (gastroesophageal reflux disease)        ALLERGIES:  No Known Allergies    HOME MEDICATIONS:    AICD/PPM: [ ] yes   [ ] no    PERTINENT LAB DATA:                      PHYSICAL EXAMINATION:    T(C): --  HR: --  BP: --  RR: --  SpO2: --    Constitutional: NAD    Neck:  No JVD  Respiratory: CTAB/L  Cardiovascular: S1 and S2  Gastrointestinal: BS+, soft, NT/ND  Extremities: No peripheral edema  Neurological: A/O x 3, no focal deficits        COMMENTS:    The patient is a suitable candidate for the planned procedure unless box checked [ ]  No, explain:

## 2025-05-21 ENCOUNTER — TRANSCRIPTION ENCOUNTER (OUTPATIENT)
Age: 50
End: 2025-05-21

## 2025-05-21 ENCOUNTER — OUTPATIENT (OUTPATIENT)
Dept: OUTPATIENT SERVICES | Facility: HOSPITAL | Age: 50
LOS: 1 days | End: 2025-05-21
Payer: MEDICAID

## 2025-05-21 VITALS
RESPIRATION RATE: 18 BRPM | OXYGEN SATURATION: 99 % | HEIGHT: 73 IN | HEART RATE: 98 BPM | SYSTOLIC BLOOD PRESSURE: 118 MMHG | WEIGHT: 169.98 LBS | DIASTOLIC BLOOD PRESSURE: 77 MMHG | TEMPERATURE: 98 F

## 2025-05-21 VITALS
DIASTOLIC BLOOD PRESSURE: 70 MMHG | HEART RATE: 90 BPM | RESPIRATION RATE: 16 BRPM | SYSTOLIC BLOOD PRESSURE: 111 MMHG | OXYGEN SATURATION: 98 %

## 2025-05-21 DIAGNOSIS — K74.60 UNSPECIFIED CIRRHOSIS OF LIVER: ICD-10-CM

## 2025-05-21 PROCEDURE — 43244 EGD VARICES LIGATION: CPT | Mod: GC

## 2025-05-21 DEVICE — SPEEDBAND SPRVW 7: Type: IMPLANTABLE DEVICE | Status: FUNCTIONAL

## 2025-05-21 NOTE — ASU DISCHARGE PLAN (ADULT/PEDIATRIC) - FINANCIAL ASSISTANCE
[facility] provides services at a reduced cost to those who are determined to be eligible through [facility]’s financial assistance program. Information regarding [facility]’s financial assistance program can be found by going to [email or web address] or by calling [phone].

## 2025-06-11 ENCOUNTER — NON-APPOINTMENT (OUTPATIENT)
Age: 50
End: 2025-06-11

## 2025-06-12 ENCOUNTER — APPOINTMENT (OUTPATIENT)
Dept: HEPATOLOGY | Facility: CLINIC | Age: 50
End: 2025-06-12

## 2025-06-12 VITALS
OXYGEN SATURATION: 97 % | RESPIRATION RATE: 16 BRPM | TEMPERATURE: 98.3 F | HEIGHT: 73 IN | HEART RATE: 89 BPM | SYSTOLIC BLOOD PRESSURE: 111 MMHG | WEIGHT: 162 LBS | DIASTOLIC BLOOD PRESSURE: 74 MMHG | BODY MASS INDEX: 21.47 KG/M2

## 2025-06-12 LAB
ALBUMIN SERPL ELPH-MCNC: 3.2 G/DL
ALP BLD-CCNC: 121 U/L
ALT SERPL-CCNC: 16 U/L
ANION GAP SERPL CALC-SCNC: 13 MMOL/L
AST SERPL-CCNC: 32 U/L
BILIRUB DIRECT SERPL-MCNC: 0.92 MG/DL
BILIRUB INDIRECT SERPL-MCNC: 1.9 MG/DL
BILIRUB SERPL-MCNC: 2.8 MG/DL
BUN SERPL-MCNC: 13 MG/DL
CALCIUM SERPL-MCNC: 8.9 MG/DL
CHLORIDE SERPL-SCNC: 100 MMOL/L
CO2 SERPL-SCNC: 21 MMOL/L
CREAT SERPL-MCNC: 1.13 MG/DL
EGFRCR SERPLBLD CKD-EPI 2021: 80 ML/MIN/1.73M2
FERRITIN SERPL-MCNC: 31 NG/ML
GLUCOSE SERPL-MCNC: 104 MG/DL
INR PPP: 1.43 RATIO
IRON SATN MFR SERPL: 26 %
IRON SERPL-MCNC: 113 UG/DL
POTASSIUM SERPL-SCNC: 4.6 MMOL/L
PROT SERPL-MCNC: 8.2 G/DL
PT BLD: 16.8 SEC
SODIUM SERPL-SCNC: 135 MMOL/L
TIBC SERPL-MCNC: 433 UG/DL
UIBC SERPL-MCNC: 320 UG/DL

## 2025-06-12 PROCEDURE — 99215 OFFICE O/P EST HI 40 MIN: CPT

## 2025-06-13 LAB
AFP-TM SERPL-MCNC: 2.6 NG/ML
HCT VFR BLD CALC: 32.6 %
HGB BLD-MCNC: 10.1 G/DL
MCHC RBC-ENTMCNC: 26.4 PG
MCHC RBC-ENTMCNC: 31 G/DL
MCV RBC AUTO: 85.1 FL
PLATELET # BLD AUTO: 224 K/UL
RBC # BLD: 3.83 M/UL
RBC # FLD: 15.3 %
WBC # FLD AUTO: 5.81 K/UL

## 2025-06-18 LAB
PETH 16:0/18:1: NEGATIVE NG/ML
PETH 16:0/18:2: NEGATIVE NG/ML
PETH COMMENTS: NORMAL

## 2025-07-02 ENCOUNTER — NON-APPOINTMENT (OUTPATIENT)
Age: 50
End: 2025-07-02

## 2025-07-02 ENCOUNTER — APPOINTMENT (OUTPATIENT)
Dept: TRANSPLANT | Facility: CLINIC | Age: 50
End: 2025-07-02

## 2025-07-02 ENCOUNTER — APPOINTMENT (OUTPATIENT)
Dept: HEPATOLOGY | Facility: CLINIC | Age: 50
End: 2025-07-02
Payer: COMMERCIAL

## 2025-07-02 VITALS
WEIGHT: 170 LBS | OXYGEN SATURATION: 99 % | DIASTOLIC BLOOD PRESSURE: 62 MMHG | TEMPERATURE: 97.8 F | SYSTOLIC BLOOD PRESSURE: 102 MMHG | RESPIRATION RATE: 147 BRPM | BODY MASS INDEX: 22.53 KG/M2 | HEART RATE: 77 BPM | HEIGHT: 73 IN

## 2025-07-02 PROCEDURE — 99205 OFFICE O/P NEW HI 60 MIN: CPT

## 2025-07-02 PROCEDURE — 99215 OFFICE O/P EST HI 40 MIN: CPT

## 2025-07-02 RX ORDER — POLYETHYLENE GLYCOL 3350 17 G/17G
17 POWDER, FOR SOLUTION ORAL
Qty: 30 | Refills: 5 | Status: ACTIVE | COMMUNITY
Start: 2025-07-02 | End: 1900-01-01

## 2025-07-03 ENCOUNTER — LABORATORY RESULT (OUTPATIENT)
Age: 50
End: 2025-07-03

## 2025-07-04 LAB
ABORH: NORMAL
AFP-TM SERPL-MCNC: 2.5 NG/ML
ALBUMIN SERPL ELPH-MCNC: 3.3 G/DL
ALBUMIN SERPL ELPH-MCNC: 3.4 G/DL
ALP BLD-CCNC: 154 U/L
ALP BLD-CCNC: 158 U/L
ALT SERPL-CCNC: 22 U/L
ALT SERPL-CCNC: 23 U/L
ANION GAP SERPL CALC-SCNC: 12 MMOL/L
ANION GAP SERPL CALC-SCNC: 13 MMOL/L
AST SERPL-CCNC: 37 U/L
AST SERPL-CCNC: 39 U/L
BASOPHILS # BLD AUTO: 0.07 K/UL
BASOPHILS NFR BLD AUTO: 1 %
BILIRUB DIRECT SERPL-MCNC: 0.57 MG/DL
BILIRUB INDIRECT SERPL-MCNC: 0.6 MG/DL
BILIRUB SERPL-MCNC: 1.2 MG/DL
BILIRUB SERPL-MCNC: 1.2 MG/DL
BUN SERPL-MCNC: 17 MG/DL
BUN SERPL-MCNC: 17 MG/DL
CALCIUM SERPL-MCNC: 9.2 MG/DL
CALCIUM SERPL-MCNC: 9.3 MG/DL
CERULOPLASMIN SERPL-MCNC: 24 MG/DL
CHLORIDE SERPL-SCNC: 101 MMOL/L
CHLORIDE SERPL-SCNC: 99 MMOL/L
CHOLEST SERPL-MCNC: 89 MG/DL
CO2 SERPL-SCNC: 20 MMOL/L
CO2 SERPL-SCNC: 22 MMOL/L
COVID-19 SPIKE DOMAIN ANTIBODY INTERPRETATION: POSITIVE
CREAT SERPL-MCNC: 0.9 MG/DL
CREAT SERPL-MCNC: 0.93 MG/DL
EBV DNA SERPL NAA+PROBE-ACNC: NOT DETECTED IU/ML
EBVPCR LOG: NOT DETECTED LOG10IU/ML
EGFRCR SERPLBLD CKD-EPI 2021: 101 ML/MIN/1.73M2
EGFRCR SERPLBLD CKD-EPI 2021: 105 ML/MIN/1.73M2
EOSINOPHIL # BLD AUTO: 0.24 K/UL
EOSINOPHIL NFR BLD AUTO: 3.6 %
ESTIMATED AVERAGE GLUCOSE: 91 MG/DL
ETHANOL BLD-MCNC: <10 MG/DL
FERRITIN SERPL-MCNC: 32 NG/ML
GLUCOSE SERPL-MCNC: 97 MG/DL
GLUCOSE SERPL-MCNC: 98 MG/DL
HBA1C MFR BLD HPLC: 4.8 %
HBV CORE IGG+IGM SER QL: NONREACTIVE
HBV SURFACE AB SERPL IA-ACNC: <3.3 MIU/ML
HBV SURFACE AG SER QL: NONREACTIVE
HCT VFR BLD CALC: 30.3 %
HCV AB SER QL: REACTIVE
HCV S/CO RATIO: 1.09 S/CO
HDLC SERPL-MCNC: 44 MG/DL
HEPATITIS A IGG ANTIBODY: NONREACTIVE
HGB BLD-MCNC: 9.2 G/DL
HIV1+2 AB SPEC QL IA.RAPID: NONREACTIVE
IGA SERPL-MCNC: 1807 MG/DL
IGG SERPL-MCNC: 1817 MG/DL
IGM SERPL-MCNC: 174 MG/DL
IMM GRANULOCYTES NFR BLD AUTO: 0.1 %
INR PPP: 1.18 RATIO
IRON SATN MFR SERPL: 5 %
IRON SERPL-MCNC: 25 UG/DL
LDLC SERPL-MCNC: 34 MG/DL
LYMPHOCYTES # BLD AUTO: 1.94 K/UL
LYMPHOCYTES NFR BLD AUTO: 28.7 %
MAGNESIUM SERPL-MCNC: 1.9 MG/DL
MAN DIFF?: NORMAL
MCHC RBC-ENTMCNC: 26.6 PG
MCHC RBC-ENTMCNC: 30.4 G/DL
MCV RBC AUTO: 87.6 FL
MONOCYTES # BLD AUTO: 1.16 K/UL
MONOCYTES NFR BLD AUTO: 17.2 %
NEUTROPHILS # BLD AUTO: 3.33 K/UL
NEUTROPHILS NFR BLD AUTO: 49.4 %
NONHDLC SERPL-MCNC: 45 MG/DL
PLATELET # BLD AUTO: 225 K/UL
POTASSIUM SERPL-SCNC: 4.9 MMOL/L
POTASSIUM SERPL-SCNC: 5.3 MMOL/L
PROT SERPL-MCNC: 8.2 G/DL
PROT SERPL-MCNC: 8.2 G/DL
PSA SERPL-MCNC: 0.25 NG/ML
PT BLD: 14 SEC
RBC # BLD: 3.46 M/UL
RBC # FLD: 16.7 %
SARS-COV-2 AB SERPL IA-ACNC: >250 U/ML
SODIUM SERPL-SCNC: 132 MMOL/L
SODIUM SERPL-SCNC: 134 MMOL/L
TIBC SERPL-MCNC: 459 UG/DL
TRIGL SERPL-MCNC: 37 MG/DL
UIBC SERPL-MCNC: 434 UG/DL
WBC # FLD AUTO: 6.75 K/UL

## 2025-07-05 LAB
CMV IGG SERPL QL: 3.3 U/ML
CMV IGG SERPL-IMP: POSITIVE
EBV EA AB SER IA-ACNC: 7.13 U/ML
EBV EA AB TITR SER IF: POSITIVE
EBV EA IGG SER QL IA: 230 U/ML
EBV EA IGG SER-ACNC: NEGATIVE
EBV EA IGM SER IA-ACNC: NEGATIVE
EBV PATRN SPEC IB-IMP: NORMAL
EBV VCA IGG SER IA-ACNC: 590 U/ML
EBV VCA IGM SER QL IA: 21.7 U/ML
EPSTEIN-BARR VIRUS CAPSID ANTIGEN IGG: POSITIVE
HSV 1+2 IGG SER IA-IMP: NEGATIVE
HSV 1+2 IGG SER IA-IMP: POSITIVE
HSV1 IGG SER QL: 0.15 INDEX
HSV2 IGG SER QL: 1.83 INDEX
MEV IGG FLD QL IA: 273 AU/ML
MEV IGG+IGM SER-IMP: POSITIVE
MUV AB SER-ACNC: POSITIVE
MUV IGG SER QL IA: 191 AU/ML
RUBV IGG FLD-ACNC: 5.53 INDEX
RUBV IGG FLD-ACNC: 5.53 INDEX
RUBV IGG SER-IMP: POSITIVE
RUBV IGG SER-IMP: POSITIVE
T GONDII AB SER-IMP: NEGATIVE
T GONDII IGG SER QL: 3.23 IU/ML
TTG IGA SER IA-ACNC: 0.8 U/ML
TTG IGA SER-ACNC: NEGATIVE
TTG IGG SER IA-ACNC: <0.8 U/ML
TTG IGG SER IA-ACNC: NEGATIVE
VZV AB TITR SER: POSITIVE
VZV IGG SER IF-ACNC: 14.2 S/CO

## 2025-07-06 LAB — T PALLIDUM AB SER QL IA: NEGATIVE

## 2025-07-07 ENCOUNTER — NON-APPOINTMENT (OUTPATIENT)
Age: 50
End: 2025-07-07

## 2025-07-07 LAB
DRUG ABUSE PANEL-9, SERUM: NORMAL
LKM AB SER QL IF: <20.1 UNITS
M PROTEIN SPEC IFE-MCNC: NORMAL
M TB IFN-G BLD-IMP: NEGATIVE
QUANTIFERON TB PLUS MITOGEN MINUS NIL: 5.54 IU/ML
QUANTIFERON TB PLUS NIL: 0.02 IU/ML
QUANTIFERON TB PLUS TB1 MINUS NIL: 0 IU/ML
QUANTIFERON TB PLUS TB2 MINUS NIL: 0.01 IU/ML
STRONGYLOIDES AB SER IA-ACNC: POSITIVE

## 2025-07-08 LAB
A1AT PHENOTYP SERPL-IMP: NORMAL
A1AT SERPL-MCNC: 201 MG/DL
ANA TITR SER: NEGATIVE
MITOCHONDRIA AB SER IF-ACNC: NORMAL
SMOOTH MUSCLE AB SER QL IF: NORMAL
SOLUBLE LIVER IGG SER IA-ACNC: 1.5

## 2025-07-09 LAB
ACARBOXYPROTHROMBIN SERPL-MCNC: 1.9 NG/ML
PETH 16:0/18:1: NEGATIVE NG/ML
PETH 16:0/18:2: NEGATIVE NG/ML
PETH COMMENTS: NORMAL

## 2025-07-11 PROBLEM — D47.2 IGG LAMBDA MONOCLONAL GAMMOPATHY: Status: ACTIVE | Noted: 2025-07-11

## 2025-07-15 ENCOUNTER — LABORATORY RESULT (OUTPATIENT)
Age: 50
End: 2025-07-15

## 2025-07-15 PROBLEM — Z59.41 FOOD INSECURITY: Status: ACTIVE | Noted: 2025-07-02

## 2025-07-21 ENCOUNTER — LABORATORY RESULT (OUTPATIENT)
Age: 50
End: 2025-07-21

## 2025-07-24 ENCOUNTER — APPOINTMENT (OUTPATIENT)
Dept: HEART AND VASCULAR | Facility: CLINIC | Age: 50
End: 2025-07-24
Payer: MEDICAID

## 2025-07-24 VITALS
TEMPERATURE: 97.4 F | BODY MASS INDEX: 22.53 KG/M2 | WEIGHT: 170 LBS | HEIGHT: 73 IN | DIASTOLIC BLOOD PRESSURE: 56 MMHG | OXYGEN SATURATION: 99 % | HEART RATE: 70 BPM | SYSTOLIC BLOOD PRESSURE: 94 MMHG

## 2025-07-24 PROCEDURE — 99204 OFFICE O/P NEW MOD 45 MIN: CPT | Mod: 25

## 2025-07-24 PROCEDURE — 93000 ELECTROCARDIOGRAM COMPLETE: CPT | Mod: NC

## 2025-07-25 ENCOUNTER — APPOINTMENT (OUTPATIENT)
Dept: INFECTIOUS DISEASE | Facility: CLINIC | Age: 50
End: 2025-07-25
Payer: COMMERCIAL

## 2025-07-25 VITALS
OXYGEN SATURATION: 99 % | TEMPERATURE: 98.1 F | SYSTOLIC BLOOD PRESSURE: 92 MMHG | BODY MASS INDEX: 23.19 KG/M2 | WEIGHT: 175 LBS | HEART RATE: 69 BPM | DIASTOLIC BLOOD PRESSURE: 65 MMHG | HEIGHT: 73 IN

## 2025-07-25 DIAGNOSIS — R18.8 OTHER ASCITES: ICD-10-CM

## 2025-07-25 DIAGNOSIS — Z83.3 FAMILY HISTORY OF DIABETES MELLITUS: ICD-10-CM

## 2025-07-25 DIAGNOSIS — Z87.891 PERSONAL HISTORY OF NICOTINE DEPENDENCE: ICD-10-CM

## 2025-07-25 DIAGNOSIS — F12.90 CANNABIS USE, UNSPECIFIED, UNCOMPLICATED: ICD-10-CM

## 2025-07-25 DIAGNOSIS — Z83.79 FAMILY HISTORY OF OTHER DISEASES OF THE DIGESTIVE SYSTEM: ICD-10-CM

## 2025-07-25 DIAGNOSIS — D12.6 BENIGN NEOPLASM OF COLON, UNSPECIFIED: ICD-10-CM

## 2025-07-25 DIAGNOSIS — D50.9 IRON DEFICIENCY ANEMIA, UNSPECIFIED: ICD-10-CM

## 2025-07-25 DIAGNOSIS — I85.00 ESOPHAGEAL VARICES W/OUT BLEEDING: ICD-10-CM

## 2025-07-25 DIAGNOSIS — I85.10 SECONDARY ESOPHAGEAL VARICES W/OUT BLEEDING: ICD-10-CM

## 2025-07-25 DIAGNOSIS — B78.9 STRONGYLOIDIASIS, UNSPECIFIED: ICD-10-CM

## 2025-07-25 DIAGNOSIS — K76.82 HEPATIC ENCEPHALOPATHY: ICD-10-CM

## 2025-07-25 DIAGNOSIS — Z01.818 ENCOUNTER FOR OTHER PREPROCEDURAL EXAMINATION: ICD-10-CM

## 2025-07-25 DIAGNOSIS — K74.60 UNSPECIFIED CIRRHOSIS OF LIVER: ICD-10-CM

## 2025-07-25 DIAGNOSIS — K92.1 MELENA: ICD-10-CM

## 2025-07-25 DIAGNOSIS — F10.90 ALCOHOL USE, UNSPECIFIED, UNCOMPLICATED: ICD-10-CM

## 2025-07-25 DIAGNOSIS — K76.6 PORTAL HYPERTENSION: ICD-10-CM

## 2025-07-25 DIAGNOSIS — Z56.0 UNEMPLOYMENT, UNSPECIFIED: ICD-10-CM

## 2025-07-25 PROCEDURE — 99215 OFFICE O/P EST HI 40 MIN: CPT

## 2025-07-25 RX ORDER — IVERMECTIN 3 MG/1
3 TABLET ORAL DAILY
Qty: 12 | Refills: 1 | Status: ACTIVE | COMMUNITY
Start: 2025-07-25 | End: 1900-01-01

## 2025-07-25 SDOH — ECONOMIC STABILITY - INCOME SECURITY: UNEMPLOYMENT, UNSPECIFIED: Z56.0

## 2025-07-28 ENCOUNTER — OUTPATIENT (OUTPATIENT)
Dept: OUTPATIENT SERVICES | Facility: HOSPITAL | Age: 50
LOS: 1 days | End: 2025-07-28

## 2025-07-28 ENCOUNTER — APPOINTMENT (OUTPATIENT)
Dept: MRI IMAGING | Facility: CLINIC | Age: 50
End: 2025-07-28
Payer: COMMERCIAL

## 2025-07-28 ENCOUNTER — APPOINTMENT (OUTPATIENT)
Dept: CT IMAGING | Facility: CLINIC | Age: 50
End: 2025-07-28
Payer: COMMERCIAL

## 2025-07-28 PROCEDURE — 74183 MRI ABD W/O CNTR FLWD CNTR: CPT | Mod: 26

## 2025-07-28 PROCEDURE — 71250 CT THORAX DX C-: CPT | Mod: 26

## 2025-07-30 ENCOUNTER — RESULT REVIEW (OUTPATIENT)
Age: 50
End: 2025-07-30

## 2025-07-30 ENCOUNTER — OUTPATIENT (OUTPATIENT)
Dept: OUTPATIENT SERVICES | Facility: HOSPITAL | Age: 50
LOS: 1 days | End: 2025-07-30
Payer: COMMERCIAL

## 2025-07-30 DIAGNOSIS — K74.60 UNSPECIFIED CIRRHOSIS OF LIVER: ICD-10-CM

## 2025-07-30 DIAGNOSIS — Z01.818 ENCOUNTER FOR OTHER PREPROCEDURAL EXAMINATION: ICD-10-CM

## 2025-07-30 DIAGNOSIS — K70.31 ALCOHOLIC CIRRHOSIS OF LIVER WITH ASCITES: ICD-10-CM

## 2025-07-30 DIAGNOSIS — K76.82 HEPATIC ENCEPHALOPATHY: ICD-10-CM

## 2025-07-30 PROCEDURE — 93351 STRESS TTE COMPLETE: CPT | Mod: 26

## 2025-07-30 PROCEDURE — 93356 MYOCRD STRAIN IMG SPCKL TRCK: CPT

## 2025-07-30 PROCEDURE — 93306 TTE W/DOPPLER COMPLETE: CPT | Mod: 26,59

## 2025-08-07 ENCOUNTER — NON-APPOINTMENT (OUTPATIENT)
Age: 50
End: 2025-08-07

## 2025-08-11 ENCOUNTER — APPOINTMENT (OUTPATIENT)
Dept: HEPATOLOGY | Facility: CLINIC | Age: 50
End: 2025-08-11

## 2025-08-11 ENCOUNTER — NON-APPOINTMENT (OUTPATIENT)
Age: 50
End: 2025-08-11

## 2025-08-11 VITALS
WEIGHT: 190 LBS | DIASTOLIC BLOOD PRESSURE: 58 MMHG | HEART RATE: 86 BPM | BODY MASS INDEX: 25.07 KG/M2 | SYSTOLIC BLOOD PRESSURE: 98 MMHG | TEMPERATURE: 97.9 F

## 2025-08-11 LAB
ALBUMIN SERPL ELPH-MCNC: 3.5 G/DL
ALP BLD-CCNC: 132 U/L
ALT SERPL-CCNC: 21 U/L
ANION GAP SERPL CALC-SCNC: 12 MMOL/L
AST SERPL-CCNC: 35 U/L
BASOPHILS # BLD AUTO: 0.07 K/UL
BASOPHILS NFR BLD AUTO: 1.2 %
BILIRUB SERPL-MCNC: 1 MG/DL
BUN SERPL-MCNC: 21 MG/DL
CALCIUM SERPL-MCNC: 9 MG/DL
CHLORIDE SERPL-SCNC: 102 MMOL/L
CO2 SERPL-SCNC: 19 MMOL/L
CREAT SERPL-MCNC: 1.02 MG/DL
EGFRCR SERPLBLD CKD-EPI 2021: 91 ML/MIN/1.73M2
EOSINOPHIL # BLD AUTO: 0.23 K/UL
EOSINOPHIL NFR BLD AUTO: 3.9 %
GLUCOSE SERPL-MCNC: 128 MG/DL
HCT VFR BLD CALC: 29.7 %
HGB BLD-MCNC: 9 G/DL
IMM GRANULOCYTES NFR BLD AUTO: 0.2 %
INR PPP: 1.21 RATIO
LYMPHOCYTES # BLD AUTO: 1.5 K/UL
LYMPHOCYTES NFR BLD AUTO: 25.7 %
MAN DIFF?: NORMAL
MCHC RBC-ENTMCNC: 25.1 PG
MCHC RBC-ENTMCNC: 30.3 G/DL
MCV RBC AUTO: 82.7 FL
MONOCYTES # BLD AUTO: 0.94 K/UL
MONOCYTES NFR BLD AUTO: 16.1 %
NEUTROPHILS # BLD AUTO: 3.08 K/UL
NEUTROPHILS NFR BLD AUTO: 52.9 %
PLATELET # BLD AUTO: 181 K/UL
POTASSIUM SERPL-SCNC: 4.6 MMOL/L
PROT SERPL-MCNC: 8 G/DL
PT BLD: 14.2 SEC
RBC # BLD: 3.59 M/UL
RBC # FLD: 17.2 %
SODIUM SERPL-SCNC: 133 MMOL/L
WBC # FLD AUTO: 5.83 K/UL

## 2025-08-21 ENCOUNTER — RESULT REVIEW (OUTPATIENT)
Age: 50
End: 2025-08-21

## 2025-08-21 ENCOUNTER — APPOINTMENT (OUTPATIENT)
Dept: HEMATOLOGY ONCOLOGY | Facility: CLINIC | Age: 50
End: 2025-08-21
Payer: MEDICAID

## 2025-08-21 ENCOUNTER — NON-APPOINTMENT (OUTPATIENT)
Age: 50
End: 2025-08-21

## 2025-08-21 VITALS
DIASTOLIC BLOOD PRESSURE: 61 MMHG | TEMPERATURE: 98.1 F | OXYGEN SATURATION: 98 % | RESPIRATION RATE: 17 BRPM | HEART RATE: 82 BPM | BODY MASS INDEX: 25.27 KG/M2 | SYSTOLIC BLOOD PRESSURE: 98 MMHG | HEIGHT: 73 IN | WEIGHT: 190.68 LBS

## 2025-08-21 DIAGNOSIS — Z87.891 PERSONAL HISTORY OF NICOTINE DEPENDENCE: ICD-10-CM

## 2025-08-21 DIAGNOSIS — K92.1 MELENA: ICD-10-CM

## 2025-08-21 DIAGNOSIS — I85.10 SECONDARY ESOPHAGEAL VARICES W/OUT BLEEDING: ICD-10-CM

## 2025-08-21 DIAGNOSIS — Z83.3 FAMILY HISTORY OF DIABETES MELLITUS: ICD-10-CM

## 2025-08-21 DIAGNOSIS — Z83.79 FAMILY HISTORY OF OTHER DISEASES OF THE DIGESTIVE SYSTEM: ICD-10-CM

## 2025-08-21 PROBLEM — D47.2: Status: ACTIVE | Noted: 2025-08-21

## 2025-08-21 LAB
ALBUMIN SERPL ELPH-MCNC: 3.3 G/DL
ALP BLD-CCNC: 106 U/L
ALT SERPL-CCNC: 18 U/L
ANION GAP SERPL CALC-SCNC: 15 MMOL/L
AST SERPL-CCNC: 27 U/L
BASOPHILS # BLD AUTO: 0.07 K/UL
BASOPHILS NFR BLD AUTO: 1.4 %
BILIRUB DIRECT SERPL-MCNC: 0.57 MG/DL
BILIRUB INDIRECT SERPL-MCNC: 0.5 MG/DL
BILIRUB SERPL-MCNC: 1.1 MG/DL
BUN SERPL-MCNC: 18 MG/DL
CALCIUM SERPL-MCNC: 8.4 MG/DL
CHLORIDE SERPL-SCNC: 101 MMOL/L
CO2 SERPL-SCNC: 20 MMOL/L
CREAT SERPL-MCNC: 1.13 MG/DL
EGFRCR SERPLBLD CKD-EPI 2021: 80 ML/MIN/1.73M2
EOSINOPHIL # BLD AUTO: 0.21 K/UL
EOSINOPHIL NFR BLD AUTO: 4.3 %
GLUCOSE SERPL-MCNC: 116 MG/DL
HCT VFR BLD CALC: 28.6 %
HGB BLD-MCNC: 8.8 G/DL
IMM GRANULOCYTES NFR BLD AUTO: 0.2 %
INR PPP: 1.33 RATIO
LYMPHOCYTES # BLD AUTO: 1.31 K/UL
LYMPHOCYTES NFR BLD AUTO: 27.1 %
MAN DIFF?: NORMAL
MCHC RBC-ENTMCNC: 24.9 PG
MCHC RBC-ENTMCNC: 30.8 G/DL
MCV RBC AUTO: 80.8 FL
MONOCYTES # BLD AUTO: 0.81 K/UL
MONOCYTES NFR BLD AUTO: 16.8 %
NEUTROPHILS # BLD AUTO: 2.42 K/UL
NEUTROPHILS NFR BLD AUTO: 50.2 %
PLATELET # BLD AUTO: 177 K/UL
POTASSIUM SERPL-SCNC: 4.3 MMOL/L
PROT SERPL-MCNC: 7.6 G/DL
PT BLD: 15.4 SEC
RBC # BLD: 3.54 M/UL
RBC # FLD: 17.3 %
SODIUM SERPL-SCNC: 136 MMOL/L
WBC # FLD AUTO: 4.83 K/UL

## 2025-08-21 PROCEDURE — 99204 OFFICE O/P NEW MOD 45 MIN: CPT

## 2025-08-21 RX ORDER — CHLORHEXIDINE GLUCONATE 4 %
325 (65 FE) LIQUID (ML) TOPICAL DAILY
Qty: 30 | Refills: 6 | Status: ACTIVE | COMMUNITY
Start: 2025-08-21 | End: 1900-01-01

## 2025-08-22 ENCOUNTER — RESULT REVIEW (OUTPATIENT)
Age: 50
End: 2025-08-22

## 2025-08-22 ENCOUNTER — APPOINTMENT (OUTPATIENT)
Dept: INTERVENTIONAL RADIOLOGY/VASCULAR | Facility: HOSPITAL | Age: 50
End: 2025-08-22

## 2025-08-22 ENCOUNTER — OUTPATIENT (OUTPATIENT)
Dept: OUTPATIENT SERVICES | Facility: HOSPITAL | Age: 50
LOS: 1 days | End: 2025-08-22
Payer: MEDICAID

## 2025-08-22 PROCEDURE — 87070 CULTURE OTHR SPECIMN AEROBIC: CPT

## 2025-08-22 PROCEDURE — 89051 BODY FLUID CELL COUNT: CPT

## 2025-08-22 PROCEDURE — 88112 CYTOPATH CELL ENHANCE TECH: CPT | Mod: 26

## 2025-08-22 PROCEDURE — 82945 GLUCOSE OTHER FLUID: CPT

## 2025-08-22 PROCEDURE — 88305 TISSUE EXAM BY PATHOLOGIST: CPT | Mod: 26

## 2025-08-22 PROCEDURE — 49083 ABD PARACENTESIS W/IMAGING: CPT

## 2025-08-22 PROCEDURE — 83615 LACTATE (LD) (LDH) ENZYME: CPT

## 2025-08-22 PROCEDURE — 82042 OTHER SOURCE ALBUMIN QUAN EA: CPT

## 2025-08-22 PROCEDURE — C1830: CPT

## 2025-08-22 PROCEDURE — 84157 ASSAY OF PROTEIN OTHER: CPT

## 2025-08-25 LAB
ALBUMIN MFR SERPL ELPH: 39.8 %
ALBUMIN SERPL ELPH-MCNC: 3.4 G/DL
ALBUMIN SERPL-MCNC: 3.1 G/DL
ALBUMIN/GLOB SERPL: 0.7 RATIO
ALP BLD-CCNC: 104 U/L
ALPHA1 GLOB MFR SERPL ELPH: 3.8 %
ALPHA1 GLOB SERPL ELPH-MCNC: 0.3 G/DL
ALPHA2 GLOB MFR SERPL ELPH: 7.7 %
ALPHA2 GLOB SERPL ELPH-MCNC: 0.6 G/DL
ALT SERPL-CCNC: 20 U/L
ANION GAP SERPL CALC-SCNC: 12 MMOL/L
AST SERPL-CCNC: 34 U/L
B-GLOBULIN MFR SERPL ELPH: 28.4 %
B-GLOBULIN SERPL ELPH-MCNC: 2.2 G/DL
B2 MICROGLOB SERPL-MCNC: 2.7 MG/L
BILIRUB SERPL-MCNC: 1.4 MG/DL
BUN SERPL-MCNC: 18 MG/DL
CALCIUM SERPL-MCNC: 8.8 MG/DL
CHLORIDE SERPL-SCNC: 101 MMOL/L
CO2 SERPL-SCNC: 22 MMOL/L
CREAT SERPL-MCNC: 0.91 MG/DL
DEPRECATED KAPPA LC FREE/LAMBDA SER: 0.73 RATIO
EGFRCR SERPLBLD CKD-EPI 2021: 104 ML/MIN/1.73M2
FERRITIN SERPL-MCNC: 30 NG/ML
FOLATE SERPL-MCNC: 19.8 NG/ML
GAMMA GLOB FLD ELPH-MCNC: 1.6 G/DL
GAMMA GLOB MFR SERPL ELPH: 20.3 %
GLUCOSE SERPL-MCNC: 98 MG/DL
IGA SERPL-MCNC: 1550 MG/DL
IGG SERPL-MCNC: 1640 MG/DL
IGM SERPL-MCNC: 154 MG/DL
INTERPRETATION SERPL IEP-IMP: NORMAL
IRON SATN MFR SERPL: 7 %
IRON SERPL-MCNC: 36 UG/DL
KAPPA LC CSF-MCNC: 10.63 MG/DL
KAPPA LC SERPL-MCNC: 7.8 MG/DL
LDH SERPL-CCNC: 184 U/L
M PROTEIN MFR SERPL ELPH: 12.3 %
M PROTEIN SPEC IFE-MCNC: NORMAL
MONOCLON BAND OBS SERPL: 0.9 G/DL
POTASSIUM SERPL-SCNC: 4.8 MMOL/L
PROT SERPL-MCNC: 7.7 G/DL
PROT SERPL-MCNC: 7.7 G/DL
PROT SERPL-MCNC: 7.8 G/DL
SODIUM SERPL-SCNC: 135 MMOL/L
TIBC SERPL-MCNC: 488 UG/DL
UIBC SERPL-MCNC: 453 UG/DL
VIT B12 SERPL-MCNC: 675 PG/ML

## 2025-08-25 RX ORDER — CHLORHEXIDINE GLUCONATE 4 %
325 (65 FE) LIQUID (ML) TOPICAL
Qty: 90 | Refills: 3 | Status: ACTIVE | COMMUNITY
Start: 2025-08-25 | End: 1900-01-01

## 2025-08-26 ENCOUNTER — APPOINTMENT (OUTPATIENT)
Dept: HEPATOLOGY | Facility: CLINIC | Age: 50
End: 2025-08-26
Payer: MEDICAID

## 2025-08-26 VITALS
OXYGEN SATURATION: 98 % | WEIGHT: 182 LBS | SYSTOLIC BLOOD PRESSURE: 99 MMHG | HEIGHT: 73 IN | TEMPERATURE: 98 F | HEART RATE: 99 BPM | BODY MASS INDEX: 24.12 KG/M2 | DIASTOLIC BLOOD PRESSURE: 65 MMHG

## 2025-08-26 DIAGNOSIS — I95.9 HYPOTENSION, UNSPECIFIED: ICD-10-CM

## 2025-08-26 DIAGNOSIS — Z01.818 ENCOUNTER FOR OTHER PREPROCEDURAL EXAMINATION: ICD-10-CM

## 2025-08-26 DIAGNOSIS — Z59.41 FOOD INSECURITY: ICD-10-CM

## 2025-08-26 DIAGNOSIS — K76.82 HEPATIC ENCEPHALOPATHY: ICD-10-CM

## 2025-08-26 PROCEDURE — 99205 OFFICE O/P NEW HI 60 MIN: CPT

## 2025-08-26 PROCEDURE — G2211 COMPLEX E/M VISIT ADD ON: CPT | Mod: NC

## 2025-08-26 RX ORDER — MIDODRINE HYDROCHLORIDE 10 MG/1
10 TABLET ORAL 3 TIMES DAILY
Qty: 90 | Refills: 5 | Status: ACTIVE | COMMUNITY
Start: 2025-08-26 | End: 1900-01-01

## 2025-08-26 SDOH — ECONOMIC STABILITY - FOOD INSECURITY: FOOD INSECURITY: Z59.41

## 2025-09-03 LAB
ALBUMIN SERPL ELPH-MCNC: 3.6 G/DL
ALP BLD-CCNC: 108 U/L
ALT SERPL-CCNC: 18 U/L
ANION GAP SERPL CALC-SCNC: 15 MMOL/L
AST SERPL-CCNC: 35 U/L
BASOPHILS # BLD AUTO: 0.09 K/UL
BASOPHILS NFR BLD AUTO: 1.2 %
BILIRUB DIRECT SERPL-MCNC: 0.83 MG/DL
BILIRUB INDIRECT SERPL-MCNC: 1.2 MG/DL
BILIRUB SERPL-MCNC: 2 MG/DL
BUN SERPL-MCNC: 19 MG/DL
CALCIUM SERPL-MCNC: 9.4 MG/DL
CHLORIDE SERPL-SCNC: 97 MMOL/L
CO2 SERPL-SCNC: 23 MMOL/L
CREAT SERPL-MCNC: 1.48 MG/DL
EGFRCR SERPLBLD CKD-EPI 2021: 58 ML/MIN/1.73M2
EOSINOPHIL # BLD AUTO: 0.34 K/UL
EOSINOPHIL NFR BLD AUTO: 4.7 %
GLUCOSE SERPL-MCNC: 114 MG/DL
HCT VFR BLD CALC: 35.1 %
HGB BLD-MCNC: 10.6 G/DL
IMM GRANULOCYTES NFR BLD AUTO: 0.1 %
INR PPP: 1.38 RATIO
LYMPHOCYTES # BLD AUTO: 1.84 K/UL
LYMPHOCYTES NFR BLD AUTO: 25.3 %
MAN DIFF?: NORMAL
MCHC RBC-ENTMCNC: 24.6 PG
MCHC RBC-ENTMCNC: 30.2 G/DL
MCV RBC AUTO: 81.4 FL
MONOCYTES # BLD AUTO: 1.14 K/UL
MONOCYTES NFR BLD AUTO: 15.7 %
NEUTROPHILS # BLD AUTO: 3.84 K/UL
NEUTROPHILS NFR BLD AUTO: 53 %
PLATELET # BLD AUTO: 237 K/UL
POTASSIUM SERPL-SCNC: 4.1 MMOL/L
PROT SERPL-MCNC: 8.3 G/DL
PT BLD: 15.9 SEC
RBC # BLD: 4.31 M/UL
RBC # FLD: 18.8 %
SODIUM SERPL-SCNC: 135 MMOL/L
WBC # FLD AUTO: 7.26 K/UL

## 2025-09-04 ENCOUNTER — LABORATORY RESULT (OUTPATIENT)
Age: 50
End: 2025-09-04

## 2025-09-04 ENCOUNTER — RESULT REVIEW (OUTPATIENT)
Age: 50
End: 2025-09-04

## 2025-09-04 ENCOUNTER — APPOINTMENT (OUTPATIENT)
Dept: HEMATOLOGY ONCOLOGY | Facility: CLINIC | Age: 50
End: 2025-09-04
Payer: MEDICAID

## 2025-09-04 VITALS
RESPIRATION RATE: 16 BRPM | SYSTOLIC BLOOD PRESSURE: 104 MMHG | TEMPERATURE: 98.2 F | DIASTOLIC BLOOD PRESSURE: 67 MMHG | HEART RATE: 69 BPM | BODY MASS INDEX: 23.85 KG/M2 | OXYGEN SATURATION: 99 % | WEIGHT: 180.78 LBS

## 2025-09-04 DIAGNOSIS — D47.2 MONOCLONAL GAMMOPATHY: ICD-10-CM

## 2025-09-04 PROCEDURE — 38222 DX BONE MARROW BX & ASPIR: CPT

## 2025-09-05 ENCOUNTER — NON-APPOINTMENT (OUTPATIENT)
Age: 50
End: 2025-09-05

## 2025-09-05 ENCOUNTER — APPOINTMENT (OUTPATIENT)
Dept: RADIOLOGY | Facility: CLINIC | Age: 50
End: 2025-09-05
Payer: MEDICAID

## 2025-09-05 LAB
PETH 16:0/18:1: NEGATIVE NG/ML
PETH 16:0/18:2: NEGATIVE NG/ML
PETH COMMENTS: NORMAL

## 2025-09-05 PROCEDURE — 77075 RADEX OSSEOUS SURVEY COMPL: CPT

## 2025-09-08 ENCOUNTER — APPOINTMENT (OUTPATIENT)
Dept: HEPATOLOGY | Facility: CLINIC | Age: 50
End: 2025-09-08
Payer: MEDICAID

## 2025-09-08 VITALS
TEMPERATURE: 99.4 F | SYSTOLIC BLOOD PRESSURE: 106 MMHG | RESPIRATION RATE: 19 BRPM | DIASTOLIC BLOOD PRESSURE: 69 MMHG | BODY MASS INDEX: 24.25 KG/M2 | HEART RATE: 88 BPM | HEIGHT: 73 IN | OXYGEN SATURATION: 98 % | WEIGHT: 183 LBS

## 2025-09-08 DIAGNOSIS — I85.00 ESOPHAGEAL VARICES W/OUT BLEEDING: ICD-10-CM

## 2025-09-08 DIAGNOSIS — D47.2 MONOCLONAL GAMMOPATHY: ICD-10-CM

## 2025-09-08 DIAGNOSIS — K76.82 HEPATIC ENCEPHALOPATHY: ICD-10-CM

## 2025-09-08 DIAGNOSIS — F10.90 ALCOHOL USE, UNSPECIFIED, UNCOMPLICATED: ICD-10-CM

## 2025-09-08 PROCEDURE — 99215 OFFICE O/P EST HI 40 MIN: CPT

## 2025-09-08 PROCEDURE — G2211 COMPLEX E/M VISIT ADD ON: CPT | Mod: NC

## 2025-09-11 ENCOUNTER — NON-APPOINTMENT (OUTPATIENT)
Age: 50
End: 2025-09-11

## 2025-09-12 ENCOUNTER — LABORATORY RESULT (OUTPATIENT)
Age: 50
End: 2025-09-12

## 2025-09-13 LAB
ALBUMIN SERPL ELPH-MCNC: 3.4 G/DL
ALP BLD-CCNC: 139 U/L
ALT SERPL-CCNC: 20 U/L
ANION GAP SERPL CALC-SCNC: 14 MMOL/L
AST SERPL-CCNC: 39 U/L
BASOPHILS # BLD AUTO: 0.06 K/UL
BASOPHILS NFR BLD AUTO: 0.9 %
BILIRUB DIRECT SERPL-MCNC: 0.52 MG/DL
BILIRUB INDIRECT SERPL-MCNC: 0.7 MG/DL
BILIRUB SERPL-MCNC: 1.2 MG/DL
BUN SERPL-MCNC: 16 MG/DL
CALCIUM SERPL-MCNC: 8.7 MG/DL
CHLORIDE SERPL-SCNC: 99 MMOL/L
CO2 SERPL-SCNC: 21 MMOL/L
CREAT SERPL-MCNC: 1.15 MG/DL
EGFRCR SERPLBLD CKD-EPI 2021: 78 ML/MIN/1.73M2
EOSINOPHIL # BLD AUTO: 0.34 K/UL
EOSINOPHIL NFR BLD AUTO: 5.1 %
GLUCOSE SERPL-MCNC: 115 MG/DL
HCT VFR BLD CALC: 34.4 %
HGB BLD-MCNC: 10.4 G/DL
IMM GRANULOCYTES NFR BLD AUTO: 0.3 %
INR PPP: 1.26 RATIO
LYMPHOCYTES # BLD AUTO: 1.48 K/UL
LYMPHOCYTES NFR BLD AUTO: 22.3 %
MAGNESIUM SERPL-MCNC: 1.9 MG/DL
MAN DIFF?: NORMAL
MCHC RBC-ENTMCNC: 25.2 PG
MCHC RBC-ENTMCNC: 30.2 G/DL
MCV RBC AUTO: 83.5 FL
MONOCYTES # BLD AUTO: 0.95 K/UL
MONOCYTES NFR BLD AUTO: 14.3 %
NEUTROPHILS # BLD AUTO: 3.79 K/UL
NEUTROPHILS NFR BLD AUTO: 57.1 %
PHOSPHATE SERPL-MCNC: 3.8 MG/DL
PLATELET # BLD AUTO: 201 K/UL
POTASSIUM SERPL-SCNC: 4.3 MMOL/L
PROT SERPL-MCNC: 7.8 G/DL
PT BLD: 14.6 SEC
RBC # BLD: 4.12 M/UL
RBC # FLD: 22.5 %
SODIUM SERPL-SCNC: 134 MMOL/L
WBC # FLD AUTO: 6.64 K/UL

## 2025-09-15 DIAGNOSIS — K74.60 UNSPECIFIED CIRRHOSIS OF LIVER: ICD-10-CM

## 2025-09-15 DIAGNOSIS — K70.31 ALCOHOLIC CIRRHOSIS OF LIVER WITH ASCITES: ICD-10-CM

## 2025-09-15 DIAGNOSIS — K76.6 PORTAL HYPERTENSION: ICD-10-CM

## 2025-09-19 LAB
PETH 16:0/18:1: NEGATIVE NG/ML
PETH 16:0/18:2: NEGATIVE NG/ML
PETH COMMENTS: NORMAL

## (undated) DEVICE — TUBING IV SET GRAVITY 3Y 100" MACRO

## (undated) DEVICE — CATH IV SAFE BC 22G X 1" (BLUE)

## (undated) DEVICE — CLAMP BX HOT RAD JAW 3

## (undated) DEVICE — ELCTR ECG CONDUCTIVE ADHESIVE

## (undated) DEVICE — GOWN LG

## (undated) DEVICE — CONTAINER FORMALIN 80ML YELLOW

## (undated) DEVICE — BIOPSY FORCEP COLD DISP

## (undated) DEVICE — LUBRICATING JELLY HR ONE SHOT 3G

## (undated) DEVICE — DRSG BANDAID 0.75X3"

## (undated) DEVICE — BIOPSY FORCEP RADIAL JAW 4 STANDARD WITH NEEDLE

## (undated) DEVICE — Device

## (undated) DEVICE — DRSG 2X2

## (undated) DEVICE — POLY TRAP ETRAP

## (undated) DEVICE — TUBING MEDI-VAC W MAXIGRIP CONNECTORS 1/4"X6'

## (undated) DEVICE — PACK IV START WITH CHG

## (undated) DEVICE — BASIN EMESIS 10IN GRADUATED MAUVE

## (undated) DEVICE — BITE BLOCK ADULT 20 X 27MM (GREEN)

## (undated) DEVICE — SOLIDIFIER 1200CC

## (undated) DEVICE — UNDERPAD LINEN SAVER 17 X 24"

## (undated) DEVICE — TUBING IV SET GRAVITY 1Y 78" MACRO

## (undated) DEVICE — ANESTHESIA CIRCUIT ADULT HMEF

## (undated) DEVICE — VENODYNE/SCD SLEEVE CALF MEDIUM

## (undated) DEVICE — CONTAINER FORMALIN 10% 20ML

## (undated) DEVICE — DENTURE CUP PINK

## (undated) DEVICE — TUBING SUCTION NONCONDUCTIVE 6MM X 12FT

## (undated) DEVICE — SALIVA EJECTOR (BLUE)

## (undated) DEVICE — MASK LRG MED AND HIGH O2 CONC M TO M 10FT

## (undated) DEVICE — SYR LUER LOK 3CC

## (undated) DEVICE — DRSG CURITY GAUZE SPONGE 4 X 4" 12-PLY NON-STERILE

## (undated) DEVICE — WARMING BLANKET FULL ADULT

## (undated) DEVICE — ELCTR GROUNDING PAD ADULT COVIDIEN

## (undated) DEVICE — SOL IRR POUR NS 0.9% 500ML

## (undated) DEVICE — SOL INJ NS 0.9% 500ML 1-PORT

## (undated) DEVICE — NDL HYPO SAFE 22G X 1" (BLACK)

## (undated) DEVICE — TUBING ENDO EXT OLYMPUS 160 24HR USE GI

## (undated) DEVICE — LABELS BLANK W PEN

## (undated) DEVICE — SNARE LESIONHUNTER ROTAT NITNL COLD 10MM